# Patient Record
Sex: FEMALE | Race: WHITE | Employment: FULL TIME | URBAN - METROPOLITAN AREA
[De-identification: names, ages, dates, MRNs, and addresses within clinical notes are randomized per-mention and may not be internally consistent; named-entity substitution may affect disease eponyms.]

---

## 2021-04-02 ENCOUNTER — HOSPITAL ENCOUNTER (EMERGENCY)
Age: 46
Discharge: HOME | End: 2021-04-02
Attending: EMERGENCY MEDICINE
Payer: MEDICAID

## 2021-04-02 ENCOUNTER — APPOINTMENT (OUTPATIENT)
Dept: CT IMAGING | Age: 46
End: 2021-04-02
Attending: EMERGENCY MEDICINE
Payer: MEDICAID

## 2021-04-02 VITALS
RESPIRATION RATE: 16 BRPM | BODY MASS INDEX: 41.79 KG/M2 | DIASTOLIC BLOOD PRESSURE: 83 MMHG | SYSTOLIC BLOOD PRESSURE: 110 MMHG | WEIGHT: 221.34 LBS | HEART RATE: 77 BPM | OXYGEN SATURATION: 97 % | HEIGHT: 61 IN | TEMPERATURE: 98.2 F

## 2021-04-02 DIAGNOSIS — G51.0 BELL'S PALSY: Primary | ICD-10-CM

## 2021-04-02 PROCEDURE — 70450 CT HEAD/BRAIN W/O DYE: CPT

## 2021-04-02 PROCEDURE — 99283 EMERGENCY DEPT VISIT LOW MDM: CPT

## 2021-04-02 RX ORDER — PREDNISONE 10 MG/1
TABLET ORAL
Qty: 48 TAB | Refills: 0 | Status: SHIPPED | OUTPATIENT
Start: 2021-04-02 | End: 2021-05-11

## 2021-04-02 NOTE — ED TRIAGE NOTES
Triage Note: Patient arrives to ER complaining of eye swelling. Patient states that yesterday around 1500 that her right eye is not blinking with the left eye. Patient states that the right eye feels heavy. Patient denies any pain. Patient states that she has not done anything to relieve symptom but nothing makes it worse.

## 2021-04-02 NOTE — ED PROVIDER NOTES
HPI     Please note that this dictation was completed with [employer], the computer voice recognition software. Quite often unanticipated grammatical, syntax, homophones, and other interpretive errors are inadvertently transcribed by the computer software. Please disregard these errors. Please excuse any errors that have escaped final proofreading. 67-year-old female otherwise healthy here with right facial paralysis and eyelid paralysis noted yesterday at 3 PM.  Last known normal was yesterday morning. She was on her way to work and noticed that her right face had a droop at 3 PM.  Denies any other focal weakness of arms or legs, sensory changes, headaches, vomiting, tick bites or other complaints. No double vision. No eye pain. Social history: Non-smoker. Rare alcohol. History reviewed. No pertinent past medical history.     Past Surgical History:   Procedure Laterality Date    HX  SECTION      HX CHOLECYSTECTOMY           Family History:   Problem Relation Age of Onset    Cancer Sister         leukemia       Social History     Socioeconomic History    Marital status: SINGLE     Spouse name: Not on file    Number of children: Not on file    Years of education: Not on file    Highest education level: Not on file   Occupational History    Not on file   Social Needs    Financial resource strain: Not on file    Food insecurity     Worry: Not on file     Inability: Not on file    Transportation needs     Medical: Not on file     Non-medical: Not on file   Tobacco Use    Smoking status: Never Smoker    Smokeless tobacco: Never Used   Substance and Sexual Activity    Alcohol use: Yes     Comment: rarely    Drug use: Never    Sexual activity: Not on file   Lifestyle    Physical activity     Days per week: Not on file     Minutes per session: Not on file    Stress: Not on file   Relationships    Social connections     Talks on phone: Not on file     Gets together: Not on file Attends Sabianism service: Not on file     Active member of club or organization: Not on file     Attends meetings of clubs or organizations: Not on file     Relationship status: Not on file    Intimate partner violence     Fear of current or ex partner: Not on file     Emotionally abused: Not on file     Physically abused: Not on file     Forced sexual activity: Not on file   Other Topics Concern    Not on file   Social History Narrative    Not on file         ALLERGIES: Patient has no known allergies. Review of Systems   Constitutional: Negative for fever. Eyes: Negative for discharge and redness. Neurological: Positive for facial asymmetry. Negative for headaches. All other systems reviewed and are negative. Vitals:    04/02/21 1134   BP: (!) 153/96            Physical Exam  Vitals signs and nursing note reviewed. Constitutional:       Appearance: She is well-developed. HENT:      Head: Normocephalic and atraumatic. Mouth/Throat:      Pharynx: No oropharyngeal exudate. Eyes:      General: No scleral icterus. Right eye: No discharge. Left eye: No discharge. Conjunctiva/sclera: Conjunctivae normal.      Pupils: Pupils are equal, round, and reactive to light. Neck:      Musculoskeletal: Normal range of motion and neck supple. Cardiovascular:      Rate and Rhythm: Normal rate and regular rhythm. Heart sounds: Normal heart sounds. No murmur. No friction rub. No gallop. Pulmonary:      Effort: Pulmonary effort is normal. No respiratory distress. Breath sounds: Normal breath sounds. No wheezing or rales. Abdominal:      General: Bowel sounds are normal. There is no distension. Palpations: Abdomen is soft. Tenderness: There is no abdominal tenderness. There is no guarding. Musculoskeletal: Normal range of motion. General: No tenderness. Lymphadenopathy:      Cervical: No cervical adenopathy.    Skin:     General: Skin is warm and dry.      Coloration: Skin is not pale. Findings: No rash. Neurological:      Mental Status: She is alert and oriented to person, place, and time. Cranial Nerves: Cranial nerve deficit (cn 7 with asymetric forehead wrinking, mild right facial droop and inability to close right eyelid as forcefully but able to close it) present. Sensory: No sensory deficit. Coordination: Coordination normal.      Comments: + eom. Perrla. No arm or leg drift. mp tpmgue deviation. MDM     78-year-old female with right facial nerve palsy which involves her forehead as well. No eye symptoms of pain to suggest any cornea drying or ulcer. Will check head CT, eye care, likely steroids if head CT is fine. Exam is consistent with Bell's palsy most likely. Procedures          12:30 PM  Patient's results have been reviewed with them. Patient and/or family have verbally conveyed their understanding and agreement of the patient's signs, symptoms, diagnosis, treatment and prognosis and additionally agree to follow up as recommended or return to the Emergency Room should their condition change prior to follow-up. Discharge instructions have also been provided to the patient with some educational information regarding their diagnosis as well a list of reasons why they would want to return to the ER prior to their follow-up appointment should their condition change. No results found for this or any previous visit (from the past 24 hour(s)). Ct Head Wo Cont    Result Date: 4/2/2021  EXAM: CT HEAD WO CONT INDICATION: right facial nerve palsy COMPARISON: None. CONTRAST: None. TECHNIQUE: Unenhanced CT of the head was performed using 5 mm images. Brain and bone windows were generated. Coronal and sagittal reformats. CT dose reduction was achieved through use of a standardized protocol tailored for this examination and automatic exposure control for dose modulation.   FINDINGS: The ventricles and sulci are normal in size, shape and configuration. . There is no significant white matter disease. There is no intracranial hemorrhage, extra-axial collection, or mass effect. The basilar cisterns are open. No CT evidence of acute infarct. The bone windows demonstrate no abnormalities. The visualized portions of the paranasal sinuses and mastoid air cells are clear. No abnormalities demonstrated.

## 2021-05-11 ENCOUNTER — OFFICE VISIT (OUTPATIENT)
Dept: INTERNAL MEDICINE CLINIC | Age: 46
End: 2021-05-11
Payer: MEDICAID

## 2021-05-11 VITALS
HEIGHT: 61 IN | RESPIRATION RATE: 15 BRPM | OXYGEN SATURATION: 97 % | SYSTOLIC BLOOD PRESSURE: 129 MMHG | BODY MASS INDEX: 39.91 KG/M2 | WEIGHT: 211.38 LBS | HEART RATE: 92 BPM | TEMPERATURE: 98 F | DIASTOLIC BLOOD PRESSURE: 85 MMHG

## 2021-05-11 DIAGNOSIS — Z71.85 IMMUNIZATION COUNSELING: ICD-10-CM

## 2021-05-11 DIAGNOSIS — G51.0 RIGHT-SIDED BELL'S PALSY: Primary | ICD-10-CM

## 2021-05-11 DIAGNOSIS — Z76.89 ENCOUNTER TO ESTABLISH CARE: ICD-10-CM

## 2021-05-11 PROCEDURE — 99203 OFFICE O/P NEW LOW 30 MIN: CPT | Performed by: INTERNAL MEDICINE

## 2021-05-11 RX ORDER — LEVONORGESTREL 52 MG/1
1 INTRAUTERINE DEVICE INTRAUTERINE ONCE
COMMUNITY

## 2021-05-11 NOTE — PROGRESS NOTES
Meera Caro (: 1975) is a 41 y.o. female, new patient, here for evaluation of the following chief complaint(s):  Chief Complaint   Patient presents with    New Patient       Assessment and Plan:       ICD-10-CM ICD-9-CM    1. Right-sided Bell's palsy--resolved with prednisone management  G51.0 351.0    2. Immunization counseling  Z71.89 V65.49    3. Encounter to establish care  Z76.89 V65.8        1. Resolved without sequelae noted. 2.  Reviewed updating records here and vaccines as needed at follow-up. Follow-up and Dispositions    · Return in about 4 weeks (around 2021) for fasting labs, yearly physical.       lab results and schedule of future lab studies reviewed with patient  reviewed medications and side effects in detail    For additional documentation of information and/or recommendations discussed this visit, please see notes in instructions. Plan and evaluation (above) reviewed with pt at visit  Patient voiced understanding of plan and provided with time to ask/review questions. After Visit Summary (AVS) provided to pt after visit with additional instructions as needed/reviewed. Future Appointments   Date Time Provider Stacey Sanchez   2021  8:30 AM Hui Mallory MD [facility] BS AMB   --Updated future visits after patient check-out. On this date 2021 I have spent 42 minutes reviewing previous notes, test results and face to face with the patient discussing the diagnosis and importance of compliance with the treatment plan as well as documenting on the day of the visit. History of Present Illness:     Notes (nursing/rooming note copied below in italics):  Patient not fasting    Here to establish care. Pt has records in [address] [place] [address]. Records reviewed at visit as below:  --ED eval 21--imaging and exam c/w Bell's palsy right. Treated with prednisone 10mg dose pack  Head CT normal.  No labs done.     She notes symptoms resolved with prednisone therapy over 1.5 weeks as prescribed. She has no residual weakness, pain or sensory problems. She has no eye-related symptoms or persistent neuro symptoms currently. Was back to normal after 1.5 wks steroid--for past month. Last dose of prednisone was 21. Not using/needing eye ointment either. She has no other primary care at this time . Has gyn care through provider [facility] of [facility]. Had possible breast mass but complete imaging and normal--just yearly mammograms now. She was not fasting for labs with Gyn. She is seeing yearly. Health Maintenance Due   Topic Date Due    Hepatitis C Screening  Never done    COVID-19 Vaccine (1) Never done    PAP AKA CERVICAL CYTOLOGY  Never done       Prior eval with Dr. Susan Omer. Last labs --reviewed May-2016 labs--CMP, H8, lipids. Reviewed HM screening with pt--plan to review Gyn records and update at visit. Nursing screenings reviewed by provider at visit. Past Medical History:   Diagnosis Date    Bell's palsy      Past Surgical History:   Procedure Laterality Date    HX  SECTION      HX CHOLECYSTECTOMY          Prior to Admission medications    Medication Sig Start Date End Date Taking? Authorizing Provider   artificial tears,hypromellose, (ISOPTO TEARS) 0.3 % drop 1-2 drops right eye every 2 hours and then every 1 hour as needed 21  Yes Baldemar Shukla MD   white petrolatum (LACRILUBE) ointment 1 ribbon right eye at bedtime  Patient not taking: Reported on 2021   Baldemar Shukla MD   predniSONE [facility] DS) 10 mg dose pack Use as directed on package  Patient not taking: Reported on 2021   Baldemar Shukla MD   HYDROcodone-acetaminophen [facility]) 5-325 mg per tablet Take 1-2 Tabs by mouth every six (6) hours as needed for Pain. Max Daily Amount: 8 Tabs.   Patient not taking: Reported on 2021   Mic Tolliver MD   ondansetron (ZOFRAN ODT) 4 mg disintegrating tablet Take 1 Tab by mouth every eight (8) hours as needed for Nausea. Patient not taking: Reported on 5/11/2021 5/4/16   MD RENETTA Carney    Vitals:    05/11/21 1322   BP: 129/85   Pulse: 92   Resp: 15   Temp: 98 °F (36.7 °C)   TempSrc: Oral   SpO2: 97%   Weight: 211 lb 6 oz (95.9 kg)   Height: 5' 1\" (1.549 m)   PainSc:   0 - No pain      Body mass index is 39.94 kg/m². Physical Exam:     Physical Exam  Vitals signs and nursing note reviewed. Constitutional:       General: She is not in acute distress. Appearance: Normal appearance. She is well-developed. She is not diaphoretic. HENT:      Head: Normocephalic and atraumatic. Comments: Face symmetric. No facial droop noted. Motor 5/5 bilat. Sensory intact to light touch. No difficulty with eyelid closing or bulbar conjunctival dryness/injection/erythema noted bilat. Mouth/Throat:      Mouth: Mucous membranes are moist.   Eyes:      General: No scleral icterus. Right eye: No discharge. Left eye: No discharge. Conjunctiva/sclera: Conjunctivae normal.   Neck:      Musculoskeletal: Neck supple. No neck rigidity or muscular tenderness. Cardiovascular:      Rate and Rhythm: Normal rate and regular rhythm. Pulses: Normal pulses. Heart sounds: Normal heart sounds. No murmur. No friction rub. No gallop. Pulmonary:      Effort: Pulmonary effort is normal. No respiratory distress. Breath sounds: Normal breath sounds. No stridor. No wheezing, rhonchi or rales. Abdominal:      General: Bowel sounds are normal. There is no distension. Palpations: Abdomen is soft. Tenderness: There is no abdominal tenderness. There is no guarding. Musculoskeletal:         General: No swelling, tenderness, deformity or signs of injury. Right lower leg: No edema. Left lower leg: No edema. Lymphadenopathy:      Cervical: No cervical adenopathy. Skin:     General: Skin is warm. Coloration: Skin is not jaundiced or pale. Findings: No bruising, erythema or rash. Neurological:      General: No focal deficit present. Mental Status: She is alert. Motor: No abnormal muscle tone. Coordination: Coordination normal.      Gait: Gait normal.   Psychiatric:         Mood and Affect: Mood normal.         Behavior: Behavior normal.         Thought Content: Thought content normal.         Judgment: Judgment normal.         An electronic signature was used to authenticate this note.   -- Raymond Stapleton MD

## 2021-05-11 NOTE — PATIENT INSTRUCTIONS
1. COVID Vaccine Information as of April 2021: Locations to Receive Vaccine with [facility]: The vaccines are going to be given in locations that are separate from our clinic at [address] [facility] at this time. The sites that have opened to administer the vaccines are:  
[address] 
[address] [address], [address] [place] [address]--Saturday Only [facility], [address] [facility]--Saturday Only [address] [place], [address] [facility]) [address] [place] [address] [address] [place] [address], [address] [place] [address] Scheduling an appointment: If you would like to receive the vaccine, please call [phone] to make your appointment. There is limited availability and we will be scheduling patients in the order in which they call until our current vaccine supply is allocated. You must have an appointment in order to receive the vaccine. Clinics cannot accommodate walk-ins. Other ways to get COVID vaccines: 
[facility]:   
[email or web address] Note:  The Health Department is encouraging anyone interested in getting a COVID vaccine to register at vaccinate. [place].gov or call 724-VAX-IN-VA Thank you for allowing [facility] to be your trusted health care partner! 2. The CPT code for the HPV-9 (Human PapillomaVirus) vaccine is listed below. This is what insurance uses to identify the specific vaccine for billing: 
 
--CPT code is 81224 for the Human Papillomavirus vaccine nonavalent (9vHPV), 2 or 3 dose schedule, for intramuscular use. If covered here, you can start a 3-dose series as a nurse-only visit here, or you can do in local pharmacy if preferred.

## 2021-05-11 NOTE — PROGRESS NOTES
RM 16    Patient not fasting    Chief Complaint   Patient presents with    New Patient       1. Have you been to the ER, urgent care clinic since your last visit? Hospitalized since your last visit? Yes Reason for visit: 4/2/21, SPT, eye swelling     2. Have you seen or consulted any other health care providers outside of the [address] [place] [address] since your last visit? Include any pap smears or colon screening. No    Health Maintenance Due   Topic Date Due    Hepatitis C Screening  Never done    COVID-19 Vaccine (1) Never done    PAP AKA CERVICAL CYTOLOGY  Never done       Abuse Screening Questionnaire 5/11/2021   Do you ever feel afraid of your partner? N   Are you in a relationship with someone who physically or mentally threatens you? N   Is it safe for you to go home? Y       3 most recent PHQ Screens 5/11/2021   Little interest or pleasure in doing things Not at all   Feeling down, depressed, irritable, or hopeless Not at all   Total Score PHQ 2 0       No flowsheet data found.

## 2021-06-08 ENCOUNTER — OFFICE VISIT (OUTPATIENT)
Dept: INTERNAL MEDICINE CLINIC | Age: 46
End: 2021-06-08
Payer: MEDICAID

## 2021-06-08 VITALS
DIASTOLIC BLOOD PRESSURE: 81 MMHG | BODY MASS INDEX: 42.2 KG/M2 | WEIGHT: 223.5 LBS | HEIGHT: 61 IN | RESPIRATION RATE: 14 BRPM | OXYGEN SATURATION: 98 % | SYSTOLIC BLOOD PRESSURE: 123 MMHG | HEART RATE: 77 BPM | TEMPERATURE: 97.7 F

## 2021-06-08 DIAGNOSIS — A64 STI (SEXUALLY TRANSMITTED INFECTION): ICD-10-CM

## 2021-06-08 DIAGNOSIS — Z00.00 WELL WOMAN EXAM (NO GYNECOLOGICAL EXAM): Primary | ICD-10-CM

## 2021-06-08 LAB
ALBUMIN SERPL-MCNC: 4 G/DL (ref 3.5–5)
ALBUMIN/GLOB SERPL: 1 {RATIO} (ref 1.1–2.2)
ALP SERPL-CCNC: 71 U/L (ref 45–117)
ALT SERPL-CCNC: 21 U/L (ref 12–78)
ANION GAP SERPL CALC-SCNC: 8 MMOL/L (ref 5–15)
AST SERPL-CCNC: 14 U/L (ref 15–37)
BASOPHILS # BLD: 0.1 K/UL (ref 0–0.1)
BASOPHILS NFR BLD: 1 % (ref 0–1)
BILIRUB SERPL-MCNC: 0.5 MG/DL (ref 0.2–1)
BUN SERPL-MCNC: 9 MG/DL (ref 6–20)
BUN/CREAT SERPL: 14 (ref 12–20)
CALCIUM SERPL-MCNC: 9.1 MG/DL (ref 8.5–10.1)
CHLORIDE SERPL-SCNC: 103 MMOL/L (ref 97–108)
CHOLEST SERPL-MCNC: 198 MG/DL
CO2 SERPL-SCNC: 26 MMOL/L (ref 21–32)
CREAT SERPL-MCNC: 0.64 MG/DL (ref 0.55–1.02)
DIFFERENTIAL METHOD BLD: ABNORMAL
EOSINOPHIL # BLD: 0.1 K/UL (ref 0–0.4)
EOSINOPHIL NFR BLD: 1 % (ref 0–7)
ERYTHROCYTE [DISTWIDTH] IN BLOOD BY AUTOMATED COUNT: 14.7 % (ref 11.5–14.5)
EST. AVERAGE GLUCOSE BLD GHB EST-MCNC: 151 MG/DL
GLOBULIN SER CALC-MCNC: 4 G/DL (ref 2–4)
GLUCOSE SERPL-MCNC: 137 MG/DL (ref 65–100)
HBA1C MFR BLD: 6.9 % (ref 4–5.6)
HBV SURFACE AB SER QL: NONREACTIVE
HBV SURFACE AB SER-ACNC: <3.1 MIU/ML
HBV SURFACE AG SER QL: <0.1 INDEX
HBV SURFACE AG SER QL: NEGATIVE
HCT VFR BLD AUTO: 44.4 % (ref 35–47)
HCV AB SERPL QL IA: NONREACTIVE
HCV COMMENT,HCGAC: NORMAL
HDLC SERPL-MCNC: 43 MG/DL
HDLC SERPL: 4.6 {RATIO} (ref 0–5)
HGB BLD-MCNC: 13.5 G/DL (ref 11.5–16)
HIV 1+2 AB+HIV1 P24 AG SERPL QL IA: NONREACTIVE
HIV12 RESULT COMMENT, HHIVC: NORMAL
IMM GRANULOCYTES # BLD AUTO: 0.1 K/UL (ref 0–0.04)
IMM GRANULOCYTES NFR BLD AUTO: 1 % (ref 0–0.5)
LDLC SERPL CALC-MCNC: 117.2 MG/DL (ref 0–100)
LYMPHOCYTES # BLD: 1.9 K/UL (ref 0.8–3.5)
LYMPHOCYTES NFR BLD: 24 % (ref 12–49)
MCH RBC QN AUTO: 25.6 PG (ref 26–34)
MCHC RBC AUTO-ENTMCNC: 30.4 G/DL (ref 30–36.5)
MCV RBC AUTO: 84.3 FL (ref 80–99)
MONOCYTES # BLD: 0.4 K/UL (ref 0–1)
MONOCYTES NFR BLD: 5 % (ref 5–13)
NEUTS SEG # BLD: 5.6 K/UL (ref 1.8–8)
NEUTS SEG NFR BLD: 68 % (ref 32–75)
NRBC # BLD: 0 K/UL (ref 0–0.01)
NRBC BLD-RTO: 0 PER 100 WBC
PLATELET # BLD AUTO: 241 K/UL (ref 150–400)
PMV BLD AUTO: 11.1 FL (ref 8.9–12.9)
POTASSIUM SERPL-SCNC: 4.1 MMOL/L (ref 3.5–5.1)
PROT SERPL-MCNC: 8 G/DL (ref 6.4–8.2)
RBC # BLD AUTO: 5.27 M/UL (ref 3.8–5.2)
SODIUM SERPL-SCNC: 137 MMOL/L (ref 136–145)
TRIGL SERPL-MCNC: 189 MG/DL (ref ?–150)
VLDLC SERPL CALC-MCNC: 37.8 MG/DL
WBC # BLD AUTO: 8.1 K/UL (ref 3.6–11)

## 2021-06-08 PROCEDURE — 99396 PREV VISIT EST AGE 40-64: CPT | Performed by: INTERNAL MEDICINE

## 2021-06-08 NOTE — PATIENT INSTRUCTIONS
Hemoglobin A1c: About This Test 
What is it? An A1c test is a blood test that checks your average blood sugar level over the past 2 to 3 months. This test also is called a glycohemoglobin test or a hemoglobin A1c test. 
Why is this test done? The A1c test is one of the tests used to diagnose prediabetes and diabetes. If you have diabetes, this test is done to check how well your diabetes has been controlled over the past 2 to 3 months. Your doctor can use this information to adjust your treatment, if needed. How do you prepare for the test? 
You don't need to stop eating before you have an A1c test. This test can be done at any time during the day, even after a meal. 
How is the test done? A health professional uses a needle to take a blood sample, usually from an arm. What do the results of the test mean? The test result is usually given as a percentage. The normal A1c is less than 5.7%. You have a higher risk for diabetes if your A1c is 5.7% to 6.4%. If your level is 6.5% or higher, you have diabetes. The A1c test result also can be used to find your estimated average glucose, or eAG. Your eAG and A1c show the same thing in two different ways. They both help you learn more about your average blood sugar range over the past 2 to 3 months. A1c is shown as a percentage, while eAG uses the same units (mg/dl) as your glucose meter. Examples: · 6% A1c = 126 mg/dL · 7% A1c = 154 mg/dL · 8% A1c = 183 mg/dL · 9% A1c = 212 mg/dL · 10% A1c = 240 mg/dL · 11% A1c = 269 mg/dL · 12% A1c = 298 mg/dL Where can you learn more? Go to http://www.gray.com/ Enter U216 in the search box to learn more about \"Hemoglobin A1c: About This Test.\" Current as of: August 31, 2020               Content Version: 12.8 © 7133-0351 Healthwise, Inc[employer]. Care instructions adapted under license by "[employer]" (which disclaims liability or warranty for this information).  If you have questions about a medical condition or this instruction, always ask your healthcare professional. [address] any warranty or liability for your use of this information. Well Visit, Ages 25 to 48: Care Instructions Overview Well visits can help you stay healthy. Your doctor has checked your overall health and may have suggested ways to take good care of yourself. Your doctor also may have recommended tests. At home, you can help prevent illness with healthy eating, regular exercise, and other steps. Follow-up care is a key part of your treatment and safety. Be sure to make and go to all appointments, and call your doctor if you are having problems. It's also a good idea to know your test results and keep a list of the medicines you take. How can you care for yourself at home? · Get screening tests that you and your doctor decide on. Screening helps find diseases before any symptoms appear. · Eat healthy foods. Choose fruits, vegetables, whole grains, protein, and low-fat dairy foods. Limit fat, especially saturated fat. Reduce salt in your diet. · Limit alcohol. If you are a man, have no more than 2 drinks a day or 14 drinks a week. If you are a woman, have no more than 1 drink a day or 7 drinks a week. · Get at least 30 minutes of physical activity on most days of the week. Walking is a good choice. You also may want to do other activities, such as running, swimming, cycling, or playing tennis or team sports. Discuss any changes in your exercise program with your doctor. · Reach and stay at a healthy weight. This will lower your risk for many problems, such as obesity, diabetes, heart disease, and high blood pressure. · Do not smoke or allow others to smoke around you. If you need help quitting, talk to your doctor about stop-smoking programs and medicines. These can increase your chances of quitting for good. · Care for your mental health.  It is easy to get weighed down by worry and stress. Learn strategies to manage stress, like deep breathing and mindfulness, and stay connected with your family and community. If you find you often feel sad or hopeless, talk with your doctor. Treatment can help. · Talk to your doctor about whether you have any risk factors for sexually transmitted infections (STIs). You can help prevent STIs if you wait to have sex with a new partner (or partners) until you've each been tested for STIs. It also helps if you use condoms (male or female condoms) and if you limit your sex partners to one person who only has sex with you. Vaccines are available for some STIs, such as HPV. · Use birth control if it's important to you to prevent pregnancy. Talk with your doctor about the choices available and what might be best for you. · If you think you may have a problem with alcohol or drug use, talk to your doctor. This includes prescription medicines (such as amphetamines and opioids) and illegal drugs (such as cocaine and methamphetamine). Your doctor can help you figure out what type of treatment is best for you. · Protect your skin from too much sun. When you're outdoors from 10 a.m. to 4 p.m., stay in the shade or cover up with clothing and a hat with a wide brim. Wear sunglasses that block UV rays. Even when it's cloudy, put broad-spectrum sunscreen (SPF 30 or higher) on any exposed skin. · See a dentist one or two times a year for checkups and to have your teeth cleaned. · Wear a seat belt in the car. When should you call for help? Watch closely for changes in your health, and be sure to contact your doctor if you have any problems or symptoms that concern you. Where can you learn more? Go to http://www.[employer].com/ Enter P072 in the search box to learn more about \"Well Visit, Ages 25 to 48: Care Instructions. \" Current as of: May 27, 2020               Content Version: 12.8 © 2758-3563 Healthwise, [facility].   
Care instructions adapted under license by [employer] (which disclaims liability or warranty for this information). If you have questions about a medical condition or this instruction, always ask your healthcare professional. [address] any warranty or liability for your use of this information.

## 2021-06-08 NOTE — PROGRESS NOTES
Abhijit Maurice (: 1975) is a 41 y.o. female, established patient, here for evaluation of the following chief complaint(s):  Chief Complaint   Patient presents with    Complete Physical    Labs     Patient is fasting        Assessment and Plan:       ICD-10-CM ICD-9-CM    1. Well woman exam (no gynecological exam)  Z00.00 V70.0 CBC WITH AUTOMATED DIFF      METABOLIC PANEL, COMPREHENSIVE      HEMOGLOBIN A1C WITH EAG      LIPID PANEL   2. STI (sexually transmitted infection)  A64 099.9 HIV 1/2 AG/AB, 4TH GENERATION,W RFLX CONFIRM      HEPATITIS C AB      T PALLIDUM SCREEN W/REFLEX      HEP B SURFACE AG      HEP B SURFACE AB      CT/NG/T.VAGINALIS AMPLIFICATION      HEPATITIS B CORE AB, TOTAL       1-2:  Screenings reviewed at visit. Immunizations reviewed at visit--current/up to date. Follow-up and Dispositions    · Return in about 1 year (around 2022), or if symptoms worsen or fail to improve, for yearly physical, fasting labs. lab results and schedule of future lab studies reviewed with patient  reviewed medications and side effects in detail    Plan and evaluation (above) reviewed with pt at visit  Patient voiced understanding of plan and provided with time to ask/review questions. After Visit Summary (AVS) provided to pt after visit with additional instructions as needed/reviewed. No future appointments. --Updated future visits after patient check-out. History of Present Illness:     Notes (nursing/rooming note copied below in italics):  As above    Here for physical and fasting labs. Has gyn provider and mammogram screenings and pap current there. Release signed prior visit. Health Maintenance Due   Topic Date Due    Hepatitis C Screening  Never done    PAP AKA CERVICAL CYTOLOGY  Never done    Lipid Screen  2021     --Hep C and STI screening reviewed and updated at visit. --Pap reviewed as above.   Release prior.  --Lipid screening done today at visit. Reviewed and Tdap current. She is with same partner since  and not interested/at risk for HPV at this time. Shared decision-making for HPV prior to 46yr old reviewed with pt at visit. She has not had recommended with gyn prior either. Not recommended at this time. Updated colon cancer screening at 39yr old reviewed. Discussed since new recommendation, other professional groups have not addressed fully. She has no FH of colon cancer. Sister has leukemia and just had colonoscopy at 53yr old. Plan re-evaluate with her at next physical.      Nursing screenings reviewed by provider at visit. Past Medical History:   Diagnosis Date    Bell's palsy      Past Surgical History:   Procedure Laterality Date    HX  SECTION      HX CHOLECYSTECTOMY          Prior to Admission medications    Medication Sig Start Date End Date Taking? Authorizing Provider   levonorgestreL (Mirena) 20 mcg/24 hours (6 yrs) 52 mg IUD 1 Device by IntraUTERine route once. Yes Provider, Historical   artificial tears,hypromellose, (ISOPTO TEARS) 0.3 % drop 1-2 drops right eye every 2 hours and then every 1 hour as needed 21  Yes Jolene Salinas MD   white petrolatum (LACRILUBE) ointment 1 ribbon right eye at bedtime  Patient not taking: Reported on 2021   Jolene Salinas MD        ROS    Vitals:    21 0827   BP: 123/81   Pulse: 77   Resp: 14   Temp: 97.7 °F (36.5 °C)   TempSrc: Oral   SpO2: 98%   Weight: 223 lb 8 oz (101.4 kg)   Height: 5' 1\" (1.549 m)   PainSc:   0 - No pain      Body mass index is 42.23 kg/m². Physical Exam:     Physical Exam  Vitals and nursing note reviewed. Constitutional:       General: She is not in acute distress. Appearance: Normal appearance. She is well-developed. She is not diaphoretic. HENT:      Head: Normocephalic and atraumatic.       Right Ear: Tympanic membrane, ear canal and external ear normal.      Left Ear: Tympanic membrane, ear canal and external ear normal.      Ears:      Comments: Mild wax bilat. Mouth/Throat:      Mouth: Mucous membranes are moist.   Eyes:      General: No scleral icterus. Right eye: No discharge. Left eye: No discharge. Conjunctiva/sclera: Conjunctivae normal.   Cardiovascular:      Rate and Rhythm: Normal rate and regular rhythm. Pulses: Normal pulses. Heart sounds: Normal heart sounds. No murmur heard. No friction rub. No gallop. Pulmonary:      Effort: Pulmonary effort is normal. No respiratory distress. Breath sounds: Normal breath sounds. No stridor. No wheezing, rhonchi or rales. Abdominal:      General: Bowel sounds are normal. There is no distension. Palpations: Abdomen is soft. Tenderness: There is no abdominal tenderness. There is no guarding. Musculoskeletal:         General: No swelling, tenderness, deformity or signs of injury. Cervical back: Neck supple. No rigidity. No muscular tenderness. Right lower leg: No edema. Left lower leg: No edema. Lymphadenopathy:      Cervical: No cervical adenopathy. Skin:     General: Skin is warm. Coloration: Skin is not jaundiced or pale. Findings: No bruising, erythema or rash. Neurological:      General: No focal deficit present. Mental Status: She is alert. Motor: No abnormal muscle tone. Coordination: Coordination normal.      Gait: Gait normal.   Psychiatric:         Mood and Affect: Mood normal.         Behavior: Behavior normal.         Thought Content: Thought content normal.         Judgment: Judgment normal.         An electronic signature was used to authenticate this note.   -- Komal Denney MD

## 2021-06-08 NOTE — PROGRESS NOTES
RM 17    Chief Complaint   Patient presents with    Complete Physical    Labs     Patient is fasting        1. Have you been to the ER, urgent care clinic since your last visit? Hospitalized since your last visit? No    2. Have you seen or consulted any other health care providers outside of the [address] [place] [address] since your last visit? Include any pap smears or colon screening. No    Health Maintenance Due   Topic Date Due    Hepatitis C Screening  Never done    PAP AKA CERVICAL CYTOLOGY  Never done    Lipid Screen  06/13/2021       Abuse Screening Questionnaire 6/8/2021   Do you ever feel afraid of your partner? N   Are you in a relationship with someone who physically or mentally threatens you? N   Is it safe for you to go home? Y       3 most recent PHQ Screens 6/8/2021   Little interest or pleasure in doing things Not at all   Feeling down, depressed, irritable, or hopeless Not at all   Total Score PHQ 2 0       No flowsheet data found.

## 2021-06-09 LAB
HBV CORE AB SERPL QL IA: NEGATIVE
T PALLIDUM AB SER QL IA: NON REACTIVE

## 2021-06-10 LAB
C TRACH RRNA SPEC QL NAA+PROBE: NEGATIVE
N GONORRHOEA RRNA SPEC QL NAA+PROBE: NEGATIVE
SPECIMEN SOURCE: NORMAL
T VAGINALIS RRNA VAG QL NAA+PROBE: NEGATIVE

## 2021-09-15 ENCOUNTER — NURSE NAVIGATOR (OUTPATIENT)
Dept: SURGERY | Age: 46
End: 2021-09-15

## 2021-09-15 NOTE — PROGRESS NOTES
[address]  Breast Navigator Encounter    Name:    Jono Puente  Age:    40 y.o. Diagnosis:     RIGHT breast cancer    Interdisciplinary Team:  Med-Onc:    Surg-Onc:    Dr. Louise Elslworth:    Plastics:    :     Nurse Navigator:  Gamal San, RN, BSN, [facility]      Encounter type:  []Patient Initiated  []Navigator Follow-up []Pre-op  []Post-op  []Check-in Prior to First Treatment []Treatment Modality Change  []Survivorship Transition [x]Other:   Initial Navigator Encounter    Narrative: Attempted to reach out to patient prior to her appointment next week. She was not available, so I left a message asking her to call me back at her convenience.             Gamal San RN, BSN, [facility]  Oncology Breast Navigator     [address]  [address] [place] [address].  W: [phone]  F: [phone]  [email or web address]  Good Help to Those in [facility]

## 2021-09-16 ENCOUNTER — NURSE NAVIGATOR (OUTPATIENT)
Dept: SURGERY | Age: 46
End: 2021-09-16

## 2021-09-16 DIAGNOSIS — Z17.0 MALIGNANT NEOPLASM OF RIGHT BREAST IN FEMALE, ESTROGEN RECEPTOR POSITIVE, UNSPECIFIED SITE OF BREAST (HCC): Primary | ICD-10-CM

## 2021-09-16 DIAGNOSIS — C50.911 MALIGNANT NEOPLASM OF RIGHT BREAST IN FEMALE, ESTROGEN RECEPTOR POSITIVE, UNSPECIFIED SITE OF BREAST (HCC): Primary | ICD-10-CM

## 2021-09-16 NOTE — PROGRESS NOTES
[employer]  Breast Navigator Encounter    Name:    Hardeep Haley  Age:    40 y.o. Diagnosis:    RIGHT breast cancer    Interdisciplinary Team:  Med-Onc:    Surg-Onc:    Dr. Dorantes Loose:    Plastics:    :     Nurse Navigator:  Gaby Gaviria RN, BSN, [facility]      Encounter type:  []Patient Initiated  []Navigator Follow-up []Pre-op  []Post-op  []Check-in Prior to First Treatment []Treatment Modality Change  []Survivorship Transition [x]Other:   Initial Navigator Encounter     Narrative:    Patient returned my call for initial navigator contact. I explained what happens at the first consultation - an exam by the physician, a possible ultrasound, then complete discussion of surgical options and other treatment that may be considered. I encouraged the patient to bring  someone with her to this appointment since there is a lot of information that will be covered. She will be bringing a family member. Discussed breast MRI, and she is willing to go ahead and get this scheduled. Since we have a short window of time, I had already reached out to the imaging navigator, Renetta Holloway, and she had an appointment tomorrow at [facility] at 8:30 am for a breast MRI with arrival time of 8:00 am.  The patient has to go to work at 11:00, but she is able to make that appointment. Reminded her to take her disc with images from [facility] with her when she goes to the appointment tomorrow, and she will do this. I am going to try to meet her at the office next Monday when she is there to see Dr. Rossana Kong. Provided the patient with my contact information and discussed my role in her care. Will continue to follow patient throughout  the care continuum.               Gaby Gaviria RN, BSN, [facility]  Oncology Breast Navigator     [address]  [address] [facility] [address]  W: [phone]  F: [phone]  [email or web address]  Good Help to Those in [facility]

## 2021-09-17 ENCOUNTER — HOSPITAL ENCOUNTER (OUTPATIENT)
Dept: MRI IMAGING | Age: 46
Discharge: HOME | End: 2021-09-17
Attending: SURGERY
Payer: MEDICAID

## 2021-09-17 DIAGNOSIS — Z17.0 MALIGNANT NEOPLASM OF RIGHT BREAST IN FEMALE, ESTROGEN RECEPTOR POSITIVE, UNSPECIFIED SITE OF BREAST (HCC): ICD-10-CM

## 2021-09-17 DIAGNOSIS — C50.911 MALIGNANT NEOPLASM OF RIGHT BREAST IN FEMALE, ESTROGEN RECEPTOR POSITIVE, UNSPECIFIED SITE OF BREAST (HCC): ICD-10-CM

## 2021-09-17 PROCEDURE — 74011250636 HC RX REV CODE- 250/636

## 2021-09-17 PROCEDURE — 77049 MRI BREAST C-+ W/CAD BI: CPT

## 2021-09-17 PROCEDURE — 74011000258 HC RX REV CODE- 258: Performed by: SURGERY

## 2021-09-17 PROCEDURE — A9585 GADOBUTROL INJECTION: HCPCS

## 2021-09-17 RX ADMIN — GADOBUTROL 10 ML: 604.72 INJECTION INTRAVENOUS at 09:00

## 2021-09-17 RX ADMIN — SODIUM CHLORIDE 100 ML: 900 INJECTION, SOLUTION INTRAVENOUS at 09:00

## 2021-09-20 ENCOUNTER — OFFICE VISIT (OUTPATIENT)
Dept: SURGERY | Age: 46
End: 2021-09-20
Payer: MEDICAID

## 2021-09-20 ENCOUNTER — DOCUMENTATION ONLY (OUTPATIENT)
Dept: SURGERY | Age: 46
End: 2021-09-20

## 2021-09-20 ENCOUNTER — NURSE NAVIGATOR (OUTPATIENT)
Dept: CASE MANAGEMENT | Age: 46
End: 2021-09-20

## 2021-09-20 VITALS — WEIGHT: 220 LBS | BODY MASS INDEX: 41.54 KG/M2 | HEIGHT: 61 IN

## 2021-09-20 DIAGNOSIS — C50.411 MALIGNANT NEOPLASM OF UPPER-OUTER QUADRANT OF RIGHT FEMALE BREAST, UNSPECIFIED ESTROGEN RECEPTOR STATUS (HCC): Primary | ICD-10-CM

## 2021-09-20 PROCEDURE — 76642 ULTRASOUND BREAST LIMITED: CPT | Performed by: SURGERY

## 2021-09-20 PROCEDURE — 99205 OFFICE O/P NEW HI 60 MIN: CPT | Performed by: SURGERY

## 2021-09-20 NOTE — PATIENT INSTRUCTIONS
Breast Cancer: Care Instructions  Your Care Instructions     Breast cancer occurs when abnormal cells grow out of control in the breast. These cancer cells can spread within the breast, to nearby lymph nodes and other tissues, and to other parts of the body. Being treated for cancer can weaken your body, and you may feel very tired. Get the rest your body needs so you can feel better. Finding out that you have cancer is scary. You may feel many emotions and may need some help coping. Seek out family, friends, and counselors for support. You also can do things at home to make yourself feel better while you go through treatment. Call the [employer] (7-819.666.7259) or visit its website at [employer] for more information. Follow-up care is a key part of your treatment and safety. Be sure to make and go to all appointments, and call your doctor if you are having problems. It's also a good idea to know your test results and keep a list of the medicines you take. How can you care for yourself at home? · Take your medicines exactly as prescribed. Call your doctor if you think you are having a problem with your medicine. You may get medicine for nausea and vomiting if you have these side effects. · Follow your doctor's instructions to relieve pain. Pain from cancer and surgery can almost always be controlled. Use pain medicine when you first notice pain, before it becomes severe. · Eat healthy food. If you do not feel like eating, try to eat food that has protein and extra calories to keep up your strength and prevent weight loss. Drink liquid meal replacements for extra calories and protein. Try to eat your main meal early. · Get some physical activity every day, but do not get too tired. Keep doing the hobbies you enjoy as your energy allows. · Do not smoke. Smoking can make your cancer worse. If you need help quitting, talk to your doctor about stop-smoking programs and medicines.  These can increase your chances of quitting for good. · Take steps to control your stress and workload. Learn relaxation techniques. ? Share your feelings. Stress and tension affect our emotions. By expressing your feelings to others, you may be able to understand and cope with them. ? Consider joining a support group. Talking about a problem with your spouse, a good friend, or other people with similar problems is a good way to reduce tension and stress. ? Express yourself through art. Try writing, crafts, dance, or art to relieve stress. Some dance, writing, or art groups may be available just for people who have cancer. ? Be kind to your body and mind. Getting enough sleep, eating a healthy diet, and taking time to do things you enjoy can contribute to an overall feeling of balance in your life and can help reduce stress. ? Get help if you need it. Discuss your concerns with your doctor or counselor. · If you are vomiting or have diarrhea:  ? Drink plenty of fluids to prevent dehydration. Choose water and other clear liquids. If you have kidney, heart, or liver disease and have to limit fluids, talk with your doctor before you increase the amount of fluids you drink. ? When you are able to eat, try clear soups, mild foods, and liquids until all symptoms are gone for 12 to 48 hours. Other good choices include dry toast, crackers, cooked cereal, and gelatin dessert, such as Jell-O.  · If you have not already done so, prepare a list of advance directives. Advance directives are instructions to your doctor and family members about what kind of care you want if you become unable to speak or express yourself. When should you call for help? Call 911 anytime you think you may need emergency care. For example, call if:    · You passed out (lost consciousness).    Call your doctor now or seek immediate medical care if:    · You have a fever.     · You have abnormal bleeding.     · You think you have an infection.     · You have new or worse pain.     · You have new symptoms, such as a cough, belly pain, vomiting, diarrhea, or a rash. Watch closely for changes in your health, and be sure to contact your doctor if:    · You are much more tired than usual.     · You have swollen glands in your armpits, groin, or neck.     · You do not get better as expected. Where can you learn more? Go to http://www.smith.com/  Enter V321 in the search box to learn more about \"Breast Cancer: Care Instructions. \"  Current as of: December 17, 2020               Content Version: 13.0  © 3233-9128 [employer]. Care instructions adapted under license by [employer] (which disclaims liability or warranty for this information). If you have questions about a medical condition or this instruction, always ask your healthcare professional. [address] any warranty or liability for your use of this information.

## 2021-09-20 NOTE — PROGRESS NOTES
Completed TRF on [employer] portal for genetic testing (Breast Next Expanded). Insurance card emailed to [employer]. Patient has saliva kit.

## 2021-09-20 NOTE — PROGRESS NOTES
HISTORY OF PRESENT ILLNESS  Josefina Mcintosh is a 40 y.o. female. HPI NEW patient consult referred by  Dr. Amisha Osorio for RIGHT breast invasive ductal carcinoma. Found on imaging. She is having pain in the RIGHT breast area once she takes her bra off. Denies change. Biopsy 21 RIGHT breast IDC grade 3 suspicious LVI   ER 96, ND 98, Ki-67 32, Her2 Positive     Family History: None    Breast imaging-   Mammogram VWC 21, BI-RADS 4 linear calcs right breast   MRI 21      Past Medical History:   Diagnosis Date    Bell's palsy      Past Surgical History:   Procedure Laterality Date    HX  SECTION      HX CHOLECYSTECTOMY       Social History     Socioeconomic History    Marital status: SINGLE     Spouse name: Not on file    Number of children: Not on file    Years of education: Not on file    Highest education level: Not on file   Occupational History    Not on file   Tobacco Use    Smoking status: Never Smoker    Smokeless tobacco: Never Used   Substance and Sexual Activity    Alcohol use: Yes     Comment: rarely    Drug use: Never    Sexual activity: Yes     Partners: Male     Birth control/protection: I.U.D. Comment: Mirena IUD   Other Topics Concern    Not on file   Social History Narrative    Not on file     Social Determinants of Health     Financial Resource Strain:     Difficulty of Paying Living Expenses:    Food Insecurity:     Worried About Running Out of Food in the Last Year:     [address] in the Last Year:    Transportation Needs:     Lack of Transportation (Medical):      Lack of Transportation (Non-Medical):    Physical Activity:     Days of Exercise per Week:     Minutes of Exercise per Session:    Stress:     Feeling of Stress :    Social Connections:     Frequency of Communication with Friends and Family:     Frequency of Social Gatherings with Friends and Family:     Attends Gnosticist Services:     Active Member of Clubs or Organizations:     Attends Club or Organization Meetings:     Marital Status:    Intimate Partner Violence:     Fear of Current or Ex-Partner:     Emotionally Abused:     Physically Abused:     Sexually Abused:      OB History    No obstetric history on file. Obstetric Comments   Menarche 16, LMP 46, # of children 2, age of 1st delivery 2013, Hysterectomy/oophorectomy No/No, Breast bx Yes, history of breast feeding No, BCP No, Hormone therapy No             Current Outpatient Medications:     levonorgestreL (Mirena) 20 mcg/24 hours (6 yrs) 52 mg IUD, 1 Device by IntraUTERine route once., Disp: , Rfl:     artificial tears,hypromellose, (ISOPTO TEARS) 0.3 % drop, 1-2 drops right eye every 2 hours and then every 1 hour as needed, Disp: 30 mL, Rfl: 0    white petrolatum (LACRILUBE) ointment, 1 ribbon right eye at bedtime (Patient not taking: Reported on 5/11/2021), Disp: 3.5 g, Rfl: 0  No Known Allergies      Review of Systems   All other systems reviewed and are negative. Physical Exam  Vitals and nursing note reviewed. Chest:      Breasts: Breasts are symmetrical.         Right: No inverted nipple, mass, nipple discharge, skin change or tenderness. Left: No inverted nipple, mass, nipple discharge, skin change or tenderness. Lymphadenopathy:      Cervical: No cervical adenopathy. Upper Body:      Right upper body: No supraclavicular, axillary or pectoral adenopathy. Left upper body: No supraclavicular, axillary or pectoral adenopathy. BREAST ULTRASOUND, Preop planning  Indication:preop planning  right Breast 9:00    Technique:   The area was scanned using a high-frequency linear-array near-field transducer  Findings: clip not seen  Impression: Biopsy site not visible with ultrasound  Disposition:  Will schedule breast mri and refer to med onc    ASSESSMENT and PLAN    ICD-10-CM ICD-9-CM    1. Malignant neoplasm of upper-outer quadrant of right female breast, unspecified estrogen receptor status (HCC)  C50.411 174.4      Right breast cancer Biopsy 9-9-21 RIGHT breast IDC grade 3 suspicious LVI   ER 96, AL 98, Ki-67 32, Her2 Positive with DCIS  T1 N0  She is here with her . 90 minutes were spent face-to-face with the patient during this encounter and 90% of that time was spent on counseling and coordination of care. 1. Discussed lumpectomy and radiation vs mastectomy. Discussed reconstruction. MRI ordered to see if patient is a candidate for a lumpectomy. 2. Discussed sentinel lymph node biopsy. 3. Discussed external beam radiation. 4. Discussed hormone therapy. 5. Discussed the possibility of chemotherapy. Will need her 2 blockade and chemo. Neoadjuvant vs adjuvant dependent on mri  Refer to med onc  6. Genetic testing    I will call her after breast mri and refer to medical oncology. May need neoadjuvant.

## 2021-09-22 ENCOUNTER — NURSE NAVIGATOR (OUTPATIENT)
Dept: CASE MANAGEMENT | Age: 46
End: 2021-09-22

## 2021-09-22 NOTE — PROGRESS NOTES
[address]  Breast Navigator Encounter    Name:    Heena Baez  Age:    40 y.o. Diagnosis:   RIGHT breast cancer    Interdisciplinary Team:  Med-Onc:    Dr. Nicole Nick  Surg-Onc:    Dr. Branch Nap:    Plastics:    :     Nurse Navigator:  Negin Dewitt RN, BSN, [facility]      Encounter type:  [x]Patient Initiated  []Navigator Follow-up []Pre-op  []Post-op  []Check-in Prior to First Treatment []Treatment Modality Change  []Survivorship Transition []Other:       Narrative:    Can't go to her appt with Dr. Nicole Nick on Monday. Her fiance will be out of town. She has two small children. I provided her the number for Dr. Nicole Nick and asked her to call her office to change the appt. I encouraged her to get in next week (has HER-2+ cancer).             Negin Dewitt RN, BSN, [facility]  Oncology Breast Navigator     [facility] [address]  [address] [place], [place] [address]  [address].  W: [phone]  F: [phone]  [email or web address]  Good Help to Those in [facility]

## 2021-09-30 ENCOUNTER — DOCUMENTATION ONLY (OUTPATIENT)
Dept: ONCOLOGY | Age: 46
End: 2021-09-30

## 2021-09-30 ENCOUNTER — OFFICE VISIT (OUTPATIENT)
Dept: ONCOLOGY | Age: 46
End: 2021-09-30
Payer: MEDICAID

## 2021-09-30 VITALS
HEART RATE: 83 BPM | OXYGEN SATURATION: 96 % | BODY MASS INDEX: 41.65 KG/M2 | WEIGHT: 220.6 LBS | TEMPERATURE: 97.2 F | DIASTOLIC BLOOD PRESSURE: 79 MMHG | HEIGHT: 61 IN | SYSTOLIC BLOOD PRESSURE: 114 MMHG

## 2021-09-30 DIAGNOSIS — C50.911 MALIGNANT NEOPLASM OF RIGHT BREAST IN FEMALE, ESTROGEN RECEPTOR POSITIVE, UNSPECIFIED SITE OF BREAST (HCC): Primary | ICD-10-CM

## 2021-09-30 DIAGNOSIS — C50.411 MALIGNANT NEOPLASM OF UPPER-OUTER QUADRANT OF RIGHT FEMALE BREAST, UNSPECIFIED ESTROGEN RECEPTOR STATUS (HCC): Primary | ICD-10-CM

## 2021-09-30 DIAGNOSIS — Z17.0 MALIGNANT NEOPLASM OF RIGHT BREAST IN FEMALE, ESTROGEN RECEPTOR POSITIVE, UNSPECIFIED SITE OF BREAST (HCC): Primary | ICD-10-CM

## 2021-09-30 PROCEDURE — 99205 OFFICE O/P NEW HI 60 MIN: CPT | Performed by: INTERNAL MEDICINE

## 2021-09-30 NOTE — PROGRESS NOTES
Pharmacy Note- Chemotherapy Education    Leroy Cuenca is a  40 y. o.female  diagnosed with breast cancer here today for chemotherapy counseling and consent. Ms. Abdirahman Steele is being treated with TCHP. Provided education on DOCEtaxel, CARBOplatin, trastuzumab, pertuzumab and premedications - fosaprepitant, palonosetron and dexamethasone. Reviewed pegfilgrastim. Reviewed IV vs. SQ trastuzumab and pertuzumab - patient was unsure of which she wanted. Side effects of chemotherapy reviewed included s/s infection, anemia, appetite changes, thromboyctopenia, fatigue, hair loss/alopecia, bone pain, skin and nail changes, diarrhea/constipation, infusion reactions/fluid retention, peripheral neuropathy, kidney changes and cardiac toxicity. Patient given ways to manage these side effects and when to contact office. [address] Handout of medications provided to patient. Ms. Abdirahman Steele verbalized understanding of the information presented and all of the patient's questions were answered.     Zbigniew Feliciano, PharmD, BCPS, BCOP    For Pharmacy Admin Tracking Only     CPA in place: No   Recommendation Provided To: Patient/Caregiver: 1 via In person     Intervention Accepted By: Patient/Caregiver: 1   Time Spent (min): 30

## 2021-09-30 NOTE — PROGRESS NOTES
[facility] at [address], [address], [address] [place]: [phone]  F: [phone]    Reason for Visit:   Becca Hendricks is a 40 y.o. female who is seen in consultation at the request of Dr. Kody Castillo for evaluation of breast cancer. Treatment History:   RIGHT Breast biopsy 21    STAGE: clinical 2 ER+ HER2+    History of Present Illness:     Pt seen today for office consult for new RIGHT breast cancer ER/IN+ Her2 3+ post biopsy only 21. Initially had abnormal mammo at [address] with calcifications on RIGHT. No mass reported/ dense breast tissue. Had biopsy done and IDC grade 3 ER/IN strong + Her2 3+. Breast MRI:   IMPRESSION  RIGHT BREAST:  1. There is a biopsy clip in the lower, outer quadrant of the right breast,  junction of the middle and deep thirds. There is linear, branching enhancement  which extends anteriorly from the biopsy clip over approximately 3.6 cm. BI-RADS Category 6 - Known biopsy-proven malignancy. LEFT BREAST:  1. BI-RADS Category 2 - Benign findings. Seen today for systemic therapy options given Her2+. Here with sister. Genetic testing pending. Pt works as a . No medical problems. Feels well overall today. No fevers/ chills/ chest pain/ SOB/ nausea/ vomiting/diarrhea/ pain/fatigue    Family History: aunt cancer  Sister leukemia        Past Medical History:   Diagnosis Date    Bell's palsy       Past Surgical History:   Procedure Laterality Date    HX  SECTION      HX CHOLECYSTECTOMY        Social History     Tobacco Use    Smoking status: Never Smoker    Smokeless tobacco: Never Used   Substance Use Topics    Alcohol use: Yes     Comment: rarely      Family History   Problem Relation Age of Onset    Cancer Sister         leukemia     Current Outpatient Medications   Medication Sig    levonorgestreL (Mirena) 20 mcg/24 hours (6 yrs) 52 mg IUD 1 Device by IntraUTERine route once.     artificial tears,hypromellose, (ISOPTO TEARS) 0.3 % drop 1-2 drops right eye every 2 hours and then every 1 hour as needed    white petrolatum (LACRILUBE) ointment 1 ribbon right eye at bedtime (Patient not taking: Reported on 5/11/2021)     No current facility-administered medications for this visit. No Known Allergies     A complete review of systems was obtained, negative except as described above and as reported on ROS sheet scanned into system. Physical Exam:     Visit Vitals  /79 (BP 1 Location: Left upper arm, BP Patient Position: Sitting)   Pulse 83   Temp 97.2 °F (36.2 °C) (Temporal)   Ht 5' 1\" (1.549 m)   Wt 220 lb 9.6 oz (100.1 kg)   SpO2 96%   BMI 41.68 kg/m²     ECOG PS: 0  General: No distress  Eyes: PERRLA, anicteric sclerae  HENT: Atraumatic, wearing mask  Neck: Supple  Respiratory: CTAB, normal respiratory effort  CV: Normal rate, regular rhythm, no murmurs, no peripheral edema  GI: Soft, nontender, nondistended, no masses  MS: Normal gait and station. Digits without clubbing or cyanosis. Skin: No rashes, ecchymoses, or petechiae. Normal temperature, turgor, and texture. Psych: Alert, oriented, appropriate affect, normal judgment/insight  Neuro non focal  Breast no masses palpable b/l, specifically no mass appreciated RIGHT breast, no axillary LAD    Results:     Lab Results   Component Value Date/Time    WBC 8.1 06/08/2021 09:26 AM    HGB 13.5 06/08/2021 09:26 AM    HCT 44.4 06/08/2021 09:26 AM    PLATELET 684 18/07/2466 09:26 AM    MCV 84.3 06/08/2021 09:26 AM    ABS.  NEUTROPHILS 5.6 06/08/2021 09:26 AM     Lab Results   Component Value Date/Time    Sodium 137 06/08/2021 09:26 AM    Potassium 4.1 06/08/2021 09:26 AM    Chloride 103 06/08/2021 09:26 AM    CO2 26 06/08/2021 09:26 AM    Glucose 137 (H) 06/08/2021 09:26 AM    BUN 9 06/08/2021 09:26 AM    Creatinine 0.64 06/08/2021 09:26 AM    GFR est AA >60 06/08/2021 09:26 AM    GFR est non-AA >60 06/08/2021 09:26 AM    Calcium 9.1 06/08/2021 09:26 AM     Lab Results   Component Value Date/Time    Bilirubin, total 0.5 06/08/2021 09:26 AM    ALT (SGPT) 21 06/08/2021 09:26 AM    Alk. phosphatase 71 06/08/2021 09:26 AM    Protein, total 8.0 06/08/2021 09:26 AM    Albumin 4.0 06/08/2021 09:26 AM    Globulin 4.0 06/08/2021 09:26 AM         Records reviewed and summarized above. Pathology report(s) reviewed above. Radiology report(s) reviewed above. Assessment:/PLAN     1)  Clinical stage 2 (based on MRI) RIGHT breast cancer ER+ Her2+   post biopsy breast only at [facility] 9/9/21  Records and history reviewed with pt today. Reviewed path and data specifically with pt. Discussed dx, stage and treatment of breast cancer. Breast MRI with 3.6 cm enhancement. No mass palpable on exam.   Discussed systemic therapy today with neoadjuvant vs adjuvant chemo and hormonal therapy options. Pt prefers lumpectomy. Discussed possible neoadjuvant chemo based on size of mass on MRI. D/w breast surgery today. Will re check MRI / size of mass. Could do chemo with TCHP. Reviewed this chemo overall today. Will do prechemo ECHO. Pt would want cold cap for chemo. Pt and sister want to consider options today. Pt is healthy overall. Will fu with us in a week. 2) premenopausal.   Has two kids and does not want more kids. Has mirena. 3) Psychosocial.    Vaccinated for Ryan. Works as a . SW support today. Fu here in a week  Call if questions    I appreciate the opportunity to participate in Ms. Susan Rob's care.     Signed By: Cain Castaneda DO

## 2021-09-30 NOTE — PROGRESS NOTES
Oncology Social Work  Psychosocial Assessment    Reason for Assessment:      [] Social Work Referral [x] Initial Assessment [] New Diagnosis [] Other    Advance Care [address]   Patient's [address] is: Unable to obtain   Confirm Advance Directive None   Patient does not have an advanced medical directive, and did not express interest in completing one today. Sources of Information:    [x]Patient  [x]Family  []Staff  []Medical Record    Mental Status:    [x]Alert  []Lethargic  []Unresponsive   [] Unable to assess   Oriented to:  [x]Person  [x]Place  [x]Time  [x]Situation      Relationship Status:  []Single  []  [x]Significant Other/Life Partner  []  []  []    Living Circumstances:  []Lives Alone  [x]Family/Significant Other in Household  []Roommates  []Children in the Home  []Paid Caregivers  []Assisted Living Facility/Group Home  []Skilled [address]  []Homeless  []Incarcerated  []Environmental/Care Concerns  []Other:    Employment Status:  []Employed Full-time [x]Employed Part-time []Homemaker  [] Retired [] Short-Term Disability [] [facility]  [] Unemployed     Barriers to Learning:    []Language  []Developmental  []Cognitive  []Altered Mental Status  []Visual/Hearing Impairment  []Unable to Read/Write  []Motivational  [] Challenges Understanding Medical Jargon [x]No Barriers Identified      Financial/Legal Concerns:    []Uninsured  [x]Limited Income/Resources  []Non-Citizen  []Food Insecurity []No Concerns Identified   []Other:    Gnosticism/Spiritual/Existential:  Does patient have any spiritual or Synagogue beliefs? [] Yes [] No  Is the patient involved in a spiritual, tejal or Synagogue community? [] Yes [] No  Patient expressing spiritual/existential angst? [] Yes [x] No  Notes: Patient did not express any spiritual or Synagogue preferences today.       Support System:    Identified Support Person/Group:  [x]Strong []Fair  []Limited    Coping with Illness:   [x]  Coping Well  [] Challenges Coping with Serious Illness [] Situational Depression [] Situational Anxiety [] Anticipatory Grief  [] Recent Loss [] Caregiver [place]            Narrative:   Met with the patient during her initial office visit, along with her sister Harry Jordan). The patient is being seen for breast cancer. The patient has a PCP - Dr. Blythe Dancer. Living Situation - The patient lives with her fikannan Malave, and two daughters ages 10& 9years of age. The patient does not use any DME and is independent with all ADLs and IADLs. Employment Status - The patient has worked at [employer] as a [employer] for 26 years. She is currently part-time, and does not have FMLA or Short Term Disability available. Insurance - [address] [place] [address]    Social Support - The patient has good support from her fiance, children, and sister. She explained that their mother  6 years ago in her sleep. Their father  in January from [employer]. They do not have any other siblings. The patient has good support from her coworkers, and employer, as well. Resources provided -  The patient has not yet told her children of her diagnosis. Provided a list of websites and age appropriate books that will facilitate conversation regarding cancer. Patient is interested in the [employer]. Provided education regarding this system, along with pricing information. Explained that if she is interested, nursing would later determine her cap size, and complete paperwork at her next office visit. Explained that more information can be obtained by visiting [employer] and watching educational videos, or by calling [facility] directly at (P#[phone]). Provided the patient with a list of places to obtain wigs, head scarves, mastectomy bras, prosthesis, and other accessories.       Invited the patient to the upcoming [employer] Support Group meetings, led by this . Provided this 's contact information as well as information regarding the complementary services provided by the [facility], explaining that the center is currently closed due to COVID-19 restrictions. Explained that meditation, yoga, relationship counseling, and music therapy, are being offered virtually. Plan:   1. Introduced self and role of this  in the [address] 2.  Informed the patient of the [facility] and available resources there. 3.  Continue to meet with the patient when she returns to the clinic for ongoing assessment of the patients adjustment to her diagnosis and treatment. 4.  Ongoing psychosocial support as desired by patient.       Referral:   Complementary therapies referral  Support Groups referral  DME/WIANTONIO referral  [facility] Cold Cap referral   Daisy Titus LCSW

## 2021-09-30 NOTE — PROGRESS NOTES
Braden Valles is a 40 y.o. female  Chief Complaint   Patient presents with    New Patient    Breast Cancer     1. Have you been to the ER, urgent care clinic since your last visit? Hospitalized since your last visit? No.    2. Have you seen or consulted any other health care providers outside of the [address] [place] [address] since your last visit? Include any pap smears or colon screening.  No.

## 2021-10-04 ENCOUNTER — TELEPHONE (OUTPATIENT)
Dept: ONCOLOGY | Age: 46
End: 2021-10-04

## 2021-10-04 NOTE — TELEPHONE ENCOUNTER
Called patient to schedule a follow up this week. Left message asking for a call back     Pt called back 10/5 and scheduled for 10/8.

## 2021-10-08 ENCOUNTER — DOCUMENTATION ONLY (OUTPATIENT)
Dept: ONCOLOGY | Age: 46
End: 2021-10-08

## 2021-10-08 ENCOUNTER — OFFICE VISIT (OUTPATIENT)
Dept: ONCOLOGY | Age: 46
End: 2021-10-08
Payer: MEDICAID

## 2021-10-08 VITALS
DIASTOLIC BLOOD PRESSURE: 83 MMHG | OXYGEN SATURATION: 98 % | SYSTOLIC BLOOD PRESSURE: 120 MMHG | HEART RATE: 87 BPM | HEIGHT: 61 IN | TEMPERATURE: 96.8 F | WEIGHT: 218.8 LBS | BODY MASS INDEX: 41.31 KG/M2

## 2021-10-08 DIAGNOSIS — C50.911 MALIGNANT NEOPLASM OF RIGHT BREAST IN FEMALE, ESTROGEN RECEPTOR POSITIVE, UNSPECIFIED SITE OF BREAST (HCC): Primary | ICD-10-CM

## 2021-10-08 DIAGNOSIS — Z17.0 MALIGNANT NEOPLASM OF RIGHT BREAST IN FEMALE, ESTROGEN RECEPTOR POSITIVE, UNSPECIFIED SITE OF BREAST (HCC): Primary | ICD-10-CM

## 2021-10-08 PROCEDURE — 99214 OFFICE O/P EST MOD 30 MIN: CPT | Performed by: INTERNAL MEDICINE

## 2021-10-08 RX ORDER — DEXAMETHASONE 4 MG/1
TABLET ORAL
Qty: 48 TABLET | Refills: 0 | Status: ON HOLD | OUTPATIENT
Start: 2021-10-08 | End: 2022-03-03

## 2021-10-08 RX ORDER — ONDANSETRON 4 MG/1
4-8 TABLET, ORALLY DISINTEGRATING ORAL
Qty: 60 TABLET | Refills: 1 | Status: ON HOLD | OUTPATIENT
Start: 2021-10-08 | End: 2022-03-03

## 2021-10-08 RX ORDER — PROCHLORPERAZINE MALEATE 5 MG
TABLET ORAL
Qty: 30 TABLET | Refills: 1 | Status: SHIPPED | OUTPATIENT
Start: 2021-10-08 | End: 2022-04-07

## 2021-10-08 RX ORDER — LIDOCAINE AND PRILOCAINE 25; 25 MG/G; MG/G
CREAM TOPICAL AS NEEDED
Qty: 30 G | Refills: 0 | Status: ON HOLD | OUTPATIENT
Start: 2021-10-08 | End: 2022-03-03

## 2021-10-08 NOTE — PROGRESS NOTES
[facility] at [address], [address] [place]: [phone]  F: [phone]    Reason for Visit:   Shraddha Steele is a 40 y.o. female who is seen for fu of breast cancer. Treatment History:   RIGHT Breast biopsy 21    STAGE: clinical 2 ER+ HER2+    History of Present Illness:     Pt seen today for office fu of breast cancer ER+ Her2 + post biopsy only 21 here again for discussion of systemic therapy plan. For neoadjuvant TCHP. ECHO pending. Still needs port. Pt and family ready to set up chemo. Pt feels well today. No fevers/ chills/ chest pain/ SOB/ nausea/ vomiting/diarrhea/ pain/fatigue            Last visit:  consult for new RIGHT breast cancer ER/OR+ Her2 3+ post biopsy only 21. Initially had abnormal mammo at [facility] with calcifications on RIGHT. No mass reported/ dense breast tissue. Had biopsy done and IDC grade 3 ER/OR strong + Her2 3+. Breast MRI:   IMPRESSION  RIGHT BREAST:  1. There is a biopsy clip in the lower, outer quadrant of the right breast,  junction of the middle and deep thirds. There is linear, branching enhancement  which extends anteriorly from the biopsy clip over approximately 3.6 cm. BI-RADS Category 6 - Known biopsy-proven malignancy. LEFT BREAST:  1. BI-RADS Category 2 - Benign findings. Seen today for systemic therapy options given Her2+. Here with sister. Genetic testing pending. Pt works as a . No medical problems. Feels well overall today.    No fevers/ chills/ chest pain/ SOB/ nausea/ vomiting/diarrhea/ pain/fatigue    Family History: aunt cancer  Sister leukemia        Past Medical History:   Diagnosis Date    Bell's palsy       Past Surgical History:   Procedure Laterality Date    HX  SECTION      HX CHOLECYSTECTOMY        Social History     Tobacco Use    Smoking status: Never Smoker    Smokeless tobacco: Never Used   Substance Use Topics    Alcohol use: Yes     Comment: rarely      Family History   Problem Relation Age of Onset    Cancer Sister         leukemia     Current Outpatient Medications   Medication Sig    levonorgestreL (Mirena) 20 mcg/24 hours (6 yrs) 52 mg IUD 1 Device by IntraUTERine route once.  artificial tears,hypromellose, (ISOPTO TEARS) 0.3 % drop 1-2 drops right eye every 2 hours and then every 1 hour as needed    white petrolatum (LACRILUBE) ointment 1 ribbon right eye at bedtime (Patient not taking: Reported on 5/11/2021)     No current facility-administered medications for this visit. No Known Allergies     A complete review of systems was obtained, negative except as described above and as reported on ROS sheet scanned into system. Physical Exam:     Visit Vitals  /83 (BP 1 Location: Right arm, BP Patient Position: Sitting)   Pulse 87   Temp 96.8 °F (36 °C) (Temporal)   Ht 5' 1\" (1.549 m)   Wt 218 lb 12.8 oz (99.2 kg)   SpO2 98%   BMI 41.34 kg/m²     ECOG PS: 0  General: No distress  Eyes: PERRLA, anicteric sclerae  HENT: Atraumatic, wearing mask  Neck: Supple  Respiratory:  normal respiratory effort  MS: Normal gait and station. Digits without clubbing or cyanosis. Skin: No rashes, ecchymoses, or petechiae. Normal temperature, turgor, and texture. Psych: Alert, oriented, appropriate affect, normal judgment/insight  Neuro non focal  Last visit:    Breast no masses palpable b/l, specifically no mass appreciated RIGHT breast, no axillary LAD    Results:     Lab Results   Component Value Date/Time    WBC 8.1 06/08/2021 09:26 AM    HGB 13.5 06/08/2021 09:26 AM    HCT 44.4 06/08/2021 09:26 AM    PLATELET 266 69/96/6873 09:26 AM    MCV 84.3 06/08/2021 09:26 AM    ABS.  NEUTROPHILS 5.6 06/08/2021 09:26 AM     Lab Results   Component Value Date/Time    Sodium 137 06/08/2021 09:26 AM    Potassium 4.1 06/08/2021 09:26 AM    Chloride 103 06/08/2021 09:26 AM    CO2 26 06/08/2021 09:26 AM    Glucose 137 (H) 06/08/2021 09:26 AM BUN 9 06/08/2021 09:26 AM    Creatinine 0.64 06/08/2021 09:26 AM    GFR est AA >60 06/08/2021 09:26 AM    GFR est non-AA >60 06/08/2021 09:26 AM    Calcium 9.1 06/08/2021 09:26 AM     Lab Results   Component Value Date/Time    Bilirubin, total 0.5 06/08/2021 09:26 AM    ALT (SGPT) 21 06/08/2021 09:26 AM    Alk. phosphatase 71 06/08/2021 09:26 AM    Protein, total 8.0 06/08/2021 09:26 AM    Albumin 4.0 06/08/2021 09:26 AM    Globulin 4.0 06/08/2021 09:26 AM         Records reviewed and summarized above. Pathology report(s) reviewed above. Radiology report(s) reviewed above. Assessment:/PLAN     1)  Clinical stage 2 (based on MRI) RIGHT breast cancer ER+ Her2+ post biopsy breast only at [facility] 9/9/21   Breast MRI with 3.6 cm enhancement. No mass palpable on exam.   Pt prefers lumpectomy. Sent here for neoadjuvant chemo based on size of mass on MRI. D/w breast surgery. Here today for fu. Pt and family want to do ninoska chemo. To set up neoadjuvant chemo with TCHP. Will do prechemo ECHO. Not done yet. Does not want to do cold cap. rx for wig. Pt and family want to set up TCHP neoadjuvant chemo. We discussed the risks and benefits of TCH-P chemotherapy. Potential side effects include, but are not limited to, nausea, vomiting, constipation, diarrhea, taste changes, myelosuppression, increased risk for infection, myalgias, fatigue, alopecia, mucositis, neuropathy, fluid retention, renal failure, cardiac damage, allergic reactions, infertility, and rarely, death. The patient has consented to beginning chemotherapy. Port via surgery. Pt is healthy overall. Labs at chemo. Set up for next thurs. 2) premenopausal.   Has two kids and does not want more kids. Has mirena. 3) Psychosocial.    Vaccinated for Jacint[place]ohnnie. Works as a . SW support today. fiance here today. Fu here at chemo next thurs. Call if questions    I appreciate the opportunity to participate in Ms.  Robbie Yeni Darron's macho.     Signed By: Nahid Rowe DO

## 2021-10-08 NOTE — PROGRESS NOTES
[address]  Social Work Navigator Encounter     Patient Name:  Royce Ortiz    Medical History: Breast Cancer     Advance Directives: Patient does not have an advanced medical directive, and did not express interest in completing one today. Narrative:   Met with the patient during her office visit today, along with her sister Shawn Thapa), and heaven [place]. The patient plans to pursue chemotherapy, but has opted not to try the Cold Caps. She asked for wig resources. Provided the patient with a list of places to obtain wigs, head scarves, mastectomy bras, prosthesis, and other accessories. Also looked through options donated by the [employer]. Patient found a possible option. Also provided a voucher for a free wig from [employer]. Invited the patient to the upcoming Virtual Support Group meetings, led by this . The patient plans to take unpaid leave from work during treatment. Offered continued support to the patient as needed. Barriers to Care:   Patient is searching for wig resources. Plan:  1. Continue to meet with the patient when she returns to the clinic for ongoing assessment of the patients adjustment to her diagnosis and treatment. 2.  Ongoing psychosocial support as desired by patient.       Referral:   Support Groups referral  DME/Wig referral  Kayleigh Haas LCSW

## 2021-10-08 NOTE — PROGRESS NOTES
Fabian Dubon is a 40 y.o. female  Chief Complaint   Patient presents with    Follow-up    Breast Cancer     1. Have you been to the ER, urgent care clinic since your last visit? Hospitalized since your last visit? No.    2. Have you seen or consulted any other health care providers outside of the [address] [place] [address] since your last visit? Include any pap smears or colon screening.  No.

## 2021-10-08 NOTE — PROGRESS NOTES
Neoadjuvant TCHP orders entered into [place] to start around 10/14/21. Anti-emetics, Decadron, and EMLA sent to pharmacy on file. Patient still needs to do ECHO and port.

## 2021-10-11 ENCOUNTER — NURSE NAVIGATOR (OUTPATIENT)
Dept: CASE MANAGEMENT | Age: 46
End: 2021-10-11

## 2021-10-11 DIAGNOSIS — C50.911 MALIGNANT NEOPLASM OF RIGHT FEMALE BREAST, UNSPECIFIED ESTROGEN RECEPTOR STATUS, UNSPECIFIED SITE OF BREAST (HCC): Primary | ICD-10-CM

## 2021-10-11 RX ORDER — DIPHENHYDRAMINE HYDROCHLORIDE 50 MG/ML
50 INJECTION, SOLUTION INTRAMUSCULAR; INTRAVENOUS AS NEEDED
Status: CANCELLED
Start: 2021-10-21

## 2021-10-11 RX ORDER — DEXAMETHASONE SODIUM PHOSPHATE 100 MG/10ML
10 INJECTION INTRAMUSCULAR; INTRAVENOUS ONCE
Status: CANCELLED | OUTPATIENT
Start: 2021-10-21 | End: 2021-10-14

## 2021-10-11 RX ORDER — SODIUM CHLORIDE 9 MG/ML
10 INJECTION INTRAMUSCULAR; INTRAVENOUS; SUBCUTANEOUS AS NEEDED
Status: CANCELLED | OUTPATIENT
Start: 2021-10-21

## 2021-10-11 RX ORDER — ONDANSETRON 2 MG/ML
8 INJECTION INTRAMUSCULAR; INTRAVENOUS AS NEEDED
Status: CANCELLED | OUTPATIENT
Start: 2021-10-21

## 2021-10-11 RX ORDER — EPINEPHRINE 1 MG/ML
0.3 INJECTION, SOLUTION, CONCENTRATE INTRAVENOUS AS NEEDED
Status: CANCELLED | OUTPATIENT
Start: 2021-10-21

## 2021-10-11 RX ORDER — ACETAMINOPHEN 325 MG/1
650 TABLET ORAL AS NEEDED
Status: CANCELLED
Start: 2021-10-21

## 2021-10-11 RX ORDER — PALONOSETRON 0.05 MG/ML
0.25 INJECTION, SOLUTION INTRAVENOUS ONCE
Status: CANCELLED | OUTPATIENT
Start: 2021-10-21 | End: 2021-10-14

## 2021-10-11 RX ORDER — ALBUTEROL SULFATE 0.83 MG/ML
2.5 SOLUTION RESPIRATORY (INHALATION) AS NEEDED
Status: CANCELLED
Start: 2021-10-21

## 2021-10-11 RX ORDER — DIPHENHYDRAMINE HYDROCHLORIDE 50 MG/ML
25 INJECTION, SOLUTION INTRAMUSCULAR; INTRAVENOUS AS NEEDED
Status: CANCELLED
Start: 2021-10-21

## 2021-10-11 RX ORDER — SODIUM CHLORIDE 0.9 % (FLUSH) 0.9 %
10 SYRINGE (ML) INJECTION AS NEEDED
Status: CANCELLED | OUTPATIENT
Start: 2021-10-21

## 2021-10-11 RX ORDER — HEPARIN 100 UNIT/ML
300-500 SYRINGE INTRAVENOUS AS NEEDED
Status: CANCELLED
Start: 2021-10-21

## 2021-10-11 RX ORDER — HYDROCORTISONE SODIUM SUCCINATE 100 MG/2ML
100 INJECTION, POWDER, FOR SOLUTION INTRAMUSCULAR; INTRAVENOUS AS NEEDED
Status: CANCELLED | OUTPATIENT
Start: 2021-10-21

## 2021-10-11 RX ORDER — SODIUM CHLORIDE 9 MG/ML
25 INJECTION, SOLUTION INTRAVENOUS CONTINUOUS
Status: CANCELLED | OUTPATIENT
Start: 2021-10-21

## 2021-10-11 NOTE — PROGRESS NOTES
[facility]  Breast Navigator Encounter    Name:    Shraddha Steele  Age:    40 y.o. Diagnosis:   RIGHT breast cancer    Interdisciplinary Team:  Med-Onc:    Dr. Flavia Olson  Surg-Onc:    Dr. Morel Grief:    Plastics:    :    Mario Alberto Leon LCSW  Nurse Navigator:  Dez Saavedra RN, BSN, [facility]      Encounter type:  [x]Patient Initiated  []Navigator Follow-up []Pre-op  []Post-op  []Check-in Prior to First Treatment []Treatment Modality Change  []Survivorship Transition []Other:       Narrative:    Called this morning asking about getting a port placed for chemotherapy. Saw Dr. Flavia Olson on Friday, and she has decided to move forward with chemotherapy. Dr. Flavia Olson wants to start on 10/14. I told her I would send a message to Dr. Chinmay Bran surgery scheduler, and she would call her with a date/time. She is very happy with Dr. Flavia Olson. Reached out to Dr. Chinmay Bran surgery scheduler to see if she can find a spot on the schedule for patient's port. She will reach out to the patient as soon as she gets an order from Dr. Aaliyah Zazueta.           Dez Saavedra, RN, BSN, [facility]  Oncology Breast Navigator     [address]  W: [phone]  F: [phone]  [email or web address]  Good Help to Those in [facility]

## 2021-10-12 ENCOUNTER — ANESTHESIA EVENT (OUTPATIENT)
Dept: SURGERY | Age: 46
End: 2021-10-12
Payer: MEDICAID

## 2021-10-12 ENCOUNTER — HOSPITAL ENCOUNTER (OUTPATIENT)
Dept: PREADMISSION TESTING | Age: 46
Discharge: HOME | End: 2021-10-12
Payer: MEDICAID

## 2021-10-12 VITALS
HEIGHT: 61 IN | BODY MASS INDEX: 40.97 KG/M2 | SYSTOLIC BLOOD PRESSURE: 121 MMHG | WEIGHT: 217 LBS | TEMPERATURE: 96.8 F | DIASTOLIC BLOOD PRESSURE: 78 MMHG

## 2021-10-12 DIAGNOSIS — C50.911 MALIGNANT NEOPLASM OF RIGHT BREAST IN FEMALE, ESTROGEN RECEPTOR POSITIVE, UNSPECIFIED SITE OF BREAST (HCC): Primary | ICD-10-CM

## 2021-10-12 DIAGNOSIS — Z17.0 MALIGNANT NEOPLASM OF RIGHT BREAST IN FEMALE, ESTROGEN RECEPTOR POSITIVE, UNSPECIFIED SITE OF BREAST (HCC): Primary | ICD-10-CM

## 2021-10-12 LAB
ATRIAL RATE: 62 BPM
CALCULATED P AXIS, ECG09: 43 DEGREES
CALCULATED R AXIS, ECG10: -11 DEGREES
CALCULATED T AXIS, ECG11: 25 DEGREES
DIAGNOSIS, 93000: NORMAL
P-R INTERVAL, ECG05: 168 MS
Q-T INTERVAL, ECG07: 416 MS
QRS DURATION, ECG06: 102 MS
QTC CALCULATION (BEZET), ECG08: 422 MS
VENTRICULAR RATE, ECG03: 62 BPM

## 2021-10-12 PROCEDURE — 93005 ELECTROCARDIOGRAM TRACING: CPT

## 2021-10-12 NOTE — PERIOP NOTES
[address] [facility], [address] [facility], [place]   MAIN OR                                  [phone]   MAIN PRE OP                          [phone]                                                                                AMBULATORY PRE OP          [phone]  PRE-ADMISSION TESTING    [phone]   Surgery Date: Wednesday 10/13/21        Is surgery arrival time given by surgeon? NO  If NO, [facility] staff will call you between 3 and 7pm the day before your surgery with your arrival time. (If your surgery is on a Monday, we will call you the Friday before.)    Call [phone] after 7pm Monday-Friday if you did not receive this call. INSTRUCTIONS BEFORE YOUR SURGERY   When You  Arrive Arrive at the 2nd [facility] on the day of your surgery  Have your insurance card, photo ID, and any copayment (if needed)   Food   and   Drink NO food or drink after midnight the night before surgery    This means NO water, gum, mints, coffee, juice, etc.  No alcohol (beer, wine, liquor) 24 hours before and after surgery   Medications to   TAKE   Morning of Surgery MEDICATIONS TO TAKE THE MORNING OF SURGERY WITH A SIP OF WATER:    none   Medications  To  STOP      7 days before surgery  Non-Steroidal anti-inflammatory Drugs (NSAID's): for example, Ibuprofen (Advil, Motrin), Naproxen (Aleve)   Aspirin, if taking for pain    Herbal supplements, vitamins, and fish oil   Other:  (Pain medications not listed above, including Tylenol may be taken)   Blood  Thinners  If you take  Aspirin, Plavix, Coumadin, or any blood-thinning or anti-blood clot medicine, talk to the doctor who prescribed the medications for pre-operative instructions.    Bathing Clothing  Jewelry  Valuables      If you shower the morning of surgery, please do not apply anything to your skin (lotions, powders, deodorant, or makeup, especially mascara)   Follow Chlorhex[employer] body wash instructions provided to you during PAT appointment. Begin 3 days prior to surgery.  Do not shave or trim anywhere 24 hours before surgery   Wear your hair loose or down; no pony-tails, buns, or metal hair clips   Wear loose, comfortable, clean clothes   Wear glasses instead of contacts  [employer] money, valuables, and jewelry, including body piercings, at home   If you were given an [employer], bring it on day of surgery. Going Home - or Spending the Night  SAME-DAY SURGERY: You must have a responsible adult drive you home and stay with you 24 hours after surgery   ADMITS: If your doctor is keeping you in the hospital after surgery, leave personal belongings/luggage in your car until you have a hospital room number. Hospital discharge time is 12 noon  Drivers must be here before 12 noon unless you are told differently   Special Instructions        Follow all instructions so your surgery wont be cancelled. Please, be on time. If a situation occurs and you are delayed the day of surgery, call  [phone]. If your physical condition changes (like a fever, cold, flu, etc.) call your surgeon. Home medication(s) reviewed and verified via     LIST   VERBAL   during PAT appointment. The patient was contacted by      IN-PERSON    The patient verbalizes understanding of all instructions and      DOES NOT   need reinforcement.

## 2021-10-12 NOTE — PROGRESS NOTES
Verbally informed patient of surgery   [facility]   ( Dr Blankenship )    Date: 10/13/2021 @ 1:00 PM   Arrive:11:00 AM  report to 2nd floor outpatient registration day of surgery. If you take the elevator take a right once you are on the 2nd floor.     PAT: nurse will call   Arrive: nurse  report to 2nd floor volunteer desk, take a right off the elevator      Gave following instructions:  NPO after Midnight the night before  Shower in AM, no lotion, deodorant, powder,perfume or makeup  Will need  morning of the surgery     Post op to follow:

## 2021-10-12 NOTE — PERIOP NOTES
Pt's list of medications are specific to her chemotherapy regime and not reflective of what she is taking on a regular basis. List not modified for day of surgery so as not to impact future refills.

## 2021-10-13 ENCOUNTER — APPOINTMENT (OUTPATIENT)
Dept: GENERAL RADIOLOGY | Age: 46
End: 2021-10-13
Attending: SURGERY
Payer: MEDICAID

## 2021-10-13 ENCOUNTER — HOSPITAL ENCOUNTER (OUTPATIENT)
Dept: NON INVASIVE DIAGNOSTICS | Age: 46
Discharge: HOME | End: 2021-10-13
Attending: INTERNAL MEDICINE
Payer: MEDICAID

## 2021-10-13 ENCOUNTER — HOSPITAL ENCOUNTER (OUTPATIENT)
Age: 46
Setting detail: OUTPATIENT SURGERY
Discharge: HOME | End: 2021-10-13
Attending: SURGERY | Admitting: SURGERY
Payer: MEDICAID

## 2021-10-13 ENCOUNTER — ANESTHESIA (OUTPATIENT)
Dept: SURGERY | Age: 46
End: 2021-10-13
Payer: MEDICAID

## 2021-10-13 VITALS
HEIGHT: 61 IN | DIASTOLIC BLOOD PRESSURE: 78 MMHG | SYSTOLIC BLOOD PRESSURE: 121 MMHG | WEIGHT: 216.05 LBS | BODY MASS INDEX: 40.79 KG/M2

## 2021-10-13 VITALS
TEMPERATURE: 97.8 F | OXYGEN SATURATION: 99 % | HEIGHT: 61 IN | BODY MASS INDEX: 40.79 KG/M2 | DIASTOLIC BLOOD PRESSURE: 82 MMHG | HEART RATE: 59 BPM | WEIGHT: 216.05 LBS | SYSTOLIC BLOOD PRESSURE: 123 MMHG | RESPIRATION RATE: 19 BRPM

## 2021-10-13 DIAGNOSIS — C50.911 MALIGNANT NEOPLASM OF RIGHT FEMALE BREAST, UNSPECIFIED ESTROGEN RECEPTOR STATUS, UNSPECIFIED SITE OF BREAST (HCC): ICD-10-CM

## 2021-10-13 DIAGNOSIS — Z17.0 MALIGNANT NEOPLASM OF RIGHT BREAST IN FEMALE, ESTROGEN RECEPTOR POSITIVE, UNSPECIFIED SITE OF BREAST (HCC): ICD-10-CM

## 2021-10-13 DIAGNOSIS — C50.911 MALIGNANT NEOPLASM OF RIGHT BREAST IN FEMALE, ESTROGEN RECEPTOR POSITIVE, UNSPECIFIED SITE OF BREAST (HCC): ICD-10-CM

## 2021-10-13 LAB
COVID-19 RAPID TEST, COVR: NOT DETECTED
ECHO AO ROOT DIAM: 3.19 CM
ECHO AV PEAK GRADIENT: 10.96 MMHG
ECHO AV PEAK VELOCITY: 165.5 CM/S
ECHO LA MAJOR AXIS: 3.06 CM
ECHO LA MINOR AXIS: 1.57 CM
ECHO LV GLOBAL LONGITUDINAL STRAIN (GLS): -21.8 PERCENT
ECHO LV INTERNAL DIMENSION DIASTOLIC: 4.62 CM (ref 3.9–5.3)
ECHO LV INTERNAL DIMENSION SYSTOLIC: 2.95 CM
ECHO LV IVSD: 0.88 CM (ref 0.6–0.9)
ECHO LV MASS 2D: 133.6 G (ref 67–162)
ECHO LV MASS INDEX 2D: 68.5 G/M2 (ref 43–95)
ECHO LV POSTERIOR WALL DIASTOLIC: 0.87 CM (ref 0.6–0.9)
ECHO LVOT PEAK GRADIENT: 4.64 MMHG
ECHO LVOT PEAK VELOCITY: 107.68 CM/S
ECHO MV A VELOCITY: 67.04 CM/S
ECHO MV E DECELERATION TIME (DT): 162.44 MS
ECHO MV E VELOCITY: 98.15 CM/S
ECHO MV E/A RATIO: 1.46
ECHO PV PEAK INSTANTANEOUS GRADIENT SYSTOLIC: 2.82 MMHG
ECHO RV TAPSE: 1.96 CM (ref 1.5–2)
ECHO TV REGURGITANT MAX VELOCITY: 203.29 CM/S
ECHO TV REGURGITANT PEAK GRADIENT: 16.53 MMHG
GLOBAL LONGITUDINAL STRAIN 2 CHAMBER: -23.2 PERCENT
GLOBAL LONGITUDINAL STRAIN 4 CHAMBER: -22.1 PERCENT
GLOBAL LONGITUDINAL STRAIN LONG AXIS: -20.1 PERCENT
HCG UR QL: NEGATIVE
SOURCE, COVRS: NORMAL

## 2021-10-13 PROCEDURE — 74011000250 HC RX REV CODE- 250: Performed by: ANESTHESIOLOGY

## 2021-10-13 PROCEDURE — 74011250636 HC RX REV CODE- 250/636: Performed by: ANESTHESIOLOGY

## 2021-10-13 PROCEDURE — 2709999900 HC NON-CHARGEABLE SUPPLY: Performed by: SURGERY

## 2021-10-13 PROCEDURE — C1788 PORT, INDWELLING, IMP: HCPCS | Performed by: SURGERY

## 2021-10-13 PROCEDURE — 76030000000 HC AMB SURG OR TIME 0.5 TO 1: Performed by: SURGERY

## 2021-10-13 PROCEDURE — 77030040922 HC BLNKT HYPOTHRM STRY -A

## 2021-10-13 PROCEDURE — 76210000040 HC AMBSU PH I REC FIRST 0.5 HR: Performed by: SURGERY

## 2021-10-13 PROCEDURE — 76937 US GUIDE VASCULAR ACCESS: CPT | Performed by: SURGERY

## 2021-10-13 PROCEDURE — 71045 X-RAY EXAM CHEST 1 VIEW: CPT

## 2021-10-13 PROCEDURE — 74011000250 HC RX REV CODE- 250: Performed by: SURGERY

## 2021-10-13 PROCEDURE — 74011250636 HC RX REV CODE- 250/636: Performed by: SURGERY

## 2021-10-13 PROCEDURE — 81025 URINE PREGNANCY TEST: CPT

## 2021-10-13 PROCEDURE — 77030040361 HC SLV COMPR DVT MDII -B

## 2021-10-13 PROCEDURE — 77001 FLUOROGUIDE FOR VEIN DEVICE: CPT | Performed by: SURGERY

## 2021-10-13 PROCEDURE — 93306 TTE W/DOPPLER COMPLETE: CPT

## 2021-10-13 PROCEDURE — 76060000061 HC AMB SURG ANES 0.5 TO 1 HR: Performed by: SURGERY

## 2021-10-13 PROCEDURE — 76210000046 HC AMBSU PH II REC FIRST 0.5 HR: Performed by: SURGERY

## 2021-10-13 PROCEDURE — 87635 SARS-COV-2 COVID-19 AMP PRB: CPT

## 2021-10-13 PROCEDURE — 36561 INSERT TUNNELED CV CATH: CPT | Performed by: SURGERY

## 2021-10-13 DEVICE — KIT POWERPRT CLEARVUE ISP IMPL PRT 6FRENCH ATTCH POLYUR: Type: IMPLANTABLE DEVICE | Site: CHEST  WALL | Status: FUNCTIONAL

## 2021-10-13 RX ORDER — NALOXONE HYDROCHLORIDE 0.4 MG/ML
0.2 INJECTION, SOLUTION INTRAMUSCULAR; INTRAVENOUS; SUBCUTANEOUS
Status: DISCONTINUED | OUTPATIENT
Start: 2021-10-13 | End: 2021-10-13 | Stop reason: HOSPADM

## 2021-10-13 RX ORDER — BUPIVACAINE HYDROCHLORIDE 5 MG/ML
30 INJECTION, SOLUTION EPIDURAL; INTRACAUDAL ONCE
Status: DISCONTINUED | OUTPATIENT
Start: 2021-10-13 | End: 2021-10-13 | Stop reason: HOSPADM

## 2021-10-13 RX ORDER — FLUMAZENIL 0.1 MG/ML
0.2 INJECTION INTRAVENOUS
Status: DISCONTINUED | OUTPATIENT
Start: 2021-10-13 | End: 2021-10-13 | Stop reason: HOSPADM

## 2021-10-13 RX ORDER — SODIUM CHLORIDE 0.9 % (FLUSH) 0.9 %
5-40 SYRINGE (ML) INJECTION AS NEEDED
Status: DISCONTINUED | OUTPATIENT
Start: 2021-10-13 | End: 2021-10-13 | Stop reason: HOSPADM

## 2021-10-13 RX ORDER — FENTANYL CITRATE 50 UG/ML
INJECTION, SOLUTION INTRAMUSCULAR; INTRAVENOUS AS NEEDED
Status: DISCONTINUED | OUTPATIENT
Start: 2021-10-13 | End: 2021-10-13 | Stop reason: HOSPADM

## 2021-10-13 RX ORDER — SODIUM CHLORIDE 0.9 % (FLUSH) 0.9 %
5-40 SYRINGE (ML) INJECTION EVERY 8 HOURS
Status: DISCONTINUED | OUTPATIENT
Start: 2021-10-13 | End: 2021-10-13 | Stop reason: HOSPADM

## 2021-10-13 RX ORDER — HEPARIN 100 UNIT/ML
500 SYRINGE INTRAVENOUS AS NEEDED
Status: DISCONTINUED | OUTPATIENT
Start: 2021-10-13 | End: 2021-10-13 | Stop reason: HOSPADM

## 2021-10-13 RX ORDER — SODIUM CHLORIDE, SODIUM LACTATE, POTASSIUM CHLORIDE, CALCIUM CHLORIDE 600; 310; 30; 20 MG/100ML; MG/100ML; MG/100ML; MG/100ML
100 INJECTION, SOLUTION INTRAVENOUS CONTINUOUS
Status: DISCONTINUED | OUTPATIENT
Start: 2021-10-13 | End: 2021-10-13 | Stop reason: HOSPADM

## 2021-10-13 RX ORDER — SODIUM CHLORIDE, SODIUM LACTATE, POTASSIUM CHLORIDE, CALCIUM CHLORIDE 600; 310; 30; 20 MG/100ML; MG/100ML; MG/100ML; MG/100ML
125 INJECTION, SOLUTION INTRAVENOUS CONTINUOUS
Status: DISCONTINUED | OUTPATIENT
Start: 2021-10-13 | End: 2021-10-13 | Stop reason: HOSPADM

## 2021-10-13 RX ORDER — ONDANSETRON 2 MG/ML
4 INJECTION INTRAMUSCULAR; INTRAVENOUS AS NEEDED
Status: DISCONTINUED | OUTPATIENT
Start: 2021-10-13 | End: 2021-10-13 | Stop reason: HOSPADM

## 2021-10-13 RX ORDER — BUPIVACAINE HYDROCHLORIDE 5 MG/ML
INJECTION, SOLUTION EPIDURAL; INTRACAUDAL AS NEEDED
Status: DISCONTINUED | OUTPATIENT
Start: 2021-10-13 | End: 2021-10-13 | Stop reason: HOSPADM

## 2021-10-13 RX ORDER — PROPOFOL 10 MG/ML
INJECTION, EMULSION INTRAVENOUS
Status: DISCONTINUED | OUTPATIENT
Start: 2021-10-13 | End: 2021-10-13 | Stop reason: HOSPADM

## 2021-10-13 RX ORDER — LIDOCAINE HYDROCHLORIDE 20 MG/ML
INJECTION, SOLUTION INFILTRATION; PERINEURAL AS NEEDED
Status: DISCONTINUED | OUTPATIENT
Start: 2021-10-13 | End: 2021-10-13 | Stop reason: HOSPADM

## 2021-10-13 RX ORDER — MIDAZOLAM HYDROCHLORIDE 1 MG/ML
INJECTION, SOLUTION INTRAMUSCULAR; INTRAVENOUS AS NEEDED
Status: DISCONTINUED | OUTPATIENT
Start: 2021-10-13 | End: 2021-10-13 | Stop reason: HOSPADM

## 2021-10-13 RX ORDER — LIDOCAINE HYDROCHLORIDE 10 MG/ML
0.1 INJECTION, SOLUTION EPIDURAL; INFILTRATION; INTRACAUDAL; PERINEURAL AS NEEDED
Status: DISCONTINUED | OUTPATIENT
Start: 2021-10-13 | End: 2021-10-13 | Stop reason: HOSPADM

## 2021-10-13 RX ORDER — HYDROMORPHONE HYDROCHLORIDE 1 MG/ML
.5-1 INJECTION, SOLUTION INTRAMUSCULAR; INTRAVENOUS; SUBCUTANEOUS
Status: DISCONTINUED | OUTPATIENT
Start: 2021-10-13 | End: 2021-10-13 | Stop reason: HOSPADM

## 2021-10-13 RX ORDER — HEPARIN SODIUM (PORCINE) LOCK FLUSH IV SOLN 100 UNIT/ML 100 UNIT/ML
SOLUTION INTRAVENOUS AS NEEDED
Status: DISCONTINUED | OUTPATIENT
Start: 2021-10-13 | End: 2021-10-13 | Stop reason: HOSPADM

## 2021-10-13 RX ADMIN — MIDAZOLAM HYDROCHLORIDE 3 MG: 1 INJECTION, SOLUTION INTRAMUSCULAR; INTRAVENOUS at 12:47

## 2021-10-13 RX ADMIN — MIDAZOLAM HYDROCHLORIDE 2 MG: 1 INJECTION, SOLUTION INTRAMUSCULAR; INTRAVENOUS at 12:49

## 2021-10-13 RX ADMIN — PROPOFOL 140 MCG/KG/MIN: 10 INJECTION, EMULSION INTRAVENOUS at 12:57

## 2021-10-13 RX ADMIN — LIDOCAINE HYDROCHLORIDE 40 MG: 20 INJECTION, SOLUTION INFILTRATION; PERINEURAL at 12:56

## 2021-10-13 RX ADMIN — SODIUM CHLORIDE, POTASSIUM CHLORIDE, SODIUM LACTATE AND CALCIUM CHLORIDE 125 ML/HR: 600; 310; 30; 20 INJECTION, SOLUTION INTRAVENOUS at 11:33

## 2021-10-13 RX ADMIN — FENTANYL CITRATE 50 MCG: 50 INJECTION, SOLUTION INTRAMUSCULAR; INTRAVENOUS at 12:55

## 2021-10-13 NOTE — ANESTHESIA PREPROCEDURE EVALUATION
Relevant Problems   PERSONAL HX & FAMILY HX OF CANCER   (+) Malignant neoplasm of right breast in female, estrogen receptor positive (HCC)       Anesthetic History   No history of anesthetic complications            Review of Systems / Medical History  Patient summary reviewed and pertinent labs reviewed    Pulmonary  Within defined limits                 Neuro/Psych   Within defined limits        Pertinent negatives: No seizures, TIA and CVA   Cardiovascular                Pertinent negatives: No past MI, angina and CHF  Exercise tolerance: >4 METS     GI/Hepatic/Renal  Within defined limits           Pertinent negatives: No renal disease   Endo/Other        Morbid obesity  Pertinent negatives: No diabetes   Other Findings              Physical Exam    Airway  Mallampati: III  TM Distance: 4 - 6 cm  Neck ROM: normal range of motion   Mouth opening: Normal     Cardiovascular    Rhythm: regular  Rate: normal         Dental      Comments: Poor dentition throughout. Multiple chipped and missing teeth. Denies loose teeth.     Pulmonary  Breath sounds clear to auscultation               Abdominal         Other Findings            Anesthetic Plan    ASA: 3  Anesthesia type: MAC          Induction: Intravenous  Anesthetic plan and risks discussed with: Patient

## 2021-10-13 NOTE — OP NOTES
Eldon Estrada [facility] [address]  [address] [facility] [address]    Name: Bowen Tran  : 1975  Portacath Insertion   Operative Report   Date of Surgery: 10/13/2021  Preoperative Diagnosis: RIGHT breast cancer  Postoperative Diagnosis:   Same  Surgeon: Dr Sylvia Hansen  Anesthesia: MAC  Procedure:  Portacath insertion    Procedure Note:   Pt was sedated with intravenous sedation. The neck and chest were prepped and draped in the usual sterile fashion. The patient was placed in Trendelenburg. Lidocaine 1% plain was used for local anesthesia. The LEFT internal jugular vein was identified with ultrasound, and under ultrasound guidance an 18 gauge needle was inserted into the internal jugular vein. A guidewire was passed through the needle and under fluoroscopic guidance was advanced into the superior vena cava. A 3cm horizontal incision was then made just inferior to the clavicle and a pocket was created for the port. The catheter was tunneled from the chest to the neck incision. A dilator with pull-away sheath was inserted over the wire and the wire and dilator were removed leaving the pull-away sheath in place. Under fluoroscopic guidance the catheter was inserted and positioned at the junction of the right atrium and the superior vena cava. The pull-away sheath was removed . TThe catheter was trimmed to size and attached to the port and secured with a clamp over the port to catheter attachment. The port was tacked to the pectoralis major fascia with 3-0 Vicryl suture. The port was tacked to the pectoralis major fascia with 3-0 Vicryl suture. The Portacath was flushed with Heparin. The dermis was re-approximated with 3-0 Vicryl and the skin was closed with 4-0 Monocryl. The wound was dressed with skin glue and the patient was awakened and taken to the recovery room. Sponge needle and instrument counts were reported to be correct.    A portable chest x-ray was obtained in the PACU.  Estimated Blood Loss:  2 cc  Complications: none  Implants: Bard ClearVue 6fr  Assistant: none    Signed:  Itzel Contreras MD

## 2021-10-13 NOTE — ANESTHESIA POSTPROCEDURE EVALUATION
Procedure(s):  PORT A CATH PLACEMENT (URGENT). MAC    Anesthesia Post Evaluation        Patient location during evaluation: PACU  Patient participation: complete - patient participated  Level of consciousness: awake  Pain management: adequate  Airway patency: patent  Anesthetic complications: no  Cardiovascular status: acceptable  Respiratory status: acceptable  Hydration status: acceptable  Post anesthesia nausea and vomiting:  none  Final Post Anesthesia Temperature Assessment:  Normothermia (36.0-37.5 degrees C)      INITIAL Post-op Vital signs:   Vitals Value Taken Time   BP 99/86 10/13/21 1355   Temp 36.9 °C (98.4 °F) 10/13/21 1339   Pulse 64 10/13/21 1358   Resp 15 10/13/21 1358   SpO2 99 % 10/13/21 1358   Vitals shown include unvalidated device data.

## 2021-10-13 NOTE — H&P
History and Physical    Surgery History and Physical          Emiliano Howell is a 40 y.o. female who presents for portacath insertion. RIGHT breast cancer ER/PA+ Her2 3+    Physical Exam  Constitutional:       Appearance: She is well-developed. Cardiovascular:      Rate and Rhythm: Normal rate and regular rhythm. Heart sounds: Normal heart sounds. Pulmonary:      Effort: Pulmonary effort is normal.      Breath sounds: Normal breath sounds. Chest:      Breasts: Breasts are symmetrical.         Right: No mass, nipple discharge or skin change. Left: No mass, nipple discharge or skin change. Lymphadenopathy:      Cervical: No cervical adenopathy. Upper Body:      Right upper body: No supraclavicular adenopathy. Left upper body: No supraclavicular adenopathy. Skin:     General: Skin is warm and dry. Neurological:      Mental Status: She is alert and oriented to person, place, and time. Past Medical History:   Diagnosis Date    Bell's palsy     Breast cancer ([facility] [address] 2021    ER/PA+ Her2 +     Past Surgical History:   Procedure Laterality Date    HX  SECTION      HX  SECTION      HX CHOLECYSTECTOMY      HX CHOLECYSTECTOMY        Family History   Problem Relation Age of Onset   Floydene Ada Cancer Sister         leukemia     Social History     Tobacco Use    Smoking status: Never Smoker    Smokeless tobacco: Never Used   Substance Use Topics    Alcohol use: Yes     Comment: rarely      Prior to Admission medications    Medication Sig Start Date End Date Taking? Authorizing Provider   CRANIAL PROSTHESIS misc Alopecia/ breast cancer chemo/ wig 10/8/21   Christophe Kothari DO   ondansetron (ZOFRAN ODT) 4 mg disintegrating tablet Take 1-2 Tablets by mouth every eight (8) hours as needed for Nausea or Vomiting.  10/8/21   Jonathan Osborn NP   prochlorperazine (Compazine) 5 mg tablet Take 1 tab by mouth every 6 hours as needed for nausea or vomiting 10/8/21 Marlys Dumont NP   lidocaine-prilocaine (EMLA) topical cream Apply  to affected area as needed for Pain. Apply to port-a-cath 30-60 minutes prior to access for chemo 10/8/21   Marlys Dumont NP   dexAMETHasone (DECADRON) 4 mg tablet Take 2 tabs (8mg) by mouth twice daily the day before chemotherapy and the day after chemotherapy 10/8/21   Marlys Dumont NP   levonorgestreL (Mirena) 20 mcg/24 hours (6 yrs) 52 mg IUD 1 Device by IntraUTERine route once. Provider, Historical      No Known Allergies    Patient Vitals for the past 8 hrs:   Height Weight   10/13/21 1038 5' 1\" (1.549 m) 216 lb 0.8 oz (98 kg)       No data recorded.     IMPRESSION:RIGHT breast cancer, Her2 positive  PLAN: portacat for chemotherapy    Signed By: Mohamud Morillo MD     October 13, 2021

## 2021-10-13 NOTE — DISCHARGE INSTRUCTIONS
Discharge Instructions from Dr. Tyrus Sacks    Diet:Regular  Activity: As tolerated. Wound care: Okay to shower or take a bath. Dressing: The skin glue is waterproof. It is okay to wash normally at this site. If the glue is still present after 10 days you should peel it off. Pain:  You may use an ice pack and over the counter pain medication or hydrocodone if needed. Problems/Questions: Call Jerzy Morris MD on my cell phone. [phone]      DISCHARGE SUMMARY from your Nurse      PATIENT INSTRUCTIONS    After general anesthesia or intravenous sedation, for 24 hours or while taking prescription Narcotics:  · Limit your activities  · Do not drive and operate hazardous machinery  · Do not make important personal or business decisions  · Do  not drink alcoholic beverages  · If you have not urinated within 8 hours after discharge, please contact your surgeon on call. Report the following to your surgeon:  · Excessive pain, swelling, redness or odor of or around the surgical area  · Temperature over 100.5  · Nausea and vomiting lasting longer than 4 hours or if unable to take medications  · Any signs of decreased circulation or nerve impairment to extremity: change in color, persistent  numbness, tingling, coldness or increase pain  · Any questions      GOOD HELP TO FIGHT AN INFECTION  Here are a few tip to help reduce the chance of getting an infection after surgery:   Wash Your Hands   Good handwashing is the most important thing you and your caregiver can do.  Wash before and after caring for any wounds. Dry your hand with a clean towel.  Wash with soap and water for at least 20 seconds. A TIP: sing the \"Happy Birthday\" song through one time while washing to help with the timing.  Use a hand  in between washings.      Shower   When your surgeon says it is OK to take a shower, use a new bar of antibacterial soap (if that is what you use, and keep that bar of soap ONLY for your use), or antibacterial body wash.  Use a clean wash cloth or sponge when you bathe.  Dry off with a clean towel  after every bath - be careful around any wounds, skin staples, sutures or surgical glue over/on wounds.  Do not enter swimming pools, hot tubs, lakes, rivers and/or ocean until wounds are healed and your doctor/surgeon says it is OK.  Use Clean Sheets   Sleep on freshly laundered sheets after your surgery.  Keep the surgery site covered with a clean, dry bandage (if instructed to do so). If the bandage becomes soiled, reapply a new, dry, clean bandage.  Do not allow pets to sleep with you while your wound is healing.  Lifestyle Modification and Controlling Your Blood Sugar   Smoking slows wound healing. Stop smoking and limit exposure to second-hand smoke.  High blood sugar slows wound healing. Eat a well-balanced diet to provide proper nutrition while healing   Monitor your blood sugar (if you are a diabetic) and take your medications as you are suppose to so you can control you blood sugar after surgery. COUGH AND DEEP BREATHE    Breathing deeply and coughing are very important exercises to do after surgery. Deep breathing and coughing open the little air tubes and air sacks in your lungs. You take deep breaths every day. You may not even notice - it is just something you do when you sigh or yawn. It is a natural exercise you do to keep these air passages open. After surgery, take deep breaths and cough, on purpose. DIRECTIONS:  · Take 10 to 15 slow deep breaths every hour while awake. · Breathe in deeply, and hold it for 2 seconds. · Exhale slowly through puckered lips, like blowing up a balloon. · After every 4th or 5th deep breath, hug your pillow to your chest or belly and give a hard, deep cough. Yes, it will probably hurt. But doing this exercise is a very important part of healing after surgery.   Take your pain medicine to help you do this exercise without too much pain. Coughing and deep breathing help prevent bronchitis and pneumonia after surgery. If you had chest or belly surgery, use a pillow as a \"hug sabino\" and hold it tightly to your chest or belly when you cough. ANKLE PUMPS    Ankle pumps increase the circulation of oxygenated blood to your lower extremities and decrease your risk for circulation problems such as blood clots. They also stretch the muscles, tendons and ligaments in your foot and ankle, and prevent joint contracture in the ankle and foot, especially after surgeries on the legs. It is important to do ankle pump exercises regularly after surgery because immobility increases your risk for developing a blood clot. Your doctor may also have you take an Aspirin for the next few days as well. If your doctor did not ask you to take an Aspirin, consult with him before starting Aspirin therapy on your own. The exercise is quite simple. · Slowly point your foot forward, feeling the muscles on the top of your lower leg stretch, and hold this position for 5 seconds. · Next, pull your foot back toward you as far as possible, stretching the calf muscles, and hold that position for 5 seconds. · Repeat with the other foot. · Perform 10 repetitions every hour while awake for both ankles if possible (down and then up with the foot once is one repetition). You should feel gentle stretching of the muscles in your lower leg when doing this exercise. If you feel pain, or your range of motion is limited, don't push too hard. Only go the limit your joint and muscles will let you go. If you have increasing pain, progressively worsening leg warmth or swelling, STOP the exercise and call your doctor. MEDICATION AND   SIDE EFFECT GUIDE    The [employer] MEDICATION AND SIDE EFFECT GUIDE was provided to the PATIENT AND CARE PROVIDER.   Information provided includes instruction about drug purpose and common side effects for the following medications:   · none        These are general instructions for a healthy lifestyle:    *   Please give a list of your current medications to your Primary Care Provider. *   Please update this list whenever your medications are discontinued, doses are changed, or new medications (including over-the-counter products) are added. *   Please carry medication information at all times in case of emergency situations. About Smoking  No smoking / No tobacco products  Avoid exposure to second hand smoke     Surgeon General's Warning:  Quitting smoking now greatly reduces serious risk to your health. Obesity, smoking, and sedentary lifestyle greatly increases your risk for illness and disease. A healthy diet, regular physical exercise & weight monitoring are important for maintaining a healthy lifestyle. Congestive Heart Failure  You may be retaining fluid if you have a history of heart failure or if you experience any of the following symptoms:  Weight gain of 3 pounds or more overnight or 5 pounds in a week, increased swelling in your hands or feet or shortness of breath while lying flat in bed. Please call your doctor as soon as you notice any of these symptoms; do not wait until your next office visit. Recognize signs and symptoms of STROKE:  F -  Face looks uneven  A -  Arms unable to move or move evenly  S -  Speech slurred or non-existent  T -  Time-call 911 as soon as signs and symptoms begin-DO NOT go          back to bed or wait to see if you get better-TIME IS BRAIN. Warning Signs of HEART ATTACK   Call 911 if you have these symptoms:     Chest discomfort. Most heart attacks involve discomfort in the center of the chest that lasts more than a few minutes, or that goes away and comes back. It can feel like uncomfortable pressure, squeezing, fullness, or pain.  Discomfort in other areas of the upper body.  Symptoms can include pain or discomfort in one or both arms, the back, neck, jaw, or stomach.  Shortness of breath with or without chest discomfort.  Other signs may include breaking out in a cold sweat, nausea, or lightheadedness. Don't wait more than five minutes to call 911 - MINUTES MATTER! Fast action can save your life. Calling 911 is almost always the fastest way to get lifesaving treatment. Emergency Medical Services staff can begin treatment when they arrive -- up to an hour sooner than if someone gets to the hospital by car. Learning About Coronavirus [phone])  Coronavirus [phone]): Overview  What is coronavirus (COVID-19)? The coronavirus disease (COVID-19) is caused by a virus. It is an illness that was first found in [place], [place], in December 2019. It has since spread worldwide. The virus can cause fever, cough, and trouble breathing. In severe cases, it can cause pneumonia and make it hard to breathe without help. It can cause death. Coronaviruses are a large group of viruses. They cause the common cold. They also cause more serious illnesses like Middle East respiratory syndrome (MERS) and severe acute respiratory syndrome (SARS). COVID-19 is caused by a novel coronavirus. That means it's a new type that has not been seen in people before. This virus spreads person-to-person through droplets from coughing and sneezing. It can also spread when you are close to someone who is infected. And it can spread when you touch something that has the virus on it, such as a doorknob or a tabletop. What can you do to protect yourself from coronavirus (COVID-19)? The best way to protect yourself from getting sick is to:  · Avoid areas where there is an outbreak. · Avoid contact with people who may be infected. · Wash your hands often with soap or alcohol-based hand sanitizers. · Avoid crowds and try to stay at least 6 feet away from other people.   · Wash your hands often, especially after you cough or sneeze. Use soap and water, and scrub for at least 20 seconds. If soap and water aren't available, use an alcohol-based hand . · Avoid touching your mouth, nose, and eyes. What can you do to avoid spreading the virus to others? To help avoid spreading the virus to others:  · Cover your mouth with a tissue when you cough or sneeze. Then throw the tissue in the trash. · Use a disinfectant to clean things that you touch often. · Stay home if you are sick or have been exposed to the virus. Don't go to school, work, or public areas. And don't use public transportation. · If you are sick:  ? Leave your home only if you need to get medical care. But call the doctor's office first so they know you're coming. And wear a face mask, if you have one.  ? If you have a face mask, wear it whenever you're around other people. It can help stop the spread of the virus when you cough or sneeze. ? Clean and disinfect your home every day. Use household  and disinfectant wipes or sprays. Take special care to clean things that you grab with your hands. These include doorknobs, remote controls, phones, and handles on your refrigerator and microwave. And don't forget countertops, tabletops, bathrooms, and computer keyboards. When to call for help  Call 911 anytime you think you may need emergency care. For example, call if:  · You have severe trouble breathing. (You can't talk at all.)  · You have constant chest pain or pressure. · You are severely dizzy or lightheaded. · You are confused or can't think clearly. · Your face and lips have a blue color. · You pass out (lose consciousness) or are very hard to wake up. Call your doctor now if you develop symptoms such as:  · Shortness of breath. · Fever. · Cough. If you need to get care, call ahead to the doctor's office for instructions before you go. Make sure you wear a face mask, if you have one, to prevent exposing other people to the virus.   Where can you get the latest information? The following health organizations are tracking and studying this virus. Their websites contain the most up-to-date information. Vero Dunlap also learn what to do if you think you may have been exposed to the virus. · U.S. Centers for Disease Control and Prevention (CDC): The CDC provides updated news about the disease and travel advice. The website also tells you how to prevent the spread of infection. www.cdc.gov  · World Health Organization [facility]): WHO offers information about the virus outbreaks. WHO also has travel advice. www.who.int  Current as of: April 1, 2020               Content Version: 12.4  © 0935-2209 [employer], [employer]orporated. Care instructions adapted under license by your healthcare professional. If you have questions about a medical condition or this instruction, always ask your healthcare professional. [address] any warranty or liability for your use of this information. The discharge information has been reviewed with the spouse. Any questions and concerns from the spouse have been addressed. The spouse verbalized understanding. CONTENTS FOUND IN YOUR DISCHARGE ENVELOPE:  [x]     Surgeon and Hospital Discharge Instructions  [x]     [facility] Surgical Services Care Provider Card  []     Medication & Side Effect Guide            (your newly prescribed medications have been marked/highlighted showing the most common side effects from   the classes of drugs on your prescriptions)  []     [facility] Education Information  []     Physical Therapy Prescription  []     Follow-up Appointment Cards  []     Surgery-related Pictures/Media  []     Pain block and/or block with On-Q Catheter from Anesthesia Service (information included in your instructions above)  []     Medical device information sheets/pamphlets from their    []     School/work excuse note. []     /parent work excuse note.       The following personal items collected during your admission are returned to you:   Dental Appliance: Dental Appliances: Uppers, With patient  Vision: Visual Aid: Glasses  Hearing Aid:    Jewelry: Jewelry: None  Clothing: Clothing: Footwear, Pants, Shirt, Undergarments  Other Valuables:  Other Valuables: Eyeglasses  Valuables sent to safe:

## 2021-10-13 NOTE — PROGRESS NOTES
Follow up call to patient, ID verified X 2. Patient slightly woozy, fatigued from procedures today. Scheduled to begin [facility] on 10/14/21, but Insurance has not authorized treatment as of 1640. Patient understands that treatment, [address] will be rescheduled - patient prefers Thursdays for treatment and states cannot come before 0900, as must get her children on the school bus. Understands that scheduling department will contact her with updated times/dates. [facility] Charge RNNANA MARIE notified that patient appt will be rescheduled. Update to Provider/Scheduling/NN.

## 2021-10-14 ENCOUNTER — HOSPITAL ENCOUNTER (OUTPATIENT)
Dept: INFUSION THERAPY | Age: 46
End: 2021-10-14

## 2021-10-15 ENCOUNTER — TELEPHONE (OUTPATIENT)
Dept: SURGERY | Age: 46
End: 2021-10-15

## 2021-10-15 NOTE — TELEPHONE ENCOUNTER
Called patient. No answer. Left detailed message with genetic testing results ([facility] [address]) and stated that they should be available to the patient in [address] Asked her to call the office with questions or concerns.

## 2021-10-19 ENCOUNTER — TELEPHONE (OUTPATIENT)
Dept: ONCOLOGY | Age: 46
End: 2021-10-19

## 2021-10-19 ENCOUNTER — NURSE NAVIGATOR (OUTPATIENT)
Dept: CASE MANAGEMENT | Age: 46
End: 2021-10-19

## 2021-10-19 NOTE — PROGRESS NOTES
[address]  Breast Navigator Encounter    Patient called today to make sure that everything has been approved through her insurance company for her chemo to start on Thursday. I told her I saw the appointments in [address] [place] [address], but recommended that she reach out to Dr. Kidd Links office to confirm, number provided.             Aron Rodney, RN, BSN, [facility]  Oncology Breast Navigator     [address]  [address]  W: [phone]  F: [phone]  [email or web address]  Good Help to Those in [facility]

## 2021-10-19 NOTE — TELEPHONE ENCOUNTER
Patient called to make sure that she is okay for her treatment on Thursday. She explained that the insurance was giving her issues.      Please follow up      CB# [phone]

## 2021-10-19 NOTE — TELEPHONE ENCOUNTER
Call to patient, [phone], ID verified X 2. Scheduled for C 1, D 1 of [facility] on Updated patient that per NEY, Insurance approved her treatment. Reviewed treatment plan with patient, as well as [facility] location and appointment times. Has not received pre/post meds from Pharmacy, but plans to obtain today. Reviewed EMLA usage and when to apply, also understands to take Decadron BID day before/day after treatment. Plans eat breakfast prior to arrival, to bring items of comfort/support with her to [facility]. Understands length of treatment and will plan self care accordingly. Also reviewed post care and will plan ahead by having Claritin, Imodium, antacids, light foods, plenty of liquids on hand if needed. Reviewed how to reach after hours Provider if needed. Update to Provider.

## 2021-10-20 NOTE — PROGRESS NOTES
[facility] [place] [address] [place], [address] [address], [facility]: [phone]  F: [phone]    Reason for Visit:   Nahum Gan is a 40 y.o. female seen today in office for follow up if Right Breast Cancer    Treatment History:   · Initially had abnormal mammo at [address] with calcifications on RIGHT. No mass reported/dense breast tissue  · RIGHT Breast Biopsy 9/9/21: PATH -  IDC, grade 3 ER/NJ strong+ Her2 3+  · Genetic testing negative  · Breast MRI 9/17/21: RIGHT BREAST:Background parenchymal enhancement: Severe. There is a biopsy clip in the lower, outer quadrant of the right breast, junction of the middle and deep thirds. There is linear, branching enhancement which extends anterior to the biopsy clip over approximately 3.6 cm. There are no suspicious internal mammary or axillary chain lymph nodes. LEFT BREAST: Background parenchymal enhancement: Severe. There is no suspicious masslike or nonmasslike enhancement within the left breast to indicate breast carcinoma. There is a small mass in the lower, outer quadrant with an adjacent biopsy clip. There is an additional mass without significant enhancement in the lateral, middle 3rd, just below the nipple line. There are no suspicious internal mammary or axillary chain lymph nodes  · Neoadjuvant TCHP chemo 10/21/21 -     STAGE: Clinical 2 ER/NJ+, HER2+    History of Present Illness:   Nahum Gan is a 40 y.o. female seen today in office for follow up of right breast cancer ER+ Her2 + post biopsy only 9/9/21. She is here today for Cycle 1 of neoadjuvant TCHP chemotherapy. She had a port placed on 10/13/21 without issues. She had pre chemo ECHO on 10/13/21 that was good with EF 60-65%. She reports that she feels well overall today. Her appetite and energy levels are good. She denies fever, chills, mouth sores, cough, SOB, CP, nausea, vomiting, diarrhea, and constipation. She denies pain today.  CBC and CMP are still pending today. She is ready to start chemo today. Family History:   · Aunt cancer  · Sister leukemia    Past Medical History:   Diagnosis Date    Bell's palsy     Breast cancer ([address] 2021    ER/CT+ Her2 +      Past Surgical History:   Procedure Laterality Date    HX  SECTION      HX  SECTION      HX CHOLECYSTECTOMY      HX CHOLECYSTECTOMY        Social History     Tobacco Use    Smoking status: Never Smoker    Smokeless tobacco: Never Used   Substance Use Topics    Alcohol use: Yes     Comment: rarely      Family History   Problem Relation Age of Onset    Cancer Sister         leukemia     Current Outpatient Medications   Medication Sig    CRANIAL PROSTHESIS misc Alopecia/ breast cancer chemo/ wig    ondansetron (ZOFRAN ODT) 4 mg disintegrating tablet Take 1-2 Tablets by mouth every eight (8) hours as needed for Nausea or Vomiting.  dexAMETHasone (DECADRON) 4 mg tablet Take 2 tabs (8mg) by mouth twice daily the day before chemotherapy and the day after chemotherapy    levonorgestreL (Mirena) 20 mcg/24 hours (6 yrs) 52 mg IUD 1 Device by IntraUTERine route once.  prochlorperazine (Compazine) 5 mg tablet Take 1 tab by mouth every 6 hours as needed for nausea or vomiting    lidocaine-prilocaine (EMLA) topical cream Apply  to affected area as needed for Pain. Apply to port-a-cath 30-60 minutes prior to access for chemo     No current facility-administered medications for this visit.      Facility-Administered Medications Ordered in Other Visits   Medication Dose Route Frequency    0.9% sodium chloride infusion  25 mL/hr IntraVENous CONTINUOUS    pertuzumab 1,200 mg-trastuzumab 600 mg-hyaluronidase-zzxf 30,000 units/15 mL  15 mL SubCUTAneous ONCE    fosaprepitant (EMEND) 150 mg in 0.9% sodium chloride 150 mL IVPB  150 mg IntraVENous ONCE    dexamethasone (DECADRON) 4 mg/mL injection 10 mg  10 mg IntraVENous ONCE    palonosetron HCl (ALOXI) injection 0.25 mg  0.25 mg IntraVENous ONCE    DOCEtaxeL (TAXOTERE) 155 mg in 0.9% sodium chloride 250 mL, overfill volume 25 mL chemo infusion  75 mg/m2 (Treatment Plan Recorded) IntraVENous ONCE    CARBOplatin (PARAPLATIN) 900 mg in 0.9% sodium chloride 250 mL chemo infusion  900 mg IntraVENous ONCE    pegfilgrastim (NEULASTA) wearable SQ injector 6 mg  6 mg SubCUTAneous ONCE    sodium chloride (NS) flush 10 mL  10 mL IntraVENous PRN    0.9% sodium chloride injection 10 mL  10 mL IntraVENous PRN    heparin (porcine) pf 300-500 Units  300-500 Units InterCATHeter PRN      No Known Allergies     Review of Systems:  A complete review of systems was obtained, negative except as described above and as reported on ROS sheet scanned into system. Physical Exam:     Visit Vitals  /74 (BP 1 Location: Left upper arm, BP Patient Position: Sitting)   Pulse 87   Temp 97.3 °F (36.3 °C) (Temporal)   Ht 5' 1\" (1.549 m)   Wt 215 lb 14.4 oz (97.9 kg)   SpO2 96%   BMI 40.79 kg/m²     ECOG PS: 0  General: No distress  Eyes: Anicteric sclerae  HENT: Atraumatic, wearing a mask  Neck: Supple  CV: Regular   Respiratory: CTAB, normal respiratory effort  MS: Normal gait and station. Digits without clubbing or cyanosis. Skin: No rashes, ecchymoses, or petechiae. Normal temperature, turgor, and texture. Port site without redness/swelling  Psych: Alert, oriented, appropriate affect, normal judgment/insight  Breast: No masses palpable b/l, specifically no mass appreciated RIGHT breast, no axillary LAD  Neuro: Non focal    Results:     Lab Results   Component Value Date/Time    WBC 14.0 (H) 10/21/2021 10:12 AM    HGB 13.2 10/21/2021 10:12 AM    HCT 41.3 10/21/2021 10:12 AM    PLATELET 611 83/76/9032 10:12 AM    MCV 80.4 10/21/2021 10:12 AM    ABS.  NEUTROPHILS 12.6 (H) 10/21/2021 10:12 AM     Lab Results   Component Value Date/Time    Sodium 138 10/21/2021 10:12 AM    Potassium 3.9 10/21/2021 10:12 AM    Chloride 110 (H) 10/21/2021 10:12 AM    CO2 22 10/21/2021 10:12 AM    Glucose 263 (H) 10/21/2021 10:12 AM    BUN 14 10/21/2021 10:12 AM    Creatinine 0.86 10/21/2021 10:12 AM    GFR est AA >60 10/21/2021 10:12 AM    GFR est non-AA >60 10/21/2021 10:12 AM    Calcium 9.5 10/21/2021 10:12 AM     Lab Results   Component Value Date/Time    Bilirubin, total 0.3 10/21/2021 10:12 AM    ALT (SGPT) 20 10/21/2021 10:12 AM    Alk. phosphatase 80 10/21/2021 10:12 AM    Protein, total 8.1 10/21/2021 10:12 AM    Albumin 3.7 10/21/2021 10:12 AM    Globulin 4.4 (H) 10/21/2021 10:12 AM     Path at outside facility - scanned into chart     MRI Results (most recent):  Results from Hospital Encounter encounter on 09/17/21    MRI BREAST BI W WO CONT    Narrative  MRI BREAST BI W WO CONT, 9/17/2021 8:59 AM  INDICATION: Malignant neoplasm of unspecified site of right female breast / New  diagnosis of RIGHT breast cancer. HISTORY: Newly diagnosed invasive ductal carcinoma and ductal carcinoma in situ  in the right breast at \"11 o'clock. \" Fibroadenoma in the left breast at 3-4  o'clock. COMPARISON: Previous mammograms from 2020 and 2021  . TECHNIQUE:  Bilateral breast MRI was performed using a dedicated breast coil without  compression with the patient in the prone position. Precontrast T1-weighted  images with fat suppression were obtained followed by bolus injection of  contrast. Postcontrast dynamic and high-resolution images were acquired. T2-weighted axial imaging with fat suppression was also performed. The images  were analyzed using software for kinetic analysis, enhancement curves, digital  subtraction, and reconstructions. Alan Locke RIGHT BREAST:  Background parenchymal enhancement: Severe  There is a biopsy clip in the lower, outer quadrant of the right breast,  junction of the middle and deep thirds.  There is linear, branching enhancement  which extends anterior to the biopsy clip over approximately 3.6 cm    There are no suspicious internal mammary or axillary chain lymph nodes. Alfonzo Mackenzie LEFT BREAST:  Background parenchymal enhancement: Severe  There is no suspicious masslike or nonmasslike enhancement within the left  breast to indicate breast carcinoma. There is a small mass in the lower, outer  quadrant with an adjacent biopsy clip. There is an additional mass without  significant enhancement in the lateral, middle 3rd, just below the nipple line. There are no suspicious internal mammary or axillary chain lymph nodes. .    Impression  RIGHT BREAST:  1. There is a biopsy clip in the lower, outer quadrant of the right breast,  junction of the middle and deep thirds. There is linear, branching enhancement  which extends anteriorly from the biopsy clip over approximately 3.6 cm. BI-RADS Category 6 - Known biopsy-proven malignancy. Alfonzo Mackenzie LEFT BREAST:  1. BI-RADS Category 2 - Benign findings. 10/13/21    ECHO ADULT COMPLETE 10/13/2021 10/13/2021    Interpretation Summary  · LV: Estimated LVEF is 60 - 65%. Normal cavity size, wall thickness, systolic function (ejection fraction normal) and diastolic function. Normal left ventricular strain. Signed by: Alexander Musa MD on 10/13/2021  3:52 PM    Records reviewed and summarized above. Pathology report(s) reviewed above. Radiology report(s) reviewed above. Assessment:/PLAN     1) Clinical Stage 2 (based on MRI) RIGHT Breast Cancer ER+ Her2+ post Breast Biopsy only at [address] 9/9/21   Breast MRI 9/17/21 with 3.6 cm enhancement. No mass palpable on exam.   Patient was sent here for neoadjuvant chemo based on size of mass on MRI. Patient prefers lumpectomy, d/w breast surgery. Decided on neoadjuvant TCHP chemotherapy. She had teaching and consent with pharmacy. Pre chemo ECHO 10/13/21 good with EF 60-65%  She had port placed on 10/13/21 with no issues. Patient does not want to do cold cap, rx for wig. She is here today for Cycle 1 of neoadjuvant TCHP chemotherapy. She is clinically stable and healthy overall.   Labs (CBC and CMP) reviewed today. Patient is ready for chemo today. Follow up in 3 weeks with Cycle 2. Patient agrees with plan. We discussed the risks and benefits of TCH-P chemotherapy. Potential side effects include, but are not limited to, nausea, vomiting, constipation, diarrhea, taste changes, myelosuppression, increased risk for infection, myalgias, fatigue, alopecia, mucositis, neuropathy, fluid retention, renal failure, cardiac damage, allergic reactions, infertility, and rarely, death. The patient has consented to beginning chemotherapy. Intent is curative. 2) Premenopausal  She has two kids and does not want more kids. Reviewed impaired fertility/early menopause due to chemo with patient. She has Mirena IUD in place. Continue routine GYN follow up. 3) Management of High Risk Medications - Chemotherapy  No toxicities noted as patient is starting treatment today. Labs (CBC and CMP) reviewed today. Pre chemo ECHO from 10/13/21 reviewed. No dose adjustments needed today. Will monitor for side effects. 4) Psychosocial  She is vaccinated for COVID. She works as a , has a supportive fiance. SW/NN support as needed. Her fiance is here today. Call if questions. Follow up in 3 weeks with Cycle 2. This patient was seen in conjunction with Claire Caldera NP. I personally performed a face to face diagnostic evaluation on this patient. I personally reviewed the history and performed the key points on the exam.   I personally reviewed all points in the assessment and created treatment plan with the patient. Specifically, pt seen by me today  Doing well overall  Ready for chemo  Reviewed chemo overall today  Labs personally reviewed today  Proceed with chemo as ordered. I appreciate the opportunity to participate in Ms. Susan Rob's care.     Signed By: Syd Dunlap DO

## 2021-10-21 ENCOUNTER — DOCUMENTATION ONLY (OUTPATIENT)
Dept: ONCOLOGY | Age: 46
End: 2021-10-21

## 2021-10-21 ENCOUNTER — OFFICE VISIT (OUTPATIENT)
Dept: ONCOLOGY | Age: 46
End: 2021-10-21
Payer: MEDICAID

## 2021-10-21 ENCOUNTER — HOSPITAL ENCOUNTER (OUTPATIENT)
Dept: INFUSION THERAPY | Age: 46
Discharge: HOME | End: 2021-10-21
Payer: MEDICAID

## 2021-10-21 VITALS
DIASTOLIC BLOOD PRESSURE: 78 MMHG | TEMPERATURE: 97.3 F | SYSTOLIC BLOOD PRESSURE: 120 MMHG | HEART RATE: 69 BPM | OXYGEN SATURATION: 96 % | RESPIRATION RATE: 17 BRPM

## 2021-10-21 VITALS
OXYGEN SATURATION: 96 % | HEIGHT: 61 IN | DIASTOLIC BLOOD PRESSURE: 74 MMHG | SYSTOLIC BLOOD PRESSURE: 118 MMHG | WEIGHT: 215.9 LBS | BODY MASS INDEX: 40.76 KG/M2 | TEMPERATURE: 97.3 F | HEART RATE: 87 BPM

## 2021-10-21 DIAGNOSIS — N63.10 BREAST MASS, RIGHT: ICD-10-CM

## 2021-10-21 DIAGNOSIS — C50.911 MALIGNANT NEOPLASM OF RIGHT BREAST IN FEMALE, ESTROGEN RECEPTOR POSITIVE, UNSPECIFIED SITE OF BREAST (HCC): Primary | ICD-10-CM

## 2021-10-21 DIAGNOSIS — Z17.0 MALIGNANT NEOPLASM OF RIGHT BREAST IN FEMALE, ESTROGEN RECEPTOR POSITIVE, UNSPECIFIED SITE OF BREAST (HCC): Primary | ICD-10-CM

## 2021-10-21 DIAGNOSIS — Z51.81 ENCOUNTER FOR MONITORING CARDIOTOXIC DRUG THERAPY: ICD-10-CM

## 2021-10-21 DIAGNOSIS — Z79.899 ENCOUNTER FOR MONITORING CARDIOTOXIC DRUG THERAPY: ICD-10-CM

## 2021-10-21 DIAGNOSIS — N95.9 PREMENOPAUSAL PATIENT: ICD-10-CM

## 2021-10-21 DIAGNOSIS — Z95.828 PORT-A-CATH IN PLACE: ICD-10-CM

## 2021-10-21 DIAGNOSIS — Z51.11 ENCOUNTER FOR ANTINEOPLASTIC CHEMOTHERAPY: ICD-10-CM

## 2021-10-21 LAB
ALBUMIN SERPL-MCNC: 3.7 G/DL (ref 3.5–5)
ALBUMIN/GLOB SERPL: 0.8 {RATIO} (ref 1.1–2.2)
ALP SERPL-CCNC: 80 U/L (ref 45–117)
ALT SERPL-CCNC: 20 U/L (ref 12–78)
ANION GAP SERPL CALC-SCNC: 6 MMOL/L (ref 5–15)
AST SERPL-CCNC: 9 U/L (ref 15–37)
BASOPHILS # BLD: 0 K/UL (ref 0–0.1)
BASOPHILS NFR BLD: 0 % (ref 0–1)
BILIRUB SERPL-MCNC: 0.3 MG/DL (ref 0.2–1)
BUN SERPL-MCNC: 14 MG/DL (ref 6–20)
BUN/CREAT SERPL: 16 (ref 12–20)
CALCIUM SERPL-MCNC: 9.5 MG/DL (ref 8.5–10.1)
CHLORIDE SERPL-SCNC: 110 MMOL/L (ref 97–108)
CO2 SERPL-SCNC: 22 MMOL/L (ref 21–32)
CREAT SERPL-MCNC: 0.86 MG/DL (ref 0.55–1.02)
DIFFERENTIAL METHOD BLD: ABNORMAL
EOSINOPHIL # BLD: 0 K/UL (ref 0–0.4)
EOSINOPHIL NFR BLD: 0 % (ref 0–7)
ERYTHROCYTE [DISTWIDTH] IN BLOOD BY AUTOMATED COUNT: 14.2 % (ref 11.5–14.5)
GLOBULIN SER CALC-MCNC: 4.4 G/DL (ref 2–4)
GLUCOSE SERPL-MCNC: 263 MG/DL (ref 65–100)
HBV SURFACE AB SER QL: NONREACTIVE
HBV SURFACE AB SER-ACNC: <3.1 MIU/ML
HBV SURFACE AG SER QL: 0.22 INDEX
HBV SURFACE AG SER QL: NEGATIVE
HCT VFR BLD AUTO: 41.3 % (ref 35–47)
HGB BLD-MCNC: 13.2 G/DL (ref 11.5–16)
IMM GRANULOCYTES # BLD AUTO: 0.1 K/UL (ref 0–0.04)
IMM GRANULOCYTES NFR BLD AUTO: 1 % (ref 0–0.5)
LYMPHOCYTES # BLD: 1 K/UL (ref 0.8–3.5)
LYMPHOCYTES NFR BLD: 7 % (ref 12–49)
MCH RBC QN AUTO: 25.7 PG (ref 26–34)
MCHC RBC AUTO-ENTMCNC: 32 G/DL (ref 30–36.5)
MCV RBC AUTO: 80.4 FL (ref 80–99)
MONOCYTES # BLD: 0.2 K/UL (ref 0–1)
MONOCYTES NFR BLD: 2 % (ref 5–13)
NEUTS SEG # BLD: 12.6 K/UL (ref 1.8–8)
NEUTS SEG NFR BLD: 90 % (ref 32–75)
NRBC # BLD: 0 K/UL (ref 0–0.01)
NRBC BLD-RTO: 0 PER 100 WBC
PLATELET # BLD AUTO: 274 K/UL (ref 150–400)
PMV BLD AUTO: 11.3 FL (ref 8.9–12.9)
POTASSIUM SERPL-SCNC: 3.9 MMOL/L (ref 3.5–5.1)
PROT SERPL-MCNC: 8.1 G/DL (ref 6.4–8.2)
RBC # BLD AUTO: 5.14 M/UL (ref 3.8–5.2)
SODIUM SERPL-SCNC: 138 MMOL/L (ref 136–145)
WBC # BLD AUTO: 14 K/UL (ref 3.6–11)

## 2021-10-21 PROCEDURE — 74011250636 HC RX REV CODE- 250/636: Performed by: INTERNAL MEDICINE

## 2021-10-21 PROCEDURE — 87340 HEPATITIS B SURFACE AG IA: CPT

## 2021-10-21 PROCEDURE — 96402 CHEMO HORMON ANTINEOPL SQ/IM: CPT

## 2021-10-21 PROCEDURE — 96367 TX/PROPH/DG ADDL SEQ IV INF: CPT

## 2021-10-21 PROCEDURE — 77030012965 HC NDL HUBR BBMI -A

## 2021-10-21 PROCEDURE — 80053 COMPREHEN METABOLIC PANEL: CPT

## 2021-10-21 PROCEDURE — 96415 CHEMO IV INFUSION ADDL HR: CPT

## 2021-10-21 PROCEDURE — 86704 HEP B CORE ANTIBODY TOTAL: CPT

## 2021-10-21 PROCEDURE — 86706 HEP B SURFACE ANTIBODY: CPT

## 2021-10-21 PROCEDURE — 85025 COMPLETE CBC W/AUTO DIFF WBC: CPT

## 2021-10-21 PROCEDURE — 96413 CHEMO IV INFUSION 1 HR: CPT

## 2021-10-21 PROCEDURE — 96401 CHEMO ANTI-NEOPL SQ/IM: CPT

## 2021-10-21 PROCEDURE — 96377 APPLICATON ON-BODY INJECTOR: CPT

## 2021-10-21 PROCEDURE — 96375 TX/PRO/DX INJ NEW DRUG ADDON: CPT

## 2021-10-21 PROCEDURE — 74011000258 HC RX REV CODE- 258: Performed by: INTERNAL MEDICINE

## 2021-10-21 PROCEDURE — 99215 OFFICE O/P EST HI 40 MIN: CPT | Performed by: INTERNAL MEDICINE

## 2021-10-21 PROCEDURE — 36415 COLL VENOUS BLD VENIPUNCTURE: CPT

## 2021-10-21 RX ORDER — DEXAMETHASONE SODIUM PHOSPHATE 4 MG/ML
10 INJECTION, SOLUTION INTRA-ARTICULAR; INTRALESIONAL; INTRAMUSCULAR; INTRAVENOUS; SOFT TISSUE ONCE
Status: COMPLETED | OUTPATIENT
Start: 2021-10-21 | End: 2021-10-21

## 2021-10-21 RX ORDER — HEPARIN 100 UNIT/ML
300-500 SYRINGE INTRAVENOUS AS NEEDED
Status: ACTIVE | OUTPATIENT
Start: 2021-10-21 | End: 2021-10-21

## 2021-10-21 RX ORDER — PALONOSETRON 0.05 MG/ML
0.25 INJECTION, SOLUTION INTRAVENOUS ONCE
Status: COMPLETED | OUTPATIENT
Start: 2021-10-21 | End: 2021-10-21

## 2021-10-21 RX ORDER — SODIUM CHLORIDE 9 MG/ML
10 INJECTION INTRAMUSCULAR; INTRAVENOUS; SUBCUTANEOUS AS NEEDED
Status: ACTIVE | OUTPATIENT
Start: 2021-10-21 | End: 2021-10-21

## 2021-10-21 RX ORDER — SODIUM CHLORIDE 0.9 % (FLUSH) 0.9 %
10 SYRINGE (ML) INJECTION AS NEEDED
Status: DISPENSED | OUTPATIENT
Start: 2021-10-21 | End: 2021-10-21

## 2021-10-21 RX ORDER — SODIUM CHLORIDE 9 MG/ML
25 INJECTION, SOLUTION INTRAVENOUS CONTINUOUS
Status: DISPENSED | OUTPATIENT
Start: 2021-10-21 | End: 2021-10-21

## 2021-10-21 RX ADMIN — DEXAMETHASONE SODIUM PHOSPHATE 10 MG: 4 INJECTION, SOLUTION INTRA-ARTICULAR; INTRALESIONAL; INTRAMUSCULAR; INTRAVENOUS; SOFT TISSUE at 13:31

## 2021-10-21 RX ADMIN — FOSAPREPITANT 150 MG: 150 INJECTION, POWDER, LYOPHILIZED, FOR SOLUTION INTRAVENOUS at 13:39

## 2021-10-21 RX ADMIN — Medication 10 ML: at 16:50

## 2021-10-21 RX ADMIN — DOCETAXEL ANHYDROUS 155 MG: 10 INJECTION, SOLUTION INTRAVENOUS at 14:35

## 2021-10-21 RX ADMIN — PEGFILGRASTIM 6 MG: KIT SUBCUTANEOUS at 16:49

## 2021-10-21 RX ADMIN — CARBOPLATIN 900 MG: 10 INJECTION, SOLUTION INTRAVENOUS at 15:53

## 2021-10-21 RX ADMIN — SODIUM CHLORIDE 25 ML/HR: 900 INJECTION, SOLUTION INTRAVENOUS at 13:27

## 2021-10-21 RX ADMIN — PALONOSETRON 0.25 MG: 0.05 INJECTION, SOLUTION INTRAVENOUS at 13:27

## 2021-10-21 RX ADMIN — HEPARIN SODIUM (PORCINE) LOCK FLUSH IV SOLN 100 UNIT/ML 500 UNITS: 100 SOLUTION at 16:54

## 2021-10-21 RX ADMIN — PERTUZUMAB, TRASTUZUMAB, AND HYALURONIDASE-ZZXF 15 ML: 1200; 600; 2000 INJECTION, SOLUTION SUBCUTANEOUS at 14:20

## 2021-10-21 NOTE — PROGRESS NOTES
Isaiah Rosario is a 40 y.o. female  Chief Complaint   Patient presents with    Chemotherapy    Breast Cancer     1. Have you been to the ER, urgent care clinic since your last visit? Hospitalized since your last visit? No.    2. Have you seen or consulted any other health care providers outside of the [address] [place] [address] since your last visit? Include any pap smears or colon screening.  No.

## 2021-10-21 NOTE — PROGRESS NOTES
Pharmacy Note- Chemotherapy Education     Adele Jimenez is a  40 y. o.female  diagnosed with breast cancer here today for Cycle 1, Day 1 chemotherapy counseling. Ms. Booker Mae is being treated with TCHP. Provided education on DOCEtaxel, CARBOplatin, trastuzumab, pertuzumab and premedications - fosaprepitant, palonosetron and dexamethasone.     Provided Ms. Booker Mae with handouts and reviewed home supportive care regimen and Neulasta On Pro.      Reviewed SQ trastuzumab and pertuzumab.     Reviewed side effects of chemotherapy to include s/s infection, anemia, appetite changes, thromboyctopenia, fatigue, hair loss/alopecia, bone pain, skin and nail changes, diarrhea/constipation, infusion reactions/fluid retention, peripheral neuropathy, kidney changes and cardiac toxicity.     Patient given ways to manage these side effects and when to contact office.       Ms. Booker Mae verbalized understanding of the information presented and all of the patient's questions were answered.     Julien Menendez, MargueriteD, BCPS, [facility] [address] in [address]: No   Recommendation Provided To: Patient/Caregiver: 1 via In person     Intervention Accepted By: Patient/Caregiver: 1   Time Spent (min): 30

## 2021-10-21 NOTE — PROGRESS NOTES
COY Verified. Called pt on mobile phone number listed. Patient was made aware of her high blood sugar levels prior to start chemo. Patient verbalized understanding and was advised MA has faxed results to her PCP for an FYI. MA has faxed most recent blood work results from 10/21/21 to [facility] to fax number: [phone]! Fax confirmation was received back. This was done.   Timur Del Castillo

## 2021-10-21 NOTE — PROGRESS NOTES
[facility] Progress Note  Name:Susan Rob  : 1975  MRN: [id number]    Pt arrived to  [facility] ambulatory in no acute distress at 1000 for [facility] [place] D[facility]. Assessment unremarkable. L chest port accessed without issue and positive blood return noted. Labs obtained, as ordred. Vital Signs:  Patient Vitals for the past 12 hrs:   Temp Pulse Resp BP SpO2   10/21/21 1657  69  120/78    10/21/21 1009 97.3 °F (36.3 °C) 87 17 118/74 96 %       Labs:  Recent Results (from the past 12 hour(s))   CBC WITH AUTOMATED DIFF    Collection Time: 10/21/21 10:12 AM   Result Value Ref Range    WBC 14.0 (H) 3.6 - 11.0 K/uL    RBC 5.14 3.80 - 5.20 M/uL    HGB 13.2 11.5 - 16.0 g/dL    HCT 41.3 35.0 - 47.0 %    MCV 80.4 80.0 - 99.0 FL    MCH 25.7 (L) 26.0 - 34.0 PG    MCHC 32.0 30.0 - 36.5 g/dL    RDW 14.2 11.5 - 14.5 %    PLATELET 776 378 - 243 K/uL    MPV 11.3 8.9 - 12.9 FL    NRBC 0.0 0  WBC    ABSOLUTE NRBC 0.00 0.00 - 0.01 K/uL    NEUTROPHILS 90 (H) 32 - 75 %    LYMPHOCYTES 7 (L) 12 - 49 %    MONOCYTES 2 (L) 5 - 13 %    EOSINOPHILS 0 0 - 7 %    BASOPHILS 0 0 - 1 %    IMMATURE GRANULOCYTES 1 (H) 0.0 - 0.5 %    ABS. NEUTROPHILS 12.6 (H) 1.8 - 8.0 K/UL    ABS. LYMPHOCYTES 1.0 0.8 - 3.5 K/UL    ABS. MONOCYTES 0.2 0.0 - 1.0 K/UL    ABS. EOSINOPHILS 0.0 0.0 - 0.4 K/UL    ABS. BASOPHILS 0.0 0.0 - 0.1 K/UL    ABS. IMM.  GRANS. 0.1 (H) 0.00 - 0.04 K/UL    DF AUTOMATED     METABOLIC PANEL, COMPREHENSIVE    Collection Time: 10/21/21 10:12 AM   Result Value Ref Range    Sodium 138 136 - 145 mmol/L    Potassium 3.9 3.5 - 5.1 mmol/L    Chloride 110 (H) 97 - 108 mmol/L    CO2 22 21 - 32 mmol/L    Anion gap 6 5 - 15 mmol/L    Glucose 263 (H) 65 - 100 mg/dL    BUN 14 6 - 20 MG/DL    Creatinine 0.86 0.55 - 1.02 MG/DL    BUN/Creatinine ratio 16 12 - 20      GFR est AA >60 >60 ml/min/1.73m2    GFR est non-AA >60 >60 ml/min/1.73m2    Calcium 9.5 8.5 - 10.1 MG/DL    Bilirubin, total 0.3 0.2 - 1.0 MG/DL    ALT (SGPT) 20 12 - 78 U/L    AST (SGOT) 9 (L) 15 - 37 U/L    Alk. phosphatase 80 45 - 117 U/L    Protein, total 8.1 6.4 - 8.2 g/dL    Albumin 3.7 3.5 - 5.0 g/dL    Globulin 4.4 (H) 2.0 - 4.0 g/dL    A-G Ratio 0.8 (L) 1.1 - 2.2     HEP B SURFACE AG    Collection Time: 10/21/21 10:33 AM   Result Value Ref Range    Hepatitis B surface Ag 0.22 Index    Hep B surface Ag Interp. Negative NEG     HEP B SURFACE AB    Collection Time: 10/21/21 10:33 AM   Result Value Ref Range    Hepatitis B surface Ab <3.10 mIU/mL    Hep B surface Ab Interp. NONREACTIVE NR         Patient denied having any symptoms of COVID-19, i.e. SOB, coughing, fever, or generally not feeling well.   Also denies having been exposed to COVID-19 recently or having had any recent contact with family/friend that has a pending COVID test.     The following medications administered:  Medications Administered     0.9% sodium chloride infusion     Admin Date  10/21/2021 Action  New Bag Dose  25 mL/hr Rate  25 mL/hr Route  IntraVENous Administered By  Elisabeth Costello RN          CARBOplatin (PARAPLATIN) 900 mg in 0.9% sodium chloride 250 mL, overfill volume 25 mL chemo infusion     Admin Date  10/21/2021 Action  New Bag Dose  900 mg Rate  730 mL/hr Route  IntraVENous Administered By  Elisabeth Costello RN          dexamethasone (DECADRON) 4 mg/mL injection 10 mg     Admin Date  10/21/2021 Action  Given Dose  10 mg Route  IntraVENous Administered By  Elisabeth Costello RN          DOCEtaxeL (TAXOTERE) 155 mg in 0.9% sodium chloride 250 mL, overfill volume 25 mL chemo infusion     Admin Date  10/21/2021 Action  New Bag Dose  155 mg Rate  290.5 mL/hr Route  IntraVENous Administered By  Elisabeth Costello RN          fosaprepitant (EMEND) 150 mg in 0.9% sodium chloride 150 mL IVPB     Admin Date  10/21/2021 Action  New Bag Dose  150 mg Rate  450 mL/hr Route  IntraVENous Administered By  Elisabeth Costello RN          heparin (porcine) pf 300-500 Units     Admin Date  10/21/2021 Action  Given Dose  500 Units Route  InterCATHeter Administered By  Nate Menchaca, SUSHMA          palonosetron HCl (ALOXI) injection 0.25 mg     Admin Date  10/21/2021 Action  Given Dose  0.25 mg Route  IntraVENous Administered By  Nate Menchaca, SUSHMA          pegfilgrastim (NEULASTA) wearable SQ injector 6 mg     Admin Date  10/21/2021 Action  Given Dose  6 mg Route  SubCUTAneous Administered By  Nate Menchaca, SUSHMA          pertuzumab 1,200 mg-trastuzumab 600 mg-hyaluronidase-zzxf 30,000 units/15 mL     Admin Date  10/21/2021 Action  Given Dose  15 mL Route  SubCUTAneous Administered By  Nate Menchaca, SUSHMA          sodium chloride (NS) flush 10 mL     Admin Date  10/21/2021 Action  Given Dose  10 mL Route  IntraVENous Administered By  Nate Menchaca, SUSHMA              Neulasta placed on left arm, patient is aware that it will start to infuse after 27 hours over 45 minutes. Patient is aware to take off after 930 pm on 10/22/2021. She also received her trastuzumab/perjeta injection in her left thigh slow push over 10 minutes. Pt tolerated treatment well. IV flushed per policy and removed, 2x2 and bandaid placed. Pt discharged ambulatory in no acute distress at 1650, accompanied by significant other. Next appointment 11/11/2021@ 1000.     Future Appointments   Date Time Provider Stacey Sanchez   11/2/2021  2:00 PM Feliberto Box NP [facility] BS AMB   11/11/2021 10:00 AM H3 [facility]   11/12/2021  9:00 AM G3 [place] [place] [facility]   12/2/2021 10:00 AM H3 [facility]   12/3/2021  9:00 AM G3 [place] [facility]   12/22/2021 10:00 AM H3 [facility]   12/23/2021 10:00 AM E4 [facility] [place] [facility]   6/9/2022  8:30 AM Jaime Albetro MD [facility] BS AMB

## 2021-10-22 ENCOUNTER — TELEPHONE (OUTPATIENT)
Dept: ONCOLOGY | Age: 46
End: 2021-10-22

## 2021-10-22 LAB
HBV CORE AB SERPL QL IA: NEGATIVE
HBV CORE AB SERPL QL IA: NEGATIVE
HBV CORE IGM SERPL QL IA: NEGATIVE

## 2021-11-02 ENCOUNTER — OFFICE VISIT (OUTPATIENT)
Dept: SURGERY | Age: 46
End: 2021-11-02
Payer: MEDICAID

## 2021-11-02 VITALS — HEIGHT: 61 IN | BODY MASS INDEX: 40.59 KG/M2 | WEIGHT: 215 LBS

## 2021-11-02 DIAGNOSIS — R21 RASH: ICD-10-CM

## 2021-11-02 DIAGNOSIS — C50.411 MALIGNANT NEOPLASM OF UPPER-OUTER QUADRANT OF RIGHT FEMALE BREAST, UNSPECIFIED ESTROGEN RECEPTOR STATUS (HCC): Primary | ICD-10-CM

## 2021-11-02 PROCEDURE — 99213 OFFICE O/P EST LOW 20 MIN: CPT | Performed by: NURSE PRACTITIONER

## 2021-11-02 NOTE — PROGRESS NOTES
HISTORY OF PRESENT ILLNESS  Gab De Leon is a 40 y.o. female. HPI Established patient presents for evaluation of a rash x 4-5 days. Red, splotchy rash along arms bilaterally and small papules along chest and around back of neck. These papules also are also around her mouth. None of the areas itches and they are not painful. No drainage. Reports no changes to detergents, soap, etc.  No associated symptoms or treatments tried. Breast history -   Referring - Dr. Colleen Lofton - [address]  · Initially had abnormal mammo at [address] with calcifications on RIGHT. No mass reported/dense breast tissue  · RIGHT Breast Biopsy 9/9/21: PATH -  IDC, grade 3 ER/ND strong+ Her2 3+  · Genetic testing negative  · Breast MRI 9/17/21: RIGHT BREAST:Background parenchymal enhancement: Severe. There is a biopsy clip in the lower, outer quadrant of the right breast, junction of the middle and deep thirds. There is linear, branching enhancement which extends anterior to the biopsy clip over approximately 3.6 cm. There are no suspicious internal mammary or axillary chain lymph nodes. LEFT BREAST: Background parenchymal enhancement: Severe. There is no suspicious masslike or nonmasslike enhancement within the left breast to indicate breast carcinoma. There is a small mass in the lower, outer quadrant with an adjacent biopsy clip. There is an additional mass without significant enhancement in the lateral, middle 3rd, just below the nipple line. There are no suspicious internal mammary or axillary chain lymph nodes  · Neoadjuvant TCHP chemo 10/21/21 - started - Dr. Frederic Babinski      Family history -   No family history of breast cancer  Genetic testing negative - 2021      OB History    No obstetric history on file.       Obstetric Comments   Menarche 16, LMP 46, # of children 2, age of 5st delivery 2013, Hysterectomy/oophorectomy No/No, Breast bx Yes, history of breast feeding No, BCP No, Hormone therapy No               Past Surgical History:   Procedure Laterality Date    HX  SECTION      HX  SECTION      HX CHOLECYSTECTOMY      HX CHOLECYSTECTOMY           ROS    Physical Exam  Constitutional:       Appearance: Normal appearance. Skin:     General: Skin is warm and dry. Findings: Rash (rash along arms biaterally - red and splotchy; small papules along chest, back of neck and around back) present. Neurological:      Mental Status: She is alert. Psychiatric:         Attention and Perception: Attention normal.         Mood and Affect: Mood normal.         Speech: Speech normal.         Behavior: Behavior normal.         Visit Vitals  Ht 5' 1\" (1.549 m)   Wt 215 lb (97.5 kg)   BMI 40.62 kg/m²         ASSESSMENT and PLAN  Encounter Diagnoses   Name Primary?  Malignant neoplasm of upper-outer quadrant of right female breast, unspecified estrogen receptor status (HCC) Yes    Rash        Red, splotchy rash along arms bilaterally and small papules along chest and around back of neck. These papules also are also around her mouth. None of the areas itches and they are not painful. No drainage. Reports no changes to detergents, soap, etc.  No associated symptoms or treatments tried. Since rash is relatively widespread and is not itchy, no topical steroids will be tried at this time. Since rash is not bothersome, no treatment at this time and will observe for spread or worsening. She will follow-up here PRN changes to the rash and has follow-up with med onc on 21. Will forward note to med onc to make aware as well. Continue care with med onc and follow-up here PRN. She is comfortable with this plan. All questions answered and she stated understanding. Total time spent for this patient - 20 minutes.

## 2021-11-03 ENCOUNTER — TELEPHONE (OUTPATIENT)
Dept: ONCOLOGY | Age: 46
End: 2021-11-03

## 2021-11-03 NOTE — TELEPHONE ENCOUNTER
Follow up call to patient, ID verified X 2. Patient seen by Breast NP yesterday and noted to have raised red rash on bilateral arms, chest, back of neck, around mouth. Per patient, rash started \"a few days ago around my port. \"  Denies itching, swelling, or pain. States areas are present but not bothersome to her. States HX of allergy to [employer], but has not used. Advised to avoid prolonged sun exposure on irritated areas, plans to keep diary of what worsens/exacerbates skin, agreed to notify office if worsens, becomes bothersome. May try OTC PO Benadryl or topicals and see if this helps/hinders rash. Update to Provider.

## 2021-11-04 ENCOUNTER — APPOINTMENT (OUTPATIENT)
Dept: INFUSION THERAPY | Age: 46
End: 2021-11-04

## 2021-11-04 RX ORDER — DEXAMETHASONE SODIUM PHOSPHATE 100 MG/10ML
10 INJECTION INTRAMUSCULAR; INTRAVENOUS ONCE
Status: CANCELLED | OUTPATIENT
Start: 2021-11-12 | End: 2021-11-12

## 2021-11-04 RX ORDER — SODIUM CHLORIDE 9 MG/ML
10 INJECTION INTRAMUSCULAR; INTRAVENOUS; SUBCUTANEOUS AS NEEDED
Status: CANCELLED | OUTPATIENT
Start: 2021-11-12

## 2021-11-04 RX ORDER — EPINEPHRINE 1 MG/ML
0.3 INJECTION, SOLUTION, CONCENTRATE INTRAVENOUS AS NEEDED
Status: CANCELLED | OUTPATIENT
Start: 2021-11-12

## 2021-11-04 RX ORDER — ACETAMINOPHEN 325 MG/1
650 TABLET ORAL AS NEEDED
Status: CANCELLED
Start: 2021-11-12

## 2021-11-04 RX ORDER — PALONOSETRON 0.05 MG/ML
0.25 INJECTION, SOLUTION INTRAVENOUS ONCE
Status: CANCELLED | OUTPATIENT
Start: 2021-11-12 | End: 2021-11-12

## 2021-11-04 RX ORDER — DIPHENHYDRAMINE HYDROCHLORIDE 50 MG/ML
50 INJECTION, SOLUTION INTRAMUSCULAR; INTRAVENOUS AS NEEDED
Status: CANCELLED
Start: 2021-11-12

## 2021-11-04 RX ORDER — DIPHENHYDRAMINE HYDROCHLORIDE 50 MG/ML
25 INJECTION, SOLUTION INTRAMUSCULAR; INTRAVENOUS AS NEEDED
Status: CANCELLED
Start: 2021-11-12

## 2021-11-04 RX ORDER — HYDROCORTISONE SODIUM SUCCINATE 100 MG/2ML
100 INJECTION, POWDER, FOR SOLUTION INTRAMUSCULAR; INTRAVENOUS AS NEEDED
Status: CANCELLED | OUTPATIENT
Start: 2021-11-12

## 2021-11-04 RX ORDER — ACETAMINOPHEN 325 MG/1
650 TABLET ORAL
Status: CANCELLED | OUTPATIENT
Start: 2021-11-12

## 2021-11-04 RX ORDER — ONDANSETRON 2 MG/ML
8 INJECTION INTRAMUSCULAR; INTRAVENOUS AS NEEDED
Status: CANCELLED | OUTPATIENT
Start: 2021-11-12

## 2021-11-04 RX ORDER — DIPHENHYDRAMINE HYDROCHLORIDE 50 MG/ML
50 INJECTION, SOLUTION INTRAMUSCULAR; INTRAVENOUS
Status: CANCELLED | OUTPATIENT
Start: 2021-11-12

## 2021-11-04 RX ORDER — HEPARIN 100 UNIT/ML
300-500 SYRINGE INTRAVENOUS AS NEEDED
Status: CANCELLED
Start: 2021-11-12

## 2021-11-04 RX ORDER — SODIUM CHLORIDE 0.9 % (FLUSH) 0.9 %
10 SYRINGE (ML) INJECTION AS NEEDED
Status: CANCELLED | OUTPATIENT
Start: 2021-11-12

## 2021-11-04 RX ORDER — ALBUTEROL SULFATE 0.83 MG/ML
2.5 SOLUTION RESPIRATORY (INHALATION) AS NEEDED
Status: CANCELLED
Start: 2021-11-12

## 2021-11-04 RX ORDER — SODIUM CHLORIDE 9 MG/ML
25 INJECTION, SOLUTION INTRAVENOUS CONTINUOUS
Status: CANCELLED | OUTPATIENT
Start: 2021-11-12

## 2021-11-11 ENCOUNTER — OFFICE VISIT (OUTPATIENT)
Dept: ONCOLOGY | Age: 46
End: 2021-11-11
Payer: MEDICAID

## 2021-11-11 ENCOUNTER — HOSPITAL ENCOUNTER (OUTPATIENT)
Dept: INFUSION THERAPY | Age: 46
Discharge: HOME | End: 2021-11-11
Payer: MEDICAID

## 2021-11-11 VITALS
SYSTOLIC BLOOD PRESSURE: 104 MMHG | TEMPERATURE: 96.7 F | HEART RATE: 94 BPM | OXYGEN SATURATION: 99 % | DIASTOLIC BLOOD PRESSURE: 84 MMHG | RESPIRATION RATE: 18 BRPM

## 2021-11-11 VITALS
SYSTOLIC BLOOD PRESSURE: 114 MMHG | HEART RATE: 97 BPM | TEMPERATURE: 96.4 F | BODY MASS INDEX: 41.46 KG/M2 | WEIGHT: 211.2 LBS | OXYGEN SATURATION: 98 % | DIASTOLIC BLOOD PRESSURE: 79 MMHG | HEIGHT: 60 IN

## 2021-11-11 DIAGNOSIS — Z51.81 ENCOUNTER FOR MONITORING CARDIOTOXIC DRUG THERAPY: ICD-10-CM

## 2021-11-11 DIAGNOSIS — C50.911 MALIGNANT NEOPLASM OF RIGHT BREAST IN FEMALE, ESTROGEN RECEPTOR POSITIVE, UNSPECIFIED SITE OF BREAST (HCC): Primary | ICD-10-CM

## 2021-11-11 DIAGNOSIS — Z79.899 ENCOUNTER FOR MONITORING CARDIOTOXIC DRUG THERAPY: ICD-10-CM

## 2021-11-11 DIAGNOSIS — N95.9 PREMENOPAUSAL PATIENT: ICD-10-CM

## 2021-11-11 DIAGNOSIS — Z09 CHEMOTHERAPY FOLLOW-UP EXAMINATION: ICD-10-CM

## 2021-11-11 DIAGNOSIS — Z95.828 PORT-A-CATH IN PLACE: ICD-10-CM

## 2021-11-11 DIAGNOSIS — N63.10 BREAST MASS, RIGHT: ICD-10-CM

## 2021-11-11 DIAGNOSIS — Z17.0 MALIGNANT NEOPLASM OF RIGHT BREAST IN FEMALE, ESTROGEN RECEPTOR POSITIVE, UNSPECIFIED SITE OF BREAST (HCC): Primary | ICD-10-CM

## 2021-11-11 LAB
ALBUMIN SERPL-MCNC: 3.6 G/DL (ref 3.5–5)
ALBUMIN/GLOB SERPL: 0.9 {RATIO} (ref 1.1–2.2)
ALP SERPL-CCNC: 66 U/L (ref 45–117)
ALT SERPL-CCNC: 37 U/L (ref 12–78)
ANION GAP SERPL CALC-SCNC: 3 MMOL/L (ref 5–15)
AST SERPL-CCNC: 15 U/L (ref 15–37)
BASOPHILS # BLD: 0.1 K/UL (ref 0–0.1)
BASOPHILS NFR BLD: 1 % (ref 0–1)
BILIRUB SERPL-MCNC: 0.3 MG/DL (ref 0.2–1)
BUN SERPL-MCNC: 12 MG/DL (ref 6–20)
BUN/CREAT SERPL: 16 (ref 12–20)
CALCIUM SERPL-MCNC: 9.3 MG/DL (ref 8.5–10.1)
CHLORIDE SERPL-SCNC: 108 MMOL/L (ref 97–108)
CO2 SERPL-SCNC: 28 MMOL/L (ref 21–32)
CREAT SERPL-MCNC: 0.74 MG/DL (ref 0.55–1.02)
DIFFERENTIAL METHOD BLD: ABNORMAL
EOSINOPHIL # BLD: 0.2 K/UL (ref 0–0.4)
EOSINOPHIL NFR BLD: 2 % (ref 0–7)
ERYTHROCYTE [DISTWIDTH] IN BLOOD BY AUTOMATED COUNT: 14.6 % (ref 11.5–14.5)
GLOBULIN SER CALC-MCNC: 3.9 G/DL (ref 2–4)
GLUCOSE SERPL-MCNC: 127 MG/DL (ref 65–100)
HCT VFR BLD AUTO: 39.3 % (ref 35–47)
HGB BLD-MCNC: 12.3 G/DL (ref 11.5–16)
IMM GRANULOCYTES # BLD AUTO: 0.1 K/UL (ref 0–0.04)
IMM GRANULOCYTES NFR BLD AUTO: 2 % (ref 0–0.5)
LYMPHOCYTES # BLD: 1.9 K/UL (ref 0.8–3.5)
LYMPHOCYTES NFR BLD: 23 % (ref 12–49)
MCH RBC QN AUTO: 25.5 PG (ref 26–34)
MCHC RBC AUTO-ENTMCNC: 31.3 G/DL (ref 30–36.5)
MCV RBC AUTO: 81.4 FL (ref 80–99)
MONOCYTES # BLD: 0.5 K/UL (ref 0–1)
MONOCYTES NFR BLD: 6 % (ref 5–13)
NEUTS SEG # BLD: 5.4 K/UL (ref 1.8–8)
NEUTS SEG NFR BLD: 66 % (ref 32–75)
NRBC # BLD: 0 K/UL (ref 0–0.01)
NRBC BLD-RTO: 0 PER 100 WBC
PLATELET # BLD AUTO: 186 K/UL (ref 150–400)
PMV BLD AUTO: 11 FL (ref 8.9–12.9)
POTASSIUM SERPL-SCNC: 4.1 MMOL/L (ref 3.5–5.1)
PROT SERPL-MCNC: 7.5 G/DL (ref 6.4–8.2)
RBC # BLD AUTO: 4.83 M/UL (ref 3.8–5.2)
SODIUM SERPL-SCNC: 139 MMOL/L (ref 136–145)
WBC # BLD AUTO: 8.2 K/UL (ref 3.6–11)

## 2021-11-11 PROCEDURE — 36415 COLL VENOUS BLD VENIPUNCTURE: CPT

## 2021-11-11 PROCEDURE — 85025 COMPLETE CBC W/AUTO DIFF WBC: CPT

## 2021-11-11 PROCEDURE — 80053 COMPREHEN METABOLIC PANEL: CPT

## 2021-11-11 PROCEDURE — 99214 OFFICE O/P EST MOD 30 MIN: CPT | Performed by: INTERNAL MEDICINE

## 2021-11-11 NOTE — PROGRESS NOTES
[facility] [place] at [place] [address] [address], [place]: [phone]  F: [phone]    Reason for Visit:   Erica Reddy is a 40 y.o. female seen today in office for follow up if Right Breast Cancer    Treatment History:   · Initially had abnormal mammo at [facility] with calcifications on RIGHT. No mass reported/dense breast tissue  · RIGHT Breast Biopsy 9/9/21: PATH -  IDC, grade 3 ER/MA strong+ Her2 3+  · Genetic testing negative  · Breast MRI 9/17/21: RIGHT BREAST:Background parenchymal enhancement: Severe. There is a biopsy clip in the lower, outer quadrant of the right breast, junction of the middle and deep thirds. There is linear, branching enhancement which extends anterior to the biopsy clip over approximately 3.6 cm. There are no suspicious internal mammary or axillary chain lymph nodes. LEFT BREAST: Background parenchymal enhancement: Severe. There is no suspicious masslike or nonmasslike enhancement within the left breast to indicate breast carcinoma. There is a small mass in the lower, outer quadrant with an adjacent biopsy clip. There is an additional mass without significant enhancement in the lateral, middle 3rd, just below the nipple line. There are no suspicious internal mammary or axillary chain lymph nodes  · Neoadjuvant TCHP chemo 10/21/21 -     STAGE: Clinical 2 ER/MA+, HER2+    History of Present Illness:   Erica Reddy is a 40 y.o. female seen today in office for follow up of right breast cancer ER+ Her2 + post biopsy only 9/9/21 here today for Cycle 2 of neoadjuvant TCHP chemotherapy. Did well with cycle 1 overall. No major issues. Has some diarrhea after eating. Ready for chemo today. Labs pending. Not working now.    No fevers/ chills/ chest pain/ SOB/ nausea/ vomiting/diarrhea/ pain/            Past Medical History:   Diagnosis Date    Bell's palsy     Breast cancer ([facility] [address] 09/2021    ER/MA+ Her2 +      Past Surgical History: Procedure Laterality Date    HX  SECTION      HX  SECTION      HX CHOLECYSTECTOMY      HX CHOLECYSTECTOMY        Social History     Tobacco Use    Smoking status: Never Smoker    Smokeless tobacco: Never Used   Substance Use Topics    Alcohol use: Yes     Comment: rarely      Family History   Problem Relation Age of Onset    Cancer Sister         leukemia     Current Outpatient Medications   Medication Sig    ondansetron (ZOFRAN ODT) 4 mg disintegrating tablet Take 1-2 Tablets by mouth every eight (8) hours as needed for Nausea or Vomiting.  prochlorperazine (Compazine) 5 mg tablet Take 1 tab by mouth every 6 hours as needed for nausea or vomiting    lidocaine-prilocaine (EMLA) topical cream Apply  to affected area as needed for Pain. Apply to port-a-cath 30-60 minutes prior to access for chemo    dexAMETHasone (DECADRON) 4 mg tablet Take 2 tabs (8mg) by mouth twice daily the day before chemotherapy and the day after chemotherapy    levonorgestreL (Mirena) 20 mcg/24 hours (6 yrs) 52 mg IUD 1 Device by IntraUTERine route once.  CRANIAL PROSTHESIS misc Alopecia/ breast cancer chemo/ wig     No current facility-administered medications for this visit. No Known Allergies     Review of Systems:  A complete review of systems was obtained, negative except as described above and as reported on ROS sheet scanned into system. Physical Exam:     Visit Vitals  /79 (BP 1 Location: Left upper arm, BP Patient Position: Sitting)   Pulse 97   Temp (!) 96.4 °F (35.8 °C) (Temporal)   Ht 5' (1.524 m)   Wt 211 lb 3.2 oz (95.8 kg)   SpO2 98%   BMI 41.25 kg/m²     ECOG PS: 0  General: No distress  Eyes: Anicteric sclerae  HENT: Atraumatic, wearing a mask  Neck: Supple  CV: Regular   Respiratory: CTAB, normal respiratory effort  MS: Normal gait and station. Digits without clubbing or cyanosis. Skin: No rashes, ecchymoses, or petechiae. Normal temperature, turgor, and texture.  Port site without redness/swelling  Psych: Alert, oriented, appropriate affect, normal judgment/insight  Neuro: Non focal    Results:     Lab Results   Component Value Date/Time    WBC 14.0 (H) 10/21/2021 10:12 AM    HGB 13.2 10/21/2021 10:12 AM    HCT 41.3 10/21/2021 10:12 AM    PLATELET 394 83/23/0391 10:12 AM    MCV 80.4 10/21/2021 10:12 AM    ABS. NEUTROPHILS 12.6 (H) 10/21/2021 10:12 AM     Lab Results   Component Value Date/Time    Sodium 138 10/21/2021 10:12 AM    Potassium 3.9 10/21/2021 10:12 AM    Chloride 110 (H) 10/21/2021 10:12 AM    CO2 22 10/21/2021 10:12 AM    Glucose 263 (H) 10/21/2021 10:12 AM    BUN 14 10/21/2021 10:12 AM    Creatinine 0.86 10/21/2021 10:12 AM    GFR est AA >60 10/21/2021 10:12 AM    GFR est non-AA >60 10/21/2021 10:12 AM    Calcium 9.5 10/21/2021 10:12 AM     Lab Results   Component Value Date/Time    Bilirubin, total 0.3 10/21/2021 10:12 AM    ALT (SGPT) 20 10/21/2021 10:12 AM    Alk. phosphatase 80 10/21/2021 10:12 AM    Protein, total 8.1 10/21/2021 10:12 AM    Albumin 3.7 10/21/2021 10:12 AM    Globulin 4.4 (H) 10/21/2021 10:12 AM     Path at outside facility - scanned into chart     MRI Results (most recent):  Results from Hospital Encounter encounter on 09/17/21    MRI BREAST BI W WO CONT    Narrative  MRI BREAST BI W WO CONT, 9/17/2021 8:59 AM  INDICATION: Malignant neoplasm of unspecified site of right female breast / New  diagnosis of RIGHT breast cancer. HISTORY: Newly diagnosed invasive ductal carcinoma and ductal carcinoma in situ  in the right breast at \"11 o'clock. \" Fibroadenoma in the left breast at 3-4  o'clock. COMPARISON: Previous mammograms from 2020 and 2021  . TECHNIQUE:  Bilateral breast MRI was performed using a dedicated breast coil without  compression with the patient in the prone position.  Precontrast T1-weighted  images with fat suppression were obtained followed by bolus injection of  contrast. Postcontrast dynamic and high-resolution images were acquired. T2-weighted axial imaging with fat suppression was also performed. The images  were analyzed using software for kinetic analysis, enhancement curves, digital  subtraction, and reconstructions. Blanchie Bevels RIGHT BREAST:  Background parenchymal enhancement: Severe  There is a biopsy clip in the lower, outer quadrant of the right breast,  junction of the middle and deep thirds. There is linear, branching enhancement  which extends anterior to the biopsy clip over approximately 3.6 cm    There are no suspicious internal mammary or axillary chain lymph nodes. Blanchie Bevels LEFT BREAST:  Background parenchymal enhancement: Severe  There is no suspicious masslike or nonmasslike enhancement within the left  breast to indicate breast carcinoma. There is a small mass in the lower, outer  quadrant with an adjacent biopsy clip. There is an additional mass without  significant enhancement in the lateral, middle 3rd, just below the nipple line. There are no suspicious internal mammary or axillary chain lymph nodes. .    Impression  RIGHT BREAST:  1. There is a biopsy clip in the lower, outer quadrant of the right breast,  junction of the middle and deep thirds. There is linear, branching enhancement  which extends anteriorly from the biopsy clip over approximately 3.6 cm. BI-RADS Category 6 - Known biopsy-proven malignancy. Blanchie Bevels LEFT BREAST:  1. BI-RADS Category 2 - Benign findings. 10/13/21    ECHO ADULT COMPLETE 10/13/2021 10/13/2021    Interpretation Summary  · LV: Estimated LVEF is 60 - 65%. Normal cavity size, wall thickness, systolic function (ejection fraction normal) and diastolic function. Normal left ventricular strain. Signed by: Honey Pena MD on 10/13/2021  3:52 PM    Records reviewed and summarized above. Pathology report(s) reviewed above. Radiology report(s) reviewed above.     Assessment:/PLAN     1) Clinical Stage 2 (based on MRI) RIGHT Breast Cancer ER+ Her2+ post Breast Biopsy only at [facility] 9/9/21 Breast MRI 9/17/21 with 3.6 cm enhancement. No mass palpable on exam.   Patient was sent here for neoadjuvant chemo based on size of mass on MRI. Patient prefers lumpectomy, d/w breast surgery. Decided on neoadjuvant TCHP chemotherapy. She had teaching and consent with pharmacy. Pre chemo ECHO 10/13/21 good with EF 60-65%  She had port placed on 10/13/21 with no issues. Patient does not want to do cold cap, rx for wig. She is here today for Cycle 2 of neoadjuvant TCHP chemotherapy. Tolerated cycle 1 well. Reviewed chemo overall. She is clinically stable and healthy overall. Labs (CBC and CMP) reviewed today. Patient is ready for chemo today. Follow up in 3 weeks with Cycle 3. Patient agrees with plan. 2) Premenopausal  She has two kids and does not want more kids. Reviewed impaired fertility/early menopause due to chemo with patient. She has Mirena IUD in place. Continue routine GYN follow up. 3) Management of High Risk Medications - Chemotherapy  No toxicities noted as patient is starting treatment today. Labs (CBC and CMP) reviewed today. Pre chemo ECHO from 10/13/21 reviewed. No dose adjustments needed today. Will monitor for side effects. 4) Psychosocial  She is vaccinated for COVID. She works as a , has a supportive fiance. SW/NN support as needed. Her fiance is here today. Call if questions. Follow up in 3 weeks with Cycle 3. I appreciate the opportunity to participate in Ms. Susan Rob's care.     Signed By: Vane Rosa DO

## 2021-11-11 NOTE — PROGRESS NOTES
[facility] [place] Lab Visit:        10:00  Pt arrived ambulatory to [facility] in stable condition, for lab draw. Labs drawn peripherally from Right AC arm and sent for processing. Pt tolerated well. Visit Vitals  /84 (BP 1 Location: Left upper arm, BP Patient Position: Sitting)   Pulse 94   Temp (!) 96.7 °F (35.9 °C)   Resp 18   SpO2 99%         1015: No new concerns voiced. D/cd home ambulatory in no distress. Pt aware of next [facility] appointment scheduled. Labs available in CC once resulted.

## 2021-11-11 NOTE — PROGRESS NOTES
Emiliano Howell is a 40 y.o. female  Chief Complaint   Patient presents with    Breast Cancer     1. Have you been to the ER, urgent care clinic since your last visit? Hospitalized since your last visit? No.    2. Have you seen or consulted any other health care providers outside of the [address] [place] [address] since your last visit? Include any pap smears or colon screening.  No.

## 2021-11-11 NOTE — PROGRESS NOTES
Attempted to reach patient off mobile phone number listed, no answer. Left a voicemail to give the office a call back!   Anjelica Arango

## 2021-11-11 NOTE — PROGRESS NOTES
LAINAA Verified. Called pt on mobile phone number listed. Patient was made aware of most recent blood work results from today being good per provider. Patient was very appreciative over call!   Alvaro Bateman

## 2021-11-12 ENCOUNTER — HOSPITAL ENCOUNTER (OUTPATIENT)
Dept: INFUSION THERAPY | Age: 46
Discharge: HOME | End: 2021-11-12
Payer: MEDICAID

## 2021-11-12 VITALS
DIASTOLIC BLOOD PRESSURE: 83 MMHG | WEIGHT: 210.8 LBS | HEIGHT: 60 IN | TEMPERATURE: 97.8 F | SYSTOLIC BLOOD PRESSURE: 123 MMHG | HEART RATE: 77 BPM | RESPIRATION RATE: 18 BRPM | BODY MASS INDEX: 41.39 KG/M2

## 2021-11-12 DIAGNOSIS — Z17.0 MALIGNANT NEOPLASM OF RIGHT BREAST IN FEMALE, ESTROGEN RECEPTOR POSITIVE, UNSPECIFIED SITE OF BREAST (HCC): Primary | ICD-10-CM

## 2021-11-12 DIAGNOSIS — C50.911 MALIGNANT NEOPLASM OF RIGHT BREAST IN FEMALE, ESTROGEN RECEPTOR POSITIVE, UNSPECIFIED SITE OF BREAST (HCC): Primary | ICD-10-CM

## 2021-11-12 PROCEDURE — 96377 APPLICATON ON-BODY INJECTOR: CPT

## 2021-11-12 PROCEDURE — 96367 TX/PROPH/DG ADDL SEQ IV INF: CPT

## 2021-11-12 PROCEDURE — 74011250636 HC RX REV CODE- 250/636: Performed by: INTERNAL MEDICINE

## 2021-11-12 PROCEDURE — 96375 TX/PRO/DX INJ NEW DRUG ADDON: CPT

## 2021-11-12 PROCEDURE — 74011000258 HC RX REV CODE- 258: Performed by: INTERNAL MEDICINE

## 2021-11-12 PROCEDURE — 77030012965 HC NDL HUBR BBMI -A

## 2021-11-12 PROCEDURE — 96417 CHEMO IV INFUS EACH ADDL SEQ: CPT

## 2021-11-12 PROCEDURE — 96401 CHEMO ANTI-NEOPL SQ/IM: CPT

## 2021-11-12 PROCEDURE — 96413 CHEMO IV INFUSION 1 HR: CPT

## 2021-11-12 RX ORDER — SODIUM CHLORIDE 9 MG/ML
25 INJECTION, SOLUTION INTRAVENOUS CONTINUOUS
Status: DISPENSED | OUTPATIENT
Start: 2021-11-12 | End: 2021-11-12

## 2021-11-12 RX ORDER — PALONOSETRON 0.05 MG/ML
0.25 INJECTION, SOLUTION INTRAVENOUS ONCE
Status: COMPLETED | OUTPATIENT
Start: 2021-11-12 | End: 2021-11-12

## 2021-11-12 RX ORDER — DEXAMETHASONE SODIUM PHOSPHATE 10 MG/ML
10 INJECTION INTRAMUSCULAR; INTRAVENOUS ONCE
Status: COMPLETED | OUTPATIENT
Start: 2021-11-12 | End: 2021-11-12

## 2021-11-12 RX ORDER — SODIUM CHLORIDE 0.9 % (FLUSH) 0.9 %
10 SYRINGE (ML) INJECTION AS NEEDED
Status: DISPENSED | OUTPATIENT
Start: 2021-11-12 | End: 2021-11-12

## 2021-11-12 RX ORDER — SODIUM CHLORIDE 9 MG/ML
10 INJECTION INTRAMUSCULAR; INTRAVENOUS; SUBCUTANEOUS AS NEEDED
Status: ACTIVE | OUTPATIENT
Start: 2021-11-12 | End: 2021-11-12

## 2021-11-12 RX ORDER — HEPARIN 100 UNIT/ML
300-500 SYRINGE INTRAVENOUS AS NEEDED
Status: ACTIVE | OUTPATIENT
Start: 2021-11-12 | End: 2021-11-12

## 2021-11-12 RX ADMIN — CARBOPLATIN 900 MG: 10 INJECTION INTRAVENOUS at 11:21

## 2021-11-12 RX ADMIN — Medication 10 ML: at 09:57

## 2021-11-12 RX ADMIN — Medication 10 ML: at 12:58

## 2021-11-12 RX ADMIN — DEXAMETHASONE SODIUM PHOSPHATE 10 MG: 10 INJECTION INTRAMUSCULAR; INTRAVENOUS at 09:58

## 2021-11-12 RX ADMIN — PERTUZUMAB, TRASTUZUMAB, AND HYALURONIDASE-ZZXF 10 ML: 600; 600; 2000 INJECTION, SOLUTION SUBCUTANEOUS at 10:49

## 2021-11-12 RX ADMIN — SODIUM CHLORIDE 10 ML: 9 INJECTION INTRAMUSCULAR; INTRAVENOUS; SUBCUTANEOUS at 09:56

## 2021-11-12 RX ADMIN — FOSAPREPITANT 150 MG: 150 INJECTION, POWDER, LYOPHILIZED, FOR SOLUTION INTRAVENOUS at 10:21

## 2021-11-12 RX ADMIN — HEPARIN 500 UNITS: 100 SYRINGE at 12:58

## 2021-11-12 RX ADMIN — PALONOSETRON 0.25 MG: 0.05 INJECTION, SOLUTION INTRAVENOUS at 09:57

## 2021-11-12 RX ADMIN — DOCETAXEL 155 MG: 10 INJECTION, SOLUTION INTRAVENOUS at 11:56

## 2021-11-12 RX ADMIN — PEGFILGRASTIM 6 MG: KIT SUBCUTANEOUS at 13:00

## 2021-11-12 RX ADMIN — SODIUM CHLORIDE 25 ML/HR: 9 INJECTION, SOLUTION INTRAVENOUS at 09:56

## 2021-11-12 NOTE — PROGRESS NOTES
[facility] - Chemotherapy Progress Note    0900 Pt admit to [facility] ambulatory in stable condition accompanied by . Assessment completed. No new concerns voiced. Labs were drawn yesterday and used for today's treatment. Port accessed with positive blood return.  IV attached to NS at [address] 11/12/2021   Cycle C2   Date 11/12/2021   Drug / Regimen TCHP   Pre Meds given       Visit Vitals  /83   Pulse 77   Temp 97.8 °F (36.6 °C)   Resp 18   Ht 5' (1.524 m)   Wt 95.6 kg (210 lb 12.8 oz)   BMI 41.17 kg/m²       Medications:  Medications Administered     0.9% sodium chloride infusion     Admin Date  11/12/2021 Action  New Bag Dose  25 mL/hr Rate  25 mL/hr Route  IntraVENous Administered By  Jeannie Hopper RN          0.9% sodium chloride injection 10 mL     Admin Date  11/12/2021 Action  Given Dose  10 mL Route  IntraVENous Administered By  Jeannie Hopper RN          CARBOplatin (PARAPLATIN) 900 mg in 0.9% sodium chloride 250 mL, overfill volume 25 mL chemo infusion     Admin Date  11/12/2021 Action  New Bag Dose  900 mg Rate  730 mL/hr Route  IntraVENous Administered By  Jeannie Hopper RN          dexamethasone (PF) (DECADRON) 10 mg/mL injection 10 mg     Admin Date  11/12/2021 Action  Given Dose  10 mg Route  IntraVENous Administered By  Jeannie Hopper RN          DOCEtaxeL (TAXOTERE) 155 mg in 0.9% sodium chloride 250 mL, overfill volume 25 mL chemo infusion     Admin Date  11/12/2021 Action  New Bag Dose  155 mg Rate  290.5 mL/hr Route  IntraVENous Administered By  Jeannie Hopper RN          fosaprepitant (EMEND) 150 mg in 0.9% sodium chloride 150 mL IVPB     Admin Date  11/12/2021 Action  New Bag Dose  150 mg Rate  450 mL/hr Route  IntraVENous Administered By  Jeannie Hopper RN          heparin (porcine) pf 300-500 Units     Admin Date  11/12/2021 Action  Given Dose  500 Units Route  InterCATHeter Administered By  Jeannie Hopper RN          palonosetron HCl (ALOXI) injection 0.25 mg     Admin Date  11/12/2021 Action  Given Dose  0.25 mg Route  IntraVENous Administered By  David Carranza RN          pegfilgrastim (NEULASTA) wearable SQ injector 6 mg     Admin Date  11/12/2021 Action  Given Dose  6 mg Route  SubCUTAneous Administered By  David Carranza RN          pertuzumab 600 mg-trastuzumab 600 mg-hyaluronidase-zzxf 20,000 units/10 mL     Admin Date  11/12/2021 Action  Given Dose  10 mL Route  SubCUTAneous Administered By  David Carranza RN          sodium chloride (NS) flush 10 mL     Admin Date  11/12/2021 Action  Given Dose  10 mL Route  IntraVENous Administered By  David Carranza RN           Admin Date  11/12/2021 Action  Given Dose  10 mL Route  IntraVENous Administered By  David Carranza RN              PHESGO injected SQ in right thigh  Neulasta on body applied to left arm    1300 Pt tolerated treatment well. Port maintained positive blood return throughout treatment, flushed with positive blood return at conclusion. D/c home ambulatory in no distress.  Pt aware of next appointment scheduled for 12/2/21.  }

## 2021-11-24 ENCOUNTER — APPOINTMENT (OUTPATIENT)
Dept: INFUSION THERAPY | Age: 46
End: 2021-11-24

## 2021-11-26 RX ORDER — HEPARIN 100 UNIT/ML
300-500 SYRINGE INTRAVENOUS AS NEEDED
Status: CANCELLED
Start: 2021-12-03

## 2021-11-26 RX ORDER — HYDROCORTISONE SODIUM SUCCINATE 100 MG/2ML
100 INJECTION, POWDER, FOR SOLUTION INTRAMUSCULAR; INTRAVENOUS AS NEEDED
Status: CANCELLED | OUTPATIENT
Start: 2021-12-03

## 2021-11-26 RX ORDER — DIPHENHYDRAMINE HYDROCHLORIDE 50 MG/ML
25 INJECTION, SOLUTION INTRAMUSCULAR; INTRAVENOUS AS NEEDED
Status: CANCELLED
Start: 2021-12-03

## 2021-11-26 RX ORDER — SODIUM CHLORIDE 9 MG/ML
10 INJECTION INTRAMUSCULAR; INTRAVENOUS; SUBCUTANEOUS AS NEEDED
Status: CANCELLED | OUTPATIENT
Start: 2021-12-03

## 2021-11-26 RX ORDER — ONDANSETRON 2 MG/ML
8 INJECTION INTRAMUSCULAR; INTRAVENOUS AS NEEDED
Status: CANCELLED | OUTPATIENT
Start: 2021-12-03

## 2021-11-26 RX ORDER — DIPHENHYDRAMINE HYDROCHLORIDE 50 MG/ML
50 INJECTION, SOLUTION INTRAMUSCULAR; INTRAVENOUS
Status: CANCELLED | OUTPATIENT
Start: 2021-12-03

## 2021-11-26 RX ORDER — PALONOSETRON 0.05 MG/ML
0.25 INJECTION, SOLUTION INTRAVENOUS ONCE
Status: CANCELLED | OUTPATIENT
Start: 2021-12-03 | End: 2021-12-03

## 2021-11-26 RX ORDER — ACETAMINOPHEN 325 MG/1
650 TABLET ORAL AS NEEDED
Status: CANCELLED
Start: 2021-12-03

## 2021-11-26 RX ORDER — ALBUTEROL SULFATE 0.83 MG/ML
2.5 SOLUTION RESPIRATORY (INHALATION) AS NEEDED
Status: CANCELLED
Start: 2021-12-03

## 2021-11-26 RX ORDER — EPINEPHRINE 1 MG/ML
0.3 INJECTION, SOLUTION, CONCENTRATE INTRAVENOUS AS NEEDED
Status: CANCELLED | OUTPATIENT
Start: 2021-12-03

## 2021-11-26 RX ORDER — SODIUM CHLORIDE 0.9 % (FLUSH) 0.9 %
10 SYRINGE (ML) INJECTION AS NEEDED
Status: CANCELLED | OUTPATIENT
Start: 2021-12-03

## 2021-11-26 RX ORDER — ACETAMINOPHEN 325 MG/1
650 TABLET ORAL
Status: CANCELLED | OUTPATIENT
Start: 2021-12-03

## 2021-11-26 RX ORDER — SODIUM CHLORIDE 9 MG/ML
25 INJECTION, SOLUTION INTRAVENOUS CONTINUOUS
Status: CANCELLED | OUTPATIENT
Start: 2021-12-03

## 2021-11-26 RX ORDER — DEXAMETHASONE SODIUM PHOSPHATE 100 MG/10ML
10 INJECTION INTRAMUSCULAR; INTRAVENOUS ONCE
Status: CANCELLED | OUTPATIENT
Start: 2021-12-03 | End: 2021-12-03

## 2021-11-26 RX ORDER — DIPHENHYDRAMINE HYDROCHLORIDE 50 MG/ML
50 INJECTION, SOLUTION INTRAMUSCULAR; INTRAVENOUS AS NEEDED
Status: CANCELLED
Start: 2021-12-03

## 2021-12-01 NOTE — PROGRESS NOTES
[facility] [place] at [address] [place] [address] [address], [place]: [phone]  F: [phone]    Reason for Visit:   Nahum Gan is a 40 y.o. female seen today in office for follow up if Right Breast Cancer    Treatment History:   · Initially had abnormal mammo at [address] with calcifications on RIGHT. No mass reported/dense breast tissue  · RIGHT Breast Biopsy 9/9/21: PATH -  IDC, grade 3 ER/LA strong+ Her2 3+  · Genetic testing negative  · Breast MRI 9/17/21: RIGHT BREAST:Background parenchymal enhancement: Severe. There is a biopsy clip in the lower, outer quadrant of the right breast, junction of the middle and deep thirds. There is linear, branching enhancement which extends anterior to the biopsy clip over approximately 3.6 cm. There are no suspicious internal mammary or axillary chain lymph nodes. LEFT BREAST: Background parenchymal enhancement: Severe. There is no suspicious masslike or nonmasslike enhancement within the left breast to indicate breast carcinoma. There is a small mass in the lower, outer quadrant with an adjacent biopsy clip. There is an additional mass without significant enhancement in the lateral, middle 3rd, just below the nipple line. There are no suspicious internal mammary or axillary chain lymph nodes  · Neoadjuvant TCHP chemo 10/21/21 - Current     STAGE: Clinical 2 ER/LA+, HER2+    History of Present Illness:   Nahum Gan is a 40 y.o. female seen today in office for follow up of right breast cancer ER+ Her2 + post biopsy only 9/9/21. She started neoadjuvant TCHP on 10/21/21. She is here today for pre chemo labs and OV and will have Cycle 3 of neoadjuvant TCHP chemotherapy tomorrow. She reports that she feels well overall today. She is tolerating chemo well overall thus far. She has some diarrhea for the first week after chemo and takes Imodium PRN which helps. Her appetite and energy levels are good.   She denies fever, chills, mouth sores, cough, SOB, CP, nausea, vomiting, constipation, and neuropathy. She denies pain today. CBC and CMP are still pending today. She is ready for treatment tomorrow. She is here alone today. Past Medical History:   Diagnosis Date    Bell's palsy     Breast cancer ([facility] [address] 2021    ER/AR+ Her2 +      Past Surgical History:   Procedure Laterality Date    HX  SECTION      HX  SECTION      HX CHOLECYSTECTOMY      HX CHOLECYSTECTOMY        Social History     Tobacco Use    Smoking status: Never Smoker    Smokeless tobacco: Never Used   Substance Use Topics    Alcohol use: Yes     Comment: rarely      Family History   Problem Relation Age of Onset    Cancer Sister         leukemia     Current Outpatient Medications   Medication Sig    ondansetron (ZOFRAN ODT) 4 mg disintegrating tablet Take 1-2 Tablets by mouth every eight (8) hours as needed for Nausea or Vomiting.  prochlorperazine (Compazine) 5 mg tablet Take 1 tab by mouth every 6 hours as needed for nausea or vomiting    lidocaine-prilocaine (EMLA) topical cream Apply  to affected area as needed for Pain. Apply to port-a-cath 30-60 minutes prior to access for chemo    dexAMETHasone (DECADRON) 4 mg tablet Take 2 tabs (8mg) by mouth twice daily the day before chemotherapy and the day after chemotherapy    levonorgestreL (Mirena) 20 mcg/24 hours (6 yrs) 52 mg IUD 1 Device by IntraUTERine route once.  CRANIAL PROSTHESIS misc Alopecia/ breast cancer chemo/ wig (Patient not taking: Reported on 2021)     No current facility-administered medications for this visit. No Known Allergies     Review of Systems:  A complete review of systems was obtained, negative except as described above and as reported on ROS sheet scanned into system.      Physical Exam:     Visit Vitals  /79 (BP 1 Location: Right arm, BP Patient Position: Sitting)   Pulse 89   Temp 97.6 °F (36.4 °C) (Temporal)   Ht 5' (1.524 m)   Wt 212 lb 8 oz (96.4 kg) SpO2 95%   BMI 41.50 kg/m²     ECOG PS: 0  General: No distress  Eyes: Anicteric sclerae  HENT: Atraumatic, wearing a mask  Neck: Supple  CV: Regular   Respiratory: CTAB, normal respiratory effort  MS: Normal gait and station. Digits without clubbing or cyanosis. Skin: No rashes, ecchymoses, or petechiae. Normal temperature, turgor, and texture. Port site without redness/swelling  Psych: Alert, oriented, appropriate affect, normal judgment/insight  Neuro: Non focal    Results:     Lab Results   Component Value Date/Time    WBC 8.2 11/11/2021 09:57 AM    HGB 12.3 11/11/2021 09:57 AM    HCT 39.3 11/11/2021 09:57 AM    PLATELET 328 64/53/6197 09:57 AM    MCV 81.4 11/11/2021 09:57 AM    ABS. NEUTROPHILS 5.4 11/11/2021 09:57 AM     Lab Results   Component Value Date/Time    Sodium 139 11/11/2021 09:57 AM    Potassium 4.1 11/11/2021 09:57 AM    Chloride 108 11/11/2021 09:57 AM    CO2 28 11/11/2021 09:57 AM    Glucose 127 (H) 11/11/2021 09:57 AM    BUN 12 11/11/2021 09:57 AM    Creatinine 0.74 11/11/2021 09:57 AM    GFR est AA >60 11/11/2021 09:57 AM    GFR est non-AA >60 11/11/2021 09:57 AM    Calcium 9.3 11/11/2021 09:57 AM     Lab Results   Component Value Date/Time    Bilirubin, total 0.3 11/11/2021 09:57 AM    ALT (SGPT) 37 11/11/2021 09:57 AM    Alk. phosphatase 66 11/11/2021 09:57 AM    Protein, total 7.5 11/11/2021 09:57 AM    Albumin 3.6 11/11/2021 09:57 AM    Globulin 3.9 11/11/2021 09:57 AM     Path at outside facility - scanned into chart     MRI Results (most recent):  Results from [place] encounter on 09/17/21    MRI BREAST BI W WO CONT    Narrative  MRI BREAST BI W WO CONT, 9/17/2021 8:59 AM  INDICATION: Malignant neoplasm of unspecified site of right female breast / New  diagnosis of RIGHT breast cancer. HISTORY: Newly diagnosed invasive ductal carcinoma and ductal carcinoma in situ  in the right breast at \"11 o'clock. \" Fibroadenoma in the left breast at 3-4  o'clock.   COMPARISON: Previous mammograms from 2020 and 2021  . TECHNIQUE:  Bilateral breast MRI was performed using a dedicated breast coil without  compression with the patient in the prone position. Precontrast T1-weighted  images with fat suppression were obtained followed by bolus injection of  contrast. Postcontrast dynamic and high-resolution images were acquired. T2-weighted axial imaging with fat suppression was also performed. The images  were analyzed using software for kinetic analysis, enhancement curves, digital  subtraction, and reconstructions. Hilda Sharper RIGHT BREAST:  Background parenchymal enhancement: Severe  There is a biopsy clip in the lower, outer quadrant of the right breast,  junction of the middle and deep thirds. There is linear, branching enhancement  which extends anterior to the biopsy clip over approximately 3.6 cm    There are no suspicious internal mammary or axillary chain lymph nodes. Hilda Sharper LEFT BREAST:  Background parenchymal enhancement: Severe  There is no suspicious masslike or nonmasslike enhancement within the left  breast to indicate breast carcinoma. There is a small mass in the lower, outer  quadrant with an adjacent biopsy clip. There is an additional mass without  significant enhancement in the lateral, middle 3rd, just below the nipple line. There are no suspicious internal mammary or axillary chain lymph nodes. .    Impression  RIGHT BREAST:  1. There is a biopsy clip in the lower, outer quadrant of the right breast,  junction of the middle and deep thirds. There is linear, branching enhancement  which extends anteriorly from the biopsy clip over approximately 3.6 cm. BI-RADS Category 6 - Known biopsy-proven malignancy. Hilda Sharper LEFT BREAST:  1. BI-RADS Category 2 - Benign findings. 10/13/21    ECHO ADULT COMPLETE 10/13/2021 10/13/2021    Interpretation Summary  · LV: Estimated LVEF is 60 - 65%. Normal cavity size, wall thickness, systolic function (ejection fraction normal) and diastolic function. Normal left ventricular strain. Signed by: Babita Arias MD on 10/13/2021  3:52 PM    Records reviewed and summarized above. Pathology report(s) reviewed above. Radiology report(s) reviewed above. Assessment:/PLAN     1) Clinical Stage 2 (based on MRI) RIGHT Breast Cancer ER+ Her2+ post Breast Biopsy only at [facility] 9/9/21   Breast MRI 9/17/21 with 3.6 cm enhancement. No mass palpable on exam.   Patient was sent here for neoadjuvant chemo based on size of mass on MRI. Patient prefers lumpectomy, d/w breast surgery. Decided on neoadjuvant TCHP chemotherapy. She had teaching and consent with pharmacy. Pre chemo ECHO 10/13/21 good with EF 60-65%  She had port placed on 10/13/21 with no issues. Patient does not want to do cold cap, rx for wig. She is here today for pre chemo labs and OV and will have Cycle 3 of neoadjuvant TCHP chemotherapy tomorrow. She is tolerating chemo well overall thus far with only mild diarrhea. She is taking Imodium as needed for diarrhea and this helps. Discussed side effect management today. She is clinically stable and healthy overall. Labs (CBC and CMP) reviewed today. Patient is ready for chemo tomorrow. Follow up in 3 weeks with Cycle 4. Patient agrees with plan. 2) Premenopausal  She has two kids and does not want more kids. Reviewed impaired fertility/early menopause due to chemo with patient. She has Mirena IUD in place. Continue routine GYN follow up. 3) Management of High Risk Medications - Chemotherapy  Toxicities include Grade 1 Diarrhea. Reviewed side effect management today. Pre chemo ECHO from 10/13/21 reviewed. Labs (CBC and CMP) reviewed today. No dose adjustments needed tomorrow. Will monitor for side effects. 4) Psychosocial  She is vaccinated for COVID. She works as a , has a supportive fiance. SW/NN support as needed. Her fiance is here today. Call if questions.   Follow up in 3 weeks with Cycle 4.  Case discussed with Dr. Justen Gonzales via [employer] today. I appreciate the opportunity to participate in Ms. Susan Rob's care.     Signed By: Ashely Alexander NP

## 2021-12-02 ENCOUNTER — OFFICE VISIT (OUTPATIENT)
Dept: ONCOLOGY | Age: 46
End: 2021-12-02
Payer: MEDICAID

## 2021-12-02 ENCOUNTER — HOSPITAL ENCOUNTER (OUTPATIENT)
Dept: INFUSION THERAPY | Age: 46
Discharge: HOME | End: 2021-12-02
Payer: MEDICAID

## 2021-12-02 VITALS
TEMPERATURE: 96.8 F | RESPIRATION RATE: 18 BRPM | OXYGEN SATURATION: 97 % | WEIGHT: 210.76 LBS | SYSTOLIC BLOOD PRESSURE: 110 MMHG | BODY MASS INDEX: 41.16 KG/M2 | DIASTOLIC BLOOD PRESSURE: 76 MMHG

## 2021-12-02 VITALS
TEMPERATURE: 97.6 F | WEIGHT: 212.5 LBS | HEART RATE: 89 BPM | BODY MASS INDEX: 41.72 KG/M2 | DIASTOLIC BLOOD PRESSURE: 79 MMHG | SYSTOLIC BLOOD PRESSURE: 111 MMHG | HEIGHT: 60 IN | OXYGEN SATURATION: 95 %

## 2021-12-02 DIAGNOSIS — Z51.81 ENCOUNTER FOR MONITORING CARDIOTOXIC DRUG THERAPY: ICD-10-CM

## 2021-12-02 DIAGNOSIS — C50.911 MALIGNANT NEOPLASM OF RIGHT BREAST IN FEMALE, ESTROGEN RECEPTOR POSITIVE, UNSPECIFIED SITE OF BREAST (HCC): Primary | ICD-10-CM

## 2021-12-02 DIAGNOSIS — Z09 CHEMOTHERAPY FOLLOW-UP EXAMINATION: ICD-10-CM

## 2021-12-02 DIAGNOSIS — Z17.0 MALIGNANT NEOPLASM OF RIGHT BREAST IN FEMALE, ESTROGEN RECEPTOR POSITIVE, UNSPECIFIED SITE OF BREAST (HCC): Primary | ICD-10-CM

## 2021-12-02 DIAGNOSIS — N95.9 PREMENOPAUSAL PATIENT: ICD-10-CM

## 2021-12-02 DIAGNOSIS — Z95.828 PORT-A-CATH IN PLACE: ICD-10-CM

## 2021-12-02 DIAGNOSIS — Z79.899 ENCOUNTER FOR MONITORING CARDIOTOXIC DRUG THERAPY: ICD-10-CM

## 2021-12-02 DIAGNOSIS — N63.10 BREAST MASS, RIGHT: ICD-10-CM

## 2021-12-02 LAB
ALBUMIN SERPL-MCNC: 3.4 G/DL (ref 3.5–5)
ALBUMIN/GLOB SERPL: 0.9 {RATIO} (ref 1.1–2.2)
ALP SERPL-CCNC: 66 U/L (ref 45–117)
ALT SERPL-CCNC: 34 U/L (ref 12–78)
ANION GAP SERPL CALC-SCNC: 5 MMOL/L (ref 5–15)
AST SERPL-CCNC: 13 U/L (ref 15–37)
BASOPHILS # BLD: 0.1 K/UL (ref 0–0.1)
BASOPHILS NFR BLD: 1 % (ref 0–1)
BILIRUB SERPL-MCNC: 0.4 MG/DL (ref 0.2–1)
BUN SERPL-MCNC: 11 MG/DL (ref 6–20)
BUN/CREAT SERPL: 13 (ref 12–20)
CALCIUM SERPL-MCNC: 9.2 MG/DL (ref 8.5–10.1)
CHLORIDE SERPL-SCNC: 108 MMOL/L (ref 97–108)
CO2 SERPL-SCNC: 28 MMOL/L (ref 21–32)
CREAT SERPL-MCNC: 0.82 MG/DL (ref 0.55–1.02)
DIFFERENTIAL METHOD BLD: ABNORMAL
EOSINOPHIL # BLD: 0 K/UL (ref 0–0.4)
EOSINOPHIL NFR BLD: 0 % (ref 0–7)
ERYTHROCYTE [DISTWIDTH] IN BLOOD BY AUTOMATED COUNT: 15.9 % (ref 11.5–14.5)
GLOBULIN SER CALC-MCNC: 3.8 G/DL (ref 2–4)
GLUCOSE SERPL-MCNC: 164 MG/DL (ref 65–100)
HCT VFR BLD AUTO: 36.6 % (ref 35–47)
HGB BLD-MCNC: 11.3 G/DL (ref 11.5–16)
IMM GRANULOCYTES # BLD AUTO: 0 K/UL (ref 0–0.04)
IMM GRANULOCYTES NFR BLD AUTO: 1 % (ref 0–0.5)
LYMPHOCYTES # BLD: 1.6 K/UL (ref 0.8–3.5)
LYMPHOCYTES NFR BLD: 24 % (ref 12–49)
MCH RBC QN AUTO: 25.3 PG (ref 26–34)
MCHC RBC AUTO-ENTMCNC: 30.9 G/DL (ref 30–36.5)
MCV RBC AUTO: 82.1 FL (ref 80–99)
MONOCYTES # BLD: 0.3 K/UL (ref 0–1)
MONOCYTES NFR BLD: 5 % (ref 5–13)
NEUTS SEG # BLD: 4.5 K/UL (ref 1.8–8)
NEUTS SEG NFR BLD: 69 % (ref 32–75)
NRBC # BLD: 0 K/UL (ref 0–0.01)
NRBC BLD-RTO: 0 PER 100 WBC
PLATELET # BLD AUTO: 123 K/UL (ref 150–400)
PMV BLD AUTO: 10.1 FL (ref 8.9–12.9)
POTASSIUM SERPL-SCNC: 4 MMOL/L (ref 3.5–5.1)
PROT SERPL-MCNC: 7.2 G/DL (ref 6.4–8.2)
RBC # BLD AUTO: 4.46 M/UL (ref 3.8–5.2)
SODIUM SERPL-SCNC: 141 MMOL/L (ref 136–145)
WBC # BLD AUTO: 6.4 K/UL (ref 3.6–11)

## 2021-12-02 PROCEDURE — 85025 COMPLETE CBC W/AUTO DIFF WBC: CPT

## 2021-12-02 PROCEDURE — 80053 COMPREHEN METABOLIC PANEL: CPT

## 2021-12-02 PROCEDURE — 36415 COLL VENOUS BLD VENIPUNCTURE: CPT

## 2021-12-02 PROCEDURE — 99212 OFFICE O/P EST SF 10 MIN: CPT | Performed by: NURSE PRACTITIONER

## 2021-12-02 NOTE — PROGRESS NOTES
Bowen Tran is a 40 y.o. female  Chief Complaint   Patient presents with    Chemotherapy    Breast Cancer     1. Have you been to the ER, urgent care clinic since your last visit? Hospitalized since your last visit? No.  2. Have you seen or consulted any other health care providers outside of the [address] [place] [address] since your last visit? Include any pap smears or colon screening.  No.

## 2021-12-03 ENCOUNTER — HOSPITAL ENCOUNTER (OUTPATIENT)
Dept: INFUSION THERAPY | Age: 46
Discharge: HOME | End: 2021-12-03
Payer: MEDICAID

## 2021-12-03 VITALS
TEMPERATURE: 96.8 F | HEART RATE: 81 BPM | DIASTOLIC BLOOD PRESSURE: 78 MMHG | RESPIRATION RATE: 16 BRPM | SYSTOLIC BLOOD PRESSURE: 122 MMHG

## 2021-12-03 DIAGNOSIS — Z17.0 MALIGNANT NEOPLASM OF RIGHT BREAST IN FEMALE, ESTROGEN RECEPTOR POSITIVE, UNSPECIFIED SITE OF BREAST (HCC): Primary | ICD-10-CM

## 2021-12-03 DIAGNOSIS — C50.911 MALIGNANT NEOPLASM OF RIGHT BREAST IN FEMALE, ESTROGEN RECEPTOR POSITIVE, UNSPECIFIED SITE OF BREAST (HCC): Primary | ICD-10-CM

## 2021-12-03 PROCEDURE — 74011000250 HC RX REV CODE- 250: Performed by: INTERNAL MEDICINE

## 2021-12-03 PROCEDURE — 74011250636 HC RX REV CODE- 250/636: Performed by: INTERNAL MEDICINE

## 2021-12-03 PROCEDURE — 74011000258 HC RX REV CODE- 258: Performed by: INTERNAL MEDICINE

## 2021-12-03 PROCEDURE — 36593 DECLOT VASCULAR DEVICE: CPT

## 2021-12-03 PROCEDURE — 96377 APPLICATON ON-BODY INJECTOR: CPT

## 2021-12-03 PROCEDURE — 96366 THER/PROPH/DIAG IV INF ADDON: CPT

## 2021-12-03 PROCEDURE — 77030012965 HC NDL HUBR BBMI -A

## 2021-12-03 PROCEDURE — 96401 CHEMO ANTI-NEOPL SQ/IM: CPT

## 2021-12-03 PROCEDURE — 96413 CHEMO IV INFUSION 1 HR: CPT

## 2021-12-03 PROCEDURE — 96417 CHEMO IV INFUS EACH ADDL SEQ: CPT

## 2021-12-03 PROCEDURE — 96375 TX/PRO/DX INJ NEW DRUG ADDON: CPT

## 2021-12-03 RX ORDER — SODIUM CHLORIDE 9 MG/ML
10 INJECTION INTRAMUSCULAR; INTRAVENOUS; SUBCUTANEOUS AS NEEDED
Status: ACTIVE | OUTPATIENT
Start: 2021-12-03 | End: 2021-12-03

## 2021-12-03 RX ORDER — DEXAMETHASONE SODIUM PHOSPHATE 10 MG/ML
10 INJECTION INTRAMUSCULAR; INTRAVENOUS ONCE
Status: COMPLETED | OUTPATIENT
Start: 2021-12-03 | End: 2021-12-03

## 2021-12-03 RX ORDER — SODIUM CHLORIDE 0.9 % (FLUSH) 0.9 %
10 SYRINGE (ML) INJECTION AS NEEDED
Status: DISPENSED | OUTPATIENT
Start: 2021-12-03 | End: 2021-12-03

## 2021-12-03 RX ORDER — HEPARIN 100 UNIT/ML
300-500 SYRINGE INTRAVENOUS AS NEEDED
Status: ACTIVE | OUTPATIENT
Start: 2021-12-03 | End: 2021-12-03

## 2021-12-03 RX ORDER — SODIUM CHLORIDE 9 MG/ML
25 INJECTION, SOLUTION INTRAVENOUS CONTINUOUS
Status: DISPENSED | OUTPATIENT
Start: 2021-12-03 | End: 2021-12-03

## 2021-12-03 RX ORDER — PALONOSETRON 0.05 MG/ML
0.25 INJECTION, SOLUTION INTRAVENOUS ONCE
Status: COMPLETED | OUTPATIENT
Start: 2021-12-03 | End: 2021-12-03

## 2021-12-03 RX ADMIN — DOCETAXEL 155 MG: 10 INJECTION, SOLUTION INTRAVENOUS at 12:54

## 2021-12-03 RX ADMIN — FOSAPREPITANT 150 MG: 150 INJECTION, POWDER, LYOPHILIZED, FOR SOLUTION INTRAVENOUS at 11:01

## 2021-12-03 RX ADMIN — CARBOPLATIN 900 MG: 10 INJECTION, SOLUTION INTRAVENOUS at 14:46

## 2021-12-03 RX ADMIN — ALTEPLASE 1 MG: 2.2 INJECTION, POWDER, LYOPHILIZED, FOR SOLUTION INTRAVENOUS at 12:04

## 2021-12-03 RX ADMIN — Medication 10 ML: at 15:30

## 2021-12-03 RX ADMIN — DEXAMETHASONE SODIUM PHOSPHATE 10 MG: 10 INJECTION, SOLUTION INTRAMUSCULAR; INTRAVENOUS at 10:52

## 2021-12-03 RX ADMIN — PERTUZUMAB, TRASTUZUMAB, AND HYALURONIDASE-ZZXF 10 ML: 600; 600; 2000 INJECTION, SOLUTION SUBCUTANEOUS at 10:54

## 2021-12-03 RX ADMIN — HEPARIN 500 UNITS: 100 SYRINGE at 15:30

## 2021-12-03 RX ADMIN — SODIUM CHLORIDE 10 ML: 9 INJECTION INTRAMUSCULAR; INTRAVENOUS; SUBCUTANEOUS at 09:05

## 2021-12-03 RX ADMIN — SODIUM CHLORIDE 25 ML/HR: 900 INJECTION, SOLUTION INTRAVENOUS at 10:51

## 2021-12-03 RX ADMIN — PALONOSETRON 0.25 MG: 0.05 INJECTION, SOLUTION INTRAVENOUS at 10:50

## 2021-12-03 RX ADMIN — PEGFILGRASTIM 6 MG: KIT SUBCUTANEOUS at 15:30

## 2021-12-03 NOTE — PROGRESS NOTES
[facility] Chemo Progress Note    Date: December 3, 2021    Name: Emiliano Howell    MRN: [id number]         : 1975    0900 Ms. Kerry Madera Arrived to [facility] for C3 TCHP w/Phesgo ambulatory in stable condition. Assessment was completed, no acute issues at this time, no new complaints voiced. Port accessed but did not give initial blood return. Labs were drawn yesterday and reviewed. Chemotherapy Flowsheet 12/3/2021   Cycle C3   Date 12/3/2021   Drug / Regimen TCHP (w/Phesgo)   Pre Meds given   Notes given w/Neulasta OBI     Ms. Rob's vitals were reviewed. Patient Vitals for the past 12 hrs:   Temp Pulse Resp BP   21 1530  81 16 122/78   21 0900 96.8 °F (36 °C) 87 18 130/86     Pre-medications  were administered as ordered and chemotherapy was initiated.   Medications Administered     0.9% sodium chloride infusion     Admin Date  2021 Action  New Bag Dose  25 mL/hr Rate  25 mL/hr Route  IntraVENous Administered By  Gilma Henderson RN          0.9% sodium chloride injection 10 mL     Admin Date  2021 Action  Given Dose  10 mL Route  IntraVENous Administered By  Gilma Henderson RN          alteplase (CATHFLO) 1 mg in sterile water (preservative free) 1 mL injection     Admin Date  2021 Action  Given Dose  1 mg Route  InterCATHeter Administered By  Gilma Henderson RN          CARBOplatin (PARAPLATIN) 900 mg in 0.9% sodium chloride 250 mL, overfill volume 25 mL chemo infusion     Admin Date  2021 Action  New Bag Dose  900 mg Rate  730 mL/hr Route  IntraVENous Administered By  Gilma Henderson RN          dexamethasone (PF) (DECADRON) 10 mg/mL injection 10 mg     Admin Date  2021 Action  Given Dose  10 mg Route  IntraVENous Administered By  Gilma Henderson RN          DOCEtaxeL (TAXOTERE) 155 mg in 0.9% sodium chloride 250 mL, overfill volume 25 mL chemo infusion     Admin Date  2021 Action  New Bag Dose  155 mg Rate  290.5 mL/hr Route  IntraVENous Administered By  Demi Arciniega RN          fosaprepitant (EMEND) 150 mg in 0.9% sodium chloride 150 mL IVPB     Admin Date  12/03/2021 Action  New Bag Dose  150 mg Rate  450 mL/hr Route  IntraVENous Administered By  Demi Arciniega RN          heparin (porcine) pf 300-500 Units     Admin Date  12/03/2021 Action  Given Dose  500 Units Route  InterCATHeter Administered By  Demi Arciniega RN          palonosetron HCl (ALOXI) injection 0.25 mg     Admin Date  12/03/2021 Action  Given Dose  0.25 mg Route  IntraVENous Administered By  Demi Arciniega RN          pegfilgrastim (NEULASTA) wearable SQ injector 6 mg     Admin Date  12/03/2021 Action  Given Dose  6 mg Route  SubCUTAneous Administered By  Demi Arciniega RN          pertuzumab 600 mg-trastuzumab 600 mg-hyaluronidase-zzxf 20,000 units/10 mL     Admin Date  12/03/2021 Action  Given Dose  10 mL Route  SubCUTAneous Administered By  Demi Arciniega RN          sodium chloride (NS) flush 10 mL     Admin Date  12/03/2021 Action  Given Dose  10 mL Route  IntraVENous Administered By  Demi Arciniega RN              [id number] Patient tolerated treatment well. Port maintained positive blood return throughout treatment. Line flushed, heparinized and de accessed per protocol. Neulasta OBI placed on left arm and was flashing green upon d/c. Patient was discharged from [facility] in stable condition. Patient aware of next appointment.      Future Appointments   Date Time Provider Stacey Sanchez   12/22/2021 10:00 AM H3 [facility]   12/22/2021 10:15 AM Marlys Dumont  [address] BS AMB   12/23/2021 10:00 AM E4 [facility] [place] [facility]   1/13/2022 10:00 AM H3 [facility] LAB [facility]   1/14/2022 10:00 AM F3 [facility] LONG [place] [facility]   2/3/2022 10:00 AM H3 [facility]   2/4/2022  9:00 AM G3 [facility]O LONG [place] [facility]   2/24/2022 10:00 AM H3 [facility]   2/25/2022  9:00 AM D3 JUANITO LONG TX [address]    6/9/2022  8:30 AM Gemini Saavedra MD [facility] BS AMB     Ada Castellon RN  December 3, 2021

## 2021-12-15 RX ORDER — EPINEPHRINE 1 MG/ML
0.3 INJECTION, SOLUTION, CONCENTRATE INTRAVENOUS AS NEEDED
Status: CANCELLED | OUTPATIENT
Start: 2021-12-23

## 2021-12-15 RX ORDER — DEXAMETHASONE SODIUM PHOSPHATE 100 MG/10ML
10 INJECTION INTRAMUSCULAR; INTRAVENOUS ONCE
Status: CANCELLED | OUTPATIENT
Start: 2021-12-23 | End: 2021-12-23

## 2021-12-15 RX ORDER — HEPARIN 100 UNIT/ML
300-500 SYRINGE INTRAVENOUS AS NEEDED
Status: CANCELLED
Start: 2021-12-23

## 2021-12-15 RX ORDER — ALBUTEROL SULFATE 0.83 MG/ML
2.5 SOLUTION RESPIRATORY (INHALATION) AS NEEDED
Status: CANCELLED
Start: 2021-12-23

## 2021-12-15 RX ORDER — SODIUM CHLORIDE 9 MG/ML
10 INJECTION INTRAMUSCULAR; INTRAVENOUS; SUBCUTANEOUS AS NEEDED
Status: CANCELLED | OUTPATIENT
Start: 2021-12-23

## 2021-12-15 RX ORDER — ACETAMINOPHEN 325 MG/1
650 TABLET ORAL AS NEEDED
Status: CANCELLED
Start: 2021-12-23

## 2021-12-15 RX ORDER — SODIUM CHLORIDE 0.9 % (FLUSH) 0.9 %
10 SYRINGE (ML) INJECTION AS NEEDED
Status: CANCELLED | OUTPATIENT
Start: 2021-12-23

## 2021-12-15 RX ORDER — DIPHENHYDRAMINE HYDROCHLORIDE 50 MG/ML
25 INJECTION, SOLUTION INTRAMUSCULAR; INTRAVENOUS AS NEEDED
Status: CANCELLED
Start: 2021-12-23

## 2021-12-15 RX ORDER — ONDANSETRON 2 MG/ML
8 INJECTION INTRAMUSCULAR; INTRAVENOUS AS NEEDED
Status: CANCELLED | OUTPATIENT
Start: 2021-12-23

## 2021-12-15 RX ORDER — PALONOSETRON 0.05 MG/ML
0.25 INJECTION, SOLUTION INTRAVENOUS ONCE
Status: CANCELLED | OUTPATIENT
Start: 2021-12-23 | End: 2021-12-23

## 2021-12-15 RX ORDER — ACETAMINOPHEN 325 MG/1
650 TABLET ORAL
Status: CANCELLED | OUTPATIENT
Start: 2021-12-23

## 2021-12-15 RX ORDER — DIPHENHYDRAMINE HYDROCHLORIDE 50 MG/ML
50 INJECTION, SOLUTION INTRAMUSCULAR; INTRAVENOUS AS NEEDED
Status: CANCELLED
Start: 2021-12-23

## 2021-12-15 RX ORDER — HYDROCORTISONE SODIUM SUCCINATE 100 MG/2ML
100 INJECTION, POWDER, FOR SOLUTION INTRAMUSCULAR; INTRAVENOUS AS NEEDED
Status: CANCELLED | OUTPATIENT
Start: 2021-12-23

## 2021-12-15 RX ORDER — DIPHENHYDRAMINE HYDROCHLORIDE 50 MG/ML
50 INJECTION, SOLUTION INTRAMUSCULAR; INTRAVENOUS
Status: CANCELLED | OUTPATIENT
Start: 2021-12-23

## 2021-12-15 RX ORDER — SODIUM CHLORIDE 9 MG/ML
25 INJECTION, SOLUTION INTRAVENOUS CONTINUOUS
Status: CANCELLED | OUTPATIENT
Start: 2021-12-23

## 2021-12-16 ENCOUNTER — APPOINTMENT (OUTPATIENT)
Dept: INFUSION THERAPY | Age: 46
End: 2021-12-16

## 2021-12-21 NOTE — PROGRESS NOTES
[facility] [place] at [place] [address] [address], [address] [address], [place]: [phone]  F: [phone]    Reason for Visit:   Fuad Coles is a 40 y.o. female seen today in office for follow up if Right Breast Cancer    Treatment History:   · Initially had abnormal mammo at [facility] with calcifications on RIGHT. No mass reported/dense breast tissue  · RIGHT Breast Biopsy 9/9/21: PATH -  IDC, grade 3 ER/[place] strong+ Her2 3+  · Genetic testing negative  · Breast MRI 9/17/21: RIGHT BREAST:Background parenchymal enhancement: Severe. There is a biopsy clip in the lower, outer quadrant of the right breast, junction of the middle and deep thirds. There is linear, branching enhancement which extends anterior to the biopsy clip over approximately 3.6 cm. There are no suspicious internal mammary or axillary chain lymph nodes. LEFT BREAST: Background parenchymal enhancement: Severe. There is no suspicious masslike or nonmasslike enhancement within the left breast to indicate breast carcinoma. There is a small mass in the lower, outer quadrant with an adjacent biopsy clip. There is an additional mass without significant enhancement in the lateral, middle 3rd, just below the nipple line. There are no suspicious internal mammary or axillary chain lymph nodes  · Neoadjuvant TCHP chemo 10/21/21 - Current     STAGE: Clinical 2 ER/WV+, HER2+    History of Present Illness:   Fuad Coles is a 40 y.o. female seen today in office for follow up of right breast cancer ER+ Her2 + post biopsy only 9/9/21. She started neoadjuvant TCHP on 10/21/21. She is here today for pre chemo labs and OV and will have Cycle 4 of neoadjuvant TCHP chemotherapy tomorrow. She reports that she feels well overall today. She is tolerating chemo well overall thus far. She has some diarrhea for the first week after chemo and takes Imodium PRN which helps. Her appetite and energy levels are good.  She denies fever, chills, mouth sores, cough, SOB, CP, nausea, vomiting, constipation, and neuropathy. She denies pain today. CBC and CMP are still pending today. She is ready for treatment tomorrow. She is here alone today. Past Medical History:   Diagnosis Date    Bell's palsy     Breast cancer ([facility] [address] 2021    ER/[place]+ Her2 +      Past Surgical History:   Procedure Laterality Date    HX  SECTION      HX  SECTION      HX CHOLECYSTECTOMY      HX CHOLECYSTECTOMY        Social History     Tobacco Use    Smoking status: Never Smoker    Smokeless tobacco: Never Used   Substance Use Topics    Alcohol use: Yes     Comment: rarely      Family History   Problem Relation Age of Onset    Cancer Sister         leukemia     Current Outpatient Medications   Medication Sig    CRANIAL PROSTHESIS misc Alopecia/ breast cancer chemo/ wig    ondansetron (ZOFRAN ODT) 4 mg disintegrating tablet Take 1-2 Tablets by mouth every eight (8) hours as needed for Nausea or Vomiting.  prochlorperazine (Compazine) 5 mg tablet Take 1 tab by mouth every 6 hours as needed for nausea or vomiting    lidocaine-prilocaine (EMLA) topical cream Apply  to affected area as needed for Pain. Apply to port-a-cath 30-60 minutes prior to access for chemo    dexAMETHasone (DECADRON) 4 mg tablet Take 2 tabs (8mg) by mouth twice daily the day before chemotherapy and the day after chemotherapy    levonorgestreL (Mirena) 20 mcg/24 hours (6 yrs) 52 mg IUD 1 Device by IntraUTERine route once. No current facility-administered medications for this visit. No Known Allergies     Review of Systems:  A complete review of systems was obtained, negative except as described above and as reported on ROS sheet scanned into system.      Physical Exam:     Visit Vitals  /78 (BP 1 Location: Left upper arm, BP Patient Position: Sitting)   Pulse (!) 114   Temp 96.8 °F (36 °C) (Temporal)   Ht 5' (1.524 m)   Wt 210 lb 4.8 oz (95.4 kg)   SpO2 95%   BMI 41.07 kg/m² ECOG PS: 0  General: No distress  Eyes: Anicteric sclerae  HENT: Atraumatic, wearing a mask  Neck: Supple  CV: Regular   Respiratory: CTAB, normal respiratory effort  MS: Normal gait and station. Digits without clubbing or cyanosis. Skin: No rashes, ecchymoses, or petechiae. Normal temperature, turgor, and texture. Port site without redness/swelling  Psych: Alert, oriented, appropriate affect, normal judgment/insight  Neuro: Non focal    Results:     Lab Results   Component Value Date/Time    WBC 4.9 12/22/2021 10:00 AM    HGB 11.4 (L) 12/22/2021 10:00 AM    HCT 36.0 12/22/2021 10:00 AM    PLATELET 636 (L) 05/09/0948 10:00 AM    MCV 83.3 12/22/2021 10:00 AM    ABS. NEUTROPHILS 3.1 12/22/2021 10:00 AM     Lab Results   Component Value Date/Time    Sodium 141 12/02/2021 10:01 AM    Potassium 4.0 12/02/2021 10:01 AM    Chloride 108 12/02/2021 10:01 AM    CO2 28 12/02/2021 10:01 AM    Glucose 164 (H) 12/02/2021 10:01 AM    BUN 11 12/02/2021 10:01 AM    Creatinine 0.82 12/02/2021 10:01 AM    GFR est AA >60 12/02/2021 10:01 AM    GFR est non-AA >60 12/02/2021 10:01 AM    Calcium 9.2 12/02/2021 10:01 AM     Lab Results   Component Value Date/Time    Bilirubin, total 0.4 12/02/2021 10:01 AM    ALT (SGPT) 34 12/02/2021 10:01 AM    Alk. phosphatase 66 12/02/2021 10:01 AM    Protein, total 7.2 12/02/2021 10:01 AM    Albumin 3.4 (L) 12/02/2021 10:01 AM    Globulin 3.8 12/02/2021 10:01 AM     Path at outside facility - scanned into chart     MRI Results (most recent):  Results from [place] encounter on 09/17/21    MRI BREAST BI W WO CONT    Narrative  MRI BREAST BI W WO CONT, 9/17/2021 8:59 AM  INDICATION: Malignant neoplasm of unspecified site of right female breast / New  diagnosis of RIGHT breast cancer. HISTORY: Newly diagnosed invasive ductal carcinoma and ductal carcinoma in situ  in the right breast at \"11 o'clock. \" Fibroadenoma in the left breast at 3-4  o'clock.   COMPARISON: Previous mammograms from 2020 and 2021  . TECHNIQUE:  Bilateral breast MRI was performed using a dedicated breast coil without  compression with the patient in the prone position. Precontrast T1-weighted  images with fat suppression were obtained followed by bolus injection of  contrast. Postcontrast dynamic and high-resolution images were acquired. T2-weighted axial imaging with fat suppression was also performed. The images  were analyzed using software for kinetic analysis, enhancement curves, digital  subtraction, and reconstructions. Grace Ferguson RIGHT BREAST:  Background parenchymal enhancement: Severe  There is a biopsy clip in the lower, outer quadrant of the right breast,  junction of the middle and deep thirds. There is linear, branching enhancement  which extends anterior to the biopsy clip over approximately 3.6 cm    There are no suspicious internal mammary or axillary chain lymph nodes. Grace Ferguson LEFT BREAST:  Background parenchymal enhancement: Severe  There is no suspicious masslike or nonmasslike enhancement within the left  breast to indicate breast carcinoma. There is a small mass in the lower, outer  quadrant with an adjacent biopsy clip. There is an additional mass without  significant enhancement in the lateral, middle 3rd, just below the nipple line. There are no suspicious internal mammary or axillary chain lymph nodes. .    Impression  RIGHT BREAST:  1. There is a biopsy clip in the lower, outer quadrant of the right breast,  junction of the middle and deep thirds. There is linear, branching enhancement  which extends anteriorly from the biopsy clip over approximately 3.6 cm. BI-RADS Category 6 - Known biopsy-proven malignancy. Grace Ferguson LEFT BREAST:  1. BI-RADS Category 2 - Benign findings. 10/13/21    ECHO ADULT COMPLETE 10/13/2021 10/13/2021    Interpretation Summary  · LV: Estimated LVEF is 60 - 65%. Normal cavity size, wall thickness, systolic function (ejection fraction normal) and diastolic function.  Normal left ventricular strain. Signed by: Ahmet Steel MD on 10/13/2021  3:52 PM    Records reviewed and summarized above. Pathology report(s) reviewed above. Radiology report(s) reviewed above. Assessment:/PLAN     1) Clinical Stage 2 (based on MRI) RIGHT Breast Cancer ER+ Her2+ post Breast Biopsy only at [facility] 9/9/21   Breast MRI 9/17/21 with 3.6 cm enhancement. No mass palpable on exam.   Patient was sent here for neoadjuvant chemo based on size of mass on MRI. Patient prefers lumpectomy, d/w breast surgery. Decided on neoadjuvant TCHP chemotherapy. She had teaching and consent with pharmacy. Pre chemo ECHO 10/13/21 good with EF 60-65%  She had port placed on 10/13/21 with no issues. Patient does not want to do cold cap, rx for wig. She is here today for pre chemo labs and OV and will have Cycle 4 of neoadjuvant TCHP chemotherapy tomorrow. She is tolerating chemo well overall thus far with only mild diarrhea. She is taking Imodium as needed for diarrhea and this helps. Discussed side effect management today. She is clinically stable and healthy overall. Labs (CBC and CMP) reviewed today. Patient is ready for chemo tomorrow. Advised to make follow up with Breast Surgery. Follow up in 3 weeks with Cycle 5. Follow up ECHO ordered today. Patient agrees with plan. 2) Premenopausal  She has two kids and does not want more kids. Reviewed impaired fertility/early menopause due to chemo with patient. She has Mirena IUD in place. Continue routine GYN follow up. 3) Management of High Risk Medications - Chemotherapy  Toxicities include Grade 1 Diarrhea. Reviewed side effect management today. Pre chemo ECHO from 10/13/21 reviewed. Follow up ECHO ordered today to be done prior to next treatment. Labs (CBC and CMP) reviewed today. No dose adjustments needed tomorrow. Will monitor for side effects. 4) Psychosocial  She is vaccinated for COVID.    She works as a , has a supportive fiance. SW/NN support as needed. Her fiance is here today. Call if questions. Follow up in 3 weeks with Cycle 5. I appreciate the opportunity to participate in Ms. Susan Rob's care.     Signed By: Alesia Kim NP

## 2021-12-22 ENCOUNTER — OFFICE VISIT (OUTPATIENT)
Dept: ONCOLOGY | Age: 46
End: 2021-12-22
Payer: MEDICAID

## 2021-12-22 ENCOUNTER — HOSPITAL ENCOUNTER (OUTPATIENT)
Dept: INFUSION THERAPY | Age: 46
Discharge: HOME | End: 2021-12-22
Payer: MEDICAID

## 2021-12-22 VITALS
HEART RATE: 87 BPM | RESPIRATION RATE: 18 BRPM | SYSTOLIC BLOOD PRESSURE: 103 MMHG | DIASTOLIC BLOOD PRESSURE: 72 MMHG | BODY MASS INDEX: 41.51 KG/M2 | OXYGEN SATURATION: 98 % | WEIGHT: 212.52 LBS | TEMPERATURE: 96.6 F

## 2021-12-22 VITALS
DIASTOLIC BLOOD PRESSURE: 78 MMHG | OXYGEN SATURATION: 95 % | TEMPERATURE: 96.8 F | WEIGHT: 210.3 LBS | SYSTOLIC BLOOD PRESSURE: 122 MMHG | HEIGHT: 60 IN | HEART RATE: 114 BPM | BODY MASS INDEX: 41.29 KG/M2

## 2021-12-22 DIAGNOSIS — Z95.828 PORT-A-CATH IN PLACE: ICD-10-CM

## 2021-12-22 DIAGNOSIS — N63.10 BREAST MASS, RIGHT: ICD-10-CM

## 2021-12-22 DIAGNOSIS — C50.911 MALIGNANT NEOPLASM OF RIGHT BREAST IN FEMALE, ESTROGEN RECEPTOR POSITIVE, UNSPECIFIED SITE OF BREAST (HCC): Primary | ICD-10-CM

## 2021-12-22 DIAGNOSIS — Z51.81 ENCOUNTER FOR MONITORING CARDIOTOXIC DRUG THERAPY: ICD-10-CM

## 2021-12-22 DIAGNOSIS — Z09 CHEMOTHERAPY FOLLOW-UP EXAMINATION: ICD-10-CM

## 2021-12-22 DIAGNOSIS — N95.9 PREMENOPAUSAL PATIENT: ICD-10-CM

## 2021-12-22 DIAGNOSIS — Z17.0 MALIGNANT NEOPLASM OF RIGHT BREAST IN FEMALE, ESTROGEN RECEPTOR POSITIVE, UNSPECIFIED SITE OF BREAST (HCC): Primary | ICD-10-CM

## 2021-12-22 DIAGNOSIS — Z79.899 ENCOUNTER FOR MONITORING CARDIOTOXIC DRUG THERAPY: ICD-10-CM

## 2021-12-22 DIAGNOSIS — T45.1X5A CHEMOTHERAPY INDUCED DIARRHEA: ICD-10-CM

## 2021-12-22 DIAGNOSIS — K52.1 CHEMOTHERAPY INDUCED DIARRHEA: ICD-10-CM

## 2021-12-22 LAB
ALBUMIN SERPL-MCNC: 3.5 G/DL (ref 3.5–5)
ALBUMIN/GLOB SERPL: 0.9 {RATIO} (ref 1.1–2.2)
ALP SERPL-CCNC: 66 U/L (ref 45–117)
ALT SERPL-CCNC: 26 U/L (ref 12–78)
ANION GAP SERPL CALC-SCNC: 6 MMOL/L (ref 5–15)
AST SERPL-CCNC: 14 U/L (ref 15–37)
BASOPHILS # BLD: 0 K/UL (ref 0–0.1)
BASOPHILS NFR BLD: 1 % (ref 0–1)
BILIRUB SERPL-MCNC: 0.3 MG/DL (ref 0.2–1)
BUN SERPL-MCNC: 11 MG/DL (ref 6–20)
BUN/CREAT SERPL: 14 (ref 12–20)
CALCIUM SERPL-MCNC: 9.2 MG/DL (ref 8.5–10.1)
CHLORIDE SERPL-SCNC: 107 MMOL/L (ref 97–108)
CO2 SERPL-SCNC: 26 MMOL/L (ref 21–32)
CREAT SERPL-MCNC: 0.8 MG/DL (ref 0.55–1.02)
DIFFERENTIAL METHOD BLD: ABNORMAL
EOSINOPHIL # BLD: 0 K/UL (ref 0–0.4)
EOSINOPHIL NFR BLD: 0 % (ref 0–7)
ERYTHROCYTE [DISTWIDTH] IN BLOOD BY AUTOMATED COUNT: 18.6 % (ref 11.5–14.5)
GLOBULIN SER CALC-MCNC: 3.8 G/DL (ref 2–4)
GLUCOSE SERPL-MCNC: 179 MG/DL (ref 65–100)
HCT VFR BLD AUTO: 36 % (ref 35–47)
HGB BLD-MCNC: 11.4 G/DL (ref 11.5–16)
IMM GRANULOCYTES # BLD AUTO: 0 K/UL (ref 0–0.04)
IMM GRANULOCYTES NFR BLD AUTO: 1 % (ref 0–0.5)
LYMPHOCYTES # BLD: 1.4 K/UL (ref 0.8–3.5)
LYMPHOCYTES NFR BLD: 28 % (ref 12–49)
MCH RBC QN AUTO: 26.4 PG (ref 26–34)
MCHC RBC AUTO-ENTMCNC: 31.7 G/DL (ref 30–36.5)
MCV RBC AUTO: 83.3 FL (ref 80–99)
MONOCYTES # BLD: 0.3 K/UL (ref 0–1)
MONOCYTES NFR BLD: 7 % (ref 5–13)
NEUTS SEG # BLD: 3.1 K/UL (ref 1.8–8)
NEUTS SEG NFR BLD: 63 % (ref 32–75)
NRBC # BLD: 0 K/UL (ref 0–0.01)
NRBC BLD-RTO: 0 PER 100 WBC
PLATELET # BLD AUTO: 130 K/UL (ref 150–400)
PMV BLD AUTO: 9.8 FL (ref 8.9–12.9)
POTASSIUM SERPL-SCNC: 3.9 MMOL/L (ref 3.5–5.1)
PROT SERPL-MCNC: 7.3 G/DL (ref 6.4–8.2)
RBC # BLD AUTO: 4.32 M/UL (ref 3.8–5.2)
SODIUM SERPL-SCNC: 139 MMOL/L (ref 136–145)
WBC # BLD AUTO: 4.9 K/UL (ref 3.6–11)

## 2021-12-22 PROCEDURE — 85025 COMPLETE CBC W/AUTO DIFF WBC: CPT

## 2021-12-22 PROCEDURE — 36415 COLL VENOUS BLD VENIPUNCTURE: CPT

## 2021-12-22 PROCEDURE — 80053 COMPREHEN METABOLIC PANEL: CPT

## 2021-12-22 PROCEDURE — 99212 OFFICE O/P EST SF 10 MIN: CPT | Performed by: NURSE PRACTITIONER

## 2021-12-22 NOTE — PROGRESS NOTES
Isis Castle is a 40 y.o. female  Chief Complaint   Patient presents with    Chemotherapy    Breast Cancer     1. Have you been to the ER, urgent care clinic since your last visit? Hospitalized since your last visit? No.    2. Have you seen or consulted any other health care providers outside of the [address] [place] [address] since your last visit? Include any pap smears or colon screening.  No.

## 2021-12-23 ENCOUNTER — HOSPITAL ENCOUNTER (OUTPATIENT)
Dept: INFUSION THERAPY | Age: 46
Discharge: HOME | End: 2021-12-23
Payer: MEDICAID

## 2021-12-23 VITALS
DIASTOLIC BLOOD PRESSURE: 73 MMHG | WEIGHT: 210 LBS | HEIGHT: 60 IN | SYSTOLIC BLOOD PRESSURE: 118 MMHG | TEMPERATURE: 96.8 F | HEART RATE: 73 BPM | RESPIRATION RATE: 16 BRPM | BODY MASS INDEX: 41.23 KG/M2

## 2021-12-23 DIAGNOSIS — C50.911 MALIGNANT NEOPLASM OF RIGHT BREAST IN FEMALE, ESTROGEN RECEPTOR POSITIVE, UNSPECIFIED SITE OF BREAST (HCC): Primary | ICD-10-CM

## 2021-12-23 DIAGNOSIS — Z17.0 MALIGNANT NEOPLASM OF RIGHT BREAST IN FEMALE, ESTROGEN RECEPTOR POSITIVE, UNSPECIFIED SITE OF BREAST (HCC): Primary | ICD-10-CM

## 2021-12-23 PROCEDURE — 74011000258 HC RX REV CODE- 258: Performed by: INTERNAL MEDICINE

## 2021-12-23 PROCEDURE — 74011250636 HC RX REV CODE- 250/636: Performed by: INTERNAL MEDICINE

## 2021-12-23 PROCEDURE — 96367 TX/PROPH/DG ADDL SEQ IV INF: CPT

## 2021-12-23 PROCEDURE — 96377 APPLICATON ON-BODY INJECTOR: CPT

## 2021-12-23 PROCEDURE — 96402 CHEMO HORMON ANTINEOPL SQ/IM: CPT

## 2021-12-23 PROCEDURE — 96375 TX/PRO/DX INJ NEW DRUG ADDON: CPT

## 2021-12-23 PROCEDURE — 96417 CHEMO IV INFUS EACH ADDL SEQ: CPT

## 2021-12-23 PROCEDURE — 96413 CHEMO IV INFUSION 1 HR: CPT

## 2021-12-23 PROCEDURE — 77030012965 HC NDL HUBR BBMI -A

## 2021-12-23 RX ORDER — DEXAMETHASONE SODIUM PHOSPHATE 10 MG/ML
10 INJECTION INTRAMUSCULAR; INTRAVENOUS ONCE
Status: COMPLETED | OUTPATIENT
Start: 2021-12-23 | End: 2021-12-23

## 2021-12-23 RX ORDER — SODIUM CHLORIDE 9 MG/ML
10 INJECTION INTRAMUSCULAR; INTRAVENOUS; SUBCUTANEOUS AS NEEDED
Status: ACTIVE | OUTPATIENT
Start: 2021-12-23 | End: 2021-12-23

## 2021-12-23 RX ORDER — PALONOSETRON 0.05 MG/ML
0.25 INJECTION, SOLUTION INTRAVENOUS ONCE
Status: COMPLETED | OUTPATIENT
Start: 2021-12-23 | End: 2021-12-23

## 2021-12-23 RX ORDER — HEPARIN 100 UNIT/ML
300-500 SYRINGE INTRAVENOUS AS NEEDED
Status: ACTIVE | OUTPATIENT
Start: 2021-12-23 | End: 2021-12-23

## 2021-12-23 RX ORDER — SODIUM CHLORIDE 0.9 % (FLUSH) 0.9 %
10 SYRINGE (ML) INJECTION AS NEEDED
Status: DISPENSED | OUTPATIENT
Start: 2021-12-23 | End: 2021-12-23

## 2021-12-23 RX ORDER — SODIUM CHLORIDE 9 MG/ML
25 INJECTION, SOLUTION INTRAVENOUS CONTINUOUS
Status: DISPENSED | OUTPATIENT
Start: 2021-12-23 | End: 2021-12-23

## 2021-12-23 RX ADMIN — DEXAMETHASONE SODIUM PHOSPHATE 10 MG: 10 INJECTION, SOLUTION INTRAMUSCULAR; INTRAVENOUS at 11:37

## 2021-12-23 RX ADMIN — DOCETAXEL ANHYDROUS 155 MG: 10 INJECTION, SOLUTION INTRAVENOUS at 12:40

## 2021-12-23 RX ADMIN — PEGFILGRASTIM 6 MG: KIT SUBCUTANEOUS at 14:27

## 2021-12-23 RX ADMIN — SODIUM CHLORIDE 25 ML/HR: 9 INJECTION, SOLUTION INTRAVENOUS at 10:49

## 2021-12-23 RX ADMIN — CARBOPLATIN 900 MG: 10 INJECTION, SOLUTION INTRAVENOUS at 13:47

## 2021-12-23 RX ADMIN — FOSAPREPITANT 150 MG: 150 INJECTION, POWDER, LYOPHILIZED, FOR SOLUTION INTRAVENOUS at 10:55

## 2021-12-23 RX ADMIN — PALONOSETRON 0.25 MG: 0.25 INJECTION, SOLUTION INTRAVENOUS at 10:52

## 2021-12-23 RX ADMIN — SODIUM CHLORIDE 10 ML: 9 INJECTION INTRAMUSCULAR; INTRAVENOUS; SUBCUTANEOUS at 14:26

## 2021-12-23 RX ADMIN — Medication 500 UNITS: at 14:26

## 2021-12-23 RX ADMIN — PERTUZUMAB, TRASTUZUMAB, AND HYALURONIDASE-ZZXF 10 ML: 600; 600; 2000 INJECTION, SOLUTION SUBCUTANEOUS at 12:17

## 2021-12-23 NOTE — PROGRESS NOTES
[facility] Chemo Progress Note    Date: 2021    Name: Fuad Coles    MRN: [id number]         : 1975    0955 Ms. Lakisha Nash Arrived to [facility] for C4 D1 TCHP [facility] ambulatory in stable condition. Assessment was completed, no acute issues at this time, no new complaints voiced. port accessed with positive blood return. Labs drawn and sent for processing. Went to provider appointment with Medical Oncology    Chemotherapy Flowsheet 2021   Cycle C4   Date 2021   Drug / Regimen TCHP(Phesgo)   Pre Meds given   Notes OBI,given,phesgo in right leg         Patient denies SOB, fever, cough, general not feeling well. Patient denies recent exposure to someone who has tested positive for COVID-19. Patient denies having contact with anyone who has a pending COVID test.      Ms. Rob's vitals were reviewed. Patient Vitals for the past 12 hrs:   Temp Pulse Resp BP   21 1424  73  118/73   21 0958 96.8 °F (36 °C) 85 16 133/82         Lab results were obtained and reviewed. No results found for this or any previous visit (from the past 12 hour(s)). Pre-medications  were administered as ordered and chemotherapy was initiated.   Medications Administered     0.9% sodium chloride infusion     Admin Date  2021 Action  New Bag Dose  25 mL/hr Rate  25 mL/hr Route  IntraVENous Administered By  Mando Leon RN          0.9% sodium chloride injection 10 mL     Admin Date  2021 Action  Given Dose  10 mL Route  IntraVENous Administered By  Mando Leon RN          CARBOplatin (PARAPLATIN) 900 mg in 0.9% sodium chloride 250 mL, overfill volume 25 mL chemo infusion     Admin Date  2021 Action  New Bag Dose  900 mg Rate  730 mL/hr Route  IntraVENous Administered By  Mando Leon RN          dexamethasone (PF) (DECADRON) 10 mg/mL injection 10 mg     Admin Date  2021 Action  Given Dose  10 mg Route  IntraVENous Administered By  Mando Leon RN DOCEtaxeL (TAXOTERE) 155 mg in 0.9% sodium chloride 250 mL, overfill volume 25 mL chemo infusion     Admin Date  12/23/2021 Action  New Bag Dose  155 mg Rate  290.5 mL/hr Route  IntraVENous Administered By  Merly Chang RN          fosaprepitant (EMEND) 150 mg in 0.9% sodium chloride 150 mL IVPB     Admin Date  12/23/2021 Action  New Bag Dose  150 mg Rate  450 mL/hr Route  IntraVENous Administered By  Merly Chang RN          heparin (porcine) pf 300-500 Units     Admin Date  12/23/2021 Action  Given Dose  500 Units Route  InterCATHeter Administered By  Merly Chang RN          palonosetron HCl (ALOXI) injection 0.25 mg     Admin Date  12/23/2021 Action  Given Dose  0.25 mg Route  IntraVENous Administered By  Merly Chang RN          pegfilgrastim (NEULASTA) wearable SQ injector 6 mg     Admin Date  12/23/2021 Action  Given Dose  6 mg Route  SubCUTAneous Administered By  Merly Chang RN          pertuzumab 600 mg-trastuzumab 600 mg-hyaluronidase-zzxf 20,000 units/10 mL     Admin Date  12/23/2021 Action  Given Dose  10 mL Route  SubCUTAneous Administered By  Merly Chang RN              PHESGO injection given SC in right thigh    1435 Patient tolerated treatment well. Port maintained positive blood return throughout treatment. Port flushed, heparinized and de accessed per protocol. Patient was discharged from [facility] in stable condition. Patient aware of next appointment.      Future Appointments   Date Time Provider Stacey Sanchez   1/3/2022  9:45 AM Héctor Allred MD [facility] BS AMB   1/5/2022  8:30 AM ECHO LAB 1 [facility] [address] [facility]   1/13/2022 10:00 AM H3 [facility]   1/13/2022 10:15 AM Salma Parekh  [address] BS AMB   1/14/2022 10:00 AM F3 [facility]ITO LONG [place] [facility]   2/3/2022 10:00 AM H3 [facility]   2/4/2022  9:00 AM G3 [facility]ITO LONG [place] [facility]   2/24/2022 10:00 [facility]    2/25/2022  9:00 AM D3 JU[facility]O [facility] [address] [facility]   6/9/2022  8:30 AM Allison Almonte MD [facility] BS AMB Claudeen Fuse, RN  December 23, 2021

## 2022-01-05 ENCOUNTER — HOSPITAL ENCOUNTER (OUTPATIENT)
Dept: NON INVASIVE DIAGNOSTICS | Age: 47
Discharge: HOME | End: 2022-01-05
Attending: NURSE PRACTITIONER
Payer: MEDICAID

## 2022-01-05 VITALS
SYSTOLIC BLOOD PRESSURE: 118 MMHG | HEIGHT: 60 IN | DIASTOLIC BLOOD PRESSURE: 73 MMHG | WEIGHT: 209.44 LBS | BODY MASS INDEX: 41.12 KG/M2

## 2022-01-05 DIAGNOSIS — Z17.0 MALIGNANT NEOPLASM OF RIGHT BREAST IN FEMALE, ESTROGEN RECEPTOR POSITIVE, UNSPECIFIED SITE OF BREAST (HCC): ICD-10-CM

## 2022-01-05 DIAGNOSIS — C50.911 MALIGNANT NEOPLASM OF RIGHT BREAST IN FEMALE, ESTROGEN RECEPTOR POSITIVE, UNSPECIFIED SITE OF BREAST (HCC): ICD-10-CM

## 2022-01-05 DIAGNOSIS — Z79.899 ENCOUNTER FOR MONITORING CARDIOTOXIC DRUG THERAPY: ICD-10-CM

## 2022-01-05 DIAGNOSIS — Z51.81 ENCOUNTER FOR MONITORING CARDIOTOXIC DRUG THERAPY: ICD-10-CM

## 2022-01-05 LAB
ECHO AV PEAK GRADIENT: 7 MMHG
ECHO AV PEAK VELOCITY: 1.4 M/S
ECHO AV VELOCITY RATIO: 1
ECHO EST RA PRESSURE: 3 MMHG
ECHO LA DIAMETER INDEX: 1.68 CM/M2
ECHO LA DIAMETER: 3.2 CM
ECHO LV E' LATERAL VELOCITY: 15 CM/S
ECHO LV E' SEPTAL VELOCITY: 7 CM/S
ECHO LV EDV A2C: 54 ML
ECHO LV EDV A4C: 57 ML
ECHO LV EDV BP: 58 ML (ref 56–104)
ECHO LV EDV BP: 58 ML (ref 56–104)
ECHO LV EDV INDEX A4C: 30 ML/M2
ECHO LV EDV NDEX A2C: 28 ML/M2
ECHO LV EJECTION FRACTION A4C: 66 %
ECHO LV EJECTION FRACTION BIPLANE: 61 % (ref 55–100)
ECHO LV EJECTION FRACTION BIPLANE: 61 % (ref 55–100)
ECHO LV ESV A2C: 26 ML
ECHO LV ESV A4C: 19 ML
ECHO LV ESV BP: 22 ML (ref 19–49)
ECHO LV ESV INDEX A2C: 14 ML/M2
ECHO LV ESV INDEX A4C: 10 ML/M2
ECHO LV ESV INDEX BP: 12 ML/M2
ECHO LV FRACTIONAL SHORTENING: 29 % (ref 28–44)
ECHO LV GLOBAL LONGITUDINAL STRAIN (GLS): -18 %
ECHO LV INTERNAL DIMENSION DIASTOLE INDEX: 2.21 CM/M2
ECHO LV INTERNAL DIMENSION DIASTOLIC: 4.2 CM (ref 3.9–5.3)
ECHO LV INTERNAL DIMENSION SYSTOLIC INDEX: 1.58 CM/M2
ECHO LV INTERNAL DIMENSION SYSTOLIC: 3 CM
ECHO LV IVSD: 0.7 CM (ref 0.6–0.9)
ECHO LV MASS 2D: 85.1 G (ref 67–162)
ECHO LV MASS INDEX 2D: 44.8 G/M2 (ref 43–95)
ECHO LV POSTERIOR WALL DIASTOLIC: 0.7 CM (ref 0.6–0.9)
ECHO LV RELATIVE WALL THICKNESS RATIO: 0.33
ECHO LVOT PEAK GRADIENT: 7 MMHG
ECHO LVOT PEAK VELOCITY: 1.4 M/S
ECHO MV A VELOCITY: 0.71 M/S
ECHO MV E DECELERATION TIME (DT): 146.9 MS
ECHO MV E VELOCITY: 0.86 M/S
ECHO MV E/A RATIO: 1.21
ECHO MV E/E' LATERAL: 5.73
ECHO MV E/E' RATIO (AVERAGED): 9.01
ECHO MV E/E' SEPTAL: 12.29
ECHO PV MAX VELOCITY: 1.2 M/S
ECHO PV PEAK GRADIENT: 6 MMHG
ECHO RIGHT VENTRICULAR SYSTOLIC PRESSURE (RVSP): 20 MMHG
ECHO RV TAPSE: 1.7 CM (ref 1.5–2)
ECHO TV REGURGITANT MAX VELOCITY: 2.07 M/S
ECHO TV REGURGITANT PEAK GRADIENT: 17 MMHG

## 2022-01-05 PROCEDURE — 93356 MYOCRD STRAIN IMG SPCKL TRCK: CPT | Performed by: SPECIALIST

## 2022-01-05 PROCEDURE — 93306 TTE W/DOPPLER COMPLETE: CPT | Performed by: SPECIALIST

## 2022-01-05 PROCEDURE — 93306 TTE W/DOPPLER COMPLETE: CPT

## 2022-01-05 NOTE — PROGRESS NOTES
Attempted to reach patient off mobile phone number listed, no answer. Left a voicemail to give the office a call back!   Ana Tolentino

## 2022-01-05 NOTE — PROGRESS NOTES
HIPPA Verified. Patient gave the office a call back off mobile phone number listed. Patient was made aware of normal/good ECHO report per Provider. Patient verbalized understanding and expressed no question!   Ya Henson

## 2022-01-06 RX ORDER — DIPHENHYDRAMINE HYDROCHLORIDE 50 MG/ML
25 INJECTION, SOLUTION INTRAMUSCULAR; INTRAVENOUS AS NEEDED
Status: CANCELLED
Start: 2022-02-25

## 2022-01-06 RX ORDER — ACETAMINOPHEN 325 MG/1
650 TABLET ORAL AS NEEDED
Status: CANCELLED
Start: 2022-02-25

## 2022-01-06 RX ORDER — ACETAMINOPHEN 325 MG/1
650 TABLET ORAL AS NEEDED
Status: CANCELLED
Start: 2022-02-04

## 2022-01-06 RX ORDER — EPINEPHRINE 1 MG/ML
0.3 INJECTION, SOLUTION, CONCENTRATE INTRAVENOUS AS NEEDED
Status: CANCELLED | OUTPATIENT
Start: 2022-01-14

## 2022-01-06 RX ORDER — SODIUM CHLORIDE 9 MG/ML
25 INJECTION, SOLUTION INTRAVENOUS CONTINUOUS
Status: CANCELLED | OUTPATIENT
Start: 2022-01-14

## 2022-01-06 RX ORDER — DIPHENHYDRAMINE HYDROCHLORIDE 50 MG/ML
25 INJECTION, SOLUTION INTRAMUSCULAR; INTRAVENOUS AS NEEDED
Status: CANCELLED
Start: 2022-01-14

## 2022-01-06 RX ORDER — ALBUTEROL SULFATE 0.83 MG/ML
2.5 SOLUTION RESPIRATORY (INHALATION) AS NEEDED
Status: CANCELLED
Start: 2022-01-14

## 2022-01-06 RX ORDER — DIPHENHYDRAMINE HYDROCHLORIDE 50 MG/ML
50 INJECTION, SOLUTION INTRAMUSCULAR; INTRAVENOUS
Status: CANCELLED | OUTPATIENT
Start: 2022-01-14

## 2022-01-06 RX ORDER — DIPHENHYDRAMINE HYDROCHLORIDE 50 MG/ML
50 INJECTION, SOLUTION INTRAMUSCULAR; INTRAVENOUS AS NEEDED
Status: CANCELLED
Start: 2022-02-25

## 2022-01-06 RX ORDER — HYDROCORTISONE SODIUM SUCCINATE 100 MG/2ML
100 INJECTION, POWDER, FOR SOLUTION INTRAMUSCULAR; INTRAVENOUS AS NEEDED
Status: CANCELLED | OUTPATIENT
Start: 2022-02-04

## 2022-01-06 RX ORDER — DIPHENHYDRAMINE HYDROCHLORIDE 50 MG/ML
50 INJECTION, SOLUTION INTRAMUSCULAR; INTRAVENOUS
Status: CANCELLED | OUTPATIENT
Start: 2022-02-04

## 2022-01-06 RX ORDER — HEPARIN 100 UNIT/ML
300-500 SYRINGE INTRAVENOUS AS NEEDED
Status: CANCELLED
Start: 2022-02-04

## 2022-01-06 RX ORDER — PALONOSETRON 0.05 MG/ML
0.25 INJECTION, SOLUTION INTRAVENOUS ONCE
Status: CANCELLED | OUTPATIENT
Start: 2022-02-04 | End: 2022-02-03

## 2022-01-06 RX ORDER — EPINEPHRINE 1 MG/ML
0.3 INJECTION, SOLUTION, CONCENTRATE INTRAVENOUS AS NEEDED
Status: CANCELLED | OUTPATIENT
Start: 2022-02-25

## 2022-01-06 RX ORDER — DIPHENHYDRAMINE HYDROCHLORIDE 50 MG/ML
50 INJECTION, SOLUTION INTRAMUSCULAR; INTRAVENOUS
Status: CANCELLED | OUTPATIENT
Start: 2022-02-25

## 2022-01-06 RX ORDER — DEXAMETHASONE SODIUM PHOSPHATE 100 MG/10ML
10 INJECTION INTRAMUSCULAR; INTRAVENOUS ONCE
Status: CANCELLED | OUTPATIENT
Start: 2022-01-14 | End: 2022-01-13

## 2022-01-06 RX ORDER — SODIUM CHLORIDE 0.9 % (FLUSH) 0.9 %
10 SYRINGE (ML) INJECTION AS NEEDED
Status: CANCELLED | OUTPATIENT
Start: 2022-01-14

## 2022-01-06 RX ORDER — ONDANSETRON 2 MG/ML
8 INJECTION INTRAMUSCULAR; INTRAVENOUS AS NEEDED
Status: CANCELLED | OUTPATIENT
Start: 2022-01-14

## 2022-01-06 RX ORDER — SODIUM CHLORIDE 9 MG/ML
10 INJECTION INTRAMUSCULAR; INTRAVENOUS; SUBCUTANEOUS AS NEEDED
Status: CANCELLED | OUTPATIENT
Start: 2022-01-14

## 2022-01-06 RX ORDER — ACETAMINOPHEN 325 MG/1
650 TABLET ORAL
Status: CANCELLED | OUTPATIENT
Start: 2022-01-14

## 2022-01-06 RX ORDER — SODIUM CHLORIDE 9 MG/ML
25 INJECTION, SOLUTION INTRAVENOUS CONTINUOUS
Status: CANCELLED | OUTPATIENT
Start: 2022-02-04

## 2022-01-06 RX ORDER — EPINEPHRINE 1 MG/ML
0.3 INJECTION, SOLUTION, CONCENTRATE INTRAVENOUS AS NEEDED
Status: CANCELLED | OUTPATIENT
Start: 2022-02-04

## 2022-01-06 RX ORDER — PALONOSETRON 0.05 MG/ML
0.25 INJECTION, SOLUTION INTRAVENOUS ONCE
Status: CANCELLED | OUTPATIENT
Start: 2022-01-14 | End: 2022-01-13

## 2022-01-06 RX ORDER — ALBUTEROL SULFATE 0.83 MG/ML
2.5 SOLUTION RESPIRATORY (INHALATION) AS NEEDED
Status: CANCELLED
Start: 2022-02-04

## 2022-01-06 RX ORDER — HEPARIN 100 UNIT/ML
300-500 SYRINGE INTRAVENOUS AS NEEDED
Status: CANCELLED
Start: 2022-01-14

## 2022-01-06 RX ORDER — ACETAMINOPHEN 325 MG/1
650 TABLET ORAL AS NEEDED
Status: CANCELLED
Start: 2022-01-14

## 2022-01-06 RX ORDER — ALBUTEROL SULFATE 0.83 MG/ML
2.5 SOLUTION RESPIRATORY (INHALATION) AS NEEDED
Status: CANCELLED
Start: 2022-02-25

## 2022-01-06 RX ORDER — ONDANSETRON 2 MG/ML
8 INJECTION INTRAMUSCULAR; INTRAVENOUS AS NEEDED
Status: CANCELLED | OUTPATIENT
Start: 2022-02-25

## 2022-01-06 RX ORDER — SODIUM CHLORIDE 0.9 % (FLUSH) 0.9 %
10 SYRINGE (ML) INJECTION AS NEEDED
Status: CANCELLED | OUTPATIENT
Start: 2022-02-04

## 2022-01-06 RX ORDER — DIPHENHYDRAMINE HYDROCHLORIDE 50 MG/ML
25 INJECTION, SOLUTION INTRAMUSCULAR; INTRAVENOUS AS NEEDED
Status: CANCELLED
Start: 2022-02-04

## 2022-01-06 RX ORDER — HYDROCORTISONE SODIUM SUCCINATE 100 MG/2ML
100 INJECTION, POWDER, FOR SOLUTION INTRAMUSCULAR; INTRAVENOUS AS NEEDED
Status: CANCELLED | OUTPATIENT
Start: 2022-02-25

## 2022-01-06 RX ORDER — ACETAMINOPHEN 325 MG/1
650 TABLET ORAL
Status: CANCELLED | OUTPATIENT
Start: 2022-02-25

## 2022-01-06 RX ORDER — SODIUM CHLORIDE 9 MG/ML
10 INJECTION INTRAMUSCULAR; INTRAVENOUS; SUBCUTANEOUS AS NEEDED
Status: CANCELLED | OUTPATIENT
Start: 2022-02-04

## 2022-01-06 RX ORDER — ONDANSETRON 2 MG/ML
8 INJECTION INTRAMUSCULAR; INTRAVENOUS AS NEEDED
Status: CANCELLED | OUTPATIENT
Start: 2022-02-04

## 2022-01-06 RX ORDER — DIPHENHYDRAMINE HYDROCHLORIDE 50 MG/ML
50 INJECTION, SOLUTION INTRAMUSCULAR; INTRAVENOUS AS NEEDED
Status: CANCELLED
Start: 2022-02-04

## 2022-01-06 RX ORDER — ACETAMINOPHEN 325 MG/1
650 TABLET ORAL
Status: CANCELLED | OUTPATIENT
Start: 2022-02-04

## 2022-01-06 RX ORDER — DIPHENHYDRAMINE HYDROCHLORIDE 50 MG/ML
50 INJECTION, SOLUTION INTRAMUSCULAR; INTRAVENOUS AS NEEDED
Status: CANCELLED
Start: 2022-01-14

## 2022-01-06 RX ORDER — SODIUM CHLORIDE 9 MG/ML
10 INJECTION INTRAMUSCULAR; INTRAVENOUS; SUBCUTANEOUS AS NEEDED
Status: CANCELLED | OUTPATIENT
Start: 2022-02-25

## 2022-01-06 RX ORDER — HEPARIN 100 UNIT/ML
300-500 SYRINGE INTRAVENOUS AS NEEDED
Status: CANCELLED
Start: 2022-02-25

## 2022-01-06 RX ORDER — DEXAMETHASONE SODIUM PHOSPHATE 100 MG/10ML
10 INJECTION INTRAMUSCULAR; INTRAVENOUS ONCE
Status: CANCELLED | OUTPATIENT
Start: 2022-02-04 | End: 2022-02-03

## 2022-01-06 RX ORDER — SODIUM CHLORIDE 0.9 % (FLUSH) 0.9 %
10 SYRINGE (ML) INJECTION AS NEEDED
Status: CANCELLED | OUTPATIENT
Start: 2022-02-25

## 2022-01-06 RX ORDER — SODIUM CHLORIDE 9 MG/ML
25 INJECTION, SOLUTION INTRAVENOUS CONTINUOUS
Status: CANCELLED | OUTPATIENT
Start: 2022-02-25

## 2022-01-06 RX ORDER — HYDROCORTISONE SODIUM SUCCINATE 100 MG/2ML
100 INJECTION, POWDER, FOR SOLUTION INTRAMUSCULAR; INTRAVENOUS AS NEEDED
Status: CANCELLED | OUTPATIENT
Start: 2022-01-14

## 2022-01-10 ENCOUNTER — OFFICE VISIT (OUTPATIENT)
Dept: SURGERY | Age: 47
End: 2022-01-10
Payer: MEDICAID

## 2022-01-10 VITALS
BODY MASS INDEX: 41.62 KG/M2 | WEIGHT: 212 LBS | HEART RATE: 87 BPM | DIASTOLIC BLOOD PRESSURE: 58 MMHG | SYSTOLIC BLOOD PRESSURE: 130 MMHG | HEIGHT: 60 IN | TEMPERATURE: 97.3 F

## 2022-01-10 DIAGNOSIS — C50.411 MALIGNANT NEOPLASM OF UPPER-OUTER QUADRANT OF RIGHT FEMALE BREAST, UNSPECIFIED ESTROGEN RECEPTOR STATUS (HCC): Primary | ICD-10-CM

## 2022-01-10 PROCEDURE — 99214 OFFICE O/P EST MOD 30 MIN: CPT | Performed by: SURGERY

## 2022-01-10 NOTE — Clinical Note
No mass on ultrasound initially only calcs and enhancement   On mri  Will go ahead and order mri now.

## 2022-01-10 NOTE — PROGRESS NOTES
HISTORY OF PRESENT ILLNESS  Marques Craig is a 40 y.o. female. HPI ESTABLISHED patient here for follow up RIGHT breast cancer. Half way through Neoadjuvant TCHP chemo. Patient reports doing well  Breast history -   Referring - Dr. Cathy Galicia - [address]  · Initially had abnormal mammo at [address] with calcifications on RIGHT. No mass reported/dense breast tissue  · RIGHT Breast Biopsy 9/9/21: PATH -  IDC, grade 3 ER/[place] strong+ Her2 3+  · Genetic testing negative  · Breast MRI 9/17/21: RIGHT BREAST:Background parenchymal enhancement: Severe. There is a biopsy clip in the lower, outer quadrant of the right breast, junction of the middle and deep thirds. There is linear, branching enhancement which extends anterior to the biopsy clip over approximately 3.6 cm. There are no suspicious internal mammary or axillary chain lymph nodes. LEFT BREAST: Background parenchymal enhancement: Severe. There is no suspicious masslike or nonmasslike enhancement within the left breast to indicate breast carcinoma. There is a small mass in the lower, outer quadrant with an adjacent biopsy clip. There is an additional mass without significant enhancement in the lateral, middle 3rd, just below the nipple line. There are no suspicious internal mammary or axillary chain lymph nodes  · Neoadjuvant TCHP chemo 10/21/21 - started - Dr. Kathe Curtis    Family history -   No family history of breast cancer  Genetic testing negative - 2021     Breast imaging-   Mammogram [address] 8-19-21, BI-RADS 4 linear calcs right breast   MRI 9-17-21    Review of Systems   All other systems reviewed and are negative. Physical Exam  Vitals and nursing note reviewed. Chest:   Breasts: Breasts are symmetrical.      Right: No inverted nipple, mass, nipple discharge, skin change or tenderness. Left: No inverted nipple, mass, nipple discharge, skin change or tenderness. Lymphadenopathy:      Cervical: No cervical adenopathy.          ASSESSMENT and PLAN ICD-10-CM ICD-9-CM    1. Malignant neoplasm of upper-outer quadrant of right female breast, unspecified estrogen receptor [facility]  C50.411 174.4 MRI BREAST BI W WO CONT   Breast history -   Referring - Dr. Denny Folds - [address]  · Initially had abnormal mammo at [address] with calcifications on RIGHT. No mass reported/dense breast tissue  · RIGHT Breast Biopsy 9/9/21: PATH -  IDC, grade 3 ER/[place] strong+ Her2 3+  · Genetic testing negative  · Breast MRI 9/17/21: RIGHT BREAST:Background parenchymal enhancement: Severe. There is a biopsy clip in the lower, outer quadrant of the right breast, junction of the middle and deep thirds. There is linear, branching enhancement which extends anterior to the biopsy clip over approximately 3.6 cm. There are no suspicious internal mammary or axillary chain lymph nodes. LEFT BREAST: Background parenchymal enhancement: Severe. There is no suspicious masslike or nonmasslike enhancement within the left breast to indicate breast carcinoma. There is a small mass in the lower, outer quadrant with an adjacent biopsy clip. There is an additional mass without significant enhancement in the lateral, middle 3rd, just below the nipple line. There are no suspicious internal mammary or axillary chain lymph nodes  Neoadjuvant TCHP chemo 10/21/21 - started - Dr. Vicki Lamas    No tumor initially seen on us. All calcs on mammo and breast mri. Will go ahead and order breast mri to assess response. Plan will be right breast magseed guided lumpectomy, sln biopsy   Will schedule for mid march  30 minutes was spent with patient on counseling and coordination of care.

## 2022-01-10 NOTE — PATIENT INSTRUCTIONS

## 2022-01-12 ENCOUNTER — APPOINTMENT (OUTPATIENT)
Dept: INFUSION THERAPY | Age: 47
End: 2022-01-12

## 2022-01-13 ENCOUNTER — OFFICE VISIT (OUTPATIENT)
Dept: ONCOLOGY | Age: 47
End: 2022-01-13
Payer: MEDICAID

## 2022-01-13 ENCOUNTER — APPOINTMENT (OUTPATIENT)
Dept: INFUSION THERAPY | Age: 47
End: 2022-01-13

## 2022-01-13 ENCOUNTER — HOSPITAL ENCOUNTER (OUTPATIENT)
Dept: INFUSION THERAPY | Age: 47
Discharge: HOME | End: 2022-01-13
Payer: MEDICAID

## 2022-01-13 VITALS
OXYGEN SATURATION: 100 % | RESPIRATION RATE: 18 BRPM | HEART RATE: 93 BPM | DIASTOLIC BLOOD PRESSURE: 78 MMHG | TEMPERATURE: 97 F | SYSTOLIC BLOOD PRESSURE: 103 MMHG

## 2022-01-13 VITALS
OXYGEN SATURATION: 96 % | HEART RATE: 110 BPM | SYSTOLIC BLOOD PRESSURE: 114 MMHG | BODY MASS INDEX: 42.35 KG/M2 | TEMPERATURE: 97.6 F | DIASTOLIC BLOOD PRESSURE: 81 MMHG | WEIGHT: 215.7 LBS | HEIGHT: 60 IN

## 2022-01-13 DIAGNOSIS — Z95.828 PORT-A-CATH IN PLACE: ICD-10-CM

## 2022-01-13 DIAGNOSIS — Z09 CHEMOTHERAPY FOLLOW-UP EXAMINATION: ICD-10-CM

## 2022-01-13 DIAGNOSIS — N95.9 PREMENOPAUSAL PATIENT: ICD-10-CM

## 2022-01-13 DIAGNOSIS — C50.911 MALIGNANT NEOPLASM OF RIGHT BREAST IN FEMALE, ESTROGEN RECEPTOR POSITIVE, UNSPECIFIED SITE OF BREAST (HCC): Primary | ICD-10-CM

## 2022-01-13 DIAGNOSIS — N63.10 BREAST MASS, RIGHT: ICD-10-CM

## 2022-01-13 DIAGNOSIS — Z17.0 MALIGNANT NEOPLASM OF RIGHT BREAST IN FEMALE, ESTROGEN RECEPTOR POSITIVE, UNSPECIFIED SITE OF BREAST (HCC): Primary | ICD-10-CM

## 2022-01-13 DIAGNOSIS — Z51.81 ENCOUNTER FOR MONITORING CARDIOTOXIC DRUG THERAPY: ICD-10-CM

## 2022-01-13 DIAGNOSIS — Z79.899 ENCOUNTER FOR MONITORING CARDIOTOXIC DRUG THERAPY: ICD-10-CM

## 2022-01-13 LAB
ALBUMIN SERPL-MCNC: 3.4 G/DL (ref 3.5–5)
ALBUMIN/GLOB SERPL: 0.9 {RATIO} (ref 1.1–2.2)
ALP SERPL-CCNC: 62 U/L (ref 45–117)
ALT SERPL-CCNC: 29 U/L (ref 12–78)
ANION GAP SERPL CALC-SCNC: 4 MMOL/L (ref 5–15)
AST SERPL-CCNC: 18 U/L (ref 15–37)
BASOPHILS # BLD: 0 K/UL (ref 0–0.1)
BASOPHILS NFR BLD: 0 % (ref 0–1)
BILIRUB SERPL-MCNC: 0.2 MG/DL (ref 0.2–1)
BUN SERPL-MCNC: 12 MG/DL (ref 6–20)
BUN/CREAT SERPL: 17 (ref 12–20)
CALCIUM SERPL-MCNC: 9.2 MG/DL (ref 8.5–10.1)
CHLORIDE SERPL-SCNC: 107 MMOL/L (ref 97–108)
CO2 SERPL-SCNC: 27 MMOL/L (ref 21–32)
CREAT SERPL-MCNC: 0.71 MG/DL (ref 0.55–1.02)
DIFFERENTIAL METHOD BLD: ABNORMAL
EOSINOPHIL # BLD: 0 K/UL (ref 0–0.4)
EOSINOPHIL NFR BLD: 0 % (ref 0–7)
ERYTHROCYTE [DISTWIDTH] IN BLOOD BY AUTOMATED COUNT: 20.7 % (ref 11.5–14.5)
GLOBULIN SER CALC-MCNC: 3.6 G/DL (ref 2–4)
GLUCOSE SERPL-MCNC: 152 MG/DL (ref 65–100)
HCT VFR BLD AUTO: 33.4 % (ref 35–47)
HGB BLD-MCNC: 10.4 G/DL (ref 11.5–16)
IMM GRANULOCYTES # BLD AUTO: 0.1 K/UL (ref 0–0.04)
IMM GRANULOCYTES NFR BLD AUTO: 1 % (ref 0–0.5)
LYMPHOCYTES # BLD: 1.4 K/UL (ref 0.8–3.5)
LYMPHOCYTES NFR BLD: 20 % (ref 12–49)
MCH RBC QN AUTO: 27.2 PG (ref 26–34)
MCHC RBC AUTO-ENTMCNC: 31.1 G/DL (ref 30–36.5)
MCV RBC AUTO: 87.2 FL (ref 80–99)
MONOCYTES # BLD: 0.4 K/UL (ref 0–1)
MONOCYTES NFR BLD: 6 % (ref 5–13)
NEUTS SEG # BLD: 5 K/UL (ref 1.8–8)
NEUTS SEG NFR BLD: 73 % (ref 32–75)
NRBC # BLD: 0 K/UL (ref 0–0.01)
NRBC BLD-RTO: 0 PER 100 WBC
PLATELET # BLD AUTO: 172 K/UL (ref 150–400)
PMV BLD AUTO: 9.9 FL (ref 8.9–12.9)
POTASSIUM SERPL-SCNC: 4 MMOL/L (ref 3.5–5.1)
PROT SERPL-MCNC: 7 G/DL (ref 6.4–8.2)
RBC # BLD AUTO: 3.83 M/UL (ref 3.8–5.2)
RBC MORPH BLD: ABNORMAL
SODIUM SERPL-SCNC: 138 MMOL/L (ref 136–145)
WBC # BLD AUTO: 6.9 K/UL (ref 3.6–11)

## 2022-01-13 PROCEDURE — 99214 OFFICE O/P EST MOD 30 MIN: CPT | Performed by: NURSE PRACTITIONER

## 2022-01-13 PROCEDURE — 80053 COMPREHEN METABOLIC PANEL: CPT

## 2022-01-13 PROCEDURE — 85025 COMPLETE CBC W/AUTO DIFF WBC: CPT

## 2022-01-13 PROCEDURE — 36415 COLL VENOUS BLD VENIPUNCTURE: CPT

## 2022-01-13 NOTE — PROGRESS NOTES
Cancer [place] [facility] [place] [address] [address], [address] [address], [facility]: [phone]  F: [phone]    Reason for Visit:   Gera Harman is a 40 y.o. female seen today in office for follow up if Right Breast Cancer    Treatment History:   · Initially had abnormal mammo at [facility] with calcifications on RIGHT. No mass reported/dense breast tissue  · RIGHT Breast Biopsy 9/9/21: PATH -  IDC, grade 3 ER/ID strong+ Her2 3+  · Genetic testing negative  · Breast MRI 9/17/21: RIGHT BREAST:Background parenchymal enhancement: Severe. There is a biopsy clip in the lower, outer quadrant of the right breast, junction of the middle and deep thirds. There is linear, branching enhancement which extends anterior to the biopsy clip over approximately 3.6 cm. There are no suspicious internal mammary or axillary chain lymph nodes. LEFT BREAST: Background parenchymal enhancement: Severe. There is no suspicious masslike or nonmasslike enhancement within the left breast to indicate breast carcinoma. There is a small mass in the lower, outer quadrant with an adjacent biopsy clip. There is an additional mass without significant enhancement in the lateral, middle 3rd, just below the nipple line. There are no suspicious internal mammary or axillary chain lymph nodes  · Neoadjuvant TCHP chemo 10/21/21 - Current     STAGE: Clinical 2 ER/ID+, HER2+    History of Present Illness:   Gera Harman is a 40 y.o. female seen today in office for follow up of right breast cancer ER+ Her2 + post biopsy only 9/9/21. She started neoadjuvant TCHP on 10/21/21. She is here today for pre chemo labs and OV and will have Cycle 5 of neoadjuvant TCHP chemotherapy tomorrow. She reports that she feels well overall today. She is tolerating chemo well overall thus far. Her appetite and energy levels are good. She denies fever, chills, mouth sores, cough, SOB, CP, nausea, vomiting, diarrhea, constipation, and neuropathy.  She denies pain today. CBC and CMP are still pending today. She is ready for treatment tomorrow. She is here alone today. Past Medical History:   Diagnosis Date    Bell's palsy     Breast cancer ([address] 2021    ER/ME+ Her2 +      Past Surgical History:   Procedure Laterality Date    HX  SECTION      HX  SECTION      HX CHOLECYSTECTOMY      HX CHOLECYSTECTOMY        Social History     Tobacco Use    Smoking status: Never Smoker    Smokeless tobacco: Never Used   Substance Use Topics    Alcohol use: Yes     Comment: rarely      Family History   Problem Relation Age of Onset    Cancer Sister         leukemia     Current Outpatient Medications   Medication Sig    CRANIAL PROSTHESIS misc Alopecia/ breast cancer chemo/ wig    ondansetron (ZOFRAN ODT) 4 mg disintegrating tablet Take 1-2 Tablets by mouth every eight (8) hours as needed for Nausea or Vomiting.  prochlorperazine (Compazine) 5 mg tablet Take 1 tab by mouth every 6 hours as needed for nausea or vomiting    lidocaine-prilocaine (EMLA) topical cream Apply  to affected area as needed for Pain. Apply to port-a-cath 30-60 minutes prior to access for chemo    dexAMETHasone (DECADRON) 4 mg tablet Take 2 tabs (8mg) by mouth twice daily the day before chemotherapy and the day after chemotherapy    levonorgestreL (Mirena) 20 mcg/24 hours (6 yrs) 52 mg IUD 1 Device by IntraUTERine route once. No current facility-administered medications for this visit. No Known Allergies     Review of Systems:  A complete review of systems was obtained, negative except as described above and as reported on ROS sheet scanned into system.      Physical Exam:     Visit Vitals  /81 (BP 1 Location: Left upper arm, BP Patient Position: Sitting)   Pulse (!) 110   Temp 97.6 °F (36.4 °C) (Temporal)   Ht 5' (1.524 m)   Wt 215 lb 11.2 oz (97.8 kg)   SpO2 96%   BMI 42.13 kg/m²     ECOG PS: 0  General: No distress  Eyes: Anicteric sclerae  HENT: Atraumatic, wearing a mask  Neck: Supple  CV: Regular   Respiratory: CTAB, normal respiratory effort  MS: Normal gait and station. Digits without clubbing or cyanosis. Skin: No rashes, ecchymoses, or petechiae. Normal temperature, turgor, and texture. Port site without redness/swelling  Psych: Alert, oriented, appropriate affect, normal judgment/insight  Neuro: Non focal    Results:     Lab Results   Component Value Date/Time    WBC 6.9 01/13/2022 10:03 AM    HGB 10.4 (L) 01/13/2022 10:03 AM    HCT 33.4 (L) 01/13/2022 10:03 AM    PLATELET 040 73/93/6089 10:03 AM    MCV 87.2 01/13/2022 10:03 AM    ABS. NEUTROPHILS 5.0 01/13/2022 10:03 AM     Lab Results   Component Value Date/Time    Sodium 138 01/13/2022 10:03 AM    Potassium 4.0 01/13/2022 10:03 AM    Chloride 107 01/13/2022 10:03 AM    CO2 27 01/13/2022 10:03 AM    Glucose 152 (H) 01/13/2022 10:03 AM    BUN 12 01/13/2022 10:03 AM    Creatinine 0.71 01/13/2022 10:03 AM    GFR est AA >60 01/13/2022 10:03 AM    GFR est non-AA >60 01/13/2022 10:03 AM    Calcium 9.2 01/13/2022 10:03 AM     Lab Results   Component Value Date/Time    Bilirubin, total 0.2 01/13/2022 10:03 AM    ALT (SGPT) 29 01/13/2022 10:03 AM    Alk. phosphatase 62 01/13/2022 10:03 AM    Protein, total 7.0 01/13/2022 10:03 AM    Albumin 3.4 (L) 01/13/2022 10:03 AM    Globulin 3.6 01/13/2022 10:03 AM     Path at outside facility - scanned into chart     MRI Results (most recent):  Results from [place] encounter on 09/17/21    MRI BREAST BI W WO CONT    Narrative  MRI BREAST BI W WO CONT, 9/17/2021 8:59 AM  INDICATION: Malignant neoplasm of unspecified site of right female breast / New  diagnosis of RIGHT breast cancer. HISTORY: Newly diagnosed invasive ductal carcinoma and ductal carcinoma in situ  in the right breast at \"11 o'clock. \" Fibroadenoma in the left breast at 3-4  o'clock. COMPARISON: Previous mammograms from 2020 and 2021  .   TECHNIQUE:  Bilateral breast MRI was performed using a dedicated breast coil without  compression with the patient in the prone position. Precontrast T1-weighted  images with fat suppression were obtained followed by bolus injection of  contrast. Postcontrast dynamic and high-resolution images were acquired. T2-weighted axial imaging with fat suppression was also performed. The images  were analyzed using software for kinetic analysis, enhancement curves, digital  subtraction, and reconstructions. Vearl Pippins RIGHT BREAST:  Background parenchymal enhancement: Severe  There is a biopsy clip in the lower, outer quadrant of the right breast,  junction of the middle and deep thirds. There is linear, branching enhancement  which extends anterior to the biopsy clip over approximately 3.6 cm    There are no suspicious internal mammary or axillary chain lymph nodes. Vearl Pippins LEFT BREAST:  Background parenchymal enhancement: Severe  There is no suspicious masslike or nonmasslike enhancement within the left  breast to indicate breast carcinoma. There is a small mass in the lower, outer  quadrant with an adjacent biopsy clip. There is an additional mass without  significant enhancement in the lateral, middle 3rd, just below the nipple line. There are no suspicious internal mammary or axillary chain lymph nodes. .    Impression  RIGHT BREAST:  1. There is a biopsy clip in the lower, outer quadrant of the right breast,  junction of the middle and deep thirds. There is linear, branching enhancement  which extends anteriorly from the biopsy clip over approximately 3.6 cm. BI-RADS Category 6 - Known biopsy-proven malignancy. Vearl Pippins LEFT BREAST:  1. BI-RADS Category 2 - Benign findings. 10/13/21    ECHO ADULT COMPLETE 10/13/2021 10/13/2021    Interpretation Summary  · LV: Estimated LVEF is 60 - 65%. Normal cavity size, wall thickness, systolic function (ejection fraction normal) and diastolic function. Normal left ventricular strain.     Signed by: Cheryl Garza MD on 10/13/2021  3:52 PM    01/05/22    ECHO ADULT COMPLETE 01/05/2022 1/5/2022    Interpretation Summary    Left Ventricle: Left ventricle size is normal. Normal wall thickness. Normal wall motion. Normal left ventricular systolic function with a visually estimated EF of 55 - 60%. Global longitudinal strain is -18.0%. Normal diastolic function. Signed by: Tyler Villagran MD on 1/5/2022 12:52 PM    Records reviewed and summarized above. Pathology report(s) reviewed above. Radiology report(s) reviewed above. Assessment:/PLAN     1) Clinical Stage 2 (based on MRI) RIGHT Breast Cancer ER+ Her2+ post Breast Biopsy only at [address] 9/9/21   Breast MRI 9/17/21 with 3.6 cm enhancement. No mass palpable on exam.   Patient was sent here for neoadjuvant chemo based on size of mass on MRI. Patient prefers lumpectomy, d/w breast surgery. She is here today for pre chemo labs and OV and will have Cycle 5 of neoadjuvant TCHP chemotherapy tomorrow. She had a follow up ECHO on 1/5/22 and EF 55-60%. She is tolerating chemo well overall thus far with only mild diarrhea. She is taking Imodium as needed for diarrhea and this helps. Discussed side effect management today. She is clinically stable and healthy overall. Labs (CBC and CMP) reviewed today. Patient is ready for chemo tomorrow. She saw Breast Surgery on 1/10/22. Breast MRI is scheduled for 1/27/22. Follow up in 3 weeks with Cycle 6. Patient agrees with plan. 2) Premenopausal  She has two kids and does not want more kids. Reviewed impaired fertility/early menopause due to chemo with patient. She has Mirena IUD in place. Continue routine GYN follow up. 3) Management of High Risk Medications - Chemotherapy  No toxicities notable noted. Pre chemo ECHO from 10/13/21 reviewed. Follow up ECHO from 1/5/22 reviewed. Labs (CBC and CMP) reviewed today. No dose adjustments needed tomorrow. Will monitor for side effects. 4) Psychosocial  She is vaccinated for COVID. She works as a , has a supportive fiance. SW/NN support as needed. Her fiance is here today. Call if questions. Follow up in 3 weeks with Cycle 6. I personally saw and evaluated the patient and performed the key components of medical decision making. The history, physical exam, and documentation were performed by Socrates Ryan NP. I reviewed and verified the above documentation and modified it as needed. Specifically pt doing well overall  Tolerating chemo with manageable side effects  Continue supportive care and proceed with chemo as ordered. Continue lab/ ECHO monitoring. Seeing surgery and for breast MRI    I appreciate the opportunity to participate in Ms. Susan Rob's care.     Signed By: Temitope Lam,

## 2022-01-13 NOTE — PROGRESS NOTES
Laureano Schmidt is a 40 y.o. female  Chief Complaint   Patient presents with    Chemotherapy    Breast Cancer     1. Have you been to the ER, urgent care clinic since your last visit? Hospitalized since your last visit? No.    2. Have you seen or consulted any other health care providers outside of the [address] [place] [address] since your last visit? Include any pap smears or colon screening.  No.

## 2022-01-14 ENCOUNTER — HOSPITAL ENCOUNTER (OUTPATIENT)
Dept: INFUSION THERAPY | Age: 47
Discharge: HOME | End: 2022-01-14
Payer: MEDICAID

## 2022-01-14 VITALS
HEART RATE: 106 BPM | WEIGHT: 213 LBS | HEIGHT: 60 IN | DIASTOLIC BLOOD PRESSURE: 85 MMHG | TEMPERATURE: 97 F | BODY MASS INDEX: 41.82 KG/M2 | RESPIRATION RATE: 16 BRPM | SYSTOLIC BLOOD PRESSURE: 117 MMHG

## 2022-01-14 DIAGNOSIS — C50.911 MALIGNANT NEOPLASM OF RIGHT BREAST IN FEMALE, ESTROGEN RECEPTOR POSITIVE, UNSPECIFIED SITE OF BREAST (HCC): Primary | ICD-10-CM

## 2022-01-14 DIAGNOSIS — Z17.0 MALIGNANT NEOPLASM OF RIGHT BREAST IN FEMALE, ESTROGEN RECEPTOR POSITIVE, UNSPECIFIED SITE OF BREAST (HCC): Primary | ICD-10-CM

## 2022-01-14 PROCEDURE — 74011000250 HC RX REV CODE- 250: Performed by: NURSE PRACTITIONER

## 2022-01-14 PROCEDURE — 74011250636 HC RX REV CODE- 250/636: Performed by: NURSE PRACTITIONER

## 2022-01-14 PROCEDURE — 96413 CHEMO IV INFUSION 1 HR: CPT

## 2022-01-14 PROCEDURE — 96377 APPLICATON ON-BODY INJECTOR: CPT

## 2022-01-14 PROCEDURE — 77030012965 HC NDL HUBR BBMI -A

## 2022-01-14 PROCEDURE — 96375 TX/PRO/DX INJ NEW DRUG ADDON: CPT

## 2022-01-14 PROCEDURE — 96417 CHEMO IV INFUS EACH ADDL SEQ: CPT

## 2022-01-14 PROCEDURE — 96367 TX/PROPH/DG ADDL SEQ IV INF: CPT

## 2022-01-14 PROCEDURE — 96402 CHEMO HORMON ANTINEOPL SQ/IM: CPT

## 2022-01-14 PROCEDURE — 74011000258 HC RX REV CODE- 258: Performed by: NURSE PRACTITIONER

## 2022-01-14 RX ORDER — DIPHENHYDRAMINE HYDROCHLORIDE 50 MG/ML
50 INJECTION, SOLUTION INTRAMUSCULAR; INTRAVENOUS AS NEEDED
Status: ACTIVE | OUTPATIENT
Start: 2022-01-14 | End: 2022-01-14

## 2022-01-14 RX ORDER — SODIUM CHLORIDE 9 MG/ML
25 INJECTION, SOLUTION INTRAVENOUS CONTINUOUS
Status: DISPENSED | OUTPATIENT
Start: 2022-01-14 | End: 2022-01-14

## 2022-01-14 RX ORDER — SODIUM CHLORIDE 9 MG/ML
10 INJECTION INTRAMUSCULAR; INTRAVENOUS; SUBCUTANEOUS AS NEEDED
Status: ACTIVE | OUTPATIENT
Start: 2022-01-14 | End: 2022-01-14

## 2022-01-14 RX ORDER — HYDROCORTISONE SODIUM SUCCINATE 100 MG/2ML
100 INJECTION, POWDER, FOR SOLUTION INTRAMUSCULAR; INTRAVENOUS AS NEEDED
Status: ACTIVE | OUTPATIENT
Start: 2022-01-14 | End: 2022-01-14

## 2022-01-14 RX ORDER — DEXAMETHASONE SODIUM PHOSPHATE 10 MG/ML
10 INJECTION INTRAMUSCULAR; INTRAVENOUS ONCE
Status: COMPLETED | OUTPATIENT
Start: 2022-01-14 | End: 2022-01-14

## 2022-01-14 RX ORDER — ONDANSETRON 2 MG/ML
8 INJECTION INTRAMUSCULAR; INTRAVENOUS AS NEEDED
Status: ACTIVE | OUTPATIENT
Start: 2022-01-14 | End: 2022-01-14

## 2022-01-14 RX ORDER — HEPARIN 100 UNIT/ML
300-500 SYRINGE INTRAVENOUS AS NEEDED
Status: ACTIVE | OUTPATIENT
Start: 2022-01-14 | End: 2022-01-14

## 2022-01-14 RX ORDER — SODIUM CHLORIDE 0.9 % (FLUSH) 0.9 %
10 SYRINGE (ML) INJECTION AS NEEDED
Status: DISPENSED | OUTPATIENT
Start: 2022-01-14 | End: 2022-01-14

## 2022-01-14 RX ORDER — DIPHENHYDRAMINE HYDROCHLORIDE 50 MG/ML
25 INJECTION, SOLUTION INTRAMUSCULAR; INTRAVENOUS AS NEEDED
Status: ACTIVE | OUTPATIENT
Start: 2022-01-14 | End: 2022-01-14

## 2022-01-14 RX ORDER — PALONOSETRON 0.05 MG/ML
0.25 INJECTION, SOLUTION INTRAVENOUS ONCE
Status: COMPLETED | OUTPATIENT
Start: 2022-01-14 | End: 2022-01-14

## 2022-01-14 RX ORDER — ACETAMINOPHEN 325 MG/1
650 TABLET ORAL AS NEEDED
Status: ACTIVE | OUTPATIENT
Start: 2022-01-14 | End: 2022-01-14

## 2022-01-14 RX ORDER — ALBUTEROL SULFATE 0.83 MG/ML
2.5 SOLUTION RESPIRATORY (INHALATION) AS NEEDED
Status: ACTIVE | OUTPATIENT
Start: 2022-01-14 | End: 2022-01-14

## 2022-01-14 RX ORDER — EPINEPHRINE 1 MG/ML
0.3 INJECTION, SOLUTION, CONCENTRATE INTRAVENOUS AS NEEDED
Status: ACTIVE | OUTPATIENT
Start: 2022-01-14 | End: 2022-01-14

## 2022-01-14 RX ADMIN — DOCETAXEL ANHYDROUS 155 MG: 10 INJECTION, SOLUTION INTRAVENOUS at 13:00

## 2022-01-14 RX ADMIN — PEGFILGRASTIM 6 MG: KIT SUBCUTANEOUS at 14:50

## 2022-01-14 RX ADMIN — SODIUM CHLORIDE 150 MG: 900 INJECTION, SOLUTION INTRAVENOUS at 12:30

## 2022-01-14 RX ADMIN — PALONOSETRON 0.25 MG: 0.05 INJECTION, SOLUTION INTRAVENOUS at 12:25

## 2022-01-14 RX ADMIN — CARBOPLATIN 900 MG: 450 INJECTION, SOLUTION INTRAVENOUS at 14:12

## 2022-01-14 RX ADMIN — PERTUZUMAB, TRASTUZUMAB, AND HYALURONIDASE-ZZXF 10 ML: 600; 600; 2000 INJECTION, SOLUTION SUBCUTANEOUS at 12:10

## 2022-01-14 RX ADMIN — SODIUM CHLORIDE, PRESERVATIVE FREE 10 ML: 5 INJECTION INTRAVENOUS at 10:05

## 2022-01-14 RX ADMIN — HEPARIN 500 UNITS: 100 SYRINGE at 14:51

## 2022-01-14 RX ADMIN — DEXAMETHASONE SODIUM PHOSPHATE 10 MG: 10 INJECTION, SOLUTION INTRAMUSCULAR; INTRAVENOUS at 12:28

## 2022-01-14 RX ADMIN — SODIUM CHLORIDE 25 ML/HR: 9 INJECTION, SOLUTION INTRAVENOUS at 12:20

## 2022-01-14 NOTE — PROGRESS NOTES
[facility] - Chemotherapy Progress Note    [id number] Pt admit to [facility] for TCHP/C5 ambulatory in stable condition accompanied by family. Assessment completed. No new concerns voiced. Port accessed with positive blood return. Labs drawn prior to today's visit (1/13/22). Line flushed, clamped, Curos Cap applied to end clave. Awaiting meds to arrive. Patient denies SOB, fever, cough, general not feeling well. Patient denies recent exposure to someone who has tested positive for COVID-19.  Patient  denies having contact with anyone who has a pending COVID test.     Visit Vitals  /85   Pulse (!) 106   Temp 97 °F (36.1 °C)   Resp 16   Ht 5' (1.524 m)   Wt 96.6 kg (213 lb)   Breastfeeding No   BMI 41.60 kg/m²     Medications Administered     0.9% sodium chloride infusion     Admin Date  01/14/2022 Action  New Bag Dose  25 mL/hr Rate  25 mL/hr Route  IntraVENous Administered By  Kimani Duran RN          0.9% sodium chloride injection 10 mL     Admin Date  01/14/2022 Action  Given Dose  10 mL Route  IntraVENous Administered By  Kimani Duran RN          CARBOplatin (PARAPLATIN) 900 mg in 0.9% sodium chloride 250 mL, overfill volume 25 mL chemo infusion     Admin Date  01/14/2022 Action  New Bag Dose  900 mg Rate  730 mL/hr Route  IntraVENous Administered By  Kimani Duran RN          dexamethasone (PF) (DECADRON) 10 mg/mL injection 10 mg     Admin Date  01/14/2022 Action  Given Dose  10 mg Route  IntraVENous Administered By  Kimani Duran RN          DOCEtaxeL (TAXOTERE) 155 mg in 0.9% sodium chloride 250 mL, overfill volume 25 mL chemo infusion     Admin Date  01/14/2022 Action  New Bag Dose  155 mg Route  IntraVENous Administered By  Kimani Duran RN          fosaprepitant (EMEND) 150 mg in 0.9% sodium chloride 150 mL IVPB     Admin Date  01/14/2022 Action  New Bag Dose  150 mg Rate  450 mL/hr Route  IntraVENous Administered By  Kimani Duran RN          heparin (porcine) pf 300-500 Units     Admin Date  01/14/2022 Action  Given Dose  500 Units Route  InterCATHeter Administered By  Mickey Olmstead RN          palonosetron HCl (ALOXI) injection 0.25 mg     Admin Date  01/14/2022 Action  Given Dose  0.25 mg Route  IntraVENous Administered By  Mickey Olmstead RN          pegfilgrastim (NEULASTA) wearable SQ injector 6 mg     Admin Date  01/14/2022 Action  Given Dose  6 mg Route  SubCUTAneous Administered By  Mickey Olmstead RN          pertuzumab 600 mg-trastuzumab 600 mg-hyaluronidase-zzxf 20,000 units/10 mL     Admin Date  01/14/2022 Action  Given Dose  10 mL Route  SubCUTAneous Administered By  Mickey Olmstead RN          sodium chloride (NS) flush 10 mL     Admin Date  01/14/2022 Action  Given Dose  10 mL Route  IntraVENous Administered By  Mickey Olmstead RN              [place], SC L thigh; OBI, SC L arm      1500 Pt tolerated treatment well. Port maintained positive blood return throughout treatment, flushed with positive blood return at conclusion, heparinized and de-accessed. Discussed when to remove OBI device; verbalized understanding. D/c home ambulatory in no distress. Pt aware of next [facility] appointment scheduled for 02/03/2022.     Please see labs in CC from 01/13/22

## 2022-01-27 ENCOUNTER — HOSPITAL ENCOUNTER (OUTPATIENT)
Dept: MRI IMAGING | Age: 47
Discharge: HOME | End: 2022-01-27
Attending: SURGERY
Payer: MEDICAID

## 2022-01-27 DIAGNOSIS — C50.411 MALIGNANT NEOPLASM OF UPPER-OUTER QUADRANT OF RIGHT FEMALE BREAST, UNSPECIFIED ESTROGEN RECEPTOR STATUS (HCC): ICD-10-CM

## 2022-01-27 PROCEDURE — 74011250636 HC RX REV CODE- 250/636

## 2022-01-27 PROCEDURE — 74011000258 HC RX REV CODE- 258: Performed by: SURGERY

## 2022-01-27 PROCEDURE — 77049 MRI BREAST C-+ W/CAD BI: CPT

## 2022-01-27 PROCEDURE — 74011000250 HC RX REV CODE- 250: Performed by: SURGERY

## 2022-01-27 PROCEDURE — A9576 INJ PROHANCE MULTIPACK: HCPCS

## 2022-01-27 RX ORDER — SODIUM CHLORIDE 0.9 % (FLUSH) 0.9 %
10 SYRINGE (ML) INJECTION ONCE
Status: COMPLETED | OUTPATIENT
Start: 2022-01-27 | End: 2022-01-27

## 2022-01-27 RX ADMIN — GADOTERIDOL 20 ML: 279.3 INJECTION, SOLUTION INTRAVENOUS at 09:30

## 2022-01-27 RX ADMIN — SODIUM CHLORIDE, PRESERVATIVE FREE 10 ML: 5 INJECTION INTRAVENOUS at 09:32

## 2022-01-27 RX ADMIN — SODIUM CHLORIDE 100 ML: 900 INJECTION, SOLUTION INTRAVENOUS at 09:32

## 2022-02-02 ENCOUNTER — APPOINTMENT (OUTPATIENT)
Dept: INFUSION THERAPY | Age: 47
End: 2022-02-02

## 2022-02-03 ENCOUNTER — APPOINTMENT (OUTPATIENT)
Dept: INFUSION THERAPY | Age: 47
End: 2022-02-03

## 2022-02-03 ENCOUNTER — OFFICE VISIT (OUTPATIENT)
Dept: ONCOLOGY | Age: 47
End: 2022-02-03
Payer: MEDICAID

## 2022-02-03 ENCOUNTER — HOSPITAL ENCOUNTER (OUTPATIENT)
Dept: INFUSION THERAPY | Age: 47
Discharge: HOME | End: 2022-02-03
Payer: MEDICAID

## 2022-02-03 VITALS
DIASTOLIC BLOOD PRESSURE: 76 MMHG | TEMPERATURE: 96.4 F | SYSTOLIC BLOOD PRESSURE: 116 MMHG | HEIGHT: 60 IN | HEART RATE: 84 BPM | OXYGEN SATURATION: 97 % | BODY MASS INDEX: 41.78 KG/M2 | WEIGHT: 212.8 LBS

## 2022-02-03 VITALS
HEART RATE: 77 BPM | TEMPERATURE: 97.3 F | SYSTOLIC BLOOD PRESSURE: 111 MMHG | DIASTOLIC BLOOD PRESSURE: 66 MMHG | OXYGEN SATURATION: 99 %

## 2022-02-03 DIAGNOSIS — C50.911 MALIGNANT NEOPLASM OF RIGHT BREAST IN FEMALE, ESTROGEN RECEPTOR POSITIVE, UNSPECIFIED SITE OF BREAST (HCC): Primary | ICD-10-CM

## 2022-02-03 DIAGNOSIS — N95.9 PREMENOPAUSAL PATIENT: ICD-10-CM

## 2022-02-03 DIAGNOSIS — Z09 CHEMOTHERAPY FOLLOW-UP EXAMINATION: ICD-10-CM

## 2022-02-03 DIAGNOSIS — Z51.81 ENCOUNTER FOR MONITORING CARDIOTOXIC DRUG THERAPY: ICD-10-CM

## 2022-02-03 DIAGNOSIS — Z17.0 MALIGNANT NEOPLASM OF RIGHT BREAST IN FEMALE, ESTROGEN RECEPTOR POSITIVE, UNSPECIFIED SITE OF BREAST (HCC): Primary | ICD-10-CM

## 2022-02-03 DIAGNOSIS — Z79.899 ENCOUNTER FOR MONITORING CARDIOTOXIC DRUG THERAPY: ICD-10-CM

## 2022-02-03 DIAGNOSIS — N63.10 BREAST MASS, RIGHT: ICD-10-CM

## 2022-02-03 DIAGNOSIS — Z95.828 PORT-A-CATH IN PLACE: ICD-10-CM

## 2022-02-03 LAB
ALBUMIN SERPL-MCNC: 3.1 G/DL (ref 3.5–5)
ALBUMIN/GLOB SERPL: 0.8 {RATIO} (ref 1.1–2.2)
ALP SERPL-CCNC: 66 U/L (ref 45–117)
ALT SERPL-CCNC: 24 U/L (ref 12–78)
ANION GAP SERPL CALC-SCNC: 4 MMOL/L (ref 5–15)
AST SERPL-CCNC: 11 U/L (ref 15–37)
BASOPHILS # BLD: 0 K/UL (ref 0–0.1)
BASOPHILS NFR BLD: 1 % (ref 0–1)
BILIRUB SERPL-MCNC: 0.2 MG/DL (ref 0.2–1)
BUN SERPL-MCNC: 12 MG/DL (ref 6–20)
BUN/CREAT SERPL: 17 (ref 12–20)
CALCIUM SERPL-MCNC: 9.1 MG/DL (ref 8.5–10.1)
CHLORIDE SERPL-SCNC: 108 MMOL/L (ref 97–108)
CO2 SERPL-SCNC: 26 MMOL/L (ref 21–32)
CREAT SERPL-MCNC: 0.69 MG/DL (ref 0.55–1.02)
DIFFERENTIAL METHOD BLD: ABNORMAL
EOSINOPHIL # BLD: 0 K/UL (ref 0–0.4)
EOSINOPHIL NFR BLD: 0 % (ref 0–7)
ERYTHROCYTE [DISTWIDTH] IN BLOOD BY AUTOMATED COUNT: 18.6 % (ref 11.5–14.5)
GLOBULIN SER CALC-MCNC: 3.7 G/DL (ref 2–4)
GLUCOSE SERPL-MCNC: 173 MG/DL (ref 65–100)
HCT VFR BLD AUTO: 32.3 % (ref 35–47)
HGB BLD-MCNC: 9.9 G/DL (ref 11.5–16)
IMM GRANULOCYTES # BLD AUTO: 0 K/UL (ref 0–0.04)
IMM GRANULOCYTES NFR BLD AUTO: 0 % (ref 0–0.5)
LYMPHOCYTES # BLD: 1.3 K/UL (ref 0.8–3.5)
LYMPHOCYTES NFR BLD: 26 % (ref 12–49)
MCH RBC QN AUTO: 27.8 PG (ref 26–34)
MCHC RBC AUTO-ENTMCNC: 30.7 G/DL (ref 30–36.5)
MCV RBC AUTO: 90.7 FL (ref 80–99)
MONOCYTES # BLD: 0.4 K/UL (ref 0–1)
MONOCYTES NFR BLD: 8 % (ref 5–13)
NEUTS SEG # BLD: 3.3 K/UL (ref 1.8–8)
NEUTS SEG NFR BLD: 65 % (ref 32–75)
NRBC # BLD: 0 K/UL (ref 0–0.01)
NRBC BLD-RTO: 0 PER 100 WBC
PLATELET # BLD AUTO: 152 K/UL (ref 150–400)
PMV BLD AUTO: 10.1 FL (ref 8.9–12.9)
POTASSIUM SERPL-SCNC: 3.8 MMOL/L (ref 3.5–5.1)
PROT SERPL-MCNC: 6.8 G/DL (ref 6.4–8.2)
RBC # BLD AUTO: 3.56 M/UL (ref 3.8–5.2)
SODIUM SERPL-SCNC: 138 MMOL/L (ref 136–145)
WBC # BLD AUTO: 5.1 K/UL (ref 3.6–11)

## 2022-02-03 PROCEDURE — 80053 COMPREHEN METABOLIC PANEL: CPT

## 2022-02-03 PROCEDURE — 85025 COMPLETE CBC W/AUTO DIFF WBC: CPT

## 2022-02-03 PROCEDURE — 99215 OFFICE O/P EST HI 40 MIN: CPT | Performed by: INTERNAL MEDICINE

## 2022-02-03 PROCEDURE — 36415 COLL VENOUS BLD VENIPUNCTURE: CPT

## 2022-02-03 NOTE — PROGRESS NOTES
[facility] [place] [facility] [place] [address] [address], [address], [place]: [phone]  F: [phone]    Reason for Visit:   Evan Sands is a 40 y.o. female seen today in office for follow up if Right Breast Cancer    Treatment History:   · Initially had abnormal mammo at [facility] with calcifications on RIGHT. No mass reported/dense breast tissue  · RIGHT Breast Biopsy 9/9/21: PATH -  IDC, grade 3 ER/SD strong+ Her2 3+  · Genetic testing negative  · Breast MRI 9/17/21: RIGHT BREAST:Background parenchymal enhancement: Severe. There is a biopsy clip in the lower, outer quadrant of the right breast, junction of the middle and deep thirds. There is linear, branching enhancement which extends anterior to the biopsy clip over approximately 3.6 cm. There are no suspicious internal mammary or axillary chain lymph nodes. LEFT BREAST: Background parenchymal enhancement: Severe. There is no suspicious masslike or nonmasslike enhancement within the left breast to indicate breast carcinoma. There is a small mass in the lower, outer quadrant with an adjacent biopsy clip. There is an additional mass without significant enhancement in the lateral, middle 3rd, just below the nipple line. There are no suspicious internal mammary or axillary chain lymph nodes  · Neoadjuvant TCHP chemo 10/21/21 - Current   · Breast MRI 1/27/22: Right Breast: In the right lateral breast at 8-9 o'clock posterior depth 10 cm from the nipple there is susceptibility artifact from the biopsy clip but no residual enhancement. There is no suspicious axillary or internal mammary chain lymphadenopathy. Left Breast: No suspicious enhancement. There is no suspicious axillary or internal mammary chain lymphadenopathy.     STAGE: Clinical 2 ER/SD+, HER2+    History of Present Illness:   Evan Sands is a 40 y.o. female seen today in office for follow up of right breast cancer ER+ Her2 + post biopsy only 9/9/21.  She started neoadjuvant TCHP on 10/21/21. She had a Breast MRI on 22 that showed no residual mass. She is here today for pre chemo labs and OV and will have Cycle 6 of neoadjuvant TCHP chemotherapy tomorrow. She reports that she feels well overall today. She is tolerating chemo well overall thus far. After last chemo, she had one episode of vomiting. She took anti-emetics which helped. Her appetite and energy levels are good. She denies fever, chills, mouth sores, cough, SOB, CP, nausea, diarrhea, constipation, and neuropathy. She denies pain today. CBC and CMP are still pending today. She is tentatively scheduled for surgery with Dr. Seema Bates on 3/15/22. She is ready for treatment tomorrow. She is here alone today. Past Medical History:   Diagnosis Date    Bell's palsy     Breast cancer ([facility] [address] 2021    ER/[place]+ Her2 +      Past Surgical History:   Procedure Laterality Date    HX  SECTION      HX  SECTION      HX CHOLECYSTECTOMY      HX CHOLECYSTECTOMY        Social History     Tobacco Use    Smoking status: Never Smoker    Smokeless tobacco: Never Used   Substance Use Topics    Alcohol use: Yes     Comment: rarely      Family History   Problem Relation Age of Onset    Cancer Sister         leukemia     Current Outpatient Medications   Medication Sig    CRANIAL PROSTHESIS misc Alopecia/ breast cancer chemo/ wig    ondansetron (ZOFRAN ODT) 4 mg disintegrating tablet Take 1-2 Tablets by mouth every eight (8) hours as needed for Nausea or Vomiting.  prochlorperazine (Compazine) 5 mg tablet Take 1 tab by mouth every 6 hours as needed for nausea or vomiting    lidocaine-prilocaine (EMLA) topical cream Apply  to affected area as needed for Pain.  Apply to port-a-cath 30-60 minutes prior to access for chemo    dexAMETHasone (DECADRON) 4 mg tablet Take 2 tabs (8mg) by mouth twice daily the day before chemotherapy and the day after chemotherapy    levonorgestreL (Mirena) 20 mcg/24 hours (6 yrs) 52 mg IUD 1 Device by IntraUTERine route once. No current facility-administered medications for this visit. No Known Allergies     Review of Systems:  A complete review of systems was obtained, negative except as described above and as reported on ROS sheet scanned into system. Physical Exam:     Visit Vitals  /76 (BP 1 Location: Left upper arm, BP Patient Position: Sitting)   Pulse 84   Temp (!) 96.4 °F (35.8 °C) (Temporal)   Ht 5' (1.524 m)   Wt 212 lb 12.8 oz (96.5 kg)   SpO2 97%   BMI 41.56 kg/m²     ECOG PS: 0  General: No distress  Eyes: Anicteric sclerae  HENT: Atraumatic, wearing a mask  Neck: Supple  CV: Regular   Respiratory: CTAB, normal respiratory effort  MS: Normal gait and station. Digits without clubbing or cyanosis. Skin: No rashes, ecchymoses, or petechiae. Normal temperature, turgor, and texture. Port site without redness/swelling  Psych: Alert, oriented, appropriate affect, normal judgment/insight  Neuro: Non focal    Results:     Lab Results   Component Value Date/Time    WBC 5.1 02/03/2022 10:09 AM    HGB 9.9 (L) 02/03/2022 10:09 AM    HCT 32.3 (L) 02/03/2022 10:09 AM    PLATELET 736 85/83/7991 10:09 AM    MCV 90.7 02/03/2022 10:09 AM    ABS. NEUTROPHILS 3.3 02/03/2022 10:09 AM     Lab Results   Component Value Date/Time    Sodium 138 02/03/2022 10:09 AM    Potassium 3.8 02/03/2022 10:09 AM    Chloride 108 02/03/2022 10:09 AM    CO2 26 02/03/2022 10:09 AM    Glucose 173 (H) 02/03/2022 10:09 AM    BUN 12 02/03/2022 10:09 AM    Creatinine 0.69 02/03/2022 10:09 AM    GFR est AA >60 02/03/2022 10:09 AM    GFR est non-AA >60 02/03/2022 10:09 AM    Calcium 9.1 02/03/2022 10:09 AM     Lab Results   Component Value Date/Time    Bilirubin, total 0.2 02/03/2022 10:09 AM    ALT (SGPT) 24 02/03/2022 10:09 AM    Alk.  phosphatase 66 02/03/2022 10:09 AM    Protein, total 6.8 02/03/2022 10:09 AM    Albumin 3.1 (L) 02/03/2022 10:09 AM    Globulin 3.7 02/03/2022 10:09 AM     Path at outside facility - scanned into chart     Breast MRI 9/17/21  RIGHT BREAST:  Background parenchymal enhancement: Severe   There is a biopsy clip in the lower, outer quadrant of the right breast,  junction of the middle and deep thirds. There is linear, branching enhancement  which extends anterior to the biopsy clip over approximately 3.6 cm     There are no suspicious internal mammary or axillary chain lymph nodes. Babita Marinelli LEFT BREAST:  Background parenchymal enhancement: Severe  There is no suspicious masslike or nonmasslike enhancement within the left  breast to indicate breast carcinoma. There is a small mass in the lower, outer  quadrant with an adjacent biopsy clip. There is an additional mass without  significant enhancement in the lateral, middle 3rd, just below the nipple line.     There are no suspicious internal mammary or axillary chain lymph nodes. .  IMPRESSION  RIGHT BREAST:  1. There is a biopsy clip in the lower, outer quadrant of the right breast,  junction of the middle and deep thirds. There is linear, branching enhancement  which extends anteriorly from the biopsy clip over approximately 3.6 cm. BI-RADS Category 6 - Known biopsy-proven malignancy. Breast MRI 1/27/22  FINDINGS:  There is marked background parenchymal enhancement. The breast tissue is heterogeneously dense, which could obscure detection of  small masses (approximately 51-75% glandular).       Right breast:  In the right lateral breast at 8-9 o'clock posterior depth 10 cm from the nipple  there is susceptibility artifact from the biopsy clip but no residual  enhancement.     There is no suspicious axillary or internal mammary chain lymphadenopathy.     Left breast:  No suspicious enhancement.     There is no suspicious axillary or internal mammary chain lymphadenopathy.     IMPRESSION     Right Breast:  1. BI-RADS Assessment Category 6: Known biopsy proven malignancy- Appropriate  action should be taken.  Right breast biopsy clip with no residual suspicious  enhancement.     Left Breast:  1. BI-RADS Assessment     10/13/21    ECHO ADULT COMPLETE 10/13/2021 10/13/2021    Interpretation Summary  · LV: Estimated LVEF is 60 - 65%. Normal cavity size, wall thickness, systolic function (ejection fraction normal) and diastolic function. Normal left ventricular strain. Signed by: Ann-Marie Dangelo MD on 10/13/2021  3:52 PM    01/05/22    ECHO ADULT COMPLETE 01/05/2022 1/5/2022    Interpretation Summary    Left Ventricle: Left ventricle size is normal. Normal wall thickness. Normal wall motion. Normal left ventricular systolic function with a visually estimated EF of 55 - 60%. Global longitudinal strain is -18.0%. Normal diastolic function. Signed by: Ana Rosa Salinas MD on 1/5/2022 12:52 PM    Records reviewed and summarized above. Pathology report(s) reviewed above. Radiology report(s) reviewed above. Assessment:/PLAN     1) Clinical Stage 2 (based on MRI) RIGHT Breast Cancer ER+ Her2+ post Breast Biopsy only at [facility] 9/9/21   Breast MRI 9/17/21 with 3.6 cm enhancement. No mass palpable on exam.   Patient was sent here for neoadjuvant chemo based on size of mass on MRI. Patient prefers lumpectomy, d/w breast surgery. She had a follow up ECHO on 1/5/22 and EF 55-60%. She is here today for pre chemo labs and OV and will have Cycle 6 of neoadjuvant TCHP chemotherapy tomorrow. She saw Breast Surgery on 1/10/22. She had a Breast MRI on 1/27/22 that showed no residual disease. She is tolerating chemo well overall thus - no significant toxicities. She is clinically stable and healthy overall. Labs (CBC and CMP) reviewed today. Patient is ready for chemo tomorrow. Follow up in 3 weeks with Cycle 7 (HP). Surgery is scheduled for 3/15/22. Patient agrees with plan. 2) Premenopausal  She has two kids and does not want more kids. Reviewed impaired fertility/early menopause due to chemo with patient.   She has Mirena IUD in place. Continue routine GYN follow up. 3) Management of High Risk Medications - Chemotherapy  No toxicities noted. Pre chemo ECHO from 10/13/21 reviewed. Follow up ECHO from 1/5/22 reviewed. Labs (CBC and CMP) reviewed today. No dose adjustments needed tomorrow. Will monitor for side effects. 4) Psychosocial  She is vaccinated for COVID. She works as a , has a supportive fiance. SW/NN support as needed. Her fiance is here today. Call if questions. Follow up in 3 weeks with Cycle 7 (HP). I personally saw and evaluated the patient and performed the key components of medical decision making. The history, physical exam, and documentation were performed by Aldair Quispe NP. I reviewed and verified the above documentation and modified it as needed. Specifically pt doing well overall  Tolerating chemo with manageable side effects  Good response on MRI  Labs personally reviewed today  proceed with chemo as ordered. Continue lab/ ECHO monitoring. Seeing surgery and set up for 3/22    I appreciate the opportunity to participate in Ms. Susan Rob's care.     Signed By: Gary Ayala,

## 2022-02-03 NOTE — PROGRESS NOTES
James Hammer is a 40 y.o. female  Chief Complaint   Patient presents with    Chemotherapy    Breast Cancer     1. Have you been to the ER, urgent care clinic since your last visit? Hospitalized since your last visit? No.  2. Have you seen or consulted any other health care providers outside of the [address] [place] [address] since your last visit? Include any pap smears or colon screening.  No.

## 2022-02-04 ENCOUNTER — HOSPITAL ENCOUNTER (OUTPATIENT)
Dept: INFUSION THERAPY | Age: 47
Discharge: HOME | End: 2022-02-04
Payer: MEDICAID

## 2022-02-04 VITALS
TEMPERATURE: 97 F | SYSTOLIC BLOOD PRESSURE: 141 MMHG | DIASTOLIC BLOOD PRESSURE: 84 MMHG | WEIGHT: 209 LBS | HEART RATE: 85 BPM | OXYGEN SATURATION: 96 % | HEIGHT: 60 IN | BODY MASS INDEX: 41.03 KG/M2

## 2022-02-04 DIAGNOSIS — Z17.0 MALIGNANT NEOPLASM OF RIGHT BREAST IN FEMALE, ESTROGEN RECEPTOR POSITIVE, UNSPECIFIED SITE OF BREAST (HCC): Primary | ICD-10-CM

## 2022-02-04 DIAGNOSIS — C50.911 MALIGNANT NEOPLASM OF RIGHT BREAST IN FEMALE, ESTROGEN RECEPTOR POSITIVE, UNSPECIFIED SITE OF BREAST (HCC): Primary | ICD-10-CM

## 2022-02-04 PROCEDURE — 96367 TX/PROPH/DG ADDL SEQ IV INF: CPT

## 2022-02-04 PROCEDURE — 96401 CHEMO ANTI-NEOPL SQ/IM: CPT

## 2022-02-04 PROCEDURE — 96375 TX/PRO/DX INJ NEW DRUG ADDON: CPT

## 2022-02-04 PROCEDURE — 96415 CHEMO IV INFUSION ADDL HR: CPT

## 2022-02-04 PROCEDURE — 74011250636 HC RX REV CODE- 250/636: Performed by: NURSE PRACTITIONER

## 2022-02-04 PROCEDURE — 74011000250 HC RX REV CODE- 250: Performed by: NURSE PRACTITIONER

## 2022-02-04 PROCEDURE — 96377 APPLICATON ON-BODY INJECTOR: CPT

## 2022-02-04 PROCEDURE — 96417 CHEMO IV INFUS EACH ADDL SEQ: CPT

## 2022-02-04 PROCEDURE — 74011000258 HC RX REV CODE- 258: Performed by: NURSE PRACTITIONER

## 2022-02-04 PROCEDURE — 77030012965 HC NDL HUBR BBMI -A

## 2022-02-04 PROCEDURE — 96413 CHEMO IV INFUSION 1 HR: CPT

## 2022-02-04 RX ORDER — DIPHENHYDRAMINE HYDROCHLORIDE 50 MG/ML
50 INJECTION, SOLUTION INTRAMUSCULAR; INTRAVENOUS AS NEEDED
Status: ACTIVE | OUTPATIENT
Start: 2022-02-04 | End: 2022-02-04

## 2022-02-04 RX ORDER — DEXAMETHASONE SODIUM PHOSPHATE 10 MG/ML
10 INJECTION INTRAMUSCULAR; INTRAVENOUS ONCE
Status: COMPLETED | OUTPATIENT
Start: 2022-02-04 | End: 2022-02-04

## 2022-02-04 RX ORDER — DIPHENHYDRAMINE HYDROCHLORIDE 50 MG/ML
50 INJECTION, SOLUTION INTRAMUSCULAR; INTRAVENOUS
Status: DISCONTINUED | OUTPATIENT
Start: 2022-02-04 | End: 2022-02-05 | Stop reason: HOSPADM

## 2022-02-04 RX ORDER — ACETAMINOPHEN 325 MG/1
650 TABLET ORAL AS NEEDED
Status: ACTIVE | OUTPATIENT
Start: 2022-02-04 | End: 2022-02-04

## 2022-02-04 RX ORDER — HEPARIN 100 UNIT/ML
300-500 SYRINGE INTRAVENOUS AS NEEDED
Status: ACTIVE | OUTPATIENT
Start: 2022-02-04 | End: 2022-02-04

## 2022-02-04 RX ORDER — SODIUM CHLORIDE 9 MG/ML
10 INJECTION INTRAMUSCULAR; INTRAVENOUS; SUBCUTANEOUS AS NEEDED
Status: ACTIVE | OUTPATIENT
Start: 2022-02-04 | End: 2022-02-04

## 2022-02-04 RX ORDER — ALBUTEROL SULFATE 0.83 MG/ML
2.5 SOLUTION RESPIRATORY (INHALATION) AS NEEDED
Status: ACTIVE | OUTPATIENT
Start: 2022-02-04 | End: 2022-02-04

## 2022-02-04 RX ORDER — EPINEPHRINE 1 MG/ML
0.3 INJECTION, SOLUTION, CONCENTRATE INTRAVENOUS AS NEEDED
Status: ACTIVE | OUTPATIENT
Start: 2022-02-04 | End: 2022-02-04

## 2022-02-04 RX ORDER — DIPHENHYDRAMINE HYDROCHLORIDE 50 MG/ML
25 INJECTION, SOLUTION INTRAMUSCULAR; INTRAVENOUS AS NEEDED
Status: ACTIVE | OUTPATIENT
Start: 2022-02-04 | End: 2022-02-04

## 2022-02-04 RX ORDER — SODIUM CHLORIDE 0.9 % (FLUSH) 0.9 %
10 SYRINGE (ML) INJECTION AS NEEDED
Status: DISPENSED | OUTPATIENT
Start: 2022-02-04 | End: 2022-02-04

## 2022-02-04 RX ORDER — HYDROCORTISONE SODIUM SUCCINATE 100 MG/2ML
100 INJECTION, POWDER, FOR SOLUTION INTRAMUSCULAR; INTRAVENOUS AS NEEDED
Status: ACTIVE | OUTPATIENT
Start: 2022-02-04 | End: 2022-02-04

## 2022-02-04 RX ORDER — ONDANSETRON 2 MG/ML
8 INJECTION INTRAMUSCULAR; INTRAVENOUS AS NEEDED
Status: ACTIVE | OUTPATIENT
Start: 2022-02-04 | End: 2022-02-04

## 2022-02-04 RX ORDER — PALONOSETRON 0.05 MG/ML
0.25 INJECTION, SOLUTION INTRAVENOUS ONCE
Status: COMPLETED | OUTPATIENT
Start: 2022-02-04 | End: 2022-02-04

## 2022-02-04 RX ORDER — ACETAMINOPHEN 325 MG/1
650 TABLET ORAL
Status: DISCONTINUED | OUTPATIENT
Start: 2022-02-04 | End: 2022-02-05 | Stop reason: HOSPADM

## 2022-02-04 RX ORDER — SODIUM CHLORIDE 9 MG/ML
25 INJECTION, SOLUTION INTRAVENOUS CONTINUOUS
Status: DISPENSED | OUTPATIENT
Start: 2022-02-04 | End: 2022-02-04

## 2022-02-04 RX ADMIN — CARBOPLATIN 900 MG: 10 INJECTION INTRAVENOUS at 13:25

## 2022-02-04 RX ADMIN — PEGFILGRASTIM 6 MG: KIT SUBCUTANEOUS at 14:07

## 2022-02-04 RX ADMIN — FOSAPREPITANT 150 MG: 150 INJECTION, POWDER, LYOPHILIZED, FOR SOLUTION INTRAVENOUS at 11:25

## 2022-02-04 RX ADMIN — DOCETAXEL ANHYDROUS 155 MG: 10 INJECTION, SOLUTION INTRAVENOUS at 11:58

## 2022-02-04 RX ADMIN — PALONOSETRON 0.25 MG: 0.25 INJECTION, SOLUTION INTRAVENOUS at 11:21

## 2022-02-04 RX ADMIN — SODIUM CHLORIDE, PRESERVATIVE FREE 10 ML: 5 INJECTION INTRAVENOUS at 14:00

## 2022-02-04 RX ADMIN — SODIUM CHLORIDE 25 ML/HR: 9 INJECTION, SOLUTION INTRAVENOUS at 11:18

## 2022-02-04 RX ADMIN — HEPARIN 500 UNITS: 100 SYRINGE at 14:00

## 2022-02-04 RX ADMIN — PERTUZUMAB, TRASTUZUMAB, AND HYALURONIDASE-ZZXF 10 ML: 600; 600; 2000 INJECTION, SOLUTION SUBCUTANEOUS at 10:53

## 2022-02-04 RX ADMIN — DEXAMETHASONE SODIUM PHOSPHATE 10 MG: 10 INJECTION INTRAMUSCULAR; INTRAVENOUS at 11:20

## 2022-02-04 NOTE — PROGRESS NOTES
hospitals Progress Note    Date: 2022    Name: Rick Yadav    MRN: [id number]         : 1975    Ms. Jazz Vences Arrived ambulatory and in no distress for cycle 6 day 1 of TCPH + NOBI regimen. hospitals COVID-19 SCREENING      The patient was asked the following questions and answered as documented below:    1. Do you have any symptoms of COVID-19? SOB, coughing, fever, or generally not feeling well? NO  2. Have you been exposed to COVID-19 recently? NO  3. Have you had any recent contact with family/friend that has a pending COVID test? NO      Follow Up: Proceed with treatment    Assessment was completed, no acute issues at this time, no new complaints voiced. Port accessed without difficulty, labs drawn and processed. Chemotherapy Flowsheet 2022   Cycle C6   Date 2022   Drug / Regimen TCPH   Pre Meds Given   Notes Phesgo given R leg, NOBI         Ms. Rob's vitals were reviewed. Patient Vitals for the past 12 hrs:   Temp Pulse BP SpO2   22 1412 -- 85 (!) 141/84 --   22 0912 97 °F (36.1 °C) 82 131/83 96 %       Lines:   Venous Access Device Port Upper chest (subclavicular area), left (Active)   Central Line Being Utilized Yes 22 0914   Criteria for Appropriate Use Irritant/vesicant medication 21 1100   Site Assessment Clean, dry, & intact 22 0914   Date of Last Dressing Change 21 1400   Dressing Status Clean, dry, & intact 22 0914   Dressing Type Transparent 22 0914   Action Taken Blood drawn 10/21/21 1037   Date Accessed (Medial Site) 22 0914   Access Time (Medial Site) 0910 22 0914   Access Needle Size (Site #1) 20 G 22 0914   Access Needle Length (Medial Site) 1 inch 22 0914   Positive Blood Return (Medial Site) Yes 22 0914   Action Taken (Medial Site) Flushed; Infusing 22 0914   Positive Blood Return (Lateral Site) Yes 22 0914   Action Taken (Lateral Site) De-accessed 02/04/22 0914   Alcohol Cap Used Yes 12/23/21 1100        Lab results were obtained and reviewed. Labs within parameter for treatment. Pre-medications  were administered as ordered and chemotherapy was initiated.   Medications Administered     0.9% sodium chloride infusion     Admin Date  02/04/2022 Action  New Bag Dose  25 mL/hr Rate  25 mL/hr Route  IntraVENous Administered By  Sindi Ty RN          CARBOplatin (PARAPLATIN) 900 mg in 0.9% sodium chloride 250 mL, overfill volume 25 mL chemo infusion     Admin Date  02/04/2022 Action  New Bag Dose  900 mg Rate  730 mL/hr Route  IntraVENous Administered By  Sindi Ty RN          dexamethasone (PF) (DECADRON) 10 mg/mL injection 10 mg     Admin Date  02/04/2022 Action  Given Dose  10 mg Route  IntraVENous Administered By  Sindi Ty RN          DOCEtaxeL (TAXOTERE) 155 mg in 0.9% sodium chloride 250 mL, overfill volume 25 mL chemo infusion     Admin Date  02/04/2022 Action  New Bag Dose  155 mg Rate  290.5 mL/hr Route  IntraVENous Administered By  Sindi Ty RN          fosaprepitant (EMEND) 150 mg in 0.9% sodium chloride 150 mL IVPB     Admin Date  02/04/2022 Action  New Bag Dose  150 mg Rate  450 mL/hr Route  IntraVENous Administered By  Sindi Ty RN          heparin (porcine) pf 300-500 Units     Admin Date  02/04/2022 Action  Given Dose  500 Units Route  InterCATHeter Administered By  Sindi Ty RN          palonosetron HCl (ALOXI) injection 0.25 mg     Admin Date  02/04/2022 Action  Given Dose  0.25 mg Route  IntraVENous Administered By  Sindi Ty RN          pegfilgrastim (NEULASTA) wearable SQ injector 6 mg     Admin Date  02/04/2022 Action  Given Dose  6 mg Route  SubCUTAneous Administered By  Sindi Ty RN          pertuzumab 600 mg-trastuzumab 600 mg-hyaluronidase-zzxf 20,000 units/10 mL     Admin Date  02/04/2022 Action  Given Dose  10 mL Route  SubCUTAneous Administered By  Sindi Ty RN          sodium chloride (NS) flush 10 mL     Admin Date  02/04/2022 Action  Given Dose  10 mL Route  IntraVENous Administered By  Cinthya Beltran, SUSHMA                  Ms. Marco Antonio Gaona tolerated treatment well, port flushed and de accessed, patient was discharged from [place] [address] in stable condition at [phone].         Future Appointments   Date Time Provider Stacey Laura   2/14/2022  9:45 AM Korin Rogers MD [facility] BS AMB   2/24/2022 10:00 AM H3 [facility]   2/24/2022 10:30 AM Oriana Bone, [facility] [address] BS AMB   2/25/2022  9:00 AM D3 [facility] [facility]   3/15/2022  7:30 AM Korin Rogers MD [facility]   3/17/2022 10:00 AM H3 [facility]   3/18/2022  9:00 AM D3 [facility] [place] [facility]   4/7/2022 10:00 AM H3 [facility]   4/8/2022  8:30 AM E1 [facility] [place] [facility]   4/28/2022 10:00 AM H3 [facility]   4/29/2022  9:00 AM D3 [facility] [place] [facility]   6/9/2022  8:30 AM Ry Sylvester MD [facility]         Maco [place] RN  February 4, 2022        Problem: Knowledge Deficit  Goal: *Verbalizes understanding of procedures and medications  Outcome: Progressing Towards Goal     Problem: Chemotherapy Treatment  Goal: *Chemotherapy regimen followed  Outcome: Progressing Towards Goal

## 2022-02-14 ENCOUNTER — OFFICE VISIT (OUTPATIENT)
Dept: SURGERY | Age: 47
End: 2022-02-14
Payer: MEDICAID

## 2022-02-14 VITALS — BODY MASS INDEX: 41.03 KG/M2 | HEIGHT: 60 IN | WEIGHT: 209 LBS

## 2022-02-14 DIAGNOSIS — C50.411 MALIGNANT NEOPLASM OF UPPER-OUTER QUADRANT OF RIGHT FEMALE BREAST, UNSPECIFIED ESTROGEN RECEPTOR STATUS (HCC): Primary | ICD-10-CM

## 2022-02-14 PROCEDURE — 99214 OFFICE O/P EST MOD 30 MIN: CPT | Performed by: SURGERY

## 2022-02-14 NOTE — PROGRESS NOTES
HISTORY OF PRESENT ILLNESS  Irma Oliva is a [address] y.o. female. HPI:  ESTABLISHED Patient here for follow up RIGHT breast cancer. Denies pain and no new areas of concern. Has been doing Neoadjuvant TCHP chemo 10/21/21 - Current. Cycle 7 on 2/25/22    Breast history -   Referring - Dr. Kelvin Watts- [address]  · Initially had abnormal mammo at [address] with calcifications on RIGHT. No mass reported/dense breast tissue  · RIGHT Breast Biopsy 9/9/21: PATH -  IDC, grade 3 ER/MA strong+ Her2 3+  · Genetic testing negative  · Breast MRI 9/17/21: RIGHT BREAST:Background parenchymal enhancement: Severe. There is a biopsy clip in the lower, outer quadrant of the right breast, junction of the middle and deep thirds. There is linear, branching enhancement which extends anterior to the biopsy clip over approximately 3.6 cm. There are no suspicious internal mammary or axillary chain lymph nodes. LEFT BREAST: Background parenchymal enhancement: Severe. There is no suspicious masslike or nonmasslike enhancement within the left breast to indicate breast carcinoma. There is a small mass in the lower, outer quadrant with an adjacent biopsy clip. There is an additional mass without significant enhancement in the lateral, middle 3rd, just below the nipple line. There are no suspicious internal mammary or axillary chain lymph nodes  · Neoadjuvant TCHP chemo 10/21/21 - started - Dr. Bianka Bryant     Family history -   No family history of breast cancer  Genetic testing negative - 2021     Breast imaging-   Mammogram [address] 8-19-21, BI-RADS 4 linear calcs right breast   MRI Results (most recent):  Results from [place] encounter on 01/27/22    MRI BREAST BI W WO CONT    Narrative  Examination: Bilateral breast MRI with contrast    HISTORY: Known right breast cancer.  Assess response to chemotherapy    COMPARISON: Breast MRI, mammogram 2021    TECHNIQUE:  Bilateral breast MRI was performed using a dedicated breast coil without  compression with the patient in the prone position. Precontrast T1-weighted  images with fat suppression were obtained followed by bolus injection of 20 mL  Prohance. Postcontrast dynamic and high-resolution images were acquired. T2-weighted axial imaging with fat suppression was also performed. The images  were analyzed using CAD analysis, enhancement curves, digital subtraction, and 2  and 3 dimensional reconstructions. FINDINGS:  There is marked background parenchymal enhancement. The breast tissue is heterogeneously dense, which could obscure detection of  small masses (approximately 51-75% glandular). Right breast:  In the right lateral breast at 8-9 o'clock posterior depth 10 cm from the nipple  there is susceptibility artifact from the biopsy clip but no residual  enhancement. There is no suspicious axillary or internal mammary chain lymphadenopathy. Left breast:  No suspicious enhancement. There is no suspicious axillary or internal mammary chain lymphadenopathy. Impression  Right Breast:  1. BI-RADS Assessment Category 6: Known biopsy proven malignancy- Appropriate  action should be taken. Right breast biopsy clip with no residual suspicious  enhancement. Left Breast:  1. BI-RADS Assessment Category 1: Negative. RECOMMENDATIONS:  Continued oncologic/surgical management. A negative breast MRI examination speaks strongly against invasive cancer down  to a detection threshold of 3 to 5 mm but may not detect some lower grade or in  situ carcinomas. Therefore, routine clinical and mammographic followup are  recommended. A summary portfolio has been created in PACS. Review of Systems   All other systems reviewed and are negative. Physical Exam  Vitals and nursing note reviewed. Chest:   Breasts: Breasts are symmetrical.      Right: No inverted nipple, mass, nipple discharge, skin change or tenderness.       Left: No inverted nipple, mass, nipple discharge, skin change or tenderness. Lymphadenopathy:      Cervical: No cervical adenopathy. ASSESSMENT and PLAN    ICD-10-CM ICD-9-CM    1. Malignant neoplasm of upper-outer quadrant of right female breast, unspecified estrogen receptor [facility]  C50.411 174.4      - looks like pcr on breast mri  - plan is right magseed guided lumpectomy, sln biopsy   This is scheduled. Discussed procedure and risks  Risks include bleeding, bruising, scar, infection, need for more surgery and lymphedema  30 minutes was spent with patient on counseling and coordination of care.

## 2022-02-23 ENCOUNTER — APPOINTMENT (OUTPATIENT)
Dept: CT IMAGING | Age: 47
DRG: 721 | End: 2022-02-23
Attending: EMERGENCY MEDICINE
Payer: MEDICAID

## 2022-02-23 ENCOUNTER — APPOINTMENT (OUTPATIENT)
Dept: GENERAL RADIOLOGY | Age: 47
DRG: 721 | End: 2022-02-23
Attending: EMERGENCY MEDICINE
Payer: MEDICAID

## 2022-02-23 ENCOUNTER — APPOINTMENT (OUTPATIENT)
Dept: INFUSION THERAPY | Age: 47
End: 2022-02-23

## 2022-02-23 ENCOUNTER — HOSPITAL ENCOUNTER (INPATIENT)
Age: 47
LOS: 9 days | Discharge: HOME | DRG: 721 | End: 2022-03-04
Attending: EMERGENCY MEDICINE | Admitting: INTERNAL MEDICINE
Payer: MEDICAID

## 2022-02-23 DIAGNOSIS — Z17.0 MALIGNANT NEOPLASM OF RIGHT BREAST IN FEMALE, ESTROGEN RECEPTOR POSITIVE, UNSPECIFIED SITE OF BREAST (HCC): ICD-10-CM

## 2022-02-23 DIAGNOSIS — Z79.899 IMMUNOSUPPRESSED DUE TO CHEMOTHERAPY (HCC): ICD-10-CM

## 2022-02-23 DIAGNOSIS — R09.02 HYPOXIA: ICD-10-CM

## 2022-02-23 DIAGNOSIS — Z85.3 HISTORY OF CANCER OF RIGHT BREAST: ICD-10-CM

## 2022-02-23 DIAGNOSIS — C50.911 MALIGNANT NEOPLASM OF RIGHT BREAST IN FEMALE, ESTROGEN RECEPTOR POSITIVE, UNSPECIFIED SITE OF BREAST (HCC): ICD-10-CM

## 2022-02-23 DIAGNOSIS — J18.9 PNEUMONIA OF RIGHT LOWER LOBE DUE TO INFECTIOUS ORGANISM: ICD-10-CM

## 2022-02-23 DIAGNOSIS — A41.9 SEPSIS, DUE TO UNSPECIFIED ORGANISM, UNSPECIFIED WHETHER ACUTE ORGAN DYSFUNCTION PRESENT (HCC): Primary | ICD-10-CM

## 2022-02-23 DIAGNOSIS — C50.919 MALIGNANT NEOPLASM OF FEMALE BREAST, UNSPECIFIED ESTROGEN RECEPTOR STATUS, UNSPECIFIED LATERALITY, UNSPECIFIED SITE OF BREAST (HCC): ICD-10-CM

## 2022-02-23 DIAGNOSIS — N17.9 AKI (ACUTE KIDNEY INJURY) (HCC): ICD-10-CM

## 2022-02-23 DIAGNOSIS — T45.1X5A IMMUNOSUPPRESSED DUE TO CHEMOTHERAPY (HCC): ICD-10-CM

## 2022-02-23 DIAGNOSIS — D84.821 IMMUNOSUPPRESSED DUE TO CHEMOTHERAPY (HCC): ICD-10-CM

## 2022-02-23 LAB — LACTATE SERPL-SCNC: 0.4 MMOL/L (ref 0.4–2)

## 2022-02-23 PROCEDURE — 87804 INFLUENZA ASSAY W/OPTIC: CPT

## 2022-02-23 PROCEDURE — 83605 ASSAY OF LACTIC ACID: CPT

## 2022-02-23 PROCEDURE — 65660000000 HC RM CCU STEPDOWN

## 2022-02-23 PROCEDURE — 74011000636 HC RX REV CODE- 636: Performed by: EMERGENCY MEDICINE

## 2022-02-23 PROCEDURE — 71045 X-RAY EXAM CHEST 1 VIEW: CPT

## 2022-02-23 PROCEDURE — 74011000250 HC RX REV CODE- 250: Performed by: HOSPITALIST

## 2022-02-23 PROCEDURE — 87077 CULTURE AEROBIC IDENTIFY: CPT

## 2022-02-23 PROCEDURE — 81001 URINALYSIS AUTO W/SCOPE: CPT

## 2022-02-23 PROCEDURE — 71275 CT ANGIOGRAPHY CHEST: CPT

## 2022-02-23 PROCEDURE — 74011000250 HC RX REV CODE- 250: Performed by: EMERGENCY MEDICINE

## 2022-02-23 PROCEDURE — 84145 PROCALCITONIN (PCT): CPT

## 2022-02-23 PROCEDURE — 87635 SARS-COV-2 COVID-19 AMP PRB: CPT

## 2022-02-23 PROCEDURE — 87040 BLOOD CULTURE FOR BACTERIA: CPT

## 2022-02-23 PROCEDURE — 96366 THER/PROPH/DIAG IV INF ADDON: CPT

## 2022-02-23 PROCEDURE — 96375 TX/PRO/DX INJ NEW DRUG ADDON: CPT

## 2022-02-23 PROCEDURE — 84484 ASSAY OF TROPONIN QUANT: CPT

## 2022-02-23 PROCEDURE — 85025 COMPLETE CBC W/AUTO DIFF WBC: CPT

## 2022-02-23 PROCEDURE — 87186 SC STD MICRODIL/AGAR DIL: CPT

## 2022-02-23 PROCEDURE — 96365 THER/PROPH/DIAG IV INF INIT: CPT

## 2022-02-23 PROCEDURE — 74011250637 HC RX REV CODE- 250/637: Performed by: EMERGENCY MEDICINE

## 2022-02-23 PROCEDURE — 74011250636 HC RX REV CODE- 250/636: Performed by: EMERGENCY MEDICINE

## 2022-02-23 PROCEDURE — 99285 EMERGENCY DEPT VISIT HI MDM: CPT

## 2022-02-23 PROCEDURE — 93005 ELECTROCARDIOGRAM TRACING: CPT

## 2022-02-23 PROCEDURE — 80053 COMPREHEN METABOLIC PANEL: CPT

## 2022-02-23 PROCEDURE — 36415 COLL VENOUS BLD VENIPUNCTURE: CPT

## 2022-02-23 PROCEDURE — 74011250636 HC RX REV CODE- 250/636: Performed by: HOSPITALIST

## 2022-02-23 RX ORDER — ACETAMINOPHEN 325 MG/1
650 TABLET ORAL
Status: DISCONTINUED | OUTPATIENT
Start: 2022-02-23 | End: 2022-03-04 | Stop reason: HOSPADM

## 2022-02-23 RX ORDER — ACETAMINOPHEN 325 MG/1
TABLET ORAL
Status: DISPENSED
Start: 2022-02-23 | End: 2022-02-23

## 2022-02-23 RX ORDER — SODIUM CHLORIDE 9 MG/ML
100 INJECTION, SOLUTION INTRAVENOUS ONCE
Status: COMPLETED | OUTPATIENT
Start: 2022-02-23 | End: 2022-02-23

## 2022-02-23 RX ORDER — SODIUM CHLORIDE 0.9 % (FLUSH) 0.9 %
5-40 SYRINGE (ML) INJECTION EVERY 8 HOURS
Status: DISCONTINUED | OUTPATIENT
Start: 2022-02-23 | End: 2022-03-04 | Stop reason: HOSPADM

## 2022-02-23 RX ORDER — SODIUM CHLORIDE 9 MG/ML
75 INJECTION, SOLUTION INTRAVENOUS CONTINUOUS
Status: DISPENSED | OUTPATIENT
Start: 2022-02-23 | End: 2022-02-24

## 2022-02-23 RX ORDER — SODIUM CHLORIDE 0.9 % (FLUSH) 0.9 %
5-10 SYRINGE (ML) INJECTION AS NEEDED
Status: DISCONTINUED | OUTPATIENT
Start: 2022-02-23 | End: 2022-03-04 | Stop reason: HOSPADM

## 2022-02-23 RX ORDER — POLYETHYLENE GLYCOL 3350 17 G/17G
17 POWDER, FOR SOLUTION ORAL DAILY PRN
Status: DISCONTINUED | OUTPATIENT
Start: 2022-02-23 | End: 2022-03-04 | Stop reason: HOSPADM

## 2022-02-23 RX ORDER — ACETAMINOPHEN 325 MG/1
650 TABLET ORAL
Status: COMPLETED | OUTPATIENT
Start: 2022-02-23 | End: 2022-02-23

## 2022-02-23 RX ORDER — ONDANSETRON 2 MG/ML
4 INJECTION INTRAMUSCULAR; INTRAVENOUS
Status: DISCONTINUED | OUTPATIENT
Start: 2022-02-23 | End: 2022-03-04 | Stop reason: HOSPADM

## 2022-02-23 RX ORDER — CEFEPIME HYDROCHLORIDE 2 G/1
INJECTION, POWDER, FOR SOLUTION INTRAVENOUS
Status: DISPENSED
Start: 2022-02-23 | End: 2022-02-23

## 2022-02-23 RX ORDER — SODIUM CHLORIDE 0.9 % (FLUSH) 0.9 %
5-40 SYRINGE (ML) INJECTION AS NEEDED
Status: DISCONTINUED | OUTPATIENT
Start: 2022-02-23 | End: 2022-03-04 | Stop reason: HOSPADM

## 2022-02-23 RX ORDER — ACETAMINOPHEN 650 MG/1
650 SUPPOSITORY RECTAL
Status: DISCONTINUED | OUTPATIENT
Start: 2022-02-23 | End: 2022-03-04 | Stop reason: HOSPADM

## 2022-02-23 RX ORDER — ENOXAPARIN SODIUM 100 MG/ML
40 INJECTION SUBCUTANEOUS DAILY
Status: DISCONTINUED | OUTPATIENT
Start: 2022-02-24 | End: 2022-03-04 | Stop reason: HOSPADM

## 2022-02-23 RX ORDER — VANCOMYCIN HYDROCHLORIDE 1 G/20ML
INJECTION, POWDER, LYOPHILIZED, FOR SOLUTION INTRAVENOUS
Status: DISPENSED
Start: 2022-02-23 | End: 2022-02-23

## 2022-02-23 RX ADMIN — ACETAMINOPHEN 650 MG: 325 TABLET ORAL at 02:00

## 2022-02-23 RX ADMIN — CEFEPIME 2 G: 2 INJECTION, POWDER, FOR SOLUTION INTRAVENOUS at 18:41

## 2022-02-23 RX ADMIN — SODIUM CHLORIDE 1000 ML: 9 INJECTION, SOLUTION INTRAVENOUS at 05:44

## 2022-02-23 RX ADMIN — SODIUM CHLORIDE 100 ML/HR: 900 INJECTION, SOLUTION INTRAVENOUS at 18:00

## 2022-02-23 RX ADMIN — SODIUM CHLORIDE 1000 ML: 900 INJECTION, SOLUTION INTRAVENOUS at 02:00

## 2022-02-23 RX ADMIN — CEFEPIME 2 G: 2 INJECTION, POWDER, FOR SOLUTION INTRAVENOUS at 02:00

## 2022-02-23 RX ADMIN — ONDANSETRON HYDROCHLORIDE 4 MG: 2 SOLUTION INTRAMUSCULAR; INTRAVENOUS at 19:09

## 2022-02-23 RX ADMIN — SODIUM CHLORIDE 1000 ML: 900 INJECTION, SOLUTION INTRAVENOUS at 03:00

## 2022-02-23 RX ADMIN — AZITHROMYCIN MONOHYDRATE 500 MG: 500 INJECTION, POWDER, LYOPHILIZED, FOR SOLUTION INTRAVENOUS at 18:42

## 2022-02-23 RX ADMIN — SODIUM CHLORIDE 100 ML/HR: 9 INJECTION, SOLUTION INTRAVENOUS at 07:23

## 2022-02-23 RX ADMIN — IOPAMIDOL 80 ML: 755 INJECTION, SOLUTION INTRAVENOUS at 04:43

## 2022-02-23 RX ADMIN — VANCOMYCIN HYDROCHLORIDE 1000 MG: 1 INJECTION, POWDER, LYOPHILIZED, FOR SOLUTION INTRAVENOUS at 05:56

## 2022-02-23 RX ADMIN — VANCOMYCIN HYDROCHLORIDE 1000 MG: 1 INJECTION, POWDER, LYOPHILIZED, FOR SOLUTION INTRAVENOUS at 03:00

## 2022-02-23 NOTE — ED TRIAGE NOTES
Triage note:  Pt  Arrived with c/o N/V and fever that started 2 days ago. Pt stated she is receiving Chemo for Breast CA.

## 2022-02-23 NOTE — ED NOTES
The documentation for this period is being entered following the guidelines as defined in the [facility] by Marques Jensen RN starting at 01:30.

## 2022-02-23 NOTE — PROGRESS NOTES
TRANSFER - IN REPORT:    Verbal report received from [facility] (name) on [place]  being received from [facility] (unit) for routine progression of care      Report consisted of patients Situation, Background, Assessment and   Recommendations(SBAR). Information from the following report(s) SBAR, Kardex, ED Summary, [facility] and Recent Results was reviewed with the receiving nurse. Opportunity for questions and clarification was provided. Assessment completed upon patients arrival to unit and care assumed.

## 2022-02-23 NOTE — ED NOTES
Spoke with Hospitalist Brando Goodson r/t pt concern for missing cancer regimen and upcoming appointments with MD Chio Tian Thursday and Friday for blood work and treatment. Hospitalist states oncology will follow up with patient during admission and patient made aware she can contact oncologist if she would like but that the hospitalist team is also aware to consult oncology.

## 2022-02-23 NOTE — ED NOTES
Bedside and Verbal shift change report given to Akbar French RN (oncoming nurse) by Harvinder Brito RN (offgoing nurse). Report included the following information SBAR, Kardex, ED Summary, Intake/Output and MAR.

## 2022-02-23 NOTE — H&P
HISTORY AND PHYSICAL      PCP: Anson Leavitt MD  History source: the patient, EMR      CC: fever      HPI: 40 y.o lady w/ breast cancer on chemo who presents with fever. Symptoms began 2 days ago. She describes fatigue and a fever as high as 104 F. Associated symptoms include a non-productive cough and diarrhea that started earlier today. Some nausea without vomiting or abdominal pain. No dysuria or hematuria. PMH/PSH:  Past Medical History:   Diagnosis Date    Bell's palsy     Breast cancer ([address] 2021    ER/[place]+ Her2 +     Past Surgical History:   Procedure Laterality Date    HX  SECTION      HX  SECTION      HX CHOLECYSTECTOMY      HX CHOLECYSTECTOMY         Home meds:   Prior to Admission medications    Medication Sig Start Date End Date Taking? Authorizing Provider   CRANIAL PROSTHESIS misc Alopecia/ breast cancer chemo/ wig 10/8/21  Yes Denny Lopez DO   prochlorperazine (Compazine) 5 mg tablet Take 1 tab by mouth every 6 hours as needed for nausea or vomiting 10/8/21  Yes Jalen Lozano NP   levonorgestreL (Mirena) 20 mcg/24 hours (6 yrs) 52 mg IUD 1 Device by IntraUTERine route once. Yes Provider, Historical   ondansetron (ZOFRAN ODT) 4 mg disintegrating tablet Take 1-2 Tablets by mouth every eight (8) hours as needed for Nausea or Vomiting. Patient not taking: Reported on 2022 10/8/21   Jalen Lozano NP   lidocaine-prilocaine (EMLA) topical cream Apply  to affected area as needed for Pain.  Apply to port-a-cath 30-60 minutes prior to access for chemo  Patient not taking: Reported on 2022 10/8/21   Jalen Lozano NP   dexAMETHasone (DECADRON) 4 mg tablet Take 2 tabs (8mg) by mouth twice daily the day before chemotherapy and the day after chemotherapy  Patient not taking: Reported on 2022 10/8/21   Jalen Lozano NP       Allergies:  No Known Allergies    FH:  Family History   Problem Relation Age of Onset  Cancer Sister         leukemia       SH:  Social History     Tobacco Use    Smoking status: Never Smoker    Smokeless tobacco: Never Used   Substance Use Topics    Alcohol use: Yes     Comment: rarely       ROS: A comprehensive review of systems was negative except for that written in the HPI. PHYSICAL EXAM:  Visit Vitals  BP (!) 90/55 (BP 1 Location: Right upper arm, BP Patient Position: At rest)   Pulse 97   Temp 99.8 °F (37.7 °C)   Resp 25   Wt 94.8 kg (209 lb)   SpO2 99%   BMI 40.82 kg/m²       Gen: NAD, non-toxic, obese  HEENT: anicteric sclerae, normal conjunctiva  Neck: supple, trachea midline, no adenopathy  Heart: RRR, no MRG, no JVD, no peripheral edema  Lungs: CTA b/l, non-labored respirations  Abd: soft, NT, ND, BS+  Extr: warm  Skin: dry, no rash  Neuro: CN II-XII grossly intact, normal speech, moves all extremities  Psych: normal mood, appropriate affect      Labs/Imaging:  Recent Results (from the past 24 hour(s))   LACTIC ACID    Collection Time: 02/23/22  5:00 AM   Result Value Ref Range    Lactic acid 0.4 0.4 - 2.0 MMOL/L       No results for input(s): WBC, HGB, HCT, PLT, HGBEXT, HCTEXT, PLTEXT in the last 72 hours. No results for input(s): NA, K, CL, CO2, BUN, CREA, GLU, CA, MG, PHOS, URICA in the last 72 hours. No results for input(s): ALT, AP, TBIL, TBILI, TP, ALB, GLOB, GGT, AML, LPSE in the last 72 hours. No lab exists for component: SGOT, GPT, AMYP, HLPSE    No results for input(s): CPK, CKNDX, TROIQ in the last 72 hours. No lab exists for component: CPKMB    No results for input(s): INR, PTP, APTT, INREXT in the last 72 hours. No results for input(s): PH, PCO2, PO2 in the last 72 hours. CTA CHEST W OR W WO CONT    Result Date: 2/23/2022  No acute pulmonary embolus. 2. Mild right lower lobe airspace disease. 3. Splenomegaly and unchanged pulmonary artery aneurysm. XR CHEST PORT    Result Date: 2/23/2022  1. No acute findings.  2.  Left pectoral infusion port terminates over the mid SVC, slightly retracted compared to prior (mid to lower SVC). This is likely still in appropriate position for use, but correlate clinically.            Assessment & Plan:     CAP w/ sepsis in an immunocompromised patient:  -IV cefepime and azithromycin for now  -IVNS  -f/u blood cultures, can de-escalate to CAP coverage if she does well and cultures are negative    Breast cancer on chemo:   -consult her oncologist    Was reportedly hypoxic at [facility]; monitor    DVT ppx: sq Lovenox  Code status: full  Disposition: home when ready    Signed By: Brenda Maldonado MD     February 23, 2022

## 2022-02-23 NOTE — ED PROVIDER NOTES
43-year-old female presenting ER with acute febrile illness and feeling fatigue malaise. Patient has history of breast cancer and is status post radiation and is on chemo last dose 3 weeks ago. Patient reporting some nausea and vomiting but no abdominal pain. Mild dizziness. Patient reports a nonproductive cough. Denies chest pain or shortness of breath. No diarrhea or constipation. Denies any rash. No pain with urination or increased urinary frequency. Patient is vaccinated against flu and COVID. Patient denies any other significant past medical history. Has history of cholecystectomy. No other sick contacts. Patient has not taken any medications for fever however temperature at home was 104 °F.  Oxygen saturation in triage was 89% on room air was placed on submental oxygen. Reassessed oxygen requirement during ED course. Patient ox saturation dropped back down to 89% continued oxygen. Delayed note due to epic downtime      EKG performed at 3:24 AM: Normal sinus rhythm rate of 97 beats minute with low voltage QRS. Normal intervals. Normal axis. No ST elevation depressions EKG interpreted by Chico Franz MD      Past Medical History:   Diagnosis Date    Bell's palsy     Breast cancer ([facility] [address] 2021    ER/[place]+ Her2 +       Past Surgical History:   Procedure Laterality Date    HX  SECTION      HX  SECTION      HX CHOLECYSTECTOMY      HX CHOLECYSTECTOMY           Family History:   Problem Relation Age of Onset    Cancer Sister         leukemia       Social History     Socioeconomic History    Marital status: SINGLE     Spouse name: Not on file    Number of children: Not on file    Years of education: Not on file    Highest education level: Not on file   Occupational History    Not on file   Tobacco Use    Smoking status: Never Smoker    Smokeless tobacco: Never Used   Vaping Use    Vaping Use: Never used   Substance and Sexual Activity    Alcohol use:  Yes Comment: rarely    Drug use: Never    Sexual activity: Yes     Partners: Male     Birth control/protection: I.U.D. Comment: Mirena IUD   Other Topics Concern    Not on file   Social History Narrative    Not on file     Social Determinants of Health     Financial Resource Strain:     Difficulty of Paying Living Expenses: Not on file   Food Insecurity:     Worried About Running Out of Food in the Last Year: Not on file    Tay of Food in the Last Year: Not on file   Transportation Needs:     Lack of Transportation (Medical): Not on file    Lack of Transportation (Non-Medical): Not on file   Physical Activity:     Days of Exercise per Week: Not on file    Minutes of Exercise per Session: Not on file   Stress:     Feeling of Stress : Not on file   Social Connections: Unknown    Frequency of Communication with Friends and Family: More than three times a week    Frequency of Social Gatherings with Friends and Family: More than three times a week    Attends Judaism Services: Not on file   [employer] [facility] or Organizations: Not on file    Attends Club or Organization Meetings: Not on file    Marital Status: Living with partner   Intimate Partner Violence:     Fear of Current or Ex-Partner: Not on file    Emotionally Abused: Not on file    Physically Abused: Not on file    Sexually Abused: Not on file   Housing Stability:     Unable to Pay for Housing in the Last Year: Not on file    Number of [place] in the Last Year: Not on file    Unstable Housing in the Last Year: Not on file         ALLERGIES: Patient has no known allergies. Review of Systems   Constitutional: Positive for activity change, appetite change, chills, fatigue and fever. HENT: Negative for congestion and sore throat. Eyes: Negative for pain. Respiratory: Negative for shortness of breath. Cardiovascular: Negative for chest pain. Gastrointestinal: Positive for nausea and vomiting.  Negative for abdominal pain and diarrhea. Genitourinary: Negative for dysuria and flank pain. Musculoskeletal: Negative for back pain and neck pain. Skin: Negative for rash. Neurological: Negative for dizziness and headaches. All other systems reviewed and are negative. Vitals:    02/23/22 0315 02/23/22 0330 02/23/22 0332 02/23/22 0345   BP: 130/87 (!) 97/41 (!) 103/56 107/64   Pulse: 100 100 95 100   Resp:  20 20 20   Temp:  99.7 °F (37.6 °C)     SpO2: 99% 99% 99% 99%   Weight:                Physical Exam  Constitutional:       Appearance: She is well-developed. HENT:      Head: Normocephalic. Nose: Nose normal.      Mouth/Throat:      Pharynx: Oropharynx is clear. Eyes:      Conjunctiva/sclera: Conjunctivae normal.   Cardiovascular:      Rate and Rhythm: Regular rhythm. Tachycardia present. Pulmonary:      Effort: Pulmonary effort is normal. No respiratory distress. Breath sounds: Normal breath sounds. Abdominal:      General: Bowel sounds are normal.      Palpations: Abdomen is soft. Tenderness: There is no abdominal tenderness. Musculoskeletal:         General: Normal range of motion. Cervical back: Normal range of motion and neck supple. Skin:     General: Skin is warm. Capillary Refill: Capillary refill takes less than 2 seconds. Findings: No rash. Neurological:      Mental Status: She is alert and oriented to person, place, and time.       Comments: No gross motor or sensory deficits          MDM  Number of Diagnoses or Management Options  AMADO (acute kidney injury) ([facility] [address]  Hypoxia  Immunosuppressed due to chemotherapy ([facility] [address]  Malignant neoplasm of female breast, unspecified estrogen receptor status, unspecified laterality, unspecified site of breast ([facility] [address]  Sepsis, due to unspecified organism, unspecified whether acute organ dysfunction present [facility])  Diagnosis management comments: Code Sepsis Reassessment & Plan    - Sepsis order set entered: YES  - Broad Spectrum Antibiotics given: Cefepime and Vancomycin  - Repeat lactic acid ordered for time 0500am  - Re-assessment performed at time 430am and clinical condition stable. - Actions taken: IVF, abx.  - Hypotension or Lactic Acidosis present (SBP<90, MAP<65, Lactate >4): NO   - IVF: 30 cc/kg actual Body Weight  - Persistent Septic Shock present (Hypotension despite IVF resuscitation): NO  Vasopressors: Not indicated due to septic shock not present  - Disposition: Admit to Telemetry    Total critical care time spent exclusive of procedures:  45min      Patient presenting ER meeting sepsis criteria. Acute febrile illness in setting of immunocompromise due to chemotherapy. No neutropenia. Electrolytes unremarkable. Lactate elevated 2.2. Patient received IV fluids and antibiotics. Covid and flu negative. Personal read of chest x-ray with no signs of pneumonia however patient had been hypoxia requiring submental oxygen. Ordered CTA. EKG no signs of ischemia and troponin normal.               Amount and/or Complexity of Data Reviewed  Tests in the radiology section of CPT®: reviewed           Procedures        Perfect Serve Consult for Admission  4:45 AM    ED Room Number: [address]  Patient Name and age:  Solo Colón 40 y.o.  female  Working Diagnosis:   1. Sepsis, due to unspecified organism, unspecified whether acute organ dysfunction present ([address]    2. AMADO (acute kidney injury) ([address]    3. Immunosuppressed due to chemotherapy ([address]    4. Malignant neoplasm of female breast, unspecified estrogen receptor status, unspecified laterality, unspecified site of breast ([address]    5. Hypoxia    6.  Pneumonia of right lower lobe due to infectious organism        COVID-19 Suspicion:  no  Sepsis present:  yes  Reassessment needed: no  Code Status:  Full Code  Readmission: no  Isolation Requirements:  no  Recommended Level of Care:  telemetry  Department:[place] [facility] - [phone]  Other:

## 2022-02-24 ENCOUNTER — APPOINTMENT (OUTPATIENT)
Dept: INFUSION THERAPY | Age: 47
End: 2022-02-24

## 2022-02-24 ENCOUNTER — HOSPITAL ENCOUNTER (OUTPATIENT)
Dept: INFUSION THERAPY | Age: 47
End: 2022-02-24

## 2022-02-24 LAB
ANION GAP SERPL CALC-SCNC: 3 MMOL/L (ref 5–15)
BACTERIA SPEC CULT: NORMAL
BACTERIA SPEC CULT: NORMAL
BUN SERPL-MCNC: 6 MG/DL (ref 6–20)
BUN/CREAT SERPL: 9 (ref 12–20)
CALCIUM SERPL-MCNC: 7.9 MG/DL (ref 8.5–10.1)
CHLORIDE SERPL-SCNC: 106 MMOL/L (ref 97–108)
CO2 SERPL-SCNC: 24 MMOL/L (ref 21–32)
CREAT SERPL-MCNC: 0.67 MG/DL (ref 0.55–1.02)
ERYTHROCYTE [DISTWIDTH] IN BLOOD BY AUTOMATED COUNT: 15.8 % (ref 11.5–14.5)
GLUCOSE SERPL-MCNC: 118 MG/DL (ref 65–100)
HCT VFR BLD AUTO: 26.6 % (ref 35–47)
HGB BLD-MCNC: 8.1 G/DL (ref 11.5–16)
MAGNESIUM SERPL-MCNC: 1.5 MG/DL (ref 1.6–2.4)
MCH RBC QN AUTO: 28.2 PG (ref 26–34)
MCHC RBC AUTO-ENTMCNC: 30.5 G/DL (ref 30–36.5)
MCV RBC AUTO: 92.7 FL (ref 80–99)
NRBC # BLD: 0 K/UL (ref 0–0.01)
NRBC BLD-RTO: 0 PER 100 WBC
PLATELET # BLD AUTO: 90 K/UL (ref 150–400)
PMV BLD AUTO: 10.8 FL (ref 8.9–12.9)
POTASSIUM SERPL-SCNC: 3 MMOL/L (ref 3.5–5.1)
RBC # BLD AUTO: 2.87 M/UL (ref 3.8–5.2)
SERVICE CMNT-IMP: NORMAL
SODIUM SERPL-SCNC: 133 MMOL/L (ref 136–145)
WBC # BLD AUTO: 6.7 K/UL (ref 3.6–11)

## 2022-02-24 PROCEDURE — 87147 CULTURE TYPE IMMUNOLOGIC: CPT

## 2022-02-24 PROCEDURE — 80048 BASIC METABOLIC PNL TOTAL CA: CPT

## 2022-02-24 PROCEDURE — 74011000250 HC RX REV CODE- 250: Performed by: HOSPITALIST

## 2022-02-24 PROCEDURE — 74011000250 HC RX REV CODE- 250: Performed by: STUDENT IN AN ORGANIZED HEALTH CARE EDUCATION/TRAINING PROGRAM

## 2022-02-24 PROCEDURE — 74011250636 HC RX REV CODE- 250/636: Performed by: HOSPITALIST

## 2022-02-24 PROCEDURE — 74011250637 HC RX REV CODE- 250/637: Performed by: STUDENT IN AN ORGANIZED HEALTH CARE EDUCATION/TRAINING PROGRAM

## 2022-02-24 PROCEDURE — 74011250637 HC RX REV CODE- 250/637: Performed by: HOSPITALIST

## 2022-02-24 PROCEDURE — 94640 AIRWAY INHALATION TREATMENT: CPT

## 2022-02-24 PROCEDURE — 85027 COMPLETE CBC AUTOMATED: CPT

## 2022-02-24 PROCEDURE — 87040 BLOOD CULTURE FOR BACTERIA: CPT

## 2022-02-24 PROCEDURE — 99223 1ST HOSP IP/OBS HIGH 75: CPT | Performed by: INTERNAL MEDICINE

## 2022-02-24 PROCEDURE — 65660000000 HC RM CCU STEPDOWN

## 2022-02-24 PROCEDURE — 36415 COLL VENOUS BLD VENIPUNCTURE: CPT

## 2022-02-24 PROCEDURE — 74011250636 HC RX REV CODE- 250/636: Performed by: STUDENT IN AN ORGANIZED HEALTH CARE EDUCATION/TRAINING PROGRAM

## 2022-02-24 PROCEDURE — 94664 DEMO&/EVAL PT USE INHALER: CPT

## 2022-02-24 PROCEDURE — 83735 ASSAY OF MAGNESIUM: CPT

## 2022-02-24 RX ORDER — POTASSIUM CHLORIDE 750 MG/1
40 TABLET, FILM COATED, EXTENDED RELEASE ORAL
Status: COMPLETED | OUTPATIENT
Start: 2022-02-24 | End: 2022-02-24

## 2022-02-24 RX ORDER — IPRATROPIUM BROMIDE AND ALBUTEROL SULFATE 2.5; .5 MG/3ML; MG/3ML
3 SOLUTION RESPIRATORY (INHALATION)
Status: DISCONTINUED | OUTPATIENT
Start: 2022-02-24 | End: 2022-02-25

## 2022-02-24 RX ORDER — MAGNESIUM SULFATE HEPTAHYDRATE 40 MG/ML
2 INJECTION, SOLUTION INTRAVENOUS ONCE
Status: COMPLETED | OUTPATIENT
Start: 2022-02-24 | End: 2022-02-24

## 2022-02-24 RX ADMIN — CEFEPIME 2 G: 2 INJECTION, POWDER, FOR SOLUTION INTRAVENOUS at 01:52

## 2022-02-24 RX ADMIN — ENOXAPARIN SODIUM 40 MG: 100 INJECTION SUBCUTANEOUS at 09:36

## 2022-02-24 RX ADMIN — IPRATROPIUM BROMIDE AND ALBUTEROL SULFATE 3 ML: .5; 3 SOLUTION RESPIRATORY (INHALATION) at 14:43

## 2022-02-24 RX ADMIN — CEFEPIME 2 G: 2 INJECTION, POWDER, FOR SOLUTION INTRAVENOUS at 17:17

## 2022-02-24 RX ADMIN — AZITHROMYCIN MONOHYDRATE 500 MG: 500 INJECTION, POWDER, LYOPHILIZED, FOR SOLUTION INTRAVENOUS at 17:17

## 2022-02-24 RX ADMIN — POTASSIUM CHLORIDE 40 MEQ: 750 TABLET, EXTENDED RELEASE ORAL at 12:28

## 2022-02-24 RX ADMIN — MAGNESIUM SULFATE IN WATER 2 G: 40 INJECTION, SOLUTION INTRAVENOUS at 12:28

## 2022-02-24 RX ADMIN — IPRATROPIUM BROMIDE AND ALBUTEROL SULFATE 3 ML: .5; 3 SOLUTION RESPIRATORY (INHALATION) at 19:41

## 2022-02-24 RX ADMIN — ACETAMINOPHEN 650 MG: 325 TABLET ORAL at 02:08

## 2022-02-24 RX ADMIN — CEFEPIME 2 G: 2 INJECTION, POWDER, FOR SOLUTION INTRAVENOUS at 09:35

## 2022-02-24 RX ADMIN — SODIUM CHLORIDE, PRESERVATIVE FREE 10 ML: 5 INJECTION INTRAVENOUS at 15:52

## 2022-02-24 RX ADMIN — SODIUM CHLORIDE 100 ML/HR: 900 INJECTION, SOLUTION INTRAVENOUS at 06:01

## 2022-02-24 NOTE — PROGRESS NOTES
Pulse oximetry assessment   90% at rest on room air (if 88% or less, skip next steps)  81% while ambulating on room air  92% at rest on 2LPM  90% while ambulating on 2LPM

## 2022-02-24 NOTE — PROGRESS NOTES
Transition of Care Plan   RUR- 11% Moderate Risk   DISPOSITION: The disposition plan is home with family assistance.  DME - Home O2 (likely needed)   F/U with PCP/Specialist     Transport: Family    At 11:48am - CM provided update regarding completion of walk test by assigned nurse to attending. Attending reports that he will keep patient one more day.     CY Sainz, CRM, LMHP-e

## 2022-02-24 NOTE — PROGRESS NOTES
Reason for Admission:                       RUR Score:     11%             PCP: First and Last name:   Sebas Frank MD     Name of Practice: [facility]   Are you a current patient: Yes/No: YES   Approximate date of last visit: [facility]   Can you participate in a virtual visit if needed: YES    Do you (patient/family) have any concerns for transition/discharge? Patient has been under the care of oncologist and has been receiving chemotherapy every three weeks. She is not receiving her chemo this week and was admitted to hospital with fever and pneumonia. Patient states she may undergo surgery in the later part of March 2022. Patient was brought to the hospital with high fever and pneumonia by fikannan. She wishes to be able to be discharged as early as Late                                                                                                                            Friday pm.  Patient has been undergoing treatment per our conversation for a moderate period of time. She states that both of her daughters are aware of the diagnosis of cancer, but not of the pneumonia. Patient's                                                                                                                      Oldest daughter is 8yrs old and has Down's syndrome. Her second daughter is 6 and her name is Leticia. Patient lives with her fiance and does not use any adaptive equiptment. She lives in a townhouse and does                                                                                                                       Not have many stairs to enter her residence. She uses the [facility] pharmacy 3 blocks from her home.   Patient is very hopeful that she will be able to attend a family cruise scheduled for the beginning of May with her [place] and daughters. Patient has a left portacatheter in place. She is currently on 2liters of nasal oxygen and doing jet neb rx q4hrs. Patient is aware that she may need nasal oxygen at home depending on                                                                                                                       Her oxygen sats when she is ambulating. MD will write orders as needed for the oxygen on 2/25/2022. I sent the initial referral to [facility] and will follow up in am with DME. Plan for utilizing home health:   Not at present time. Current Advanced Directive/Advance Care Plan:  Full Code      Healthcare Decision Maker:   Click here to complete [address] including selection of the Healthcare Decision Maker Relationship (ie \"Primary\")              Transition of Care Plan:  Patient was admitted from the ED with elevated temperature and cough. She has had her covid series, but still needs a booster shot. She did ambulate earlier today and de-sated. MD has been made aware of this and will write oxygen orders in am after 6                                               Minute walk. Patient will return home when she is medically stable and continue with her outpatient chemotherapy. She has had blood cultures and is currently on IVAB THERAPY. The plan is for her to be discharged when medically stable by early part of next                                            Week.   Her fiance will provide transport home when discharged and she will re-schedule her chemotherapy with oncologist.

## 2022-02-24 NOTE — PROGRESS NOTES
Reason for Admission:   Fever of 104 and has breast cancer and is on chemotherapy rx. RUR Score:    11%              PCP: First and Last name:   Sebas Frank MD     Name of Practice:    Are you a current patient: Yes/No:    Approximate date of last visit:    Can you participate in a virtual visit if needed: yes    Do you (patient/family) have any concerns for transition/discharge? Patient is anxious to return home to her fiance and 2 children. She comes to her oncologist's office [id number] for her chemotherapy at [facility] for utilizing home health:   Not at present time. Current Advanced Directive/Advance Care Plan:  Full Code      Healthcare Decision Maker: none on file at this time. Click here to complete [address] including selection of the Healthcare Decision Maker Relationship (ie \"Primary\")              Transition of Care Plan:      Patient has been treated for breast cancer and has been undergoing chemotherapy every three weeks. She sees Dr Dang Murguia here at [facility]. Her fiancee brought her to the ED on 2/22/2022 and she was                                                   Diagnosed with pneumonia. She has been admitted and is on 2 IVAB via her portacatheter. Patient has two young children at home and her boyfriend Kari Park is providing care for them a this time. Patient has                                                   Had  Her 2 covid shots and will check with MD regarding when she can have her booster shot. Patient is presently not working and denies using any adaptive equiptment at home. She declined any information                                                  On ACP at this time. Patient uses the [facility] pharmacy which is 3 blocks from her home.   Presently patient is on 2 liters of nasal oxygeng and during rounds this was discussed with Dr Raúl Moser hospitalist.  Nursing                                                   Did a 6 minute walk test and documented it in chart. Patient remains on oxygen and referral initiated regarding the possibility of home oxygen. Care management will follow for transition of care needs.

## 2022-02-24 NOTE — PROGRESS NOTES
Bedside and Verbal shift change report given to Renetta Fierro (oncoming nurse) by Johny Mccarty (offgoing nurse). Report included the following information SBAR, Kardex, MAR and Recent Results.

## 2022-02-24 NOTE — PROGRESS NOTES
02/23/22 2013   Vitals   Temp (!) 100.7 °F (38.2 °C)   Temp Source Oral   Pulse (Heart Rate) 94   Heart Rate Source Monitor   Resp Rate 22   O2 Sat (%) 96 %   Level of Consciousness Alert (0)   BP (!) 92/53   MAP (Calculated) 66   BP 1 Location Left upper arm   BP 1 Method Automatic   BP Patient Position At rest   MEWS Score 3     Patient refused tylenol. Notified MD. Will continue to monitor.

## 2022-02-24 NOTE — PROGRESS NOTES
[facility]  Hospitalist Group                                                                                          Hospitalist Progress Note  Jeff Healy MD  Answering service: [phone] from in house phone        Date of Service:  2022  NAME:  Emmy Smiley  :  1975  MRN:  [id number]      Admission Summary:      40 y.o lady w/ breast cancer on chemo who presents with fever. Symptoms began 2 days ago. She describes fatigue and a fever as high as 104 F. Associated symptoms include a non-productive cough and diarrhea that started earlier today. Some nausea without vomiting or abdominal pain. No dysuria or hematuria. Interval history / Subjective:   Patient seen and examined today, doing fine, not complaining of any shortness of breath still on 2 L nasal cannula. Denies any chest pain. Assessment & Plan:        CAP w/ sepsis in an immunocompromised patient:  Acute hypoxic respiratory failure  -IV cefepime and azithromycin for now  -IVNS  CTA chestno acute PE, mild right lower lobe airspace disease, splenomegaly and unchanged pulmonary artery aneurysm. -Patient still on 2 L nasal cannula, did do walk test to requiring 2 L nasal cannula. DuoNebs  Blood culture pending.     Breast cancer on chemo:   -consult her oncologist       Code status:  Full code  Prophylaxis: Lovenox  Care Plan discussed with: Patient, RN, case management  Anticipated Disposition: 24 to 48 hours     Hospital Problems  Date Reviewed: 2022          Codes Class Noted POA    Sepsis ([facility] ICD-10-CM: A41.9  ICD-9-CM: 038.9, 995.91  2022 Unknown                Review of Systems:   A comprehensive review of systems was negative except for that written in the HPI. Vital Signs:    Last 24hrs VS reviewed since prior progress note.  Most recent are:  Visit Vitals  /70   Pulse (!) 103   Temp 99.3 °F (37.4 °C)   Resp 19   Wt 94 kg (207 lb 4.8 oz)   SpO2 94%   BMI 40.49 kg/m²         Intake/Output Summary (Last 24 hours) at 2/24/2022 1330  Last data filed at 2/23/2022 1914  Gross per 24 hour   Intake 240 ml   Output    Net 240 ml        Physical Examination:     I had a face to face encounter with this patient and independently examined them on 2/24/2022 as outlined below:          Constitutional:  No acute distress, cooperative, pleasant    ENT:  Oral mucosa moist, oropharynx benign. Resp:  CTA bilaterally. No wheezing/rhonchi/rales. No accessory muscle use. CV:  Regular rhythm, normal rate, no murmurs, gallops, rubs    GI:  Soft, non distended, non tender. normoactive bowel sounds, no hepatosplenomegaly     Musculoskeletal:  No edema, warm, 2+ pulses throughout    Neurologic:  Moves all extremities. AAOx3, CN II-XII reviewed            Data Review:    Review and/or order of clinical lab test  Review and/or order of tests in the radiology section of [facility]      Labs:     Recent Labs     02/24/22  0201   WBC 6.7   HGB 8.1*   HCT 26.6*   PLT 90*     Recent Labs     02/24/22  0201   *   K 3.0*      CO2 24   BUN 6   CREA 0.67   *   CA 7.9*   MG 1.5*     No results for input(s): ALT, AP, TBIL, TBILI, TP, ALB, GLOB, GGT, AML, LPSE in the last 72 hours. No lab exists for component: SGOT, GPT, AMYP, HLPSE  No results for input(s): INR, PTP, APTT, INREXT in the last 72 hours. No results for input(s): FE, TIBC, PSAT, FERR in the last 72 hours. No results found for: FOL, RBCF   No results for input(s): PH, PCO2, PO2 in the last 72 hours. No results for input(s): CPK, CKNDX, TROIQ in the last 72 hours.     No lab exists for component: CPKMB  Lab Results   Component Value Date/Time    Cholesterol, total 198 06/08/2021 09:26 AM    HDL Cholesterol 43 06/08/2021 09:26 AM    LDL, calculated 117.2 (H) 06/08/2021 09:26 AM    Triglyceride 189 (H) 06/08/2021 09:26 AM    CHOL/HDL Ratio 4.6 06/08/2021 09:26 AM     No results found for: [facility]  Lab Results   Component Value Date/Time    Color VALARIE 05/02/2016 01:35 PM    Appearance TURBID (A) 05/02/2016 01:35 PM    Specific gravity 1.026 05/02/2016 01:35 PM    pH (UA) 5.5 05/02/2016 01:35 PM    Protein TRACE (A) 05/02/2016 01:35 PM    Glucose NEGATIVE  05/02/2016 01:35 PM    Ketone 15 (A) 05/02/2016 01:35 PM    Bilirubin NEGATIVE  04/28/2016 07:36 PM    Urobilinogen 1.0 05/02/2016 01:35 PM    Nitrites POSITIVE (A) 05/02/2016 01:35 PM    Leukocyte Esterase LARGE (A) 05/02/2016 01:35 PM    Epithelial cells MANY (A) 05/02/2016 01:35 PM    Bacteria 2+ (A) 05/02/2016 01:35 PM    WBC 10-20 05/02/2016 01:35 PM    RBC 0-5 05/02/2016 01:35 PM         Medications Reviewed:     Current Facility-Administered Medications   Medication Dose Route Frequency    magnesium sulfate 2 g/50 ml IVPB (premix or compounded)  2 g IntraVENous ONCE    sodium chloride (NS) flush 5-10 mL  5-10 mL IntraVENous PRN    ondansetron (ZOFRAN) injection 4 mg  4 mg IntraVENous Q6H PRN    sodium chloride (NS) flush 5-40 mL  5-40 mL IntraVENous Q8H    sodium chloride (NS) flush 5-40 mL  5-40 mL IntraVENous PRN    acetaminophen (TYLENOL) tablet 650 mg  650 mg Oral Q6H PRN    Or    acetaminophen (TYLENOL) suppository 650 mg  650 mg Rectal Q6H PRN    polyethylene glycol (MIRALAX) packet 17 g  17 g Oral DAILY PRN    enoxaparin (LOVENOX) injection 40 mg  40 mg SubCUTAneous DAILY    cefepime (MAXIPIME) 2 g in 0.9% sodium chloride 10 mL IV syringe  2 g IntraVENous Q8H    0.9% sodium chloride infusion  100 mL/hr IntraVENous CONTINUOUS    azithromycin (ZITHROMAX) 500 mg in 0.9% sodium chloride 250 mL (VIAL-MATE)  500 mg IntraVENous Q24H     ______________________________________________________________________  EXPECTED LENGTH OF STAY: - - -  ACTUAL LENGTH OF STAY:          1                 Jose Collins MD

## 2022-02-24 NOTE — PROGRESS NOTES
[facility] [place] [facility] [address], [address], [place]: [phone]  F: [phone]    Reason for Visit:   Gerald Lin is a 40 y.o. female seen today in hospital for Right Breast Cancer    Treatment History:   · Initially had abnormal mammo at [facility] with calcifications on RIGHT. No mass reported/dense breast tissue  · RIGHT Breast Biopsy 9/9/21: PATH -  IDC, grade 3 ER/AL strong+ Her2 3+  · Genetic testing negative  · Breast MRI 9/17/21: RIGHT BREAST:Background parenchymal enhancement: Severe. There is a biopsy clip in the lower, outer quadrant of the right breast, junction of the middle and deep thirds. There is linear, branching enhancement which extends anterior to the biopsy clip over approximately 3.6 cm. There are no suspicious internal mammary or axillary chain lymph nodes. LEFT BREAST: Background parenchymal enhancement: Severe. There is no suspicious masslike or nonmasslike enhancement within the left breast to indicate breast carcinoma. There is a small mass in the lower, outer quadrant with an adjacent biopsy clip. There is an additional mass without significant enhancement in the lateral, middle 3rd, just below the nipple line. There are no suspicious internal mammary or axillary chain lymph nodes  · Neoadjuvant TCHP chemo 10/21/21 - Current   · Breast MRI 1/27/22: Right Breast: In the right lateral breast at 8-9 o'clock posterior depth 10 cm from the nipple there is susceptibility artifact from the biopsy clip but no residual enhancement. There is no suspicious axillary or internal mammary chain lymphadenopathy. Left Breast: No suspicious enhancement. There is no suspicious axillary or internal mammary chain lymphadenopathy.     STAGE: Clinical 2 ER/AL+, HER2+    History of Present Illness:   Gerald Lin is a 40 y.o. female seen in hospital during admit for PNA for breast cancer. Pt is done neoadjuvant chemo and for breast surgery 3/22.  Due for HP today but will hold. Pt is in bed on . States feels ok. CBC stable with anemia. Platelets also a little low at 90k  On IV abx. No fevers/ chills/ chest pain/ SOB/ nausea/ vomiting/diarrhea/ pain/            Last office visit:   seen today in office for follow up of right breast cancer ER+ Her2 + post biopsy only 21. She started neoadjuvant TCHP on 10/21/21. She had a Breast MRI on 22 that showed no residual mass. She is here today for pre chemo labs and OV and will have Cycle 6 of neoadjuvant TCHP chemotherapy tomorrow. She reports that she feels well overall today. She is tolerating chemo well overall thus far. After last chemo, she had one episode of vomiting. She took anti-emetics which helped. Her appetite and energy levels are good. She denies fever, chills, mouth sores, cough, SOB, CP, nausea, diarrhea, constipation, and neuropathy. She denies pain today. CBC and CMP are still pending today. She is tentatively scheduled for surgery with Dr. cAe Holland on 3/15/22. She is ready for treatment tomorrow. She is here alone today.     Past Medical History:   Diagnosis Date    Bell's palsy     Breast cancer ([facility] [address] 2021    ER/[place]+ Her2 +      Past Surgical History:   Procedure Laterality Date    HX  SECTION      HX  SECTION      HX CHOLECYSTECTOMY      HX CHOLECYSTECTOMY        Social History     Tobacco Use    Smoking status: Never Smoker    Smokeless tobacco: Never Used   Substance Use Topics    Alcohol use: Yes     Comment: rarely      Family History   Problem Relation Age of Onset    Cancer Sister         leukemia     Current Facility-Administered Medications   Medication Dose Route Frequency    potassium chloride SR (KLOR-CON 10) tablet 40 mEq  40 mEq Oral NOW    magnesium sulfate 2 g/50 ml IVPB (premix or compounded)  2 g IntraVENous ONCE    sodium chloride (NS) flush 5-10 mL  5-10 mL IntraVENous PRN    ondansetron (ZOFRAN) injection 4 mg  4 mg IntraVENous Q6H PRN    sodium chloride (NS) flush 5-40 mL  5-40 mL IntraVENous Q8H    sodium chloride (NS) flush 5-40 mL  5-40 mL IntraVENous PRN    acetaminophen (TYLENOL) tablet 650 mg  650 mg Oral Q6H PRN    Or    acetaminophen (TYLENOL) suppository 650 mg  650 mg Rectal Q6H PRN    polyethylene glycol (MIRALAX) packet 17 g  17 g Oral DAILY PRN    enoxaparin (LOVENOX) injection 40 mg  40 mg SubCUTAneous DAILY    cefepime (MAXIPIME) 2 g in 0.9% sodium chloride 10 mL IV syringe  2 g IntraVENous Q8H    0.9% sodium chloride infusion  100 mL/hr IntraVENous CONTINUOUS    azithromycin (ZITHROMAX) 500 mg in 0.9% sodium chloride 250 mL (VIAL-MATE)  500 mg IntraVENous Q24H      No Known Allergies     Review of Systems:  A complete review of systems was obtained, negative except as described above     Physical Exam:     Visit Vitals  BP 95/63 (BP 1 Location: Right upper arm, BP Patient Position: At rest)   Pulse 80   Temp 98.9 °F (37.2 °C)   Resp 19   Wt 207 lb 4.8 oz (94 kg)   SpO2 98%   BMI 40.49 kg/m²     ECOG PS: 0  General: No distress  Eyes: Anicteric sclerae  HENT: Atraumatic  Neck: Supple  CV: Regular   Respiratory: CTAB, normal respiratory effort  MS: in bed moving usually ambulatory. Skin: No rashes, ecchymoses, or petechiae. Normal temperature, turgor, and texture. Psych: Alert, oriented, appropriate affect, normal judgment/insight  Neuro: Non focal    Results:     Lab Results   Component Value Date/Time    WBC 6.7 02/24/2022 02:01 AM    HGB 8.1 (L) 02/24/2022 02:01 AM    HCT 26.6 (L) 02/24/2022 02:01 AM    PLATELET 90 (L) 87/37/1731 02:01 AM    MCV 92.7 02/24/2022 02:01 AM    ABS.  NEUTROPHILS 3.3 02/03/2022 10:09 AM     Lab Results   Component Value Date/Time    Sodium 133 (L) 02/24/2022 02:01 AM    Potassium 3.0 (L) 02/24/2022 02:01 AM    Chloride 106 02/24/2022 02:01 AM    CO2 24 02/24/2022 02:01 AM    Glucose 118 (H) 02/24/2022 02:01 AM    BUN 6 02/24/2022 02:01 AM    Creatinine 0.67 02/24/2022 02:01 AM    GFR est AA >60 02/24/2022 02:01 AM    GFR est non-AA >60 02/24/2022 02:01 AM    Calcium 7.9 (L) 02/24/2022 02:01 AM     Lab Results   Component Value Date/Time    Bilirubin, total 0.2 02/03/2022 10:09 AM    ALT (SGPT) 24 02/03/2022 10:09 AM    Alk. phosphatase 66 02/03/2022 10:09 AM    Protein, total 6.8 02/03/2022 10:09 AM    Albumin 3.1 (L) 02/03/2022 10:09 AM    Globulin 3.7 02/03/2022 10:09 AM     Path at outside facility - scanned into chart     Breast MRI 9/17/21  RIGHT BREAST:  Background parenchymal enhancement: Severe   There is a biopsy clip in the lower, outer quadrant of the right breast,  junction of the middle and deep thirds. There is linear, branching enhancement  which extends anterior to the biopsy clip over approximately 3.6 cm     There are no suspicious internal mammary or axillary chain lymph nodes. Mayela Red LEFT BREAST:  Background parenchymal enhancement: Severe  There is no suspicious masslike or nonmasslike enhancement within the left  breast to indicate breast carcinoma. There is a small mass in the lower, outer  quadrant with an adjacent biopsy clip. There is an additional mass without  significant enhancement in the lateral, middle 3rd, just below the nipple line.     There are no suspicious internal mammary or axillary chain lymph nodes. .  IMPRESSION  RIGHT BREAST:  1. There is a biopsy clip in the lower, outer quadrant of the right breast,  junction of the middle and deep thirds. There is linear, branching enhancement  which extends anteriorly from the biopsy clip over approximately 3.6 cm. BI-RADS Category 6 - Known biopsy-proven malignancy. Breast MRI 1/27/22  FINDINGS:  There is marked background parenchymal enhancement. The breast tissue is heterogeneously dense, which could obscure detection of  small masses (approximately 51-75% glandular).          Right breast:  In the right lateral breast at 8-9 o'clock posterior depth 10 cm from the nipple  there is susceptibility artifact from the biopsy clip but no residual  enhancement.     There is no suspicious axillary or internal mammary chain lymphadenopathy.     Left breast:  No suspicious enhancement.     There is no suspicious axillary or internal mammary chain lymphadenopathy.     IMPRESSION     Right Breast:  1. BI-RADS Assessment Category 6: Known biopsy proven malignancy- Appropriate  action should be taken. Right breast biopsy clip with no residual suspicious  enhancement.     Left Breast:  1. BI-RADS Assessment     10/13/21    ECHO ADULT COMPLETE 10/13/2021 10/13/2021    Interpretation Summary  · LV: Estimated LVEF is 60 - 65%. Normal cavity size, wall thickness, systolic function (ejection fraction normal) and diastolic function. Normal left ventricular strain. Signed by: Jos Weiner MD on 10/13/2021  3:52 PM    01/05/22    ECHO ADULT COMPLETE 01/05/2022 1/5/2022    Interpretation Summary    Left Ventricle: Left ventricle size is normal. Normal wall thickness. Normal wall motion. Normal left ventricular systolic function with a visually estimated EF of 55 - 60%. Global longitudinal strain is -18.0%. Normal diastolic function. Signed by: Kari Sandoval MD on 1/5/2022 12:52 PM    Records reviewed and summarized above. Pathology report(s) reviewed above. Radiology report(s) reviewed above. Assessment:/PLAN     1) Clinical Stage 2 (based on MRI) RIGHT Breast Cancer ER+ Her2+ post Breast Biopsy only at [address] 9/9/21   Breast MRI 9/17/21 with 3.6 cm enhancement. No mass palpable on exam.   Patient was sent here for neoadjuvant chemo based on size of mass on MRI. Patient prefers lumpectomy, d/w breast surgery. She had a follow up ECHO on 1/5/22 and EF 55-60%. Seen today in hospital during admit for PNA. did neoadjuvant TCHP chemotherapy and finished. On HP and was due today. Hold HP today and reschedule to next week. She saw Breast Surgery on 1/10/22.  She had a Breast MRI on 1/27/22 that showed no residual disease. Surgery is scheduled for 3/15/22. PNA treatment per IM. No plans for any inpt treatment from us  Pt is in bed and comfortable. 2) PNA per IM. 3) anemia chronic worsened by PNA. Monitor. 4) Psychosocial  She is vaccinated for COVID. She works as a , has a supportive fiance. SW/NN support as needed. Call if questions. Follow up as outpt for HP next week    I appreciate the opportunity to participate in Ms. Susan Rob's care.     Signed By: Torri Dwyer DO

## 2022-02-25 ENCOUNTER — HOSPITAL ENCOUNTER (OUTPATIENT)
Dept: INFUSION THERAPY | Age: 47
End: 2022-02-25

## 2022-02-25 LAB
ANION GAP SERPL CALC-SCNC: 5 MMOL/L (ref 5–15)
ATRIAL RATE: 97 BPM
BUN SERPL-MCNC: 4 MG/DL (ref 6–20)
BUN/CREAT SERPL: 6 (ref 12–20)
CALCIUM SERPL-MCNC: 8.4 MG/DL (ref 8.5–10.1)
CALCULATED P AXIS, ECG09: 37 DEGREES
CALCULATED R AXIS, ECG10: -12 DEGREES
CALCULATED T AXIS, ECG11: 11 DEGREES
CHLORIDE SERPL-SCNC: 104 MMOL/L (ref 97–108)
CO2 SERPL-SCNC: 28 MMOL/L (ref 21–32)
CREAT SERPL-MCNC: 0.65 MG/DL (ref 0.55–1.02)
DIAGNOSIS, 93000: NORMAL
ERYTHROCYTE [DISTWIDTH] IN BLOOD BY AUTOMATED COUNT: 15.8 % (ref 11.5–14.5)
GLUCOSE SERPL-MCNC: 193 MG/DL (ref 65–100)
HCT VFR BLD AUTO: 24.9 % (ref 35–47)
HGB BLD-MCNC: 7.5 G/DL (ref 11.5–16)
MCH RBC QN AUTO: 28 PG (ref 26–34)
MCHC RBC AUTO-ENTMCNC: 30.1 G/DL (ref 30–36.5)
MCV RBC AUTO: 92.9 FL (ref 80–99)
NRBC # BLD: 0 K/UL (ref 0–0.01)
NRBC BLD-RTO: 0 PER 100 WBC
P-R INTERVAL, ECG05: 170 MS
PLATELET # BLD AUTO: 105 K/UL (ref 150–400)
PMV BLD AUTO: 10.8 FL (ref 8.9–12.9)
POTASSIUM SERPL-SCNC: 2.8 MMOL/L (ref 3.5–5.1)
Q-T INTERVAL, ECG07: 340 MS
QRS DURATION, ECG06: 100 MS
QTC CALCULATION (BEZET), ECG08: 431 MS
RBC # BLD AUTO: 2.68 M/UL (ref 3.8–5.2)
SODIUM SERPL-SCNC: 137 MMOL/L (ref 136–145)
VENTRICULAR RATE, ECG03: 97 BPM
WBC # BLD AUTO: 4.4 K/UL (ref 3.6–11)

## 2022-02-25 PROCEDURE — 85027 COMPLETE CBC AUTOMATED: CPT

## 2022-02-25 PROCEDURE — 36415 COLL VENOUS BLD VENIPUNCTURE: CPT

## 2022-02-25 PROCEDURE — 87040 BLOOD CULTURE FOR BACTERIA: CPT

## 2022-02-25 PROCEDURE — 74011250636 HC RX REV CODE- 250/636: Performed by: HOSPITALIST

## 2022-02-25 PROCEDURE — 74011250636 HC RX REV CODE- 250/636: Performed by: STUDENT IN AN ORGANIZED HEALTH CARE EDUCATION/TRAINING PROGRAM

## 2022-02-25 PROCEDURE — 74011000250 HC RX REV CODE- 250: Performed by: STUDENT IN AN ORGANIZED HEALTH CARE EDUCATION/TRAINING PROGRAM

## 2022-02-25 PROCEDURE — 94640 AIRWAY INHALATION TREATMENT: CPT

## 2022-02-25 PROCEDURE — 74011000250 HC RX REV CODE- 250: Performed by: HOSPITALIST

## 2022-02-25 PROCEDURE — 80048 BASIC METABOLIC PNL TOTAL CA: CPT

## 2022-02-25 PROCEDURE — 99232 SBSQ HOSP IP/OBS MODERATE 35: CPT | Performed by: INTERNAL MEDICINE

## 2022-02-25 PROCEDURE — 65660000000 HC RM CCU STEPDOWN

## 2022-02-25 RX ORDER — MAGNESIUM SULFATE 1 G/100ML
1 INJECTION INTRAVENOUS ONCE
Status: COMPLETED | OUTPATIENT
Start: 2022-02-25 | End: 2022-02-25

## 2022-02-25 RX ORDER — IPRATROPIUM BROMIDE AND ALBUTEROL SULFATE 2.5; .5 MG/3ML; MG/3ML
3 SOLUTION RESPIRATORY (INHALATION)
Status: DISCONTINUED | OUTPATIENT
Start: 2022-02-25 | End: 2022-02-28

## 2022-02-25 RX ORDER — POTASSIUM CHLORIDE 750 MG/1
40 TABLET, FILM COATED, EXTENDED RELEASE ORAL EVERY 6 HOURS
Status: DISCONTINUED | OUTPATIENT
Start: 2022-02-25 | End: 2022-02-25

## 2022-02-25 RX ORDER — POTASSIUM CHLORIDE 29.8 MG/ML
20 INJECTION INTRAVENOUS
Status: DISPENSED | OUTPATIENT
Start: 2022-02-25 | End: 2022-02-26

## 2022-02-25 RX ORDER — SODIUM CHLORIDE 9 MG/ML
75 INJECTION, SOLUTION INTRAVENOUS CONTINUOUS
Status: DISPENSED | OUTPATIENT
Start: 2022-02-25 | End: 2022-02-26

## 2022-02-25 RX ADMIN — POTASSIUM CHLORIDE 20 MEQ: 400 INJECTION, SOLUTION INTRAVENOUS at 16:51

## 2022-02-25 RX ADMIN — CEFEPIME 2 G: 2 INJECTION, POWDER, FOR SOLUTION INTRAVENOUS at 01:32

## 2022-02-25 RX ADMIN — CEFEPIME 2 G: 2 INJECTION, POWDER, FOR SOLUTION INTRAVENOUS at 19:33

## 2022-02-25 RX ADMIN — AZITHROMYCIN MONOHYDRATE 500 MG: 500 INJECTION, POWDER, LYOPHILIZED, FOR SOLUTION INTRAVENOUS at 19:33

## 2022-02-25 RX ADMIN — POTASSIUM CHLORIDE 20 MEQ: 400 INJECTION, SOLUTION INTRAVENOUS at 22:22

## 2022-02-25 RX ADMIN — MAGNESIUM SULFATE 1 G: 1 INJECTION INTRAVENOUS at 15:10

## 2022-02-25 RX ADMIN — CEFEPIME 2 G: 2 INJECTION, POWDER, FOR SOLUTION INTRAVENOUS at 11:43

## 2022-02-25 RX ADMIN — SODIUM CHLORIDE 75 ML/HR: 9 INJECTION, SOLUTION INTRAVENOUS at 11:33

## 2022-02-25 RX ADMIN — SODIUM CHLORIDE, PRESERVATIVE FREE 10 ML: 5 INJECTION INTRAVENOUS at 22:25

## 2022-02-25 RX ADMIN — IPRATROPIUM BROMIDE AND ALBUTEROL SULFATE 3 ML: .5; 3 SOLUTION RESPIRATORY (INHALATION) at 20:21

## 2022-02-25 RX ADMIN — IPRATROPIUM BROMIDE AND ALBUTEROL SULFATE 3 ML: .5; 3 SOLUTION RESPIRATORY (INHALATION) at 07:32

## 2022-02-25 NOTE — PROGRESS NOTES
[facility] Adult  Hospitalist Group                                                                                          Hospitalist Progress Note  Jose Collins MD  Answering service: [phone] OR [phone] from in house phone        Date of Service:  2022  NAME:  Mary Ellen Pruitt  :  1975  MRN:  [id number]      Admission Summary:      40 y.o lady w/ breast cancer on chemo who presents with fever. Symptoms began 2 days ago. She describes fatigue and a fever as high as 104 F. Associated symptoms include a non-productive cough and diarrhea that started earlier today. Some nausea without vomiting or abdominal pain. No dysuria or hematuria. Interval history / Subjective:   Patient seen and examined today, doing fine, not complaining of any shortness of breath still on 2 L nasal cannula. Denies any chest pain. Assessment & Plan:        CAP w/ sepsis in an immunocompromised patient:  Acute hypoxic respiratory failure  Gram-positive bacteremia  -IV cefepime and azithromycin  Initial blood culture positive for gram-positive cocci in clusters, final reports are pending. Repeat blood culture today. -IVNS  CTA chestno acute PE, mild right lower lobe airspace disease, splenomegaly and unchanged pulmonary artery aneurysm. -Patient still on 2 L nasal cannula, did do walk test to requiring 2 L nasal cannula. Case management notified about the home oxygen needs. DuoNebs    Hypokalemia  Hypomagnesemia  Replaced today, BMP magnesium in a.m.     Breast cancer on chemo:   -Oncology on boardappreciate input.        Code status:  Full code  Prophylaxis: Lovenox  Care Plan discussed with: Patient, RN, case management  Anticipated Disposition: 24 to 48 hours     Hospital Problems  Date Reviewed: 2022          Codes Class Noted POA    Sepsis ([facility] [address] ICD-10-CM: A41.9  ICD-9-CM: 038.9, 995.91  2022 Unknown                Review of Systems:   A comprehensive review of systems was negative except for that written in the HPI. Vital Signs:    Last 24hrs VS reviewed since prior progress note. Most recent are:  Visit Vitals  BP 91/60 (BP 1 Location: Left upper arm, BP Patient Position: At rest)   Pulse 90   Temp 98.3 °F (36.8 °C)   Resp 18   Wt 94 kg (207 lb 4.8 oz)   SpO2 94%   BMI 40.49 kg/m²       No intake or output data in the 24 hours ending 02/25/22 1259     Physical Examination:     I had a face to face encounter with this patient and independently examined them on 2/25/2022 as outlined below:          Constitutional:  No acute distress, cooperative, pleasant    ENT:  Oral mucosa moist, oropharynx benign. Resp:  CTA bilaterally. No wheezing/rhonchi/rales. No accessory muscle use. CV:  Regular rhythm, normal rate, no murmurs, gallops, rubs    GI:  Soft, non distended, non tender. normoactive bowel sounds, no hepatosplenomegaly     Musculoskeletal:  No edema, warm, 2+ pulses throughout    Neurologic:  Moves all extremities. AAOx3, CN II-XII reviewed            Data Review:    Review and/or order of clinical lab test  Review and/or order of tests in the radiology section of CPT      Labs:     Recent Labs     02/25/22  1135 02/24/22  0201   WBC 4.4 6.7   HGB 7.5* 8.1*   HCT 24.9* 26.6*   * 90*     Recent Labs     02/25/22  1135 02/24/22  0201    133*   K 2.8* 3.0*    106   CO2 28 24   BUN 4* 6   CREA 0.65 0.67   * 118*   CA 8.4* 7.9*   MG  --  1.5*     No results for input(s): ALT, AP, TBIL, TBILI, TP, ALB, GLOB, GGT, AML, LPSE in the last 72 hours. No lab exists for component: SGOT, GPT, AMYP, HLPSE  No results for input(s): INR, PTP, APTT, INREXT, INREXT in the last 72 hours. No results for input(s): FE, TIBC, PSAT, FERR in the last 72 hours. No results found for: FOL, RBCF   No results for input(s): PH, PCO2, PO2 in the last 72 hours. No results for input(s): CPK, CKNDX, TROIQ in the last 72 hours.     No lab exists for component: CPKMB  Lab Results   Component Value Date/Time    Cholesterol, total 198 06/08/2021 09:26 AM    HDL Cholesterol 43 06/08/2021 09:26 AM    LDL, calculated 117.2 (H) 06/08/2021 09:26 AM    Triglyceride 189 (H) 06/08/2021 09:26 AM    CHOL/HDL Ratio 4.6 06/08/2021 09:26 AM     No results found for: [facility]  Lab Results   Component Value Date/Time    Color VALARIE 05/02/2016 01:35 PM    Appearance TURBID (A) 05/02/2016 01:35 PM    Specific gravity 1.026 05/02/2016 01:35 PM    pH (UA) 5.5 05/02/2016 01:35 PM    Protein TRACE (A) 05/02/2016 01:35 PM    Glucose NEGATIVE  05/02/2016 01:35 PM    Ketone 15 (A) 05/02/2016 01:35 PM    Bilirubin NEGATIVE  04/28/2016 07:36 PM    Urobilinogen 1.0 05/02/2016 01:35 PM    Nitrites POSITIVE (A) 05/02/2016 01:35 PM    Leukocyte Esterase LARGE (A) 05/02/2016 01:35 PM    Epithelial cells MANY (A) 05/02/2016 01:35 PM    Bacteria 2+ (A) 05/02/2016 01:35 PM    WBC 10-20 05/02/2016 01:35 PM    RBC 0-5 05/02/2016 01:35 PM         Medications Reviewed:     Current Facility-Administered Medications   Medication Dose Route Frequency    0.9% sodium chloride infusion  75 mL/hr IntraVENous CONTINUOUS    albuterol-ipratropium (DUO-NEB) 2.5 MG-0.5 MG/3 ML  3 mL Nebulization BID RT    sodium chloride (NS) flush 5-10 mL  5-10 mL IntraVENous PRN    ondansetron (ZOFRAN) injection 4 mg  4 mg IntraVENous Q6H PRN    sodium chloride (NS) flush 5-40 mL  5-40 mL IntraVENous Q8H    sodium chloride (NS) flush 5-40 mL  5-40 mL IntraVENous PRN    acetaminophen (TYLENOL) tablet 650 mg  650 mg Oral Q6H PRN    Or    acetaminophen (TYLENOL) suppository 650 mg  650 mg Rectal Q6H PRN    polyethylene glycol (MIRALAX) packet 17 g  17 g Oral DAILY PRN    enoxaparin (LOVENOX) injection 40 mg  40 mg SubCUTAneous DAILY    cefepime (MAXIPIME) 2 g in 0.9% sodium chloride 10 mL IV syringe  2 g IntraVENous Q8H    azithromycin (ZITHROMAX) 500 mg in 0.9% sodium chloride 250 mL (VIAL-MATE)  500 mg IntraVENous Q24H ______________________________________________________________________  EXPECTED LENGTH OF STAY: - - -  ACTUAL LENGTH OF STAY:          2                 Maryann Talavera MD

## 2022-02-25 NOTE — PROGRESS NOTES
[facility] at [address], [id number] [place] [address] Road, [facility] [phone]  F: [phone]    Reason for Visit:   Leonardo Soriano is a 40 y.o. female seen today in hospital for Right Breast Cancer    Treatment History:   · Initially had abnormal mammo at [facility] with calcifications on RIGHT. No mass reported/dense breast tissue  · RIGHT Breast Biopsy 9/9/21: PATH -  IDC, grade 3 ER/DC strong+ Her2 3+  · Genetic testing negative  · Breast MRI 9/17/21: RIGHT BREAST:Background parenchymal enhancement: Severe. There is a biopsy clip in the lower, outer quadrant of the right breast, junction of the middle and deep thirds. There is linear, branching enhancement which extends anterior to the biopsy clip over approximately 3.6 cm. There are no suspicious internal mammary or axillary chain lymph nodes. LEFT BREAST: Background parenchymal enhancement: Severe. There is no suspicious masslike or nonmasslike enhancement within the left breast to indicate breast carcinoma. There is a small mass in the lower, outer quadrant with an adjacent biopsy clip. There is an additional mass without significant enhancement in the lateral, middle 3rd, just below the nipple line. There are no suspicious internal mammary or axillary chain lymph nodes  · Neoadjuvant TCHP chemo 10/21/21 - Current   · Breast MRI 1/27/22: Right Breast: In the right lateral breast at 8-9 o'clock posterior depth 10 cm from the nipple there is susceptibility artifact from the biopsy clip but no residual enhancement. There is no suspicious axillary or internal mammary chain lymphadenopathy. Left Breast: No suspicious enhancement. There is no suspicious axillary or internal mammary chain lymphadenopathy.     STAGE: Clinical 2 ER/DC+, HER2+    History of Present Illness:   Leonardo Soriano is a 40 y.o. female seen in hospital fu during admit for PNA for breast cancer. Pt is done neoadjuvant chemo and for breast surgery 3/22. Pt is in bed on 02. States feels ok. CBC stable with anemia. Platelets also a little low at 90k. Labs pending today. On IV abx.    No new issues this am.   No fevers/ chills/ chest pain/ SOB/ nausea/ vomiting/diarrhea/ pain/          Past Medical History:   Diagnosis Date    Bell's palsy     Breast cancer ([facility] [address] 2021    ER/[place]+ Her2 +      Past Surgical History:   Procedure Laterality Date    HX  SECTION      HX  SECTION      HX CHOLECYSTECTOMY      HX CHOLECYSTECTOMY        Social History     Tobacco Use    Smoking status: Never Smoker    Smokeless tobacco: Never Used   Substance Use Topics    Alcohol use: Yes     Comment: rarely      Family History   Problem Relation Age of Onset    Cancer Sister         leukemia     Current Facility-Administered Medications   Medication Dose Route Frequency    0.9% sodium chloride infusion  75 mL/hr IntraVENous CONTINUOUS    albuterol-ipratropium (DUO-NEB) 2.5 MG-0.5 MG/3 ML  3 mL Nebulization QID RT    sodium chloride (NS) flush 5-10 mL  5-10 mL IntraVENous PRN    ondansetron (ZOFRAN) injection 4 mg  4 mg IntraVENous Q6H PRN    sodium chloride (NS) flush 5-40 mL  5-40 mL IntraVENous Q8H    sodium chloride (NS) flush 5-40 mL  5-40 mL IntraVENous PRN    acetaminophen (TYLENOL) tablet 650 mg  650 mg Oral Q6H PRN    Or    acetaminophen (TYLENOL) suppository 650 mg  650 mg Rectal Q6H PRN    polyethylene glycol (MIRALAX) packet 17 g  17 g Oral DAILY PRN    enoxaparin (LOVENOX) injection 40 mg  40 mg SubCUTAneous DAILY    cefepime (MAXIPIME) 2 g in 0.9% sodium chloride 10 mL IV syringe  2 g IntraVENous Q8H    azithromycin (ZITHROMAX) 500 mg in 0.9% sodium chloride 250 mL (VIAL-MATE)  500 mg IntraVENous Q24H      No Known Allergies     Review of Systems:  A complete review of systems was obtained, negative except as described above     Physical Exam:     Visit Vitals  BP 91/60 (BP 1 Location: Left upper arm, BP Patient Position: At rest) Pulse 93   Temp 98.3 °F (36.8 °C)   Resp 18   Wt 207 lb 4.8 oz (94 kg)   SpO2 94%   BMI 40.49 kg/m²     ECOG PS: 0  General: No distress  Eyes: Anicteric sclerae  HENT: Atraumatic  Neck: Supple  Respiratory:  normal respiratory effort  MS: in bed moving usually ambulatory. Skin: No rashes, ecchymoses, or petechiae. Normal temperature, turgor, and texture. Psych: Alert, oriented, appropriate affect, normal judgment/insight  Neuro: Non focal    Results:     Lab Results   Component Value Date/Time    WBC 6.7 02/24/2022 02:01 AM    HGB 8.1 (L) 02/24/2022 02:01 AM    HCT 26.6 (L) 02/24/2022 02:01 AM    PLATELET 90 (L) 35/23/9200 02:01 AM    MCV 92.7 02/24/2022 02:01 AM    ABS. NEUTROPHILS 3.3 02/03/2022 10:09 AM     Lab Results   Component Value Date/Time    Sodium 133 (L) 02/24/2022 02:01 AM    Potassium 3.0 (L) 02/24/2022 02:01 AM    Chloride 106 02/24/2022 02:01 AM    CO2 24 02/24/2022 02:01 AM    Glucose 118 (H) 02/24/2022 02:01 AM    BUN 6 02/24/2022 02:01 AM    Creatinine 0.67 02/24/2022 02:01 AM    GFR est AA >60 02/24/2022 02:01 AM    GFR est non-AA >60 02/24/2022 02:01 AM    Calcium 7.9 (L) 02/24/2022 02:01 AM     Lab Results   Component Value Date/Time    Bilirubin, total 0.2 02/03/2022 10:09 AM    ALT (SGPT) 24 02/03/2022 10:09 AM    Alk. phosphatase 66 02/03/2022 10:09 AM    Protein, total 6.8 02/03/2022 10:09 AM    Albumin 3.1 (L) 02/03/2022 10:09 AM    Globulin 3.7 02/03/2022 10:09 AM     Path at outside facility - scanned into chart     Breast MRI 9/17/21  RIGHT BREAST:  Background parenchymal enhancement: Severe   There is a biopsy clip in the lower, outer quadrant of the right breast,  junction of the middle and deep thirds. There is linear, branching enhancement  which extends anterior to the biopsy clip over approximately 3.6 cm     There are no suspicious internal mammary or axillary chain lymph nodes. Miri Mukherjee   LEFT BREAST:  Background parenchymal enhancement: Severe  There is no suspicious masslike or nonmasslike enhancement within the left  breast to indicate breast carcinoma. There is a small mass in the lower, outer  quadrant with an adjacent biopsy clip. There is an additional mass without  significant enhancement in the lateral, middle 3rd, just below the nipple line.     There are no suspicious internal mammary or axillary chain lymph nodes. .  IMPRESSION  RIGHT BREAST:  1. There is a biopsy clip in the lower, outer quadrant of the right breast,  junction of the middle and deep thirds. There is linear, branching enhancement  which extends anteriorly from the biopsy clip over approximately 3.6 cm. BI-RADS Category 6 - Known biopsy-proven malignancy. Breast MRI 1/27/22  FINDINGS:  There is marked background parenchymal enhancement. The breast tissue is heterogeneously dense, which could obscure detection of  small masses (approximately 51-75% glandular).       Right breast:  In the right lateral breast at 8-9 o'clock posterior depth 10 cm from the nipple  there is susceptibility artifact from the biopsy clip but no residual  enhancement.     There is no suspicious axillary or internal mammary chain lymphadenopathy.     Left breast:  No suspicious enhancement.     There is no suspicious axillary or internal mammary chain lymphadenopathy.     IMPRESSION     Right Breast:  1. BI-RADS Assessment Category 6: Known biopsy proven malignancy- Appropriate  action should be taken. Right breast biopsy clip with no residual suspicious  enhancement.     Left Breast:  1. BI-RADS Assessment     10/13/21    ECHO ADULT COMPLETE 10/13/2021 10/13/2021    Interpretation Summary  · LV: Estimated LVEF is 60 - 65%. Normal cavity size, wall thickness, systolic function (ejection fraction normal) and diastolic function. Normal left ventricular strain.     Signed by: Vannie Dubin, MD on 10/13/2021  3:52 PM    01/05/22    ECHO ADULT COMPLETE 01/05/2022 1/5/2022    Interpretation Summary    Left Ventricle: Left ventricle size is normal. Normal wall thickness. Normal wall motion. Normal left ventricular systolic function with a visually estimated EF of 55 - 60%. Global longitudinal strain is -18.0%. Normal diastolic function. Signed by: Jose Macdonald MD on 1/5/2022 12:52 PM    Records reviewed and summarized above. Pathology report(s) reviewed above. Radiology report(s) reviewed above. Assessment:/PLAN     1) Clinical Stage 2 (based on MRI) RIGHT Breast Cancer ER+ Her2+ post Breast Biopsy only at [facility] 9/9/21   Breast MRI 9/17/21 with 3.6 cm enhancement. No mass palpable on exam.   Patient was sent here for neoadjuvant chemo based on size of mass on MRI. Patient prefers lumpectomy, d/w breast surgery. She had a follow up ECHO on 1/5/22 and EF 55-60%. Seen today in hospital during admit for PNA. did neoadjuvant TCHP chemotherapy and finished. On HP and was due yest. Hold HP and reschedule to next week. She saw Breast Surgery on 1/10/22. She had a Breast MRI on 1/27/22 that showed no residual disease. Surgery is scheduled for 3/15/22. PNA treatment per IM. No plans for any inpt treatment from us  Pt is in bed and comfortable. 2) PNA per IM. 3) anemia chronic worsened by PNA. Monitor. 4) Psychosocial  She is vaccinated for COVID. She works as a , has a supportive fiance. SW/NN support as needed. Call if questions. Follow up as outpt for HP next week    I appreciate the opportunity to participate in Ms. Susan Rob's care.     Signed By: Xi Paulson DO

## 2022-02-25 NOTE — PROGRESS NOTES
Transitions of care:  RUR;11%  CRM followed up with [facility] and they will be glad to supply patient with oxygen and concentrator upon discharge. They have order from MD from today. The DME will need another six minute walk if patient is discharged after 2/26/2022.

## 2022-02-26 LAB
ANION GAP SERPL CALC-SCNC: 2 MMOL/L (ref 5–15)
BASOPHILS # BLD: 0 K/UL (ref 0–0.1)
BASOPHILS # BLD: NORMAL K/UL (ref 0–0.1)
BASOPHILS NFR BLD: 0 % (ref 0–1)
BASOPHILS NFR BLD: NORMAL % (ref 0–1)
BUN SERPL-MCNC: 4 MG/DL (ref 6–20)
BUN/CREAT SERPL: 9 (ref 12–20)
CALCIUM SERPL-MCNC: 8.7 MG/DL (ref 8.5–10.1)
CHLORIDE SERPL-SCNC: 111 MMOL/L (ref 97–108)
CO2 SERPL-SCNC: 29 MMOL/L (ref 21–32)
CREAT SERPL-MCNC: 0.47 MG/DL (ref 0.55–1.02)
DIFFERENTIAL METHOD BLD: ABNORMAL
DIFFERENTIAL METHOD BLD: NORMAL
EOSINOPHIL # BLD: 0 K/UL (ref 0–0.4)
EOSINOPHIL # BLD: NORMAL K/UL (ref 0–0.4)
EOSINOPHIL NFR BLD: 1 % (ref 0–7)
EOSINOPHIL NFR BLD: NORMAL % (ref 0–7)
ERYTHROCYTE [DISTWIDTH] IN BLOOD BY AUTOMATED COUNT: 15.6 % (ref 11.5–14.5)
ERYTHROCYTE [DISTWIDTH] IN BLOOD BY AUTOMATED COUNT: NORMAL % (ref 11.5–14.5)
GLUCOSE SERPL-MCNC: 118 MG/DL (ref 65–100)
HCT VFR BLD AUTO: 24.7 % (ref 35–47)
HCT VFR BLD AUTO: NORMAL % (ref 35–47)
HGB BLD-MCNC: 7.6 G/DL (ref 11.5–16)
HGB BLD-MCNC: NORMAL G/DL (ref 11.5–16)
IMM GRANULOCYTES # BLD AUTO: 0 K/UL
IMM GRANULOCYTES # BLD AUTO: NORMAL K/UL (ref 0–0.04)
IMM GRANULOCYTES NFR BLD AUTO: 0 %
IMM GRANULOCYTES NFR BLD AUTO: NORMAL % (ref 0–0.5)
LYMPHOCYTES # BLD: 0.9 K/UL (ref 0.8–3.5)
LYMPHOCYTES # BLD: NORMAL K/UL (ref 0.8–3.5)
LYMPHOCYTES NFR BLD: 25 % (ref 12–49)
LYMPHOCYTES NFR BLD: NORMAL % (ref 12–49)
MAGNESIUM SERPL-MCNC: 1.8 MG/DL (ref 1.6–2.4)
MCH RBC QN AUTO: 28.8 PG (ref 26–34)
MCH RBC QN AUTO: NORMAL PG (ref 26–34)
MCHC RBC AUTO-ENTMCNC: 30.8 G/DL (ref 30–36.5)
MCHC RBC AUTO-ENTMCNC: NORMAL G/DL (ref 30–36.5)
MCV RBC AUTO: 93.6 FL (ref 80–99)
MCV RBC AUTO: NORMAL FL (ref 80–99)
MONOCYTES # BLD: 0.2 K/UL (ref 0–1)
MONOCYTES # BLD: NORMAL K/UL (ref 0–1)
MONOCYTES NFR BLD: 5 % (ref 5–13)
MONOCYTES NFR BLD: NORMAL % (ref 5–13)
MYELOCYTES NFR BLD MANUAL: 1 %
NEUTS SEG # BLD: 2.4 K/UL (ref 1.8–8)
NEUTS SEG # BLD: NORMAL K/UL (ref 1.8–8)
NEUTS SEG NFR BLD: 68 % (ref 32–75)
NEUTS SEG NFR BLD: NORMAL % (ref 32–75)
NRBC # BLD: 0 K/UL (ref 0–0.01)
NRBC # BLD: NORMAL K/UL (ref 0–0.01)
NRBC BLD-RTO: 0 PER 100 WBC
NRBC BLD-RTO: NORMAL PER 100 WBC
PLATELET # BLD AUTO: 131 K/UL (ref 150–400)
PLATELET # BLD AUTO: NORMAL K/UL (ref 150–400)
PMV BLD AUTO: 10.7 FL (ref 8.9–12.9)
PMV BLD AUTO: NORMAL FL (ref 8.9–12.9)
POTASSIUM SERPL-SCNC: 3.6 MMOL/L (ref 3.5–5.1)
RBC # BLD AUTO: 2.64 M/UL (ref 3.8–5.2)
RBC # BLD AUTO: NORMAL M/UL (ref 3.8–5.2)
RBC MORPH BLD: ABNORMAL
SODIUM SERPL-SCNC: 142 MMOL/L (ref 136–145)
WBC # BLD AUTO: 3.6 K/UL (ref 3.6–11)
WBC # BLD AUTO: NORMAL K/UL (ref 3.6–11)

## 2022-02-26 PROCEDURE — 65660000000 HC RM CCU STEPDOWN

## 2022-02-26 PROCEDURE — 36415 COLL VENOUS BLD VENIPUNCTURE: CPT

## 2022-02-26 PROCEDURE — 80048 BASIC METABOLIC PNL TOTAL CA: CPT

## 2022-02-26 PROCEDURE — 74011000250 HC RX REV CODE- 250: Performed by: STUDENT IN AN ORGANIZED HEALTH CARE EDUCATION/TRAINING PROGRAM

## 2022-02-26 PROCEDURE — 94640 AIRWAY INHALATION TREATMENT: CPT

## 2022-02-26 PROCEDURE — 74011250636 HC RX REV CODE- 250/636: Performed by: HOSPITALIST

## 2022-02-26 PROCEDURE — 94760 N-INVAS EAR/PLS OXIMETRY 1: CPT

## 2022-02-26 PROCEDURE — 74011250636 HC RX REV CODE- 250/636: Performed by: STUDENT IN AN ORGANIZED HEALTH CARE EDUCATION/TRAINING PROGRAM

## 2022-02-26 PROCEDURE — 74011000250 HC RX REV CODE- 250: Performed by: HOSPITALIST

## 2022-02-26 PROCEDURE — 83735 ASSAY OF MAGNESIUM: CPT

## 2022-02-26 PROCEDURE — 85025 COMPLETE CBC W/AUTO DIFF WBC: CPT

## 2022-02-26 PROCEDURE — 77010033678 HC OXYGEN DAILY

## 2022-02-26 RX ORDER — POTASSIUM CHLORIDE 29.8 MG/ML
20 INJECTION INTRAVENOUS ONCE
Status: COMPLETED | OUTPATIENT
Start: 2022-02-26 | End: 2022-02-26

## 2022-02-26 RX ADMIN — CEFEPIME 2 G: 2 INJECTION, POWDER, FOR SOLUTION INTRAVENOUS at 11:08

## 2022-02-26 RX ADMIN — POTASSIUM CHLORIDE 20 MEQ: 400 INJECTION, SOLUTION INTRAVENOUS at 03:41

## 2022-02-26 RX ADMIN — ENOXAPARIN SODIUM 40 MG: 100 INJECTION SUBCUTANEOUS at 08:48

## 2022-02-26 RX ADMIN — IPRATROPIUM BROMIDE AND ALBUTEROL SULFATE 3 ML: .5; 3 SOLUTION RESPIRATORY (INHALATION) at 09:00

## 2022-02-26 RX ADMIN — CEFEPIME 2 G: 2 INJECTION, POWDER, FOR SOLUTION INTRAVENOUS at 02:31

## 2022-02-26 RX ADMIN — SODIUM CHLORIDE, PRESERVATIVE FREE 10 ML: 5 INJECTION INTRAVENOUS at 18:24

## 2022-02-26 RX ADMIN — CEFEPIME 2 G: 2 INJECTION, POWDER, FOR SOLUTION INTRAVENOUS at 18:22

## 2022-02-26 RX ADMIN — POTASSIUM CHLORIDE 20 MEQ: 400 INJECTION, SOLUTION INTRAVENOUS at 00:42

## 2022-02-26 RX ADMIN — AZITHROMYCIN MONOHYDRATE 500 MG: 500 INJECTION, POWDER, LYOPHILIZED, FOR SOLUTION INTRAVENOUS at 18:20

## 2022-02-26 RX ADMIN — SODIUM CHLORIDE, PRESERVATIVE FREE 10 ML: 5 INJECTION INTRAVENOUS at 06:00

## 2022-02-26 RX ADMIN — IPRATROPIUM BROMIDE AND ALBUTEROL SULFATE 3 ML: .5; 3 SOLUTION RESPIRATORY (INHALATION) at 20:49

## 2022-02-26 NOTE — CONSULTS
Infectious Disease Consult    Today's Date: 2022   Admit Date: 2022    Impression:   · Breast cancer  · Staph bacteremia  · Port a cath  · RLL pneumonia    Plan:   · Cultures until negative  · Probably needs port out  · Monitor for s/s of metastatic infection  · Adjust antibiotic therapy when cultures final    Anti-infectives:   · Cefepime   · Azithromycin   · Vancomycin      Subjective:   Date of Consultation:  2022  Referring Physician: Dr Edgardo Barney    Patient is a 40 y.o. female with recently diagnosed breast cancer. She had a port placed in the left chest in October. She is admitted with fevers and chills since the middle of the week. She has staph in blood cultures but no complaints consistent with metastatic infection. 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 She has no specific pulmonary complaints.      Patient Active Problem List   Diagnosis Code    Cholelithiasis K80.20    Upper abdominal pain R10.10    Malignant neoplasm of right breast in female, estrogen receptor positive ([facility] [address] C50.911, Z17.0    Breast mass, right N63.10    Port-A-Cath in place Z95.828    Encounter for antineoplastic chemotherapy Z51.11    Encounter for monitoring cardiotoxic drug therapy Z51.81, Z79.899    Premenopausal patient N95.9    Chemotherapy follow-up examination Z09    Chemotherapy induced diarrhea K52.1, T45.1X5A    Sepsis ([address] A41.9     Past Medical History:   Diagnosis Date    Bell's palsy     Breast cancer ([facility] [address] 2021    ER/[place]+ Her2 +      Family History   Problem Relation Age of Onset    Cancer Sister         leukemia      Social History     Tobacco Use    Smoking status: Never Smoker    Smokeless tobacco: Never Used   Substance Use Topics    Alcohol use: Yes     Comment: rarely     Past Surgical History:   Procedure Laterality Date    HX  SECTION      HX  SECTION      HX CHOLECYSTECTOMY      HX CHOLECYSTECTOMY        Prior to Admission medications    Medication Sig Start Date End Date Taking? Authorizing Provider   CRANIAL PROSTHESIS misc Alopecia/ breast cancer chemo/ wig 10/8/21  Yes Denny Holt DO   prochlorperazine (Compazine) 5 mg tablet Take 1 tab by mouth every 6 hours as needed for nausea or vomiting 10/8/21  Yes Jordy Gaming NP   levonorgestreL (Mirena) 20 mcg/24 hours (6 yrs) 52 mg IUD 1 Device by IntraUTERine route once. Yes Provider, Historical   ondansetron (ZOFRAN ODT) 4 mg disintegrating tablet Take 1-2 Tablets by mouth every eight (8) hours as needed for Nausea or Vomiting. Patient not taking: Reported on 2022 10/8/21   Jordy Gaming NP   lidocaine-prilocaine (EMLA) topical cream Apply  to affected area as needed for Pain. Apply to port-a-cath 30-60 minutes prior to access for chemo  Patient not taking: Reported on 2022 10/8/21   Jordy Gaming NP   dexAMETHasone (DECADRON) 4 mg tablet Take 2 tabs (8mg) by mouth twice daily the day before chemotherapy and the day after chemotherapy  Patient not taking: Reported on 2022 10/8/21   Jordy Gaming NP       No Known Allergies     Review of Systems:  Pertinent items are noted in the History of Present Illness. Objective:     Visit Vitals  BP (!) 98/57 (BP 1 Location: Right upper arm, BP Patient Position: At rest)   Pulse 86   Temp 98.5 °F (36.9 °C)   Resp 18   Wt 92.4 kg (203 lb 12.8 oz)   SpO2 96%   BMI 39.80 kg/m²     Temp (24hrs), Av.5 °F (36.9 °C), Min:98.1 °F (36.7 °C), Max:98.7 °F (37.1 °C)       Lines:  Central Venous Catheter:       Physical Exam:  Lungs:  clear to auscultation bilaterally  Heart:  regular rate and rhythm  Abdomen:  soft, non-tender.  Bowel sounds normal. No masses,  no organomegaly  Skin:  no rash or abnormalities  Port site not tender    Data Review:     CBC:  Recent Labs     22  0705 22  0544 22  1135   WBC 3.6 PLEASE DISREGARD RESULTS 4.4   GRANS 68 PLEASE DISREGARD RESULTS  --    MONOS 5 PLEASE DISREGARD RESULTS  --    EOS 1 PLEASE DISREGARD RESULTS  --    ANEU 2.4 PLEASE DISREGARD RESULTS  --    ABL 0.9 PLEASE DISREGARD RESULTS  --    HGB 7.6* PLEASE DISREGARD RESULTS 7.5*   HCT 24.7* PLEASE DISREGARD RESULTS 24.9*   * PLEASE DISREGARD RESULTS 105*       BMP:  Recent Labs     02/26/22  0705 02/25/22  1135 02/24/22  0201   CREA 0.47* 0.65 0.67   BUN 4* 4* 6    137 133*   K 3.6 2.8* 3.0*   * 104 106   CO2 29 28 24   AGAP 2* 5 3*   * 193* 118*       LFTS:  No results for input(s): TBILI, ALT, AP, TP, ALB in the last 72 hours. No lab exists for component: SGOT    Microbiology:     All Micro Results     Procedure Component Value Units Date/Time    CULTURE, BLOOD, PAIRED [[id number]]  (Abnormal) Collected: 02/24/22 1424    Order Status: Completed Specimen: Blood Updated: 02/26/22 1542     Special Requests: NO SPECIAL REQUESTS        Culture result:       STAPHYLOCOCCUS AUREUS GROWING IN 2 OF 4 BOTTLES DRAWN SITE=LA REFER TO [id number] FOR SENSITIVITIES                  REMAINING BOTTLE(S) HAS/HAVE NO GROWTH SO FAR            (NOTE) CALLED POSITIVE BLOOD CULTURE OF GPC IN CLUSTERS GROWING IN 1 OUT OF 4 BOTTLES TO BLAZE ORDAZ AT 0014 ON 2/25/22. AT    CULTURE, BLOOD [[id number]]     Order Status: Canceled Specimen: Blood from [facility], BLOOD, PAIRED [[id number]] Collected: 02/25/22 1135    Order Status: Completed Specimen: Blood Updated: 02/25/22 1319    CULTURE, MRSA [[id number]] Collected: 02/23/22 0921    Order Status: Completed Specimen: Nasal from Nares Updated: 02/24/22 1650     Special Requests: NO SPECIAL REQUESTS        Culture result:       MRSA NOT PRESENT. Apparent Staphylococus aureus (not MRSA noted). Screening of patient nares for MRSA is for surveillance purposes and, if positive, to facilitate isolation considerations in high risk settings.  It is not intended for automatic decolonization interventions per se as regimens are not sufficiently effective to warrant routine use.                 Imaging:   Reviewed     Signed By: Gai Tucker MD     February 26, 2022

## 2022-02-26 NOTE — PROGRESS NOTES
Transition of Care Plan   RUR- 7% Low Risk   DISPOSITION: The disposition plan is home with family assistance.  [facility]  [phone].  F/U with PCP/Specialist     Transport: Family    Patient to discharge home with home 02. [facility] [address]  [phone] accepted patient for home 02. Per CM note from yesterday, patient will need 6 min walk test if stable to discharge today.     Milena Reyes, MSW, CRM, LMHP-e

## 2022-02-26 NOTE — PROGRESS NOTES
Bedside and Verbal shift change report given to Jill (oncoming nurse) by Waynette Kawasaki (offgoing nurse). Report included the following information SBAR, Kardex, MAR and Recent Results.

## 2022-02-26 NOTE — PROGRESS NOTES
[facility]  Hospitalist Group                                                                                          Hospitalist Progress Note  Milan Champagne MD  Answering service: [phone] from in house phone        Date of Service:  2022  NAME:  Gerald Lin  :  1975  MRN:  [id number]      Admission Summary:      40 y.o lady w/ breast cancer on chemo who presents with fever. Symptoms began 2 days ago. She describes fatigue and a fever as high as 104 F. Associated symptoms include a non-productive cough and diarrhea that started earlier today. Some nausea without vomiting or abdominal pain. No dysuria or hematuria. Interval history / Subjective:   Patient seen and examined today, doing fine, not complaining of any shortness of breath still on 2 L nasal cannula. Denies any chest pain. Assessment & Plan:        CAP w/ sepsis in an immunocompromised patient:  Acute hypoxic respiratory failure  Staph aureus bacteremia  S/p chemoport   ? CAP in RLL, pt has on/off cough for almost a month PTA  --CXR reported Left pectoral infusion port terminates over the mid SVC, slightly retracted  compared to prior (mid to lower SVC). This is likely still in appropriate position for use, but correlate clinically. CTA chestno acute PE, mild right lower lobe airspace disease, splenomegaly and unchanged pulmonary artery aneurysm. -Patient still on 2 L nasal cannula, did do walk test to requiring 2 L nasal cannula. Case management notified about the home oxygen needs. DuoNebs  -IV cefepime and azithromycin  Initial blood culture positive for gram-positive cocci in clusters,1/4 bottles, could be contaminant, final reports are pending. Based on the above will consult ID   --MRSA screen neg   Repeat blood culture in process.   --ECHO  --obtain B Cx from the port     Hypokalemia  Hypomagnesemia  Replace as needed.     Breast cancer on chemo:   -Oncology on boardappreciate input. --completed Chemo and scheduled for Sx on march 15th    Thrombocytopenia   Anemia  Splenomegaly   D/t infection and Breast Ca/Rx  Keep Hb >7   Plts improving        Code status:  Full code  Prophylaxis: Lovenox  Care Plan discussed with: Patient, RN, case management  Anticipated Disposition: 24 to 48 hours     Hospital Problems  Date Reviewed: 2/18/2022          Codes Class Noted POA    Sepsis ([facility] [address] ICD-10-CM: A41.9  ICD-9-CM: 038.9, 995.91  2/23/2022 Unknown                Review of Systems:   A comprehensive review of systems was negative except for that written in the HPI. Vital Signs:    Last 24hrs VS reviewed since prior progress note. Most recent are:  Visit Vitals  BP (!) 98/57 (BP 1 Location: Right upper arm, BP Patient Position: At rest)   Pulse 89   Temp 98.5 °F (36.9 °C)   Resp 18   Wt 92.4 kg (203 lb 12.8 oz)   SpO2 96%   BMI 39.80 kg/m²         Intake/Output Summary (Last 24 hours) at 2/26/2022 1319  Last data filed at 2/26/2022 1010  Gross per 24 hour   Intake 240 ml   Output    Net 240 ml        Physical Examination:     I had a face to face encounter with this patient and independently examined them on 2/26/2022 as outlined below:          Constitutional:  No acute distress, cooperative, pleasant    ENT:  Oral mucosa moist, oropharynx benign. Resp:  CTA bilaterally. No wheezing/rhonchi/rales. Chemo port site intact. No accessory muscle use. CV:  Regular rhythm, normal rate, no murmurs, gallops, rubs    GI:  Soft, non distended, non tender. normoactive bowel sounds, no hepatosplenomegaly     Musculoskeletal:  No edema, warm, 2+ pulses throughout    Neurologic:  Moves all extremities.   AAOx3, CN II-XII reviewed              Data Review:    Review and/or order of clinical lab test  Review and/or order of tests in the radiology section of CPT      Labs:     Recent Labs     02/26/22  0705 02/26/22  0544   WBC 3.6 PLEASE DISREGARD RESULTS   HGB 7.6* PLEASE DISREGARD RESULTS   HCT 24.7* PLEASE DISREGARD RESULTS   * PLEASE DISREGARD RESULTS     Recent Labs     02/26/22  0705 02/25/22  1135 02/24/22  0201    137 133*   K 3.6 2.8* 3.0*   * 104 106   CO2 29 28 24   BUN 4* 4* 6   CREA 0.47* 0.65 0.67   * 193* 118*   CA 8.7 8.4* 7.9*   MG  --   --  1.5*     No results for input(s): ALT, AP, TBIL, TBILI, TP, ALB, GLOB, GGT, AML, LPSE in the last 72 hours. No lab exists for component: SGOT, GPT, AMYP, HLPSE  No results for input(s): INR, PTP, APTT, INREXT, INREXT in the last 72 hours. No results for input(s): FE, TIBC, PSAT, FERR in the last 72 hours. No results found for: FOL, RBCF   No results for input(s): PH, PCO2, PO2 in the last 72 hours. No results for input(s): CPK, CKNDX, TROIQ in the last 72 hours.     No lab exists for component: CPKMB  Lab Results   Component Value Date/Time    Cholesterol, total 198 06/08/2021 09:26 AM    HDL Cholesterol 43 06/08/2021 09:26 AM    LDL, calculated 117.2 (H) 06/08/2021 09:26 AM    Triglyceride 189 (H) 06/08/2021 09:26 AM    CHOL/HDL Ratio 4.6 06/08/2021 09:26 AM     No results found for: GLUCPOC  Lab Results   Component Value Date/Time    Color VALARIE 05/02/2016 01:35 PM    Appearance TURBID (A) 05/02/2016 01:35 PM    Specific gravity 1.026 05/02/2016 01:35 PM    pH (UA) 5.5 05/02/2016 01:35 PM    Protein TRACE (A) 05/02/2016 01:35 PM    Glucose NEGATIVE  05/02/2016 01:35 PM    Ketone 15 (A) 05/02/2016 01:35 PM    Bilirubin NEGATIVE  04/28/2016 07:36 PM    Urobilinogen 1.0 05/02/2016 01:35 PM    Nitrites POSITIVE (A) 05/02/2016 01:35 PM    Leukocyte Esterase LARGE (A) 05/02/2016 01:35 PM    Epithelial cells MANY (A) 05/02/2016 01:35 PM    Bacteria 2+ (A) 05/02/2016 01:35 PM    WBC 10-20 05/02/2016 01:35 PM    RBC 0-5 05/02/2016 01:35 PM         Medications Reviewed:     Current Facility-Administered Medications   Medication Dose Route Frequency    albuterol-ipratropium (DUO-NEB) 2.5 MG-0.5 MG/3 ML  3 mL Nebulization BID RT    sodium chloride (NS) flush 5-10 mL  5-10 mL IntraVENous PRN    ondansetron (ZOFRAN) injection 4 mg  4 mg IntraVENous Q6H PRN    sodium chloride (NS) flush 5-40 mL  5-40 mL IntraVENous Q8H    sodium chloride (NS) flush 5-40 mL  5-40 mL IntraVENous PRN    acetaminophen (TYLENOL) tablet 650 mg  650 mg Oral Q6H PRN    Or    acetaminophen (TYLENOL) suppository 650 mg  650 mg Rectal Q6H PRN    polyethylene glycol (MIRALAX) packet 17 g  17 g Oral DAILY PRN    enoxaparin (LOVENOX) injection 40 mg  40 mg SubCUTAneous DAILY    cefepime (MAXIPIME) 2 g in 0.9% sodium chloride 10 mL IV syringe  2 g IntraVENous Q8H    azithromycin (ZITHROMAX) 500 mg in 0.9% sodium chloride 250 mL (VIAL-MATE)  500 mg IntraVENous Q24H     ______________________________________________________________________  EXPECTED LENGTH OF STAY: - - -  ACTUAL LENGTH OF STAY:          3                 Melody Lynn MD

## 2022-02-27 ENCOUNTER — APPOINTMENT (OUTPATIENT)
Dept: NON INVASIVE DIAGNOSTICS | Age: 47
DRG: 721 | End: 2022-02-27
Attending: GENERAL ACUTE CARE HOSPITAL
Payer: MEDICAID

## 2022-02-27 LAB
ANION GAP SERPL CALC-SCNC: 1 MMOL/L (ref 5–15)
BASOPHILS # BLD: 0 K/UL (ref 0–0.1)
BASOPHILS NFR BLD: 1 % (ref 0–1)
BUN SERPL-MCNC: 3 MG/DL (ref 6–20)
BUN/CREAT SERPL: 7 (ref 12–20)
CALCIUM SERPL-MCNC: 8.7 MG/DL (ref 8.5–10.1)
CHLORIDE SERPL-SCNC: 109 MMOL/L (ref 97–108)
CO2 SERPL-SCNC: 31 MMOL/L (ref 21–32)
CREAT SERPL-MCNC: 0.42 MG/DL (ref 0.55–1.02)
DIFFERENTIAL METHOD BLD: ABNORMAL
ECHO AO ROOT DIAM: 3 CM
ECHO AO ROOT INDEX: 1.61 CM/M2
ECHO AV PEAK GRADIENT: 9 MMHG
ECHO AV PEAK VELOCITY: 1.5 M/S
ECHO AV VELOCITY RATIO: 0.67
ECHO LA DIAMETER INDEX: 1.83 CM/M2
ECHO LA DIAMETER: 3.4 CM
ECHO LA TO AORTIC ROOT RATIO: 1.13
ECHO LV E' LATERAL VELOCITY: 14 CM/S
ECHO LV E' SEPTAL VELOCITY: 9 CM/S
ECHO LV FRACTIONAL SHORTENING: 39 % (ref 28–44)
ECHO LV INTERNAL DIMENSION DIASTOLE INDEX: 2.04 CM/M2
ECHO LV INTERNAL DIMENSION DIASTOLIC: 3.8 CM (ref 3.9–5.3)
ECHO LV INTERNAL DIMENSION SYSTOLIC INDEX: 1.24 CM/M2
ECHO LV INTERNAL DIMENSION SYSTOLIC: 2.3 CM
ECHO LV IVSD: 0.7 CM (ref 0.6–0.9)
ECHO LV MASS 2D: 86 G (ref 67–162)
ECHO LV MASS INDEX 2D: 46.2 G/M2 (ref 43–95)
ECHO LV POSTERIOR WALL DIASTOLIC: 0.9 CM (ref 0.6–0.9)
ECHO LV RELATIVE WALL THICKNESS RATIO: 0.47
ECHO LVOT PEAK GRADIENT: 4 MMHG
ECHO LVOT PEAK VELOCITY: 1 M/S
ECHO MV A VELOCITY: 0.67 M/S
ECHO MV AREA PHT: 4.7 CM2
ECHO MV E DECELERATION TIME (DT): 159.8 MS
ECHO MV E VELOCITY: 0.93 M/S
ECHO MV E/A RATIO: 1.39
ECHO MV E/E' LATERAL: 6.64
ECHO MV E/E' RATIO (AVERAGED): 8.49
ECHO MV E/E' SEPTAL: 10.33
ECHO MV PRESSURE HALF TIME (PHT): 46.4 MS
ECHO PV MAX VELOCITY: 1.1 M/S
ECHO PV PEAK GRADIENT: 5 MMHG
ECHO RV TAPSE: 2.1 CM (ref 1.5–2)
EOSINOPHIL # BLD: 0.1 K/UL (ref 0–0.4)
EOSINOPHIL NFR BLD: 2 % (ref 0–7)
ERYTHROCYTE [DISTWIDTH] IN BLOOD BY AUTOMATED COUNT: 15.7 % (ref 11.5–14.5)
GLUCOSE SERPL-MCNC: 116 MG/DL (ref 65–100)
HCT VFR BLD AUTO: 25.5 % (ref 35–47)
HGB BLD-MCNC: 7.6 G/DL (ref 11.5–16)
IMM GRANULOCYTES # BLD AUTO: 0 K/UL (ref 0–0.04)
IMM GRANULOCYTES NFR BLD AUTO: 1 % (ref 0–0.5)
LYMPHOCYTES # BLD: 1.3 K/UL (ref 0.8–3.5)
LYMPHOCYTES NFR BLD: 32 % (ref 12–49)
MAGNESIUM SERPL-MCNC: 1.6 MG/DL (ref 1.6–2.4)
MCH RBC QN AUTO: 27.8 PG (ref 26–34)
MCHC RBC AUTO-ENTMCNC: 29.8 G/DL (ref 30–36.5)
MCV RBC AUTO: 93.4 FL (ref 80–99)
MONOCYTES # BLD: 0.3 K/UL (ref 0–1)
MONOCYTES NFR BLD: 8 % (ref 5–13)
NEUTS SEG # BLD: 2.3 K/UL (ref 1.8–8)
NEUTS SEG NFR BLD: 56 % (ref 32–75)
NRBC # BLD: 0 K/UL (ref 0–0.01)
NRBC BLD-RTO: 0 PER 100 WBC
PLATELET # BLD AUTO: 162 K/UL (ref 150–400)
PMV BLD AUTO: 10.2 FL (ref 8.9–12.9)
POTASSIUM SERPL-SCNC: 3.4 MMOL/L (ref 3.5–5.1)
PROCALCITONIN SERPL-MCNC: 0.13 NG/ML
RBC # BLD AUTO: 2.73 M/UL (ref 3.8–5.2)
SODIUM SERPL-SCNC: 141 MMOL/L (ref 136–145)
WBC # BLD AUTO: 4 K/UL (ref 3.6–11)

## 2022-02-27 PROCEDURE — 65660000000 HC RM CCU STEPDOWN

## 2022-02-27 PROCEDURE — 85025 COMPLETE CBC W/AUTO DIFF WBC: CPT

## 2022-02-27 PROCEDURE — 94760 N-INVAS EAR/PLS OXIMETRY 1: CPT

## 2022-02-27 PROCEDURE — 77010033678 HC OXYGEN DAILY

## 2022-02-27 PROCEDURE — 84145 PROCALCITONIN (PCT): CPT

## 2022-02-27 PROCEDURE — 80048 BASIC METABOLIC PNL TOTAL CA: CPT

## 2022-02-27 PROCEDURE — 93306 TTE W/DOPPLER COMPLETE: CPT | Performed by: INTERNAL MEDICINE

## 2022-02-27 PROCEDURE — 74011250637 HC RX REV CODE- 250/637: Performed by: GENERAL ACUTE CARE HOSPITAL

## 2022-02-27 PROCEDURE — 36415 COLL VENOUS BLD VENIPUNCTURE: CPT

## 2022-02-27 PROCEDURE — 74011000250 HC RX REV CODE- 250: Performed by: HOSPITALIST

## 2022-02-27 PROCEDURE — 94640 AIRWAY INHALATION TREATMENT: CPT

## 2022-02-27 PROCEDURE — 83735 ASSAY OF MAGNESIUM: CPT

## 2022-02-27 PROCEDURE — 74011250636 HC RX REV CODE- 250/636: Performed by: HOSPITALIST

## 2022-02-27 PROCEDURE — 93306 TTE W/DOPPLER COMPLETE: CPT

## 2022-02-27 PROCEDURE — 74011000250 HC RX REV CODE- 250: Performed by: STUDENT IN AN ORGANIZED HEALTH CARE EDUCATION/TRAINING PROGRAM

## 2022-02-27 RX ORDER — POTASSIUM CHLORIDE 750 MG/1
40 TABLET, FILM COATED, EXTENDED RELEASE ORAL
Status: DISCONTINUED | OUTPATIENT
Start: 2022-02-27 | End: 2022-02-27

## 2022-02-27 RX ORDER — VANCOMYCIN/0.9 % SOD CHLORIDE 1.5G/250ML
1500 PLASTIC BAG, INJECTION (ML) INTRAVENOUS EVERY 12 HOURS
Status: DISCONTINUED | OUTPATIENT
Start: 2022-02-27 | End: 2022-02-27

## 2022-02-27 RX ADMIN — IPRATROPIUM BROMIDE AND ALBUTEROL SULFATE 3 ML: .5; 3 SOLUTION RESPIRATORY (INHALATION) at 09:19

## 2022-02-27 RX ADMIN — CEFEPIME 2 G: 2 INJECTION, POWDER, FOR SOLUTION INTRAVENOUS at 02:04

## 2022-02-27 RX ADMIN — AZITHROMYCIN MONOHYDRATE 500 MG: 500 INJECTION, POWDER, LYOPHILIZED, FOR SOLUTION INTRAVENOUS at 18:02

## 2022-02-27 RX ADMIN — CEFEPIME 2 G: 2 INJECTION, POWDER, FOR SOLUTION INTRAVENOUS at 10:09

## 2022-02-27 RX ADMIN — SODIUM CHLORIDE, PRESERVATIVE FREE 10 ML: 5 INJECTION INTRAVENOUS at 18:03

## 2022-02-27 RX ADMIN — SODIUM CHLORIDE, PRESERVATIVE FREE 10 ML: 5 INJECTION INTRAVENOUS at 22:59

## 2022-02-27 RX ADMIN — ENOXAPARIN SODIUM 40 MG: 100 INJECTION SUBCUTANEOUS at 08:22

## 2022-02-27 RX ADMIN — CEFEPIME 2 G: 2 INJECTION, POWDER, FOR SOLUTION INTRAVENOUS at 18:05

## 2022-02-27 RX ADMIN — POTASSIUM BICARBONATE 40 MEQ: 782 TABLET, EFFERVESCENT ORAL at 11:14

## 2022-02-27 NOTE — PROGRESS NOTES
Bedside shift change report given to Shasha (oncoming nurse) by George Peters (offgoing nurse). Report included the following information SBAR, Kardex, Intake/Output, MAR and Recent Results.

## 2022-02-27 NOTE — PROGRESS NOTES
[address] [facility] Adult  Hospitalist Group                                                                                          Hospitalist Progress Note  Roman Hurtado MD  Answering service: [phone] from in house phone        Date of Service:  2022  NAME:  Minal Workman  :  1975  MRN:  [id number]      Admission Summary:      40 y.o lady w/ breast cancer on chemo who presents with fever. Symptoms began 2 days ago. She describes fatigue and a fever as high as 104 F. Associated symptoms include a non-productive cough and diarrhea that started earlier today. Some nausea without vomiting or abdominal pain. No dysuria or hematuria. Interval history / Subjective:   Patient seen and examined today, doing fine, not complaining of any shortness of breath still on 2 L nasal cannula. Denies any chest pain. Afebrile. No skin rash or joint pain. Assessment & Plan:        CAP w/ sepsis in an immunocompromised patient:  Acute hypoxic respiratory failure  Staph aureus bacteremia  S/p chemoport   ? CAP in RLL, pt has on/off cough for almost a month PTA  --CXR reported Left pectoral infusion port terminates over the mid SVC, slightly retracted  compared to prior (mid to lower SVC). This is likely still in appropriate position for use, but correlate clinically. CTA chestno acute PE, mild right lower lobe airspace disease, splenomegaly and unchanged pulmonary artery aneurysm. -Patient still on 2 L nasal cannula, did do walk test to requiring 2 L nasal cannula. Case management notified about the home oxygen needs. DuoNebs  --MRSA screen neg   --IV cefepime and azithromycin  Initial blood culture positive for staph aureus,1/4 bottles, could be contaminant, final reports are pending. Repeat blood culture in process.   --ECHO did not report vegetations   --Repeat Paired B Cx so far neg   --May need port removal, pt completed her chemotherapy and waiting for breast surgery Hypokalemia  Hypomagnesemia  Replace as needed.     Breast cancer on chemo:   -Oncology on boardappreciate input. --completed Chemo and scheduled for Sx on march 15th    Thrombocytopenia   Anemia  Splenomegaly   D/t infection and Breast Ca/Rx  Keep Hb >7   Plts improving        Code status:  Full code  Prophylaxis: Lovenox  Care Plan discussed with: Patient, RN, case management  Anticipated Disposition: 24 to 48 hours     Hospital Problems  Date Reviewed: 2/18/2022          Codes Class Noted POA    Sepsis ([facility] [address] ICD-10-CM: A41.9  ICD-9-CM: 038.9, 995.91  2/23/2022 Unknown                Review of Systems:   A comprehensive review of systems was negative except for that written in the HPI. Vital Signs:    Last 24hrs VS reviewed since prior progress note. Most recent are:  Visit Vitals  BP (!) 154/90 (BP 1 Location: Right upper arm, BP Patient Position: Lying)   Pulse 80   Temp 98.4 °F (36.9 °C)   Resp 20   Wt 90.9 kg (200 lb 4.8 oz)   SpO2 95%   BMI 39.12 kg/m²         Intake/Output Summary (Last 24 hours) at 2/27/2022 0831  Last data filed at 2/27/2022 0600  Gross per 24 hour   Intake 240 ml   Output 1550 ml   Net -1310 ml        Physical Examination:     I had a face to face encounter with this patient and independently examined them on 2/27/2022 as outlined below:          Constitutional:  No acute distress, cooperative, pleasant    ENT:  Oral mucosa moist, oropharynx benign. Resp:  CTA bilaterally. No wheezing/rhonchi/rales. Chemo port site intact. No accessory muscle use. CV:  Regular rhythm, normal rate, no murmurs, gallops, rubs    GI:  Soft, non distended, non tender. normoactive bowel sounds, no hepatosplenomegaly     Musculoskeletal:  No edema, warm, 2+ pulses throughout    Neurologic:  Moves all extremities.   AAOx3, CN II-XII reviewed              Data Review:    Review and/or order of clinical lab test  Review and/or order of tests in the radiology section of CPT      Labs:     Recent Labs     02/27/22  0212 02/26/22  0705   WBC 4.0 3.6   HGB 7.6* 7.6*   HCT 25.5* 24.7*    131*     Recent Labs     02/27/22  0212 02/26/22  0705 02/25/22  1135    142 137   K 3.4* 3.6 2.8*   * 111* 104   CO2 31 29 28   BUN 3* 4* 4*   CREA 0.42* 0.47* 0.65   * 118* 193*   CA 8.7 8.7 8.4*   MG 1.6 1.8  --      No results for input(s): ALT, AP, TBIL, TBILI, TP, ALB, GLOB, GGT, AML, LPSE in the last 72 hours. No lab exists for component: SGOT, GPT, AMYP, HLPSE  No results for input(s): INR, PTP, APTT, INREXT, INREXT in the last 72 hours. No results for input(s): FE, TIBC, PSAT, FERR in the last 72 hours. No results found for: FOL, RBCF   No results for input(s): PH, PCO2, PO2 in the last 72 hours. No results for input(s): CPK, CKNDX, TROIQ in the last 72 hours.     No lab exists for component: CPKMB  Lab Results   Component Value Date/Time    Cholesterol, total 198 06/08/2021 09:26 AM    HDL Cholesterol 43 06/08/2021 09:26 AM    LDL, calculated 117.2 (H) 06/08/2021 09:26 AM    Triglyceride 189 (H) 06/08/2021 09:26 AM    CHOL/HDL Ratio 4.6 06/08/2021 09:26 AM     No results found for: GLUCPOC  Lab Results   Component Value Date/Time    Color VALARIE 05/02/2016 01:35 PM    Appearance TURBID (A) 05/02/2016 01:35 PM    Specific gravity 1.026 05/02/2016 01:35 PM    pH (UA) 5.5 05/02/2016 01:35 PM    Protein TRACE (A) 05/02/2016 01:35 PM    Glucose NEGATIVE  05/02/2016 01:35 PM    Ketone 15 (A) 05/02/2016 01:35 PM    Bilirubin NEGATIVE  04/28/2016 07:36 PM    Urobilinogen 1.0 05/02/2016 01:35 PM    Nitrites POSITIVE (A) 05/02/2016 01:35 PM    Leukocyte Esterase LARGE (A) 05/02/2016 01:35 PM    Epithelial cells MANY (A) 05/02/2016 01:35 PM    Bacteria 2+ (A) 05/02/2016 01:35 PM    WBC 10-20 05/02/2016 01:35 PM    RBC 0-5 05/02/2016 01:35 PM         Medications Reviewed:     Current Facility-Administered Medications   Medication Dose Route Frequency    albuterol-ipratropium (DUO-NEB) 2.5 MG-0.5 MG/3 ML  3 mL Nebulization BID RT    sodium chloride (NS) flush 5-10 mL  5-10 mL IntraVENous PRN    ondansetron (ZOFRAN) injection 4 mg  4 mg IntraVENous Q6H PRN    sodium chloride (NS) flush 5-40 mL  5-40 mL IntraVENous Q8H    sodium chloride (NS) flush 5-40 mL  5-40 mL IntraVENous PRN    acetaminophen (TYLENOL) tablet 650 mg  650 mg Oral Q6H PRN    Or    acetaminophen (TYLENOL) suppository 650 mg  650 mg Rectal Q6H PRN    polyethylene glycol (MIRALAX) packet 17 g  17 g Oral DAILY PRN    enoxaparin (LOVENOX) injection 40 mg  40 mg SubCUTAneous DAILY    cefepime (MAXIPIME) 2 g in 0.9% sodium chloride 10 mL IV syringe  2 g IntraVENous Q8H    azithromycin (ZITHROMAX) 500 mg in 0.9% sodium chloride 250 mL (VIAL-MATE)  500 mg IntraVENous Q24H     ______________________________________________________________________  EXPECTED LENGTH OF STAY: - - -  ACTUAL LENGTH OF STAY:          4                 Melody Zavala MD

## 2022-02-28 LAB
BACTERIA SPEC CULT: ABNORMAL
BACTERIA SPEC CULT: ABNORMAL
COMMENT, HOLDF: NORMAL
SAMPLES BEING HELD,HOLD: NORMAL
SERVICE CMNT-IMP: ABNORMAL

## 2022-02-28 PROCEDURE — 74011250636 HC RX REV CODE- 250/636: Performed by: HOSPITALIST

## 2022-02-28 PROCEDURE — 65660000000 HC RM CCU STEPDOWN

## 2022-02-28 PROCEDURE — 99232 SBSQ HOSP IP/OBS MODERATE 35: CPT | Performed by: INTERNAL MEDICINE

## 2022-02-28 PROCEDURE — 74011000250 HC RX REV CODE- 250: Performed by: HOSPITALIST

## 2022-02-28 RX ORDER — IPRATROPIUM BROMIDE AND ALBUTEROL SULFATE 2.5; .5 MG/3ML; MG/3ML
3 SOLUTION RESPIRATORY (INHALATION)
Status: DISCONTINUED | OUTPATIENT
Start: 2022-02-28 | End: 2022-03-04 | Stop reason: HOSPADM

## 2022-02-28 RX ADMIN — AZITHROMYCIN MONOHYDRATE 500 MG: 500 INJECTION, POWDER, LYOPHILIZED, FOR SOLUTION INTRAVENOUS at 20:21

## 2022-02-28 RX ADMIN — ENOXAPARIN SODIUM 40 MG: 100 INJECTION SUBCUTANEOUS at 09:06

## 2022-02-28 RX ADMIN — SODIUM CHLORIDE, PRESERVATIVE FREE 10 ML: 5 INJECTION INTRAVENOUS at 14:00

## 2022-02-28 RX ADMIN — CEFEPIME 2 G: 2 INJECTION, POWDER, FOR SOLUTION INTRAVENOUS at 18:21

## 2022-02-28 RX ADMIN — SODIUM CHLORIDE, PRESERVATIVE FREE 10 ML: 5 INJECTION INTRAVENOUS at 07:06

## 2022-02-28 RX ADMIN — CEFEPIME 2 G: 2 INJECTION, POWDER, FOR SOLUTION INTRAVENOUS at 02:00

## 2022-02-28 RX ADMIN — CEFEPIME 2 G: 2 INJECTION, POWDER, FOR SOLUTION INTRAVENOUS at 09:06

## 2022-02-28 NOTE — PROGRESS NOTES
ID Progress Note  2022    Subjective:     She is feeling some better, no s/s concerning for metastatic infection    Objective:     Antibiotics:  1. Cefepime  2. Vancomycin   3. Azithromycin       Vitals:   Visit Vitals  /69 (BP 1 Location: Left upper arm, BP Patient Position: At rest)   Pulse 91   Temp 98.1 °F (36.7 °C)   Resp 18   Ht 5' (1.524 m)   Wt 89.4 kg (197 lb 3.2 oz)   SpO2 96%   BMI 38.51 kg/m²        Tmax:  Temp (24hrs), Av.2 °F (36.8 °C), Min:98 °F (36.7 °C), Max:98.6 °F (37 °C)      Exam:  Lungs:  clear to auscultation bilaterally  Heart:  regular rate and rhythm  Abdomen:  soft, non-tender. Bowel sounds normal. No masses,  no organomegaly  Skin:  no rash or abnormalities    Labs:      Recent Labs     22  0212 22  0705 22  0544   WBC 4.0 3.6 PLEASE DISREGARD RESULTS   HGB 7.6* 7.6* PLEASE DISREGARD RESULTS    131* PLEASE DISREGARD RESULTS   BUN 3* 4*  --    CREA 0.42* 0.47*  --        Cultures:     No results found for: SDES  Lab Results   Component Value Date/Time    Culture result: NO GROWTH 3 DAYS 2022 11:35 AM    Culture result: (A) 2022 02:24 PM     STAPHYLOCOCCUS AUREUS GROWING IN 2 OF 4 BOTTLES DRAWN SITE=LA REFER TO [id number] FOR SENSITIVITIES    Culture result: REMAINING BOTTLE(S) HAS/HAVE NO GROWTH SO FAR 2022 02:24 PM    Culture result:  2022 02:24 PM     (NOTE) CALLED POSITIVE BLOOD CULTURE OF GPC IN CLUSTERS GROWING IN 1 OUT OF 4 BOTTLES TO BLAZE ORDAZ AT 0014 ON 22. AT       Radiology:     Line/Insert Date:           Assessment:     1. MSSA bacteremia--likely line infection  2. Breast cancer--chemo complete? 3. No current s/s of metastatic infection, but she does need cardiac workup    Objective:     1. If cardiac workup negative, then will need 2 weeks IV therapy from the time of negative blood cultures  2. Blood cultures until negative  3. Will need PICC, etc at some point  4.  D/W attending and patient    Ranell Goldberg, MD

## 2022-02-28 NOTE — ROUTINE PROCESS
Transition of Care Plan  · RUR- 7% Low Risk  · DISPOSITION: The disposition plan is home with family assistance. · DME - [facility] [address]  [phone]. · F/U with PCP/Specialist    · Transport: Family    Patient to discharge home with home 02. [facility] [phone] accepted patient for home 02.     CY Moreau, CRM, LMHP-e  Available in [employer]

## 2022-02-28 NOTE — PROGRESS NOTES
[facility] at [address], [id number] [place] [address] Road, [facility] [phone]  F: [phone]    Reason for Visit:   Lianet Scherer is a 40 y.o. female seen today in hospital for Right Breast Cancer    Treatment History:   · Initially had abnormal mammo at [facility] with calcifications on RIGHT. No mass reported/dense breast tissue  · RIGHT Breast Biopsy 9/9/21: PATH -  IDC, grade 3 ER/CA strong+ Her2 3+  · Genetic testing negative  · Breast MRI 9/17/21: RIGHT BREAST:Background parenchymal enhancement: Severe. There is a biopsy clip in the lower, outer quadrant of the right breast, junction of the middle and deep thirds. There is linear, branching enhancement which extends anterior to the biopsy clip over approximately 3.6 cm. There are no suspicious internal mammary or axillary chain lymph nodes. LEFT BREAST: Background parenchymal enhancement: Severe. There is no suspicious masslike or nonmasslike enhancement within the left breast to indicate breast carcinoma. There is a small mass in the lower, outer quadrant with an adjacent biopsy clip. There is an additional mass without significant enhancement in the lateral, middle 3rd, just below the nipple line. There are no suspicious internal mammary or axillary chain lymph nodes  · Neoadjuvant TCHP chemo 10/21/21 - Current   · Breast MRI 1/27/22: Right Breast: In the right lateral breast at 8-9 o'clock posterior depth 10 cm from the nipple there is susceptibility artifact from the biopsy clip but no residual enhancement. There is no suspicious axillary or internal mammary chain lymphadenopathy. Left Breast: No suspicious enhancement. There is no suspicious axillary or internal mammary chain lymphadenopathy.     STAGE: Clinical 2 ER/CA+, HER2+    History of Present Illness:   Lianet Scherer is a 40 y.o. female seen in hospital fu during admit for PNA for breast cancer. Pt is done neoadjuvant chemo and for breast surgery 3/22. Pt is in bed on . States feels ok. CBC stable with anemia. Platelets also a little low at 90k. Labs pending today. On IV abx. No new issues this am.   No fevers/ chills/ chest pain/ SOB/ nausea/ vomiting/diarrhea/ pain/    Interval History: Karlee Pugh is off of oxygen. Infectious Disease following; blood cultures 22 with NGTD. Continues on cefepime and zithromax.     Past Medical History:   Diagnosis Date    Bell's palsy     Breast cancer ([facility] [address] 2021    ER/ME+ Her2 +      Past Surgical History:   Procedure Laterality Date    HX  SECTION      HX  SECTION      HX CHOLECYSTECTOMY      HX CHOLECYSTECTOMY        Social History     Tobacco Use    Smoking status: Never Smoker    Smokeless tobacco: Never Used   Substance Use Topics    Alcohol use: Yes     Comment: rarely      Family History   Problem Relation Age of Onset    Cancer Sister         leukemia     Current Facility-Administered Medications   Medication Dose Route Frequency    albuterol-ipratropium (DUO-NEB) 2.5 MG-0.5 MG/3 ML  3 mL Nebulization Q6H PRN    sodium chloride (NS) flush 5-10 mL  5-10 mL IntraVENous PRN    ondansetron (ZOFRAN) injection 4 mg  4 mg IntraVENous Q6H PRN    sodium chloride (NS) flush 5-40 mL  5-40 mL IntraVENous Q8H    sodium chloride (NS) flush 5-40 mL  5-40 mL IntraVENous PRN    acetaminophen (TYLENOL) tablet 650 mg  650 mg Oral Q6H PRN    Or    acetaminophen (TYLENOL) suppository 650 mg  650 mg Rectal Q6H PRN    polyethylene glycol (MIRALAX) packet 17 g  17 g Oral DAILY PRN    enoxaparin (LOVENOX) injection 40 mg  40 mg SubCUTAneous DAILY    cefepime (MAXIPIME) 2 g in 0.9% sodium chloride 10 mL IV syringe  2 g IntraVENous Q8H    azithromycin (ZITHROMAX) 500 mg in 0.9% sodium chloride 250 mL (VIAL-MATE)  500 mg IntraVENous Q24H      No Known Allergies     Review of Systems:  A complete review of systems was obtained, negative except as described above     Physical Exam:     Visit Vitals  /69 (BP 1 Location: Left upper arm, BP Patient Position: At rest)   Pulse 91   Temp 98.1 °F (36.7 °C)   Resp 18   Ht 5' (1.524 m)   Wt 197 lb 3.2 oz (89.4 kg)   SpO2 96%   BMI 38.51 kg/m²     ECOG PS: 0  General: No distress  Eyes: Anicteric sclerae  HENT: Atraumatic  Neck: Supple  Respiratory:  normal respiratory effort  MS: in bed moving usually ambulatory. Skin: No rashes, ecchymoses, or petechiae. Normal temperature, turgor, and texture. Psych: Alert, oriented, appropriate affect, normal judgment/insight  Neuro: Non focal    Results:     Lab Results   Component Value Date/Time    WBC 4.0 02/27/2022 02:12 AM    HGB 7.6 (L) 02/27/2022 02:12 AM    HCT 25.5 (L) 02/27/2022 02:12 AM    PLATELET 820 59/97/6986 02:12 AM    MCV 93.4 02/27/2022 02:12 AM    ABS. NEUTROPHILS 2.3 02/27/2022 02:12 AM     Lab Results   Component Value Date/Time    Sodium 141 02/27/2022 02:12 AM    Potassium 3.4 (L) 02/27/2022 02:12 AM    Chloride 109 (H) 02/27/2022 02:12 AM    CO2 31 02/27/2022 02:12 AM    Glucose 116 (H) 02/27/2022 02:12 AM    BUN 3 (L) 02/27/2022 02:12 AM    Creatinine 0.42 (L) 02/27/2022 02:12 AM    GFR est AA >60 02/27/2022 02:12 AM    GFR est non-AA >60 02/27/2022 02:12 AM    Calcium 8.7 02/27/2022 02:12 AM     Lab Results   Component Value Date/Time    Bilirubin, total 0.2 02/03/2022 10:09 AM    ALT (SGPT) 24 02/03/2022 10:09 AM    Alk. phosphatase 66 02/03/2022 10:09 AM    Protein, total 6.8 02/03/2022 10:09 AM    Albumin 3.1 (L) 02/03/2022 10:09 AM    Globulin 3.7 02/03/2022 10:09 AM     Path at outside facility - scanned into chart     Breast MRI 9/17/21  RIGHT BREAST:  Background parenchymal enhancement: Severe   There is a biopsy clip in the lower, outer quadrant of the right breast,  junction of the middle and deep thirds.  There is linear, branching enhancement  which extends anterior to the biopsy clip over approximately 3.6 cm     There are no suspicious internal mammary or axillary chain lymph nodes. [place] LEFT BREAST:  Background parenchymal enhancement: Severe  There is no suspicious masslike or nonmasslike enhancement within the left  breast to indicate breast carcinoma. There is a small mass in the lower, outer  quadrant with an adjacent biopsy clip. There is an additional mass without  significant enhancement in the lateral, middle 3rd, just below the nipple line.     There are no suspicious internal mammary or axillary chain lymph nodes. .  IMPRESSION  RIGHT BREAST:  1. There is a biopsy clip in the lower, outer quadrant of the right breast,  junction of the middle and deep thirds. There is linear, branching enhancement  which extends anteriorly from the biopsy clip over approximately 3.6 cm. BI-RADS Category 6 - Known biopsy-proven malignancy. Breast MRI 1/27/22  FINDINGS:  There is marked background parenchymal enhancement. The breast tissue is heterogeneously dense, which could obscure detection of  small masses (approximately 51-75% glandular).       Right breast:  In the right lateral breast at 8-9 o'clock posterior depth 10 cm from the nipple  there is susceptibility artifact from the biopsy clip but no residual  enhancement.     There is no suspicious axillary or internal mammary chain lymphadenopathy.     Left breast:  No suspicious enhancement.     There is no suspicious axillary or internal mammary chain lymphadenopathy.     IMPRESSION     Right Breast:  1. BI-RADS Assessment Category 6: Known biopsy proven malignancy- Appropriate  action should be taken. Right breast biopsy clip with no residual suspicious  enhancement.     Left Breast:  1. BI-RADS Assessment     10/13/21    ECHO ADULT COMPLETE 10/13/2021 10/13/2021    Interpretation Summary  · LV: Estimated LVEF is 60 - 65%. Normal cavity size, wall thickness, systolic function (ejection fraction normal) and diastolic function. Normal left ventricular strain.     Signed by: Alfonso Glass MD on 10/13/2021  3:52 PM    01/05/22    ECHO ADULT COMPLETE 01/05/2022 1/5/2022    Interpretation Summary    Left Ventricle: Left ventricle size is normal. Normal wall thickness. Normal wall motion. Normal left ventricular systolic function with a visually estimated EF of 55 - 60%. Global longitudinal strain is -18.0%. Normal diastolic function. Signed by: Arabella Lawson MD on 1/5/2022 12:52 PM    Records reviewed and summarized above. Pathology report(s) reviewed above. Radiology report(s) reviewed above. Assessment:/PLAN     1) Clinical Stage 2 (based on MRI) RIGHT Breast Cancer ER+ Her2+ post Breast Biopsy only [facility] 9/9/21   Breast MRI 9/17/21 with 3.6 cm enhancement. No mass palpable on exam.   Patient was sent here for neoadjuvant chemo based on size of mass on MRI. Patient prefers lumpectomy, d/w breast surgery. She had a follow up ECHO on 1/5/22 and EF 55-60%. Completed neoadjuvant TCHP chemotherapy and currently on maintenance herceptin/perjeta   She saw Breast Surgery on 1/10/22. She had a Breast MRI on 1/27/22 that showed no residual disease. Surgery is scheduled for 3/15/22. She will most likely not need port moving forward; if ID determines this needs to be removed, we are in agreement    2) Pneumonia  Management per Hospitalist, Infectious Disease  No longer requiring supplemental oxygen    3) anemia, chronic worsened by PNA   Monitor. 4) Psychosocial  She is vaccinated for COVID. She works as a , has a supportive fiance. SW/NN support as needed. Okay to remove port if indicated per ID. I personally saw and evaluated the patient and performed the key components of medical decision making. The history, physical exam, and documentation were performed by Asa Parikh NP. I reviewed and verified the above documentation and modified it as needed. Pt doing better overall.  Off 02  Question of infection from port and fine if it needs to be removed  Pt will fu with us as outpt to continue cancer care. I appreciate the opportunity to participate in Ms. Susan Rob's care.     Signed By: Radha Myers, DO

## 2022-02-28 NOTE — PROGRESS NOTES
[address] [facility] Adult  Hospitalist Group                                                                                          Hospitalist Progress Note  Mariah Fang MD  Answering service: [phone] from in house phone        Date of Service:  2022  NAME:  Jovita Kapoor  :  1975  MRN:  [id number]      Admission Summary:      40 y.o lady w/ breast cancer on chemo who presents with fever. Symptoms began 2 days ago. She describes fatigue and a fever as high as 104 F. Associated symptoms include a non-productive cough and diarrhea that started earlier today. Some nausea without vomiting or abdominal pain. No dysuria or hematuria. Interval history / Subjective:   Patient seen and examined today  Patient was seen by oncology this morning patient said that she has no further plan for cancer chemotherapy  Patient has bacteremia 2 / 4 staph aureus will remove port requested IR     Assessment & Plan:        CAP w/ sepsis in an immunocompromised patient:  Acute hypoxic respiratory failure--solved patient is on room air  Staph aureus bacteremia  S/p chemoport   ? CAP in RLL, pt has on/off cough for almost a month PTA  --CXR reported Left pectoral infusion port terminates over the mid SVC, slightly retracted  compared to prior (mid to lower SVC). This is likely still in appropriate position for use, but correlate clinically. CTA chestno acute PE, mild right lower lobe airspace disease, splenomegaly and unchanged pulmonary artery aneurysm. -Patient still on 2 L nasal cannula, --patient seen walking around without oxygen today  DuoNebs  --MRSA screen neg   --IV cefepime and azithromycin  Initial blood culture positive for staph aureus,2/4 bottles, could be contaminant, final reports are pending. Will remove port as patient will not require any more chemo through the port  Repeat blood culture in process.   --ECHO did not report vegetations   --Repeat Paired B Cx so far neg Hypokalemia  Hypomagnesemia  Replace as needed.     Breast cancer on chemo:   -Oncology on boardappreciate input. --completed Chemo and scheduled for Sx on march 15th    Thrombocytopenia   Anemia  Splenomegaly   D/t infection and Breast Ca/Rx  Keep Hb >7   Plts improving        Code status:  Full code  Prophylaxis: Lovenox  Care Plan discussed with: Patient, RN, case management  Anticipated Disposition: 24 to 48 hours     Hospital Problems  Date Reviewed: 2/18/2022          Codes Class Noted POA    Sepsis ([facility] [address] ICD-10-CM: A41.9  ICD-9-CM: 038.9, 995.91  2/23/2022 Unknown                Review of Systems:   A comprehensive review of systems was negative except for that written in the HPI. Vital Signs:    Last 24hrs VS reviewed since prior progress note. Most recent are:  Visit Vitals  /66 (BP 1 Location: Left upper arm, BP Patient Position: At rest)   Pulse 90   Temp 98.4 °F (36.9 °C)   Resp 18   Ht 5' (1.524 m)   Wt 89.4 kg (197 lb 3.2 oz)   SpO2 95%   BMI 38.51 kg/m²         Intake/Output Summary (Last 24 hours) at 2/28/2022 1339  Last data filed at 2/28/2022 0304  Gross per 24 hour   Intake 0 ml   Output 0 ml   Net 0 ml        Physical Examination:     I had a face to face encounter with this patient and independently examined them on 2/28/2022 as outlined below:          Constitutional:  No acute distress, cooperative, pleasant    ENT:  Oral mucosa moist, oropharynx benign. Resp:  CTA bilaterally. No wheezing/rhonchi/rales. Chemo port site intact. No accessory muscle use. CV:  Regular rhythm, normal rate, no murmurs, gallops, rubs    GI:  Soft, non distended, non tender. normoactive bowel sounds, no hepatosplenomegaly     Musculoskeletal:  No edema, warm, 2+ pulses throughout    Neurologic:  Moves all extremities.   AAOx3, CN II-XII reviewed              Data Review:    Review and/or order of clinical lab test  Review and/or order of tests in the radiology section of CPT      Labs: Recent Labs     02/27/22 0212 02/26/22  0705   WBC 4.0 3.6   HGB 7.6* 7.6*   HCT 25.5* 24.7*    131*     Recent Labs     02/27/22 0212 02/26/22  0705    142   K 3.4* 3.6   * 111*   CO2 31 29   BUN 3* 4*   CREA 0.42* 0.47*   * 118*   CA 8.7 8.7   MG 1.6 1.8     No results for input(s): ALT, AP, TBIL, TBILI, TP, ALB, GLOB, GGT, AML, LPSE in the last 72 hours. No lab exists for component: SGOT, GPT, AMYP, HLPSE  No results for input(s): INR, PTP, APTT, INREXT, INREXT in the last 72 hours. No results for input(s): FE, TIBC, PSAT, FERR in the last 72 hours. No results found for: FOL, RBCF   No results for input(s): PH, PCO2, PO2 in the last 72 hours. No results for input(s): CPK, CKNDX, TROIQ in the last 72 hours.     No lab exists for component: CPKMB  Lab Results   Component Value Date/Time    Cholesterol, total 198 06/08/2021 09:26 AM    HDL Cholesterol 43 06/08/2021 09:26 AM    LDL, calculated 117.2 (H) 06/08/2021 09:26 AM    Triglyceride 189 (H) 06/08/2021 09:26 AM    CHOL/HDL Ratio 4.6 06/08/2021 09:26 AM     No results found for: GLUCPOC  Lab Results   Component Value Date/Time    Color VALARIE 05/02/2016 01:35 PM    Appearance TURBID (A) 05/02/2016 01:35 PM    Specific gravity 1.026 05/02/2016 01:35 PM    pH (UA) 5.5 05/02/2016 01:35 PM    Protein TRACE (A) 05/02/2016 01:35 PM    Glucose NEGATIVE  05/02/2016 01:35 PM    Ketone 15 (A) 05/02/2016 01:35 PM    Bilirubin NEGATIVE  04/28/2016 07:36 PM    Urobilinogen 1.0 05/02/2016 01:35 PM    Nitrites POSITIVE (A) 05/02/2016 01:35 PM    Leukocyte Esterase LARGE (A) 05/02/2016 01:35 PM    Epithelial cells MANY (A) 05/02/2016 01:35 PM    Bacteria 2+ (A) 05/02/2016 01:35 PM    WBC 10-20 05/02/2016 01:35 PM    RBC 0-5 05/02/2016 01:35 PM         Medications Reviewed:     Current Facility-Administered Medications   Medication Dose Route Frequency    albuterol-ipratropium (DUO-NEB) 2.5 MG-0.5 MG/3 ML  3 mL Nebulization Q6H PRN    sodium chloride (NS) flush 5-10 mL  5-10 mL IntraVENous PRN    ondansetron (ZOFRAN) injection 4 mg  4 mg IntraVENous Q6H PRN    sodium chloride (NS) flush 5-40 mL  5-40 mL IntraVENous Q8H    sodium chloride (NS) flush 5-40 mL  5-40 mL IntraVENous PRN    acetaminophen (TYLENOL) tablet 650 mg  650 mg Oral Q6H PRN    Or    acetaminophen (TYLENOL) suppository 650 mg  650 mg Rectal Q6H PRN    polyethylene glycol (MIRALAX) packet 17 g  17 g Oral DAILY PRN    enoxaparin (LOVENOX) injection 40 mg  40 mg SubCUTAneous DAILY    cefepime (MAXIPIME) 2 g in 0.9% sodium chloride 10 mL IV syringe  2 g IntraVENous Q8H    azithromycin (ZITHROMAX) 500 mg in 0.9% sodium chloride 250 mL (VIAL-MATE)  500 mg IntraVENous Q24H     ______________________________________________________________________  EXPECTED LENGTH OF STAY: 4d 19h  ACTUAL LENGTH OF STAY:          5                 Rogelio Quinones MD

## 2022-03-01 ENCOUNTER — HOSPITAL ENCOUNTER (INPATIENT)
Dept: INTERVENTIONAL RADIOLOGY/VASCULAR | Age: 47
Discharge: HOME | DRG: 721 | End: 2022-03-01
Attending: HOSPITALIST
Payer: MEDICAID

## 2022-03-01 LAB
ALBUMIN SERPL-MCNC: 3.3 G/DL (ref 3.5–5)
ALBUMIN/GLOB SERPL: 0.8 {RATIO} (ref 1.1–2.2)
ALP SERPL-CCNC: 64 U/L (ref 45–117)
ALT SERPL-CCNC: 31 U/L (ref 12–78)
ANION GAP SERPL CALC-SCNC: 11 MMOL/L (ref 5–15)
APPEARANCE UR: CLEAR
AST SERPL-CCNC: 26 U/L (ref 15–37)
BACTERIA SPEC CULT: ABNORMAL
BACTERIA URNS QL MICRO: ABNORMAL /HPF
BASOPHILS # BLD: 0 K/UL (ref 0–0.1)
BASOPHILS NFR BLD: 0 % (ref 0–1)
BILIRUB SERPL-MCNC: 0.5 MG/DL (ref 0.2–1)
BILIRUB UR QL: NEGATIVE
BUN SERPL-MCNC: 15 MG/DL (ref 6–20)
BUN/CREAT SERPL: 14 (ref 12–20)
CALCIUM SERPL-MCNC: 8.7 MG/DL (ref 8.5–10.1)
CHLORIDE SERPL-SCNC: 96 MMOL/L (ref 97–108)
CO2 SERPL-SCNC: 26 MMOL/L (ref 21–32)
COLOR UR: ABNORMAL
COVID-19 RAPID TEST, COVR: NOT DETECTED
CREAT SERPL-MCNC: 1.06 MG/DL (ref 0.55–1.02)
DIFFERENTIAL METHOD BLD: ABNORMAL
EOSINOPHIL # BLD: 0 K/UL (ref 0–0.4)
EOSINOPHIL NFR BLD: 0 % (ref 0–7)
EPITH CASTS URNS QL MICRO: ABNORMAL /LPF
ERYTHROCYTE [DISTWIDTH] IN BLOOD BY AUTOMATED COUNT: 16 % (ref 11.5–14.5)
FLUAV AG NPH QL IA: NEGATIVE
FLUBV AG NOSE QL IA: NEGATIVE
GLOBULIN SER CALC-MCNC: 4.4 G/DL (ref 2–4)
GLUCOSE SERPL-MCNC: 192 MG/DL (ref 65–100)
GLUCOSE UR STRIP.AUTO-MCNC: NEGATIVE MG/DL
HCT VFR BLD AUTO: 33.1 % (ref 35–47)
HGB BLD-MCNC: 10.3 G/DL (ref 11.5–16)
HGB UR QL STRIP: NEGATIVE
IMM GRANULOCYTES # BLD AUTO: 0.1 K/UL (ref 0–0.04)
IMM GRANULOCYTES NFR BLD AUTO: 1 % (ref 0–0.5)
KETONES UR QL STRIP.AUTO: 15 MG/DL
LACTATE SERPL-SCNC: 2.2 MMOL/L (ref 0.4–2)
LEUKOCYTE ESTERASE UR QL STRIP.AUTO: ABNORMAL
LYMPHOCYTES # BLD: 0.7 K/UL (ref 0.8–3.5)
LYMPHOCYTES NFR BLD: 8 % (ref 12–49)
MCH RBC QN AUTO: 28.1 PG (ref 26–34)
MCHC RBC AUTO-ENTMCNC: 31.1 G/DL (ref 30–36.5)
MCV RBC AUTO: 90.2 FL (ref 80–99)
MONOCYTES # BLD: 0.6 K/UL (ref 0–1)
MONOCYTES NFR BLD: 7 % (ref 5–13)
NEUTS SEG # BLD: 7.6 K/UL (ref 1.8–8)
NEUTS SEG NFR BLD: 84 % (ref 32–75)
NITRITE UR QL STRIP.AUTO: NEGATIVE
NRBC # BLD: 0 K/UL (ref 0–0.01)
NRBC BLD-RTO: 0 PER 100 WBC
PH UR STRIP: 6.5 [PH] (ref 5–8)
PLATELET # BLD AUTO: 109 K/UL (ref 150–400)
PMV BLD AUTO: 10.1 FL (ref 8.9–12.9)
POTASSIUM SERPL-SCNC: 3.6 MMOL/L (ref 3.5–5.1)
PROCALCITONIN SERPL-MCNC: 0.19 NG/ML
PROT SERPL-MCNC: 7.7 G/DL (ref 6.4–8.2)
PROT UR STRIP-MCNC: 30 MG/DL
RBC # BLD AUTO: 3.67 M/UL (ref 3.8–5.2)
RBC #/AREA URNS HPF: ABNORMAL /HPF (ref 0–5)
RBC MORPH BLD: ABNORMAL
SERVICE CMNT-IMP: ABNORMAL
SODIUM SERPL-SCNC: 133 MMOL/L (ref 136–145)
SOURCE, COVRS: NORMAL
SP GR UR REFRACTOMETRY: 1.01 (ref 1–1.03)
TROPONIN-HIGH SENSITIVITY: 5 NG/L (ref 0–51)
UA: UC IF INDICATED,UAUC: ABNORMAL
UROBILINOGEN UR QL STRIP.AUTO: 0.2 EU/DL (ref 0.2–1)
WBC # BLD AUTO: 9 K/UL (ref 3.6–11)
WBC URNS QL MICRO: ABNORMAL /HPF (ref 0–4)

## 2022-03-01 PROCEDURE — 74011250636 HC RX REV CODE- 250/636: Performed by: RADIOLOGY

## 2022-03-01 PROCEDURE — 65660000000 HC RM CCU STEPDOWN

## 2022-03-01 PROCEDURE — 74011250636 HC RX REV CODE- 250/636: Performed by: HOSPITALIST

## 2022-03-01 PROCEDURE — 77030040922 HC BLNKT HYPOTHRM STRY -A

## 2022-03-01 PROCEDURE — 36590 REMOVAL TUNNELED CV CATH: CPT

## 2022-03-01 PROCEDURE — 2709999900 HC NON-CHARGEABLE SUPPLY

## 2022-03-01 PROCEDURE — 77030031139 HC SUT VCRL2 J&J -A

## 2022-03-01 PROCEDURE — 05HN33Z INSERTION OF INFUSION DEVICE INTO LEFT INTERNAL JUGULAR VEIN, PERCUTANEOUS APPROACH: ICD-10-PCS | Performed by: RADIOLOGY

## 2022-03-01 PROCEDURE — 94760 N-INVAS EAR/PLS OXIMETRY 1: CPT

## 2022-03-01 PROCEDURE — 74011000250 HC RX REV CODE- 250: Performed by: RADIOLOGY

## 2022-03-01 PROCEDURE — 99231 SBSQ HOSP IP/OBS SF/LOW 25: CPT | Performed by: SPECIALIST

## 2022-03-01 PROCEDURE — 74011000250 HC RX REV CODE- 250: Performed by: HOSPITALIST

## 2022-03-01 PROCEDURE — 77030010507 HC ADH SKN DERMBND J&J -B

## 2022-03-01 RX ORDER — LIDOCAINE HYDROCHLORIDE 10 MG/ML
20 INJECTION INFILTRATION; PERINEURAL
Status: COMPLETED | OUTPATIENT
Start: 2022-03-01 | End: 2022-03-01

## 2022-03-01 RX ORDER — FENTANYL CITRATE 50 UG/ML
200 INJECTION, SOLUTION INTRAMUSCULAR; INTRAVENOUS
Status: DISCONTINUED | OUTPATIENT
Start: 2022-03-01 | End: 2022-03-01

## 2022-03-01 RX ORDER — LIDOCAINE HYDROCHLORIDE AND EPINEPHRINE 20; 10 MG/ML; UG/ML
20 INJECTION, SOLUTION INFILTRATION; PERINEURAL
Status: DISCONTINUED | OUTPATIENT
Start: 2022-03-01 | End: 2022-03-02 | Stop reason: HOSPADM

## 2022-03-01 RX ADMIN — ENOXAPARIN SODIUM 40 MG: 100 INJECTION SUBCUTANEOUS at 09:50

## 2022-03-01 RX ADMIN — AZITHROMYCIN MONOHYDRATE 500 MG: 500 INJECTION, POWDER, LYOPHILIZED, FOR SOLUTION INTRAVENOUS at 19:21

## 2022-03-01 RX ADMIN — FENTANYL CITRATE 50 MCG: 50 INJECTION INTRAMUSCULAR; INTRAVENOUS at 15:40

## 2022-03-01 RX ADMIN — CEFEPIME 2 G: 2 INJECTION, POWDER, FOR SOLUTION INTRAVENOUS at 18:26

## 2022-03-01 RX ADMIN — LIDOCAINE HYDROCHLORIDE 10 ML: 10 INJECTION, SOLUTION INFILTRATION; PERINEURAL at 15:40

## 2022-03-01 RX ADMIN — FENTANYL CITRATE 50 MCG: 50 INJECTION INTRAMUSCULAR; INTRAVENOUS at 15:45

## 2022-03-01 RX ADMIN — CEFEPIME 2 G: 2 INJECTION, POWDER, FOR SOLUTION INTRAVENOUS at 09:51

## 2022-03-01 RX ADMIN — CEFEPIME 2 G: 2 INJECTION, POWDER, FOR SOLUTION INTRAVENOUS at 01:46

## 2022-03-01 NOTE — PROGRESS NOTES
Name of procedure: Port Removal    Medications given: 100 mcg Fenatanyl    Vital Signs: stable    Any complications related to procedure: none    Post Procedure Care Needed/order sets placed in connect care: See orders    Pt tolerated procedure well. VSS. No C/O pain. Dressing to site D&I. No bleeding or hematoma noted to site. HOB elevated. Pt taken to room by transport. NAD noted at time of transfer. Report called to floor RN who will assume care of pt.

## 2022-03-01 NOTE — PROGRESS NOTES
1821: attempted to pull azithromycin, but is not listed under pt on [employer] machine. Messaged pharmacy. 1850: pharmacy messaged back explaining they reloaded in [employer]. Will attempt to pull. 1913: still unable to pull medication. Alerted pharmacy.

## 2022-03-01 NOTE — PROGRESS NOTES
Verbal shift change report given to Candie Nash (oncoming nurse) by Mandy Sanchez RN (offgoing nurse). Report included the following information SBAR.     1215-Called IR who reports they will try and get pt down between [id number]    1610- pt arrived from IR after port removal.     1615- Following up cards consult for LUIS. Will keep pt NPO for now. LUIS scheduled for tomorrow pt will be NPO @ midnight. Verbal shift change report given to Carmen Navarro RN (oncoming nurse) by Ramses Jo RN (offgoing nurse). Report included the following information SBAR and Procedure Summary.

## 2022-03-01 NOTE — PROGRESS NOTES
Bedside shift change report given to Robyn Rosa (oncoming nurse) by Margarito Oliveros RN (offgoing nurse). Report included the following information SBAR, Intake/Output, MAR, Med Rec Status and Cardiac Rhythm nsr.

## 2022-03-01 NOTE — PROGRESS NOTES
800:    Bedside shift change report given to [facility] (oncoming nurse) by Mario Vinson (offgoing nurse). Report included the following information SBAR, Kardex, Intake/Output, MAR and Recent Results.

## 2022-03-01 NOTE — CONSULTS
Mel Ruano M.D., .A.C.C.  [phone]        NAME: Wayne Cotter   :  1975   MRN:  [id number]  Date/Time:  3/1/10263:49 PM      ________________________________________________________________________  HPI    Very pleasant 40years old female with past medical history of right breast cancer on neoadjuvant adjuvant chemotherapy. She was admitted on account of fatigue and fever. Eventually she had a 2 out of 4 bottles positive for staph for his bacteremia. She does have a Chemo-Port. She was diagnosed with possible community-acquired pneumonia in the right lower lobe. She has had off-and-on cough. She had a chest CT with no acute PE. Echocardiogram no vegetations. Was called for LUIS. Assessment/Plan  1. Staph aureus bacteremia bacteremia: There is a question about vegetation on his medication Port-A-Cath. LUIS was requested. Have discussed with her risks and benefits of LUIS risks include but not limited to esophageal perforation bleeding sore throat respiratory depression require intubation. The seems reasonable and the patient is agreeable to proceed. She has had no previous history of cardiac issues and no previous GI surgery with exception of cholecystectomy. She denies any problem with swallowing solids of fluids. Keep her n.p.o. overnight         ICD-10-CM ICD-9-CM    1. Sepsis, due to unspecified organism, unspecified whether acute organ dysfunction present ([facility]  A41.9 038.9      995.91    2. AMADO (acute kidney injury) ([facility]  N17.9 584.9    3. Immunosuppressed due to chemotherapy ([facility]  D84.821 V58.69     T45. 1X5A      Z79.899     4. Malignant neoplasm of female breast, unspecified estrogen receptor status, unspecified laterality, unspecified site of breast ([facility]  C50.919 174.9    5. Hypoxia  R09.02 799.02    6.  Pneumonia of right lower lobe due to infectious organism  J18.9 486    7. Malignant neoplasm of right breast in female, estrogen receptor positive, unspecified site of breast ([facility] [address]  C50.911 174.9     Z17.0 V86.0    8. History of cancer of right breast  Z85.3 V10.3 [facility] NDL/WIRE/CLIP/SEED BREAST RT      [facility] NDL/WIRE/CLIP/SEED BREAST RT          CARDIAC STUDIES      02/23/22    ECHO ADULT COMPLETE 02/27/2022 2/27/2022    Interpretation Summary    Left Ventricle: Left ventricle size is normal. Normal wall thickness. Normal wall motion. Normal left ventricular systolic function with a visually estimated EF of 60 - 65%. Signed by: Katy Vasquez MD on 2/27/2022 12:31 PM                     EKG: normal sinus rhythm. IMAGING      CT Results (most recent):  Results from Hospital Encounter encounter on 02/23/22    CTA CHEST W OR W WO CONT    Narrative  INDICATION:  SOB, fever    EXAM:  CTA Chest with contrast for Pulmonary Embolus    COMPARISON:  May 2, 2016    TECHNIQUE:  Following the uneventful intravenous administration of IV contrast  thin helical axial images were obtained through the chest. 3D image  postprocessing was performed. CT dose reduction was achieved through use of a  standardized protocol tailored for this examination and automatic exposure  control for dose modulation. FINDINGS:    THYROID: Unremarkable. MEDIASTINUM/LISA: No mass or lymphadenopathy. HEART/PERICARDIUM: Unremarkable. AORTA:  No aneurysm. PULMONARY ARTERIES:No pulmonary embolism. LUNGS/PLEURA: Mild patchy groundglass opacity right lower lobe. No pulmonary  edema, pleural effusion, or pneumothorax. Calcified granuloma left upper lobe. INCIDENTALLY IMAGED UPPER ABDOMEN: Possible splenomegaly. 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 BONES: No destructive bone lesion. ADDITIONAL COMMENTS:  N/A    Impression  No acute pulmonary embolus. 2. Mild right lower lobe airspace disease. 3. Splenomegaly and unchanged pulmonary artery aneurysm.         XR Results (most recent):  Results from [place] encounter on 02/23/22    XR CHEST PORT    Narrative  EXAM:  XR CHEST PORT    INDICATION: Shortness of breath    COMPARISON: Chest radiograph 10/13/2021    TECHNIQUE: Upright portable chest AP view    FINDINGS:    Left pectoral infusion port terminates over the mid SVC, slightly retracted  compared to prior (mid to lower SVC). Cardiac mediastinal silhouette is upper  limits of normal, stable. Lungs and pleural spaces grossly clear. Impression  1. No acute findings. 2.  Left pectoral infusion port terminates over the mid SVC, slightly retracted  compared to prior (mid to lower SVC). This is likely still in appropriate  position for use, but correlate clinically. MRI Results (most recent):  Results from [place] encounter on 01/27/22    MRI BREAST BI W WO CONT    Narrative  Examination: Bilateral breast MRI with contrast    HISTORY: Known right breast cancer. Assess response to chemotherapy    COMPARISON: Breast MRI, mammogram 2021    TECHNIQUE:  Bilateral breast MRI was performed using a dedicated breast coil without  compression with the patient in the prone position. Precontrast T1-weighted  images with fat suppression were obtained followed by bolus injection of 20 mL  Prohance. Postcontrast dynamic and high-resolution images were acquired. T2-weighted axial imaging with fat suppression was also performed. The images  were analyzed using CAD analysis, enhancement curves, digital subtraction, and 2  and 3 dimensional reconstructions. FINDINGS:  There is marked background parenchymal enhancement. The breast tissue is heterogeneously dense, which could obscure detection of  small masses (approximately 51-75% glandular). Right breast:  In the right lateral breast at 8-9 o'clock posterior depth 10 cm from the nipple  there is susceptibility artifact from the biopsy clip but no residual  enhancement.     There is no suspicious axillary or internal mammary chain lymphadenopathy. Left breast:  No suspicious enhancement. There is no suspicious axillary or internal mammary chain lymphadenopathy. Impression  Right Breast:  1. BI-RADS Assessment Category 6: Known biopsy proven malignancy- Appropriate  action should be taken. Right breast biopsy clip with no residual suspicious  enhancement. Left Breast:  1. BI-RADS Assessment Category 1: Negative. RECOMMENDATIONS:  Continued oncologic/surgical management. A negative breast MRI examination speaks strongly against invasive cancer down  to a detection threshold of 3 to 5 mm but may not detect some lower grade or in  situ carcinomas. Therefore, routine clinical and mammographic followup are  recommended. A summary portfolio has been created in PACS. Subjective:   CHIEF COMPLAINT:  \"fever\"  Pertinent items are noted in HPI. HISTORY OF PRESENT ILLNESS:     Molina Segura is a 40 y.o. female whom presents with complaint noted above. We were asked to evaluation for the above problems.    Lenard Genre  Past Medical History:   Diagnosis Date    Bell's palsy     Breast cancer ([facility] [address] 2021    ER/AZ+ Her2 +      Past Surgical History:   Procedure Laterality Date    HX  SECTION      HX  SECTION      HX CHOLECYSTECTOMY      HX CHOLECYSTECTOMY      IR REMOVE TUNL CVAD W PORT/PUMP  3/1/2022     Social History     Tobacco Use    Smoking status: Never Smoker    Smokeless tobacco: Never Used   Substance Use Topics    Alcohol use: Yes     Comment: rarely      Family History   Problem Relation Age of Onset    Cancer Sister         leukemia      No Known Allergies        Current Facility-Administered Medications   Medication Dose Route Frequency    albuterol-ipratropium (DUO-NEB) 2.5 MG-0.5 MG/3 ML  3 mL Nebulization Q6H PRN    sodium chloride (NS) flush 5-10 mL  5-10 mL IntraVENous PRN    ondansetron (ZOFRAN) injection 4 mg  4 mg IntraVENous Q6H PRN    sodium chloride (NS) flush 5-40 mL  5-40 mL IntraVENous Q8H    sodium chloride (NS) flush 5-40 mL  5-40 mL IntraVENous PRN    acetaminophen (TYLENOL) tablet 650 mg  650 mg Oral Q6H PRN    Or    acetaminophen (TYLENOL) suppository 650 mg  650 mg Rectal Q6H PRN    polyethylene glycol (MIRALAX) packet 17 g  17 g Oral DAILY PRN    enoxaparin (LOVENOX) injection 40 mg  40 mg SubCUTAneous DAILY    cefepime (MAXIPIME) 2 g in 0.9% sodium chloride 10 mL IV syringe  2 g IntraVENous Q8H    azithromycin (ZITHROMAX) 500 mg in 0.9% sodium chloride 250 mL (VIAL-MATE)  500 mg IntraVENous Q24H     Facility-Administered Medications Ordered in Other Encounters   Medication Dose Route Frequency    lidocaine-EPINEPHrine (XYLOCAINE) 2 %-1:100,000 injection 400 mg  20 mL SubCUTAneous RAD ONCE            Prior to Admission medications    Medication Sig Start Date End Date Taking? Authorizing Provider   CRANIAL PROSTHESIS misc Alopecia/ breast cancer chemo/ wig 10/8/21  Yes Denny Bingham DO   prochlorperazine (Compazine) 5 mg tablet Take 1 tab by mouth every 6 hours as needed for nausea or vomiting 10/8/21  Yes Franko Lew NP   levonorgestreL (Mirena) 20 mcg/24 hours (6 yrs) 52 mg IUD 1 Device by IntraUTERine route once. Yes Provider, Historical   ondansetron (ZOFRAN ODT) 4 mg disintegrating tablet Take 1-2 Tablets by mouth every eight (8) hours as needed for Nausea or Vomiting. Patient not taking: Reported on 2/23/2022 10/8/21   Franko Lew NP   lidocaine-prilocaine (EMLA) topical cream Apply  to affected area as needed for Pain.  Apply to port-a-cath 30-60 minutes prior to access for chemo  Patient not taking: Reported on 2/23/2022 10/8/21   Franko Lew NP   dexAMETHasone (DECADRON) 4 mg tablet Take 2 tabs (8mg) by mouth twice daily the day before chemotherapy and the day after chemotherapy  Patient not taking: Reported on 2/23/2022 10/8/21   Franko Lew NP                Objective:   VITALS: Patient Vitals for the past 12 hrs:   Temp Pulse Resp BP SpO2   03/01/22 1622 98.7 °F (37.1 °C) 93 18 102/67 95 %   03/01/22 1605  96 16 115/68 96 %   03/01/22 1550  95 16 110/60 96 %   03/01/22 1545  95 16 107/68 96 %   03/01/22 1540  92 16 119/60 96 %   03/01/22 1400  (!) 105      03/01/22 1341 97.4 °F (36.3 °C) 100 16 112/77 96 %   03/01/22 1200  94      03/01/22 1000  (!) 104      03/01/22 0948 98 °F (36.7 °C) 95 16 102/73 96 %   03/01/22 0600  89           Visit Vitals  /67 (BP 1 Location: Right upper arm, BP Patient Position: At rest)   Pulse 93   Temp 98.7 °F (37.1 °C)   Resp 18   Ht 5' (1.524 m)   Wt 194 lb 3.2 oz (88.1 kg)   SpO2 95%   BMI 37.93 kg/m²         Extended / Orthostatic Vitals: Wt Readings from Last 3 Encounters:   03/01/22 194 lb 3.2 oz (88.1 kg)   02/14/22 209 lb (94.8 kg)   02/04/22 209 lb (94.8 kg)       No intake or output data in the 24 hours ending 03/01/22 1649       Neck: no JVD  Heart: regular rate and rhythm  Lungs: clear to auscultation bilaterally  Abdomen: soft, non-tender. Bowel sounds normal. No masses,  no organomegaly  Extremities: no edema          LAB DATA REVIEWED:      All Cardiac Markers in the last 24 hours:  No results found for: CPK, CK, CKMMB, CKMB, RCK3, CKMBT, CKMBPOC, CKNDX, CKND1, ABDON, TROPT, TROIQ, SABINA, TROPT, TNIPOC, BNP, BNPP, BNPNT      No results found for this or any previous visit (from the past 24 hour(s)). Lab Results   Component Value Date/Time    Cholesterol, total 198 06/08/2021 09:26 AM    HDL Cholesterol 43 06/08/2021 09:26 AM    LDL, calculated 117.2 (H) 06/08/2021 09:26 AM    VLDL, calculated 37.8 06/08/2021 09:26 AM    Triglyceride 189 (H) 06/08/2021 09:26 AM    CHOL/HDL Ratio 4.6 06/08/2021 09:26 AM             Procedures: see electronic medical records for all procedures/Xrays and details which were not copied into this note but were reviewed prior to creation of Plan.         Please note that this dictation was completed with [employer], the computer voice recognition software. Quite often unanticipated grammatical, syntax, homophones, and other interpretive errors are inadvertently transcribed by the computer software. Please disregard these errors. Please excuse any errors that have escaped final proofreading.           Care Plan discussed with:  Patient    Family    RN    Care Manager    Consultant/Specialist:     ________________________    ______________________________________________      Signed By: Sandhya Carrasquillo MD     March 1, 2022

## 2022-03-01 NOTE — PROGRESS NOTES
[facility] [facility] Adult  Hospitalist Group                                                                                          Hospitalist Progress Note  Edwin Aguilar MD  Answering service: [phone] from in house phone        Date of Service:  3/1/2022  NAME:  Wendy Agustin  :  1975  MRN:  [id number]      Admission Summary:      40 y.o lady w/ breast cancer on chemo who presents with fever. Symptoms began 2 days ago. She describes fatigue and a fever as high as 104 F. Associated symptoms include a non-productive cough and diarrhea that started earlier today. Some nausea without vomiting or abdominal pain. No dysuria or hematuria. Interval history / Subjective:   Patient seen and examined today  Patient will get the port removed today but has eaten breakfast so it will be deferred till tomorrow  Patient will also need LUIS cardiology consult     Assessment & Plan:     CAP w/ sepsis in an immunocompromised patient:  Acute hypoxic respiratory failure--solved patient is on room air  Staph aureus bacteremia  S/p chemoport   ? CAP in RLL, pt has on/off cough for almost a month PTA  --CXR reported Left pectoral infusion port terminates over the mid SVC, slightly retracted  compared to prior (mid to lower SVC). This is likely still in appropriate position for use, but correlate clinically. CTA chestno acute PE, mild right lower lobe airspace disease, splenomegaly and unchanged pulmonary artery aneurysm. -Patient still on 2 L nasal cannula, --patient seen walking around without oxygen today  DuoNebs  --MRSA screen neg   --IV cefepime and azithromycin  Initial blood culture positive for staph aureus,2/4 bottles, could be contaminant, final reports are pending. Will remove port as patient will not require any more chemo through the port  Repeat blood culture in process.   --ECHO did not report vegetations   --Repeat Paired B Cx so far neg     Hypokalemia  Hypomagnesemia  Replace as needed.     Breast cancer on chemo:   -Oncology on boardappreciate input. --completed Chemo and scheduled for Sx on march 15th  --port will be removed     Thrombocytopenia   Anemia  Splenomegaly   D/t infection and Breast Ca/Rx  Keep Hb >7   Plts improving        Code status:  Full code  Prophylaxis: Lovenox  Care Plan discussed with: Patient, RN, case management  Anticipated Disposition: 24 to 48 hours     Hospital Problems  Date Reviewed: 2/18/2022          Codes Class Noted POA    Sepsis ([facility] [address] ICD-10-CM: A41.9  ICD-9-CM: 038.9, 995.91  2/23/2022 Unknown                Review of Systems:   A comprehensive review of systems was negative except for that written in the HPI. Vital Signs:    Last 24hrs VS reviewed since prior progress note. Most recent are:  Visit Vitals  /73 (BP 1 Location: Right upper arm)   Pulse (!) 105   Temp 98 °F (36.7 °C)   Resp 16   Ht 5' (1.524 m)   Wt 88.1 kg (194 lb 3.2 oz)   SpO2 96%   BMI 37.93 kg/m²       No intake or output data in the 24 hours ending 03/01/22 4810     Physical Examination:     I had a face to face encounter with this patient and independently examined them on 3/1/2022 as outlined below:          Constitutional:  No acute distress, cooperative, pleasant    ENT:  Oral mucosa moist, oropharynx benign. Resp:  CTA bilaterally. No wheezing/rhonchi/rales. Chemo port site intact. No accessory muscle use. CV:  Regular rhythm, normal rate, no murmurs, gallops, rubs    GI:  Soft, non distended, non tender. normoactive bowel sounds, no hepatosplenomegaly     Musculoskeletal:  No edema, warm, 2+ pulses throughout    Neurologic:  Moves all extremities.   AAOx3, CN II-XII reviewed              Data Review:    Review and/or order of clinical lab test  Review and/or order of tests in the radiology section of CPT      Labs:     Recent Labs     02/27/22 0212   WBC 4.0   HGB 7.6*   HCT 25.5*        Recent Labs     02/27/22 0212      K 3.4*   * CO2 31   BUN 3*   CREA 0.42*   *   CA 8.7   MG 1.6     No results for input(s): ALT, AP, TBIL, TBILI, TP, ALB, GLOB, GGT, AML, LPSE in the last 72 hours. No lab exists for component: SGOT, GPT, AMYP, HLPSE  No results for input(s): INR, PTP, APTT, INREXT, INREXT in the last 72 hours. No results for input(s): FE, TIBC, PSAT, FERR in the last 72 hours. No results found for: FOL, RBCF   No results for input(s): PH, PCO2, PO2 in the last 72 hours. No results for input(s): CPK, CKNDX, TROIQ in the last 72 hours.     No lab exists for component: CPKMB  Lab Results   Component Value Date/Time    Cholesterol, total 198 06/08/2021 09:26 AM    HDL Cholesterol 43 06/08/2021 09:26 AM    LDL, calculated 117.2 (H) 06/08/2021 09:26 AM    Triglyceride 189 (H) 06/08/2021 09:26 AM    CHOL/HDL Ratio 4.6 06/08/2021 09:26 AM     No results found for: [facility]  Lab Results   Component Value Date/Time    Color YELLOW/STRAW 02/23/2022 03:38 AM    Appearance CLEAR 02/23/2022 03:38 AM    Specific gravity 1.015 02/23/2022 03:38 AM    Specific gravity 1.026 05/02/2016 01:35 PM    pH (UA) 6.5 02/23/2022 03:38 AM    Protein 30 (A) 02/23/2022 03:38 AM    Glucose Negative 02/23/2022 03:38 AM    Ketone 15 (A) 02/23/2022 03:38 AM    Bilirubin Negative 02/23/2022 03:38 AM    Urobilinogen 0.2 02/23/2022 03:38 AM    Nitrites Negative 02/23/2022 03:38 AM    Leukocyte Esterase TRACE (A) 02/23/2022 03:38 AM    Epithelial cells FEW 02/23/2022 03:38 AM    Bacteria 1+ (A) 02/23/2022 03:38 AM    WBC 5-10 02/23/2022 03:38 AM    RBC 0-5 02/23/2022 03:38 AM         Medications Reviewed:     Current Facility-Administered Medications   Medication Dose Route Frequency    albuterol-ipratropium (DUO-NEB) 2.5 MG-0.5 MG/3 ML  3 mL Nebulization Q6H PRN    sodium chloride (NS) flush 5-10 mL  5-10 mL IntraVENous PRN    ondansetron (ZOFRAN) injection 4 mg  4 mg IntraVENous Q6H PRN    sodium chloride (NS) flush 5-40 mL  5-40 mL IntraVENous Q8H    sodium chloride (NS) flush 5-40 mL  5-40 mL IntraVENous PRN    acetaminophen (TYLENOL) tablet 650 mg  650 mg Oral Q6H PRN    Or    acetaminophen (TYLENOL) suppository 650 mg  650 mg Rectal Q6H PRN    polyethylene glycol (MIRALAX) packet 17 g  17 g Oral DAILY PRN    enoxaparin (LOVENOX) injection 40 mg  40 mg SubCUTAneous DAILY    cefepime (MAXIPIME) 2 g in 0.9% sodium chloride 10 mL IV syringe  2 g IntraVENous Q8H    azithromycin (ZITHROMAX) 500 mg in 0.9% sodium chloride 250 mL (VIAL-MATE)  500 mg IntraVENous Q24H     Facility-Administered Medications Ordered in Other Encounters   Medication Dose Route Frequency    lidocaine-EPINEPHrine (XYLOCAINE) 2 %-1:100,000 injection 400 mg  20 mL SubCUTAneous RAD ONCE    lidocaine (XYLOCAINE) 10 mg/mL (1 %) injection 20 mL  20 mL SubCUTAneous RAD ONCE     ______________________________________________________________________  EXPECTED LENGTH OF STAY: 4d 19h  ACTUAL LENGTH OF STAY:          6                 Kanchan Fermin MD

## 2022-03-02 ENCOUNTER — ANESTHESIA EVENT (OUTPATIENT)
Dept: CARDIAC CATH/INVASIVE PROCEDURES | Age: 47
DRG: 721 | End: 2022-03-02
Payer: MEDICAID

## 2022-03-02 ENCOUNTER — ANESTHESIA (OUTPATIENT)
Dept: CARDIAC CATH/INVASIVE PROCEDURES | Age: 47
DRG: 721 | End: 2022-03-02
Payer: MEDICAID

## 2022-03-02 ENCOUNTER — APPOINTMENT (OUTPATIENT)
Dept: CARDIAC CATH/INVASIVE PROCEDURES | Age: 47
DRG: 721 | End: 2022-03-02
Attending: SPECIALIST
Payer: MEDICAID

## 2022-03-02 LAB
ANION GAP SERPL CALC-SCNC: 3 MMOL/L (ref 5–15)
BACTERIA SPEC CULT: NORMAL
BASOPHILS # BLD: 0 K/UL (ref 0–0.1)
BASOPHILS NFR BLD: 0 % (ref 0–1)
BUN SERPL-MCNC: 12 MG/DL (ref 6–20)
BUN/CREAT SERPL: 21 (ref 12–20)
CALCIUM SERPL-MCNC: 9.3 MG/DL (ref 8.5–10.1)
CHLORIDE SERPL-SCNC: 105 MMOL/L (ref 97–108)
CO2 SERPL-SCNC: 27 MMOL/L (ref 21–32)
CREAT SERPL-MCNC: 0.56 MG/DL (ref 0.55–1.02)
DIFFERENTIAL METHOD BLD: ABNORMAL
EOSINOPHIL # BLD: 0.1 K/UL (ref 0–0.4)
EOSINOPHIL NFR BLD: 2 % (ref 0–7)
ERYTHROCYTE [DISTWIDTH] IN BLOOD BY AUTOMATED COUNT: 16.1 % (ref 11.5–14.5)
GLUCOSE SERPL-MCNC: 139 MG/DL (ref 65–100)
HCT VFR BLD AUTO: 30.6 % (ref 35–47)
HGB BLD-MCNC: 9.3 G/DL (ref 11.5–16)
IMM GRANULOCYTES # BLD AUTO: 0 K/UL
IMM GRANULOCYTES NFR BLD AUTO: 0 %
LYMPHOCYTES # BLD: 1.4 K/UL (ref 0.8–3.5)
LYMPHOCYTES NFR BLD: 24 % (ref 12–49)
MCH RBC QN AUTO: 28.4 PG (ref 26–34)
MCHC RBC AUTO-ENTMCNC: 30.4 G/DL (ref 30–36.5)
MCV RBC AUTO: 93.6 FL (ref 80–99)
MONOCYTES # BLD: 0.4 K/UL (ref 0–1)
MONOCYTES NFR BLD: 7 % (ref 5–13)
NEUTS SEG # BLD: 3.9 K/UL (ref 1.8–8)
NEUTS SEG NFR BLD: 67 % (ref 32–75)
NRBC # BLD: 0 K/UL (ref 0–0.01)
NRBC BLD-RTO: 0 PER 100 WBC
PLATELET # BLD AUTO: 256 K/UL (ref 150–400)
PLATELET COMMENTS,PCOM: ABNORMAL
PMV BLD AUTO: 10.4 FL (ref 8.9–12.9)
POTASSIUM SERPL-SCNC: 4.2 MMOL/L (ref 3.5–5.1)
RBC # BLD AUTO: 3.27 M/UL (ref 3.8–5.2)
RBC MORPH BLD: ABNORMAL
SERVICE CMNT-IMP: NORMAL
SODIUM SERPL-SCNC: 135 MMOL/L (ref 136–145)
WBC # BLD AUTO: 5.8 K/UL (ref 3.6–11)

## 2022-03-02 PROCEDURE — 85025 COMPLETE CBC W/AUTO DIFF WBC: CPT

## 2022-03-02 PROCEDURE — 76060000032 HC ANESTHESIA 0.5 TO 1 HR

## 2022-03-02 PROCEDURE — 74011250636 HC RX REV CODE- 250/636: Performed by: NURSE ANESTHETIST, CERTIFIED REGISTERED

## 2022-03-02 PROCEDURE — 74011000250 HC RX REV CODE- 250: Performed by: NURSE ANESTHETIST, CERTIFIED REGISTERED

## 2022-03-02 PROCEDURE — 65660000000 HC RM CCU STEPDOWN

## 2022-03-02 PROCEDURE — 36573 INSJ PICC RS&I 5 YR+: CPT | Performed by: INTERNAL MEDICINE

## 2022-03-02 PROCEDURE — 74011000250 HC RX REV CODE- 250: Performed by: HOSPITALIST

## 2022-03-02 PROCEDURE — 74011250636 HC RX REV CODE- 250/636: Performed by: HOSPITALIST

## 2022-03-02 PROCEDURE — C8925 2D TEE W OR W/O FOL W/CON,IN: HCPCS

## 2022-03-02 PROCEDURE — 80048 BASIC METABOLIC PNL TOTAL CA: CPT

## 2022-03-02 PROCEDURE — 36415 COLL VENOUS BLD VENIPUNCTURE: CPT

## 2022-03-02 RX ORDER — SODIUM CHLORIDE 9 MG/ML
INJECTION, SOLUTION INTRAVENOUS
Status: DISCONTINUED | OUTPATIENT
Start: 2022-03-02 | End: 2022-03-02 | Stop reason: HOSPADM

## 2022-03-02 RX ORDER — LIDOCAINE HYDROCHLORIDE 20 MG/ML
INJECTION, SOLUTION INFILTRATION; PERINEURAL AS NEEDED
Status: DISCONTINUED | OUTPATIENT
Start: 2022-03-02 | End: 2022-03-02 | Stop reason: HOSPADM

## 2022-03-02 RX ORDER — PHENYLEPHRINE HCL IN 0.9% NACL 0.4MG/10ML
SYRINGE (ML) INTRAVENOUS AS NEEDED
Status: DISCONTINUED | OUTPATIENT
Start: 2022-03-02 | End: 2022-03-02 | Stop reason: HOSPADM

## 2022-03-02 RX ORDER — PROPOFOL 10 MG/ML
INJECTION, EMULSION INTRAVENOUS AS NEEDED
Status: DISCONTINUED | OUTPATIENT
Start: 2022-03-02 | End: 2022-03-02 | Stop reason: HOSPADM

## 2022-03-02 RX ADMIN — PROPOFOL 50 MG: 10 INJECTION, EMULSION INTRAVENOUS at 15:53

## 2022-03-02 RX ADMIN — SODIUM CHLORIDE: 900 INJECTION, SOLUTION INTRAVENOUS at 15:30

## 2022-03-02 RX ADMIN — LIDOCAINE HYDROCHLORIDE 60 MG: 20 INJECTION, SOLUTION INFILTRATION; PERINEURAL at 15:36

## 2022-03-02 RX ADMIN — PROPOFOL 50 MG: 10 INJECTION, EMULSION INTRAVENOUS at 15:45

## 2022-03-02 RX ADMIN — SODIUM CHLORIDE, PRESERVATIVE FREE 10 ML: 5 INJECTION INTRAVENOUS at 19:57

## 2022-03-02 RX ADMIN — Medication 100 MCG: at 15:50

## 2022-03-02 RX ADMIN — PROPOFOL 100 MG: 10 INJECTION, EMULSION INTRAVENOUS at 15:36

## 2022-03-02 RX ADMIN — PROPOFOL 50 MG: 10 INJECTION, EMULSION INTRAVENOUS at 15:50

## 2022-03-02 RX ADMIN — CEFEPIME 2 G: 2 INJECTION, POWDER, FOR SOLUTION INTRAVENOUS at 09:01

## 2022-03-02 RX ADMIN — PROPOFOL 50 MG: 10 INJECTION, EMULSION INTRAVENOUS at 15:42

## 2022-03-02 RX ADMIN — CEFEPIME 2 G: 2 INJECTION, POWDER, FOR SOLUTION INTRAVENOUS at 19:57

## 2022-03-02 RX ADMIN — CEFEPIME 2 G: 2 INJECTION, POWDER, FOR SOLUTION INTRAVENOUS at 02:16

## 2022-03-02 NOTE — PROGRESS NOTES
Anesthesia name: Nay Ellington CRNA    Anesthesia is present for case. Refer to anesthesia log for vitals.

## 2022-03-02 NOTE — PROGRESS NOTES
[facility] [facility] Adult  Hospitalist Group                                                                                          Hospitalist Progress Note  Ashley Lamb MD  Answering service: [phone] from in house phone        Date of Service:  3/2/2022  NAME:  Mary Ellen Pruitt  :  1975  MRN:  [id number]      Admission Summary:      40 y.o lady w/ breast cancer on chemo who presents with fever. Symptoms began 2 days ago. She describes fatigue and a fever as high as 104 F. Associated symptoms include a non-productive cough and diarrhea that started earlier today. Some nausea without vomiting or abdominal pain. No dysuria or hematuria. Interval history / Subjective:   Patient seen and examined today port removed for LUIS today  Blood culture staph aureus 2 x 4 awaiting sensitivities     Assessment & Plan:     CAP w/ sepsis in an immunocompromised patient:  Acute hypoxic respiratory failure--solved patient is on room air  Staph aureus bacteremia  S/p chemoport --removed on 3/1/2022  ? CAP in RLL, pt has on/off cough for almost a month PTA  --CXR reported Left pectoral infusion port terminates over the mid SVC, slightly retracted  compared to prior (mid to lower SVC). This is likely still in appropriate position for use, but correlate clinically. CTA chestno acute PE, mild right lower lobe airspace disease, splenomegaly and unchanged pulmonary artery aneurysm. -Patient still on 2 L nasal cannula, --patient seen walking around without oxygen today  DuoNebs  --MRSA screen neg   --IV cefepime and azithromycin ID following  Initial blood culture positive for staph aureus,2/4 bottles, could be contaminant, final reports are pending. Repeat blood culture in process. --ECHO did not report vegetations LUIS pending  --Repeat Paired B Cx so far neg     Hypokalemia  Hypomagnesemia  Replace as needed.     Breast cancer on chemo:   -Oncology on boardappreciate input.   --completed Chemo and scheduled for Sx on march 15th  --port will be removed     Thrombocytopenia   Anemia  Splenomegaly   D/t infection and Breast Ca/Rx  Keep Hb >7   Plts improving        Code status:  Full code  Prophylaxis: Lovenox  Care Plan discussed with: Patient, RN, case management  Anticipated Disposition: 24 to 48 hours     Hospital Problems  Date Reviewed: 2/18/2022          Codes Class Noted POA    Sepsis ([facility] [address] ICD-10-CM: A41.9  ICD-9-CM: 038.9, 995.91  2/23/2022 Unknown                Review of Systems:   A comprehensive review of systems was negative except for that written in the HPI. Vital Signs:    Last 24hrs VS reviewed since prior progress note. Most recent are:  Visit Vitals  /60   Pulse 85   Temp 98 °F (36.7 °C)   Resp 18   Ht 5' (1.524 m)   Wt 90.5 kg (199 lb 8 oz)   SpO2 95%   BMI 38.96 kg/m²       No intake or output data in the 24 hours ending 03/02/22 1047     Physical Examination:     I had a face to face encounter with this patient and independently examined them on 3/2/2022 as outlined below:          Constitutional:  No acute distress, cooperative, pleasant    ENT:  Oral mucosa moist, oropharynx benign. Resp:  CTA bilaterally. No wheezing/rhonchi/rales. Chemo port site intact. No accessory muscle use. CV:  Regular rhythm, normal rate, no murmurs, gallops, rubs    GI:  Soft, non distended, non tender. normoactive bowel sounds, no hepatosplenomegaly     Musculoskeletal:  No edema, warm, 2+ pulses throughout    Neurologic:  Moves all extremities.   AAOx3, CN II-XII reviewed              Data Review:    Review and/or order of clinical lab test  Review and/or order of tests in the radiology section of CPT      Labs:     Recent Labs     03/02/22  0330   WBC 5.8   HGB 9.3*   HCT 30.6*        Recent Labs     03/02/22  0330   *   K 4.2      CO2 27   BUN 12   CREA 0.56   *   CA 9.3     No results for input(s): ALT, AP, TBIL, TBILI, TP, ALB, GLOB, GGT, AML, LPSE in the last 72 hours. No lab exists for component: SGOT, GPT, AMYP, HLPSE  No results for input(s): INR, PTP, APTT, INREXT, INREXT in the last 72 hours. No results for input(s): FE, TIBC, PSAT, FERR in the last 72 hours. No results found for: FOL, RBCF   No results for input(s): PH, PCO2, PO2 in the last 72 hours. No results for input(s): CPK, CKNDX, TROIQ in the last 72 hours.     No lab exists for component: CPKMB  Lab Results   Component Value Date/Time    Cholesterol, total 198 06/08/2021 09:26 AM    HDL Cholesterol 43 06/08/2021 09:26 AM    LDL, calculated 117.2 (H) 06/08/2021 09:26 AM    Triglyceride 189 (H) 06/08/2021 09:26 AM    CHOL/HDL Ratio 4.6 06/08/2021 09:26 AM     No results found for: [facility]  Lab Results   Component Value Date/Time    Color YELLOW/STRAW 02/23/2022 03:38 AM    Appearance CLEAR 02/23/2022 03:38 AM    Specific gravity 1.015 02/23/2022 03:38 AM    Specific gravity 1.026 05/02/2016 01:35 PM    pH (UA) 6.5 02/23/2022 03:38 AM    Protein 30 (A) 02/23/2022 03:38 AM    Glucose Negative 02/23/2022 03:38 AM    Ketone 15 (A) 02/23/2022 03:38 AM    Bilirubin Negative 02/23/2022 03:38 AM    Urobilinogen 0.2 02/23/2022 03:38 AM    Nitrites Negative 02/23/2022 03:38 AM    Leukocyte Esterase TRACE (A) 02/23/2022 03:38 AM    Epithelial cells FEW 02/23/2022 03:38 AM    Bacteria 1+ (A) 02/23/2022 03:38 AM    WBC 5-10 02/23/2022 03:38 AM    RBC 0-5 02/23/2022 03:38 AM         Medications Reviewed:     Current Facility-Administered Medications   Medication Dose Route Frequency    albuterol-ipratropium (DUO-NEB) 2.5 MG-0.5 MG/3 ML  3 mL Nebulization Q6H PRN    sodium chloride (NS) flush 5-10 mL  5-10 mL IntraVENous PRN    ondansetron (ZOFRAN) injection 4 mg  4 mg IntraVENous Q6H PRN    sodium chloride (NS) flush 5-40 mL  5-40 mL IntraVENous Q8H    sodium chloride (NS) flush 5-40 mL  5-40 mL IntraVENous PRN    acetaminophen (TYLENOL) tablet 650 mg  650 mg Oral Q6H PRN    Or    acetaminophen (TYLENOL) suppository 650 mg  650 mg Rectal Q6H PRN    polyethylene glycol (MIRALAX) packet 17 g  17 g Oral DAILY PRN    enoxaparin (LOVENOX) injection 40 mg  40 mg SubCUTAneous DAILY    cefepime (MAXIPIME) 2 g in 0.9% sodium chloride 10 mL IV syringe  2 g IntraVENous Q8H     ______________________________________________________________________  EXPECTED LENGTH OF STAY: 4d 19h  ACTUAL LENGTH OF STAY:          7                 Randi Pickens MD

## 2022-03-02 NOTE — PROGRESS NOTES
Bedside shift change report given to Ricardo Savage (oncoming nurse) by Shital Wright (offgoing nurse). Report included the following information SBAR, Kardex, Intake/Output, MAR and Recent Results.

## 2022-03-02 NOTE — ANESTHESIA POSTPROCEDURE EVALUATION
* No procedures listed *. MAC    Anesthesia Post Evaluation        Patient location during evaluation: PACU  Patient participation: complete - patient participated  Level of consciousness: awake and alert  Pain management: adequate  Airway patency: patent  Anesthetic complications: no  Cardiovascular status: acceptable  Respiratory status: acceptable  Hydration status: acceptable  Comments: I have seen and evaluated the patient and is ready for discharge. Jonas Short MD    Post anesthesia nausea and vomiting:  none      INITIAL Post-op Vital signs:   Vitals Value Taken Time   /69 03/02/22 1645   Temp     Pulse 87 03/02/22 1645   Resp 18 03/02/22 1645   SpO2 95 % 03/02/22 1645   Vitals shown include unvalidated device data.

## 2022-03-02 NOTE — ANESTHESIA PREPROCEDURE EVALUATION
Relevant Problems   No relevant active problems       Anesthetic History   No history of anesthetic complications            Review of Systems / Medical History  Patient summary reviewed, nursing notes reviewed and pertinent labs reviewed    Pulmonary  Within defined limits                 Neuro/Psych   Within defined limits           Cardiovascular  Within defined limits                  Comments: Echo 2/27/22    Left Ventricle: Left ventricle size is normal. Normal wall thickness. Normal wall motion. Normal left ventricular systolic function with a visually estimated EF of 60 - 65%.    GI/Hepatic/Renal  Within defined limits              Endo/Other        Obesity, cancer and anemia     Other Findings              Physical Exam    Airway  Mallampati: II  TM Distance: 4 - 6 cm  Neck ROM: normal range of motion   Mouth opening: Normal     Cardiovascular    Rhythm: regular  Rate: normal         Dental  No notable dental hx       Pulmonary  Breath sounds clear to auscultation               Abdominal  GI exam deferred       Other Findings            Anesthetic Plan    ASA: 2  Anesthesia type: MAC          Induction: Intravenous  Anesthetic plan and risks discussed with: Patient

## 2022-03-02 NOTE — PROGRESS NOTES
Transition of Care Plan   RUR- 10% Moderate Risk   DISPOSITION: The disposition plan is pending medical progression and recommendation.  F/U with PCP/Specialist     Transport: [facility]/family     CM to assist with NARGIS needs as they arise.     CY Thomas, CRM, LMHP-e  Available in Perfect Serve

## 2022-03-02 NOTE — PROGRESS NOTES
TRANSFER - IN REPORT:    Verbal report received from  [facility] on Serg Joshi, Procedure LUIS , from the Cardiac Cath lab, for routine progression of care. Report consisted of patients Situation, Background, Assessment and Recommendations(SBAR). Information from the following report(s) Procedure Summary, MAR and Recent Results was reviewed with the receiving clinician. Opportunity for questions and clarification was provided. Assessment completed upon patients arrival to [address] [place] [address] and care assumed. Cardiac Cath Lab Recovery Arrival Note:     Serg Joshi arrived to [facility] recovery area. Patient procedure= LUIS. Patient on cardiac monitor, non-invasive blood pressure, Patient status doing well without problems. Patient is A&Ox 4. Patient reports no pain, no chest pain, non/v. Procedure site without any bleeding.

## 2022-03-02 NOTE — PROCEDURES
Preliminary LUIS report    No convincing evidence for vegetation at the level of the valves.     Full report to follow

## 2022-03-02 NOTE — ADDENDUM NOTE
Addendum  created 03/02/22 1716 by Andrew Kaur MD    Clinical Note Signed, Review and Sign - Ready for Procedure, Review and Sign - Signed

## 2022-03-02 NOTE — PROGRESS NOTES
TRANSFER - IN REPORT:    Verbal report received from Jennifer Oswald (name) on Richard Chin  being received from CATH lab(unit) for routine progression of care      Report consisted of patients Situation, Background, Assessment and   Recommendations(SBAR). Information from the following report(s) SBAR, Kardex, Procedure Summary, MAR and Recent Results was reviewed with the receiving nurse. Opportunity for questions and clarification was provided. Assessment completed upon patients arrival to unit and care assumed.

## 2022-03-02 NOTE — PROGRESS NOTES
TRANSFER - OUT REPORT:    Verbal report given to [facility], RN on Rachel Perez being transferred to [facility] for routine progression of care       Report consisted of patients Situation, Background, Assessment and   Recommendations(SBAR). Information from the following report(s) Procedure Summary was reviewed with the receiving nurse. Opportunity for questions and clarification was provided.

## 2022-03-02 NOTE — PROGRESS NOTES
Verbal shift change report given to [address]  (oncoming nurse) by Bert Grace  (offgoing nurse). Report included the following information SBAR, Kardex, MAR and Recent Results. Patient off floor for procedure in cath lab.

## 2022-03-03 PROCEDURE — 74011000250 HC RX REV CODE- 250: Performed by: HOSPITALIST

## 2022-03-03 PROCEDURE — 94760 N-INVAS EAR/PLS OXIMETRY 1: CPT

## 2022-03-03 PROCEDURE — 74011000250 HC RX REV CODE- 250: Performed by: INTERNAL MEDICINE

## 2022-03-03 PROCEDURE — 74011250636 HC RX REV CODE- 250/636: Performed by: HOSPITALIST

## 2022-03-03 PROCEDURE — 74011000250 HC RX REV CODE- 250: Performed by: EMERGENCY MEDICINE

## 2022-03-03 PROCEDURE — 77030020365 HC SOL INJ SOD CL 0.9% 50ML

## 2022-03-03 PROCEDURE — C1751 CATH, INF, PER/CENT/MIDLINE: HCPCS

## 2022-03-03 PROCEDURE — 76937 US GUIDE VASCULAR ACCESS: CPT

## 2022-03-03 PROCEDURE — 65660000000 HC RM CCU STEPDOWN

## 2022-03-03 PROCEDURE — 74011250636 HC RX REV CODE- 250/636: Performed by: INTERNAL MEDICINE

## 2022-03-03 RX ADMIN — CEFAZOLIN 2 G: 1 INJECTION, POWDER, FOR SOLUTION INTRAMUSCULAR; INTRAVENOUS at 15:44

## 2022-03-03 RX ADMIN — SODIUM CHLORIDE, PRESERVATIVE FREE 10 ML: 5 INJECTION INTRAVENOUS at 02:54

## 2022-03-03 RX ADMIN — SODIUM CHLORIDE, PRESERVATIVE FREE 10 ML: 5 INJECTION INTRAVENOUS at 15:45

## 2022-03-03 RX ADMIN — CEFEPIME 2 G: 2 INJECTION, POWDER, FOR SOLUTION INTRAVENOUS at 02:53

## 2022-03-03 NOTE — PROGRESS NOTES
03/02/22 2004   Vital Signs   Temp 97.8 °F (36.6 °C)   Temp Source Oral   Pulse (Heart Rate) (!) 107   Heart Rate Source Monitor   Cardiac Rhythm Sinus Tachy   Resp Rate 18   O2 Sat (%) 95 %   Level of Consciousness Alert (0)   BP 97/66   MAP (Calculated) 76   BP 1 Method Automatic   BP 1 Location Right arm   BP Patient Position At rest   MEWS Score 3   patient resting in bed, no signs of distress.

## 2022-03-03 NOTE — PROGRESS NOTES
Transition of Care Plan   RUR- 10% Moderate Risk   DISPOSITION: The disposition plan is home with family assistance.  IV Antibiotics - [facility] accepted patient.  F/U with PCP/Specialist     Transport: Family    Per attending, during this morning's IDRs patient is stable for discharge. IV antibiotics order has been uploaded to all scripts for review. CM received a call this afternoon from [facility] who reports that they can accept patient. Order created for [facility], CM to submit for review. At 3:33pm - FRANKO unable to find [facility] agency to accept patient at this time. At 4:08pm - CM contacted [facility] at [employer] who reports that they are unable to accept patient due to insurance. Because we are unable to confirm a home health agency for skilled nursing on behalf of patient. The next option would be to contact the [facility] and patient will have labs drawn and PICC. CM to set up and confirm upon arrival tomorrow morning. At 4:29pm - CM contacted attempted to contact Dr. Marilyn Whitehead office to provide the above update. CM left call back information. Awaiting call back. At 4:36pm - FRANKO received a call from Dr. Marilyn Whitehead and patient will stay at [facility] to get IV antibiotics. Per Dr. Marilyn Whitehead. At 4:38pm - CM contacted [place] coordinator at [employer] to provide the most recent update, CM left a VM. CM also provided this update to attending verbally.     Madhu Youssef, MSW, CRM, LMHP-e  Available in Perfect Serve

## 2022-03-03 NOTE — PROGRESS NOTES
IV Antibiotic Orders    1. Diagnosis:  MSSA bacteremia/line infection  2. Routine PICC care  3. Antibiotic:  Cefazolin 2 grams IV Q 8 hours  4. Lab each Monday:   CBC/diff/platelets   BMP  5. Lab each Thursday:   CBC/diff/platelets   BMP  6. Fax lab to Dr. Olivier Whitney @ [phone].  7.  Call Dr. Olivier Whitney @ [phone] for WBC under 4 or creatinine over 2  8. Duration of therapy: 3/11/22 and remove line   Please call Dr. Olivier Whitney @ [phone] before stopping therapy. 9.  Allergies: none to current regimen    10.  Call [phone] with name of Marie Carrillo MD

## 2022-03-03 NOTE — PROGRESS NOTES
PICC Placement Note    PRE-PROCEDURE VERIFICATION  Correct Procedure: yes  Correct Site:  yes  Temperature: Temp: 98 °F (36.7 °C), Temperature Source: Temp Source: Oral  Recent Labs     03/02/22  0330   BUN 12   CREA 0.56      WBC 5.8     Allergies: Patient has no known allergies. Education materials for PICC Care given: yes. See Patient Education activity for further details. PICC Booklet placed at bedside: yes    PROCEDURE DETAIL  Consent was obtained and all questions were answered related to risks and benefits. A double lumen PICC line was inserted, as a sterile procedure using ultrasound and modified Seldinger technique for Home IV Therapy. The following documentation is in addition to the PICC properties in the lines/airways flowsheet :  Lot #: [id number]  Lidocaine 1% administered intradermally :yes  Internal Catheter Total Length: 42 (cm)  Vein Selection for PICC:left basilic  Central Line Bundle followed yes  Complication Related to Insertion:no    The placement was verified by EKG, MAX P WAVE @ 42 (cm). PER EKG PICC TIP @ C/A junction.           Line is okay to use: yes    Kristy Gaming RN

## 2022-03-03 NOTE — PROGRESS NOTES
Problem: Falls - Risk of  Goal: *Absence of Falls  Description: Document [place] Fall Risk and appropriate interventions in the flowsheet.   Outcome: Progressing Towards Goal  Note: Fall Risk Interventions:            Medication Interventions: Evaluate medications/consider consulting pharmacy                   Problem: Patient Education: Go to Patient Education Activity  Goal: Patient/Family Education  Outcome: Progressing Towards Goal

## 2022-03-03 NOTE — PROGRESS NOTES
1600: Bedside and Verbal shift change report given to SHALOM Joshua RN (oncoming nurse) by Chato Stern RN (offgoing nurse). Report included the following information SBAR, Kardex, Intake/Output, MAR, Recent Results, Med Rec Status, Cardiac Rhythm NSR/ST and Alarm Parameters . 1930: Bedside and Verbal shift change report given to RN (oncoming nurse) by SHALOM Joshua RN (offgoing nurse). Report included the following information SBAR, Kardex, Intake/Output, MAR, Recent Results, Med Rec Status, Cardiac Rhythm NSR/ST and Alarm Parameters .

## 2022-03-03 NOTE — PROGRESS NOTES
ID Progress Note  3/3/2022  Late entry note for 3/2 visit  Subjective:     She is feeling some better, no s/s concerning for metastatic infection--LUIS results noted. Objective:     Antibiotics:  1. Cefepime  2. Vancomycin   3. Azithromycin       Vitals:   Visit Vitals  BP 99/67 (BP 1 Location: Left upper arm, BP Patient Position: At rest;Supine)   Pulse 81   Temp 98 °F (36.7 °C)   Resp 18   Ht 5' (1.524 m)   Wt 90.5 kg (199 lb 8.3 oz)   SpO2 96%   BMI 38.97 kg/m²        Tmax:  Temp (24hrs), Av.8 °F (36.6 °C), Min:97.6 °F (36.4 °C), Max:98 °F (36.7 °C)      Exam:  Lungs:  clear to auscultation bilaterally  Heart:  regular rate and rhythm  Abdomen:  soft, non-tender. Bowel sounds normal. No masses,  no organomegaly  Skin:  no rash or abnormalities    Labs:      Recent Labs     22  0330   WBC 5.8   HGB 9.3*      BUN 12   CREA 0.56       Cultures:     No results found for: [facility]  Lab Results   Component Value Date/Time    Culture result: NO GROWTH 5 DAYS 2022 11:35 AM    Culture result: (A) 2022 02:24 PM     STAPHYLOCOCCUS AUREUS GROWING IN 2 OF 4 BOTTLES DRAWN SITE=LA REFER TO [id number] FOR SENSITIVITIES    Culture result: REMAINING BOTTLE(S) HAS/HAVE NO GROWTH IN 5 DAYS 2022 02:24 PM    Culture result:  2022 02:24 PM     (NOTE) CALLED POSITIVE BLOOD CULTURE OF GPC IN CLUSTERS GROWING IN 1 OUT OF 4 BOTTLES TO BLAZE ORDAZ AT 0014 ON 22. AT       Radiology:     Line/Insert Date:           Assessment:     1. MSSA bacteremia--likely line infection  2. Breast cancer--chemo complete? 3. No current s/s of metastatic infection, but she does need cardiac workup    Objective:     1. Needs 2 weeks IV therapy from the time of negative blood cultures  2. Blood cultures negative  3. Will need PICC, etc at some point--ordered  4. D/W patient  5.  HH orders on chart    Elgin Cortes MD

## 2022-03-04 VITALS
WEIGHT: 199.52 LBS | RESPIRATION RATE: 16 BRPM | SYSTOLIC BLOOD PRESSURE: 96 MMHG | HEIGHT: 60 IN | BODY MASS INDEX: 39.17 KG/M2 | DIASTOLIC BLOOD PRESSURE: 59 MMHG | HEART RATE: 106 BPM | OXYGEN SATURATION: 99 % | TEMPERATURE: 98.4 F

## 2022-03-04 LAB
ALBUMIN SERPL-MCNC: 2.9 G/DL (ref 3.5–5)
ANION GAP SERPL CALC-SCNC: 4 MMOL/L (ref 5–15)
BASOPHILS # BLD: 0 K/UL (ref 0–0.1)
BASOPHILS NFR BLD: 1 % (ref 0–1)
BUN SERPL-MCNC: 12 MG/DL (ref 6–20)
BUN/CREAT SERPL: 20 (ref 12–20)
CALCIUM SERPL-MCNC: 9.1 MG/DL (ref 8.5–10.1)
CHLORIDE SERPL-SCNC: 111 MMOL/L (ref 97–108)
CO2 SERPL-SCNC: 27 MMOL/L (ref 21–32)
CREAT SERPL-MCNC: 0.6 MG/DL (ref 0.55–1.02)
DIFFERENTIAL METHOD BLD: ABNORMAL
EOSINOPHIL # BLD: 0.1 K/UL (ref 0–0.4)
EOSINOPHIL NFR BLD: 2 % (ref 0–7)
ERYTHROCYTE [DISTWIDTH] IN BLOOD BY AUTOMATED COUNT: 15.9 % (ref 11.5–14.5)
GLUCOSE SERPL-MCNC: 133 MG/DL (ref 65–100)
HCT VFR BLD AUTO: 30.7 % (ref 35–47)
HGB BLD-MCNC: 9.4 G/DL (ref 11.5–16)
IMM GRANULOCYTES # BLD AUTO: 0.1 K/UL (ref 0–0.04)
IMM GRANULOCYTES NFR BLD AUTO: 1 % (ref 0–0.5)
LYMPHOCYTES # BLD: 1.4 K/UL (ref 0.8–3.5)
LYMPHOCYTES NFR BLD: 28 % (ref 12–49)
MCH RBC QN AUTO: 28.1 PG (ref 26–34)
MCHC RBC AUTO-ENTMCNC: 30.6 G/DL (ref 30–36.5)
MCV RBC AUTO: 91.9 FL (ref 80–99)
MONOCYTES # BLD: 0.4 K/UL (ref 0–1)
MONOCYTES NFR BLD: 8 % (ref 5–13)
NEUTS SEG # BLD: 3.1 K/UL (ref 1.8–8)
NEUTS SEG NFR BLD: 60 % (ref 32–75)
NRBC # BLD: 0 K/UL (ref 0–0.01)
NRBC BLD-RTO: 0 PER 100 WBC
PHOSPHATE SERPL-MCNC: 3.2 MG/DL (ref 2.6–4.7)
PLATELET # BLD AUTO: 225 K/UL (ref 150–400)
PMV BLD AUTO: 10.2 FL (ref 8.9–12.9)
POTASSIUM SERPL-SCNC: 4.2 MMOL/L (ref 3.5–5.1)
RBC # BLD AUTO: 3.34 M/UL (ref 3.8–5.2)
SODIUM SERPL-SCNC: 142 MMOL/L (ref 136–145)
WBC # BLD AUTO: 5.1 K/UL (ref 3.6–11)

## 2022-03-04 PROCEDURE — 85025 COMPLETE CBC W/AUTO DIFF WBC: CPT

## 2022-03-04 PROCEDURE — 80069 RENAL FUNCTION PANEL: CPT

## 2022-03-04 PROCEDURE — 94760 N-INVAS EAR/PLS OXIMETRY 1: CPT

## 2022-03-04 PROCEDURE — 74011250636 HC RX REV CODE- 250/636: Performed by: INTERNAL MEDICINE

## 2022-03-04 PROCEDURE — 74011000250 HC RX REV CODE- 250: Performed by: HOSPITALIST

## 2022-03-04 PROCEDURE — 36415 COLL VENOUS BLD VENIPUNCTURE: CPT

## 2022-03-04 PROCEDURE — 74011000250 HC RX REV CODE- 250: Performed by: INTERNAL MEDICINE

## 2022-03-04 PROCEDURE — 74011250636 HC RX REV CODE- 250/636: Performed by: HOSPITALIST

## 2022-03-04 RX ADMIN — SODIUM CHLORIDE, PRESERVATIVE FREE 10 ML: 5 INJECTION INTRAVENOUS at 08:55

## 2022-03-04 RX ADMIN — ENOXAPARIN SODIUM 40 MG: 100 INJECTION SUBCUTANEOUS at 08:44

## 2022-03-04 RX ADMIN — CEFAZOLIN 2 G: 1 INJECTION, POWDER, FOR SOLUTION INTRAMUSCULAR; INTRAVENOUS at 00:54

## 2022-03-04 RX ADMIN — SODIUM CHLORIDE, PRESERVATIVE FREE 10 ML: 5 INJECTION INTRAVENOUS at 00:55

## 2022-03-04 RX ADMIN — CEFAZOLIN 2 G: 1 INJECTION, POWDER, FOR SOLUTION INTRAMUSCULAR; INTRAVENOUS at 18:03

## 2022-03-04 RX ADMIN — CEFAZOLIN 2 G: 1 INJECTION, POWDER, FOR SOLUTION INTRAMUSCULAR; INTRAVENOUS at 08:44

## 2022-03-04 NOTE — PROGRESS NOTES
[address] [facility] Adult  Hospitalist Group                                                                                          Hospitalist Progress Note  Iraj Jeronimo MD  Answering service: [phone] OR [phone] from in house phone        Date of Service:  3/3/2022  NAME:  Kathy Cardenas  :  1975  MRN:  [id number]      Admission Summary:      40 y.o lady w/ breast cancer on chemo who presents with fever. Symptoms began 2 days ago. She describes fatigue and a fever as high as 104 F. Associated symptoms include a non-productive cough and diarrhea that started earlier today. Some nausea without vomiting or abdominal pain. No dysuria or hematuria. Interval history / Subjective:   Patient seen and examined    Sitting on the bed,denied any chest pain,SOB-asked when she can go home-explained that we are waiting for home IV abxre     Assessment & Plan:     CAP w/ sepsis in an immunocompromised patient:resolved  Acute hypoxic respiratory failure--resolved   Staph aureus bacteremia  S/p chemoport --removed on 3/1/2022  ? CAP in RLL, pt has on/off cough for almost a month PTA  --CXR reported Left pectoral infusion port terminates over the mid SVC, slightly retracted  compared to prior (mid to lower SVC). This is likely still in appropriate position for use, but correlate clinically. CTA chestno acute PE, mild right lower lobe airspace disease, splenomegaly and unchanged pulmonary artery aneurysm.  -on ancef now, LUIS negative  -CM working on home health-unable to find one so far        Hypokalemia  Hypomagnesemia  Replace as needed.     Breast cancer on chemo:   -Oncology on boardappreciate input.   --completed Chemo and scheduled for Sx on   --port is removed     Thrombocytopenia -resolved  Anemia  Splenomegaly   OP follow up       Code status:  Full code  Prophylaxis: Lovenox  Care Plan discussed with: Patient, RN, case management  Anticipated Disposition: 24 to 48 hours     Hospital Problems  Date Reviewed: 2/18/2022          Codes Class Noted POA    Sepsis [facility]) ICD-10-CM: A41.9  ICD-9-CM: 038.9, 995.91  2/23/2022 Unknown                Review of Systems:   A comprehensive review of systems was negative except for that written in the HPI. Vital Signs:    Last 24hrs VS reviewed since prior progress note. Most recent are:  Visit Vitals  /68 (BP 1 Location: Right upper arm, BP Patient Position: Sitting)   Pulse 97   Temp 98.3 °F (36.8 °C)   Resp 18   Ht 5' (1.524 m)   Wt 90.5 kg (199 lb 8.3 oz)   SpO2 96%   BMI 38.97 kg/m²         Intake/Output Summary (Last 24 hours) at 3/3/2022 2258  Last data filed at 3/3/2022 1640  Gross per 24 hour   Intake 260 ml   Output    Net 260 ml        Physical Examination:     I had a face to face encounter with this patient and independently examined them on 3/3/2022 as outlined below:          Constitutional:  No acute distress, cooperative, pleasant    ENT:  Oral mucosa moist, oropharynx benign. Resp:  CTA bilaterally. No wheezing/rhonchi/rales. No accessory muscle use. CV:  Regular rhythm, normal rate, no murmurs, gallops, rubs    GI:  Soft, non distended, non tender. normoactive bowel sounds, no hepatosplenomegaly     Musculoskeletal:  No LE edema    Neurologic:  Moves all extremities. AAOx3, CN II-XII reviewed              Data Review:    Review and/or order of clinical lab test  Review and/or order of tests in the radiology section of CPT      Labs:     Recent Labs     03/02/22  0330   WBC 5.8   HGB 9.3*   HCT 30.6*        Recent Labs     03/02/22  0330   *   K 4.2      CO2 27   BUN 12   CREA 0.56   *   CA 9.3     No results for input(s): ALT, AP, TBIL, TBILI, TP, ALB, GLOB, GGT, AML, LPSE in the last 72 hours. No lab exists for component: SGOT, GPT, AMYP, HLPSE  No results for input(s): INR, PTP, APTT, INREXT, INREXT in the last 72 hours. No results for input(s): FE, TIBC, PSAT, FERR in the last 72 hours. No results found for: FOL, RBCF   No results for input(s): PH, PCO2, PO2 in the last 72 hours. No results for input(s): CPK, CKNDX, TROIQ in the last 72 hours.     No lab exists for component: CPKMB  Lab Results   Component Value Date/Time    Cholesterol, total 198 06/08/2021 09:26 AM    HDL Cholesterol 43 06/08/2021 09:26 AM    LDL, calculated 117.2 (H) 06/08/2021 09:26 AM    Triglyceride 189 (H) 06/08/2021 09:26 AM    CHOL/HDL Ratio 4.6 06/08/2021 09:26 AM     No results found for: [facility]  Lab Results   Component Value Date/Time    Color YELLOW/STRAW 02/23/2022 03:38 AM    Appearance CLEAR 02/23/2022 03:38 AM    Specific gravity 1.015 02/23/2022 03:38 AM    Specific gravity 1.026 05/02/2016 01:35 PM    pH (UA) 6.5 02/23/2022 03:38 AM    Protein 30 (A) 02/23/2022 03:38 AM    Glucose Negative 02/23/2022 03:38 AM    Ketone 15 (A) 02/23/2022 03:38 AM    Bilirubin Negative 02/23/2022 03:38 AM    Urobilinogen 0.2 02/23/2022 03:38 AM    Nitrites Negative 02/23/2022 03:38 AM    Leukocyte Esterase TRACE (A) 02/23/2022 03:38 AM    Epithelial cells FEW 02/23/2022 03:38 AM    Bacteria 1+ (A) 02/23/2022 03:38 AM    WBC 5-10 02/23/2022 03:38 AM    RBC 0-5 02/23/2022 03:38 AM         Medications Reviewed:     Current Facility-Administered Medications   Medication Dose Route Frequency    ceFAZolin (ANCEF) 2 g in sterile water (preservative free) 20 mL IV syringe  2 g IntraVENous Q8H    albuterol-ipratropium (DUO-NEB) 2.5 MG-0.5 MG/3 ML  3 mL Nebulization Q6H PRN    sodium chloride (NS) flush 5-10 mL  5-10 mL IntraVENous PRN    ondansetron (ZOFRAN) injection 4 mg  4 mg IntraVENous Q6H PRN    sodium chloride (NS) flush 5-40 mL  5-40 mL IntraVENous Q8H    sodium chloride (NS) flush 5-40 mL  5-40 mL IntraVENous PRN    acetaminophen (TYLENOL) tablet 650 mg  650 mg Oral Q6H PRN    Or    acetaminophen (TYLENOL) suppository 650 mg  650 mg Rectal Q6H PRN    polyethylene glycol (MIRALAX) packet 17 g  17 g Oral DAILY PRN    enoxaparin (LOVENOX) injection 40 mg  40 mg SubCUTAneous DAILY     ______________________________________________________________________  EXPECTED LENGTH OF STAY: 4d 19h  ACTUAL LENGTH OF STAY:          20 Cody King MD

## 2022-03-04 NOTE — PROGRESS NOTES
NUTRITION  Reason for Screen: LOS        RECOMMENDATIONS:   1. Continue regular diet, encourage PO intakes. Interventions   - continue current diet       Information obtained from:   Chart review  Past Medical History:   Diagnosis Date    Bell's palsy     Breast cancer ([facility] [address] 09/2021    ER/SD+ Her2 +     Pt reviewed for ongoing LOS. Pt eating well, no acute malnutrition risk triggers identified. Appears pt likely will remain admitted throughout IV abx infusion x 2 more weeks. Appetite stable, will continue to follow throughout stay as needed.     Diet:  regular  Supplements: None  Meal Intake:   Patient Vitals for the past 168 hrs:   % Diet Eaten   03/03/22 1359 76 - 100%   03/02/22 1957 26 - 50%   02/28/22 1348 76 - 100%   02/28/22 0906 76 - 100%   02/26/22 1010 76 - 100%       Weight Changes:   Stable  Wt Readings from Last 10 Encounters:   03/02/22 90.5 kg (199 lb 8.3 oz)   02/14/22 94.8 kg (209 lb)   02/04/22 94.8 kg (209 lb)   02/03/22 96.5 kg (212 lb 12.8 oz)   01/14/22 96.6 kg (213 lb)   01/13/22 97.8 kg (215 lb 11.2 oz)   01/10/22 96.2 kg (212 lb)   01/05/22 95 kg (209 lb 7 oz)   12/23/21 95.3 kg (210 lb)   12/15/21 96.4 kg (212 lb 8.4 oz)       Nutrition-Related Concerns Identified:  None      PLAN:   - Continue current diet  - Rescreen per policy    Will revisit per policy    Fernando Lopez RD     Contact via Perfect Serve

## 2022-03-04 NOTE — PROGRESS NOTES
ID Progress Note  3/3/2022  Late entry note for 3/2 visit  Subjective:     She would like to go, but insurance is not covering antibiotic therapy. I explained that she is on the most appropriate therapy and this should be continued. Objective:     Antibiotics:  1. Cefazolin        Vitals:   Visit Vitals  /68 (BP 1 Location: Right upper arm, BP Patient Position: Sitting)   Pulse 74   Temp 98.3 °F (36.8 °C)   Resp 17   Ht 5' (1.524 m)   Wt 90.5 kg (199 lb 8.3 oz)   SpO2 100%   BMI 38.97 kg/m²        Tmax:  Temp (24hrs), Av °F (36.7 °C), Min:97.6 °F (36.4 °C), Max:98.3 °F (36.8 °C)      Exam:  Lungs:  clear to auscultation bilaterally  Heart:  regular rate and rhythm  Abdomen:  soft, non-tender. Bowel sounds normal. No masses,  no organomegaly  Skin:  no rash or abnormalities    Labs:      Recent Labs     22  0330   WBC 5.8   HGB 9.3*      BUN 12   CREA 0.56       Cultures:     No results found for: SD  Lab Results   Component Value Date/Time    Culture result: NO GROWTH 5 DAYS 2022 11:35 AM    Culture result: (A) 2022 02:24 PM     STAPHYLOCOCCUS AUREUS GROWING IN 2 OF 4 BOTTLES DRAWN SITE=LA REFER TO [id number] FOR SENSITIVITIES    Culture result: REMAINING BOTTLE(S) HAS/HAVE NO GROWTH IN 5 DAYS 2022 02:24 PM    Culture result:  2022 02:24 PM     (NOTE) CALLED POSITIVE BLOOD CULTURE OF GPC IN CLUSTERS GROWING IN 1 OUT OF 4 BOTTLES TO BLAZE ORDAZ AT 0014 ON 22. AT       Radiology:     Line/Insert Date:           Assessment:     1. MSSA bacteremia--likely line infection  2. Breast cancer--chemo complete? 3. No current s/s of metastatic infection, but she does need cardiac workup    Objective:     1. Needs 2 weeks IV therapy from the time of negative blood cultures  2. Blood cultures negative  3.  D/W patient    Charity May MD

## 2022-03-04 NOTE — PROGRESS NOTES
[address] [facility] Adult  Hospitalist Group                                                                                          Hospitalist Progress Note  Marieta Goodpasture, MD  Answering service: [phone] from in house phone        Date of Service:  3/4/2022  NAME:  Solo Colón  :  1975  MRN:  [id number]      Admission Summary:      40 y.o lady w/ breast cancer on chemo who presents with fever. Symptoms began 2 days ago. She describes fatigue and a fever as high as 104 F. Associated symptoms include a non-productive cough and diarrhea that started earlier today. Some nausea without vomiting or abdominal pain. No dysuria or hematuria. Interval history / Subjective:   Patient seen and examined    She denied any chest pain,SOB     Assessment & Plan:     CAP w/ sepsis in an immunocompromised patient:resolved  Acute hypoxic respiratory failure--resolved   Staph aureus bacteremia  S/p chemoport --removed on 3/1/2022  ? CAP in RLL, pt has on/off cough for almost a month PTA  --CXR reported Left pectoral infusion port terminates over the mid SVC, slightly retracted  compared to prior (mid to lower SVC). This is likely still in appropriate position for use, but correlate clinically. CTA chestno acute PE, mild right lower lobe airspace disease, splenomegaly and unchanged pulmonary artery aneurysm.  -on ancef now, LUIS negative  Discussed with CM this afternoon to see  if [facility] can deliver IV abx and if patient can come to [facility] for PICC line care and labs        Hypokalemia-resolved  Hypomagnesemia  Replace as needed.     Breast cancer on chemo:   -Oncology on boardappreciate input.   --completed Chemo and scheduled for Sx on   --port is removed     Thrombocytopenia -resolved  Anemia  Splenomegaly   OP follow up       Code status:  Full code  Prophylaxis: Lovenox  Care Plan discussed with: Patient, RN, case management  Anticipated Disposition: 24 to 48 hours     Hospital Problems  Date Reviewed: 2/18/2022          Codes Class Noted POA    Sepsis [facility]) ICD-10-CM: A41.9  ICD-9-CM: 038.9, 995.91  2/23/2022 Unknown                Review of Systems:   A comprehensive review of systems was negative except for that written in the HPI. Vital Signs:    Last 24hrs VS reviewed since prior progress note. Most recent are:  Visit Vitals  BP (!) 96/59 (BP 1 Location: Left upper arm, BP Patient Position: At rest)   Pulse (!) 106   Temp 98.4 °F (36.9 °C)   Resp 16   Ht 5' (1.524 m)   Wt 90.5 kg (199 lb 8.3 oz)   SpO2 99%   BMI 38.97 kg/m²         Intake/Output Summary (Last 24 hours) at 3/4/2022 1626  Last data filed at 3/4/2022 1448  Gross per 24 hour   Intake 520 ml   Output    Net 520 ml        Physical Examination:     I had a face to face encounter with this patient and independently examined them on 3/4/2022 as outlined below:          Constitutional:  No acute distress, cooperative, pleasant    ENT:  Oral mucosa moist, EOMI,anicteric sclera   Resp:  CTA bilaterally. No wheezing/rhonchi/rales. No accessory muscle use. CV:  Regular rhythm, normal rate, S1,S2 nl    GI:  Soft, non distended, non tender. normoactive bowel sounds, no hepatosplenomegaly     Musculoskeletal:  No LE edema    Neurologic:  Moves all extremities. AAOx3, CN II-XII reviewed              Data Review:    Review and/or order of clinical lab test  Review and/or order of tests in the radiology section of CPT      Labs:     Recent Labs     03/04/22 0359 03/02/22 0330   WBC 5.1 5.8   HGB 9.4* 9.3*   HCT 30.7* 30.6*    256     Recent Labs     03/04/22 0359 03/02/22 0330    135*   K 4.2 4.2   * 105   CO2 27 27   BUN 12 12   CREA 0.60 0.56   * 139*   CA 9.1 9.3   PHOS 3.2  --      Recent Labs     03/04/22 0359   ALB 2.9*     No results for input(s): INR, PTP, APTT, INREXT, INREXT in the last 72 hours. No results for input(s): FE, TIBC, PSAT, FERR in the last 72 hours.    No results found for: FOL, RBCF   No results for input(s): PH, PCO2, PO2 in the last 72 hours. No results for input(s): CPK, CKNDX, TROIQ in the last 72 hours.     No lab exists for component: CPKMB  Lab Results   Component Value Date/Time    Cholesterol, total 198 06/08/2021 09:26 AM    HDL Cholesterol 43 06/08/2021 09:26 AM    LDL, calculated 117.2 (H) 06/08/2021 09:26 AM    Triglyceride 189 (H) 06/08/2021 09:26 AM    CHOL/HDL Ratio 4.6 06/08/2021 09:26 AM     No results found for: [facility]  Lab Results   Component Value Date/Time    Color YELLOW/STRAW 02/23/2022 03:38 AM    Appearance CLEAR 02/23/2022 03:38 AM    Specific gravity 1.015 02/23/2022 03:38 AM    Specific gravity 1.026 05/02/2016 01:35 PM    pH (UA) 6.5 02/23/2022 03:38 AM    Protein 30 (A) 02/23/2022 03:38 AM    Glucose Negative 02/23/2022 03:38 AM    Ketone 15 (A) 02/23/2022 03:38 AM    Bilirubin Negative 02/23/2022 03:38 AM    Urobilinogen 0.2 02/23/2022 03:38 AM    Nitrites Negative 02/23/2022 03:38 AM    Leukocyte Esterase TRACE (A) 02/23/2022 03:38 AM    Epithelial cells FEW 02/23/2022 03:38 AM    Bacteria 1+ (A) 02/23/2022 03:38 AM    WBC 5-10 02/23/2022 03:38 AM    RBC 0-5 02/23/2022 03:38 AM         Medications Reviewed:     Current Facility-Administered Medications   Medication Dose Route Frequency    ceFAZolin (ANCEF) 2 g in sterile water (preservative free) 20 mL IV syringe  2 g IntraVENous Q8H    albuterol-ipratropium (DUO-NEB) 2.5 MG-0.5 MG/3 ML  3 mL Nebulization Q6H PRN    sodium chloride (NS) flush 5-10 mL  5-10 mL IntraVENous PRN    ondansetron (ZOFRAN) injection 4 mg  4 mg IntraVENous Q6H PRN    sodium chloride (NS) flush 5-40 mL  5-40 mL IntraVENous Q8H    sodium chloride (NS) flush 5-40 mL  5-40 mL IntraVENous PRN    acetaminophen (TYLENOL) tablet 650 mg  650 mg Oral Q6H PRN    Or    acetaminophen (TYLENOL) suppository 650 mg  650 mg Rectal Q6H PRN    polyethylene glycol (MIRALAX) packet 17 g  17 g Oral DAILY PRN    enoxaparin (LOVENOX) injection 40 mg  40 mg SubCUTAneous DAILY     ______________________________________________________________________  EXPECTED LENGTH OF STAY: 4d 19h  ACTUAL LENGTH OF STAY:          [address] [facility] [place] MD

## 2022-03-04 NOTE — PROGRESS NOTES
Transition of Care Plan   RUR- 10% Moderate Risk   DISPOSITION: The disposition plan is home with family assistance.  IV ABX - [facility] accepted   [facility] - pending review.  F/U with PCP/Specialist     Transport: Family     This morning, FRANKO provided verbal update that was provided by assigned ID Physician yesterday that patient would need to stay at the hospital to get her IV ABX because of medication there are no [facility] agencies able to accept patient at this time. This afternoon, CM spoke with attending and assigned ID Physician who now reports if [facility] can deliver iv abx, then patient can go to the [address] twice a week to get her labs completed. Deepti Higgins [address]  [address], [address]  [phone]    At 1:50pm -  contacted [facility] to confirm. FRANKO spoke with [facility] who is requesting that order be faxed to [phone], included with progress and facesheet for review. At 1:57pm -  provided update to [facility] with [facility]. At 2:05pm -  faxed requested documents for review. At 2:49pm -  contacted [facility] at the [facility] to confirm they have received fax, [facility] confirmed that they have received fax and is currently in review. At 4:28pm -  contacted [facility] and was informed, \"everyone has left for the day and no one is available tomorrow or Sunday to provide scheduling information. \"    CY Bhakta, CRM, LMHP-e  Available in Perfect Serve

## 2022-03-04 NOTE — PROGRESS NOTES
Transition of Care Plan   RUR: 10%   Disposition: The disposition plan is home with IV Abx & SN through [facility]   F/U with PCP/Specialist     Transport: family         CM spoke with Rosemarie Espitia with [facility] [phone] who confirmed they are able to provide skilled nursing for the patient as previously patient's insurance was not covering but they were able to \"rerun\" and it has been accepted. Rosemarie Espitia is coming to do the teach with the patient and family in a few moments and has everything set up with the patient upon discharge.       6:26 PM  Silvana Mullen, FREDERIC

## 2022-03-04 NOTE — PROGRESS NOTES
Bedside shift change report given to kati sesay rn (oncoming nurse) by Jey Greene (offgoing nurse). Report included the following information SBAR and Kardex.

## 2022-03-04 NOTE — PROGRESS NOTES
Problem: Falls - Risk of  Goal: *Absence of Falls  Description: Document Isis Don Fall Risk and appropriate interventions in the flowsheet.   Outcome: Resolved/Met  Note: Fall Risk Interventions:            Medication Interventions: Teach patient to arise slowly

## 2022-03-05 NOTE — ROUTINE PROCESS
Pt was given instructions at bedside by Hugo Damian from [address] [place] [address]. Pt discharged to home.

## 2022-03-06 NOTE — DISCHARGE SUMMARY
Discharge Summary       PATIENT ID: Willie Bean  MRN: [id number]   YOB: 1975    DATE OF ADMISSION: 2/23/2022  4:01 AM    DATE OF DISCHARGE: 3/4/2022   PRIMARY CARE PROVIDER: Natacha Acosta MD     ATTENDING PHYSICIAN: China Orozco MD    DISCHARGING PROVIDER: Lauren Newton MD    To contact this individual call [phone] and ask the  to page. If unavailable ask to be transferred the Adult Hospitalist Department. CONSULTATIONS: IP CONSULT TO HEMATOLOGY  IP CONSULT TO INFECTIOUS DISEASES    PROCEDURES/SURGERIES: * No surgery found *    DISCHARGE DIAGNOSES:   ADMISSION SUMMARY AND HOSPITAL COURSE:       DISCHARGE DIAGNOSES / PLAN:       43 y.o lady w/ breast cancer on chemo who presents with fever. Symptoms began 2 days ago. She describes fatigue and a fever as high as 104 F. Associated symptoms include a non-productive cough and diarrhea that started earlier today. Some nausea without vomiting or abdominal pain. No dysuria or hematuria. CAP w/ sepsis in an immunocompromised patient:resolved  Acute hypoxic respiratory failure--resolved   Staph aureus bacteremia  S/p chemoport --removed on 3/1/2022  ? CAP in RLL, pt has on/off cough for almost a month PTA  --  CTA chestno acute PE, mild right lower lobe airspace disease, splenomegaly and unchanged pulmonary artery aneurysm.  -on ancef now, LUIS negative          Hypokalemia-resolved  Hypomagnesemia  Replace as needed.     Breast cancer on chemo:   -Oncology on boardappreciate input.   --completed Chemo and scheduled for Sx on march 15th  --port is removed      Thrombocytopenia -resolved  Anemia  Splenomegaly   OP follow up         .IR REMOVE TUNL CVAD W PORT/PUMP    Result Date: 3/1/2022  PROCEDURE: Port removal PRE PROCEDURE DIAGNOSIS: Bacteremia POST PROCEDURE DIAGNOSIS: SAME OPERATING PHYSICIAN: Robson Hicks M.D. ESTIMATED BLOOD LOSS: None SPECIMENS REMOVED: Port removed in its entirety FLUOROSCOPY DOSE (air kerma): 0.6 mGy COMPLICATIONS: None immediate. Procedure and findings: After informed consent was obtained, the patient was brought to the angiographic suite, and placed on the angiographic table in a supine position. The left neck and chest were prepped and draped in maximum sterile barrier fashion.  fluoroscopic image demonstrates left internal jugular Port-A-Cath. The skin over the right chest was anesthetized with lidocaine. After adequate local anesthesia, a horizontal incision was made. The subcutaneous pocket was accessed and using a combination of sharp and blunt dissection the port was removed. The pocket was closed primarily , with a deep interrupted 2-0 Vicryl suture. The incision was then closed with Dermabond. A dry sterile dressing was applied. The patient tolerated the procedure well. Post procedure fluoroscopic image demonstrates removal of the entire port and catheter without evidence for foreign radiopaque retained body. Successful removal of left internal jugular vein port catheter. CTA CHEST W OR W WO CONT    Result Date: 2/23/2022  INDICATION:  SOB, fever EXAM:  CTA Chest with contrast for Pulmonary Embolus COMPARISON:  May 2, 2016 TECHNIQUE:  Following the uneventful intravenous administration of IV contrast thin helical axial images were obtained through the chest. 3D image postprocessing was performed. CT dose reduction was achieved through use of a standardized protocol tailored for this examination and automatic exposure control for dose modulation. FINDINGS: THYROID: Unremarkable. MEDIASTINUM/LISA: No mass or lymphadenopathy. HEART/PERICARDIUM: Unremarkable. AORTA:  No aneurysm. PULMONARY ARTERIES:No pulmonary embolism. LUNGS/PLEURA: Mild patchy groundglass opacity right lower lobe. No pulmonary edema, pleural effusion, or pneumothorax. Calcified granuloma left upper lobe. INCIDENTALLY IMAGED UPPER ABDOMEN: Possible splenomegaly. 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  BONES: No destructive bone lesion. ADDITIONAL COMMENTS:  N/A     No acute pulmonary embolus. 2. Mild right lower lobe airspace disease. 3. Splenomegaly and unchanged pulmonary artery aneurysm. XR CHEST PORT    Result Date: 2/23/2022  EXAM:  XR CHEST PORT INDICATION: Shortness of breath COMPARISON: Chest radiograph 10/13/2021 TECHNIQUE: Upright portable chest AP view FINDINGS: Left pectoral infusion port terminates over the mid SVC, slightly retracted compared to prior (mid to lower SVC). Cardiac mediastinal silhouette is upper limits of normal, stable. Lungs and pleural spaces grossly clear. 1.  No acute findings. 2.  Left pectoral infusion port terminates over the mid SVC, slightly retracted compared to prior (mid to lower SVC). This is likely still in appropriate position for use, but correlate clinically. ECHO ADULT COMPLETE    Result Date: 2/27/2022    Left Ventricle: Left ventricle size is normal. Normal wall thickness. Normal wall motion. Normal left ventricular systolic function with a visually estimated EF of 60 - 65%. ECHO LUIS W OR WO CONTRAST    Result Date: 3/4/2022    Left Ventricle: Left ventricle size is normal. Normal wall thickness. Normal wall motion. Normal left ventricular systolic function with a visually estimated EF of 55 - 60%. NOTIFY YOUR PHYSICIAN FOR ANY OF THE FOLLOWING:   Fever over 101 degrees for 24 hours. Chest pain, shortness of breath, fever, chills, nausea, vomiting, diarrhea, change in mentation, falling, weakness, bleeding. Severe pain or pain not relieved by medications, as well as any other signs or symptoms that you may have questions about.     FOLLOW UP APPOINTMENTS:    Follow-up Information     Follow up With Specialties Details Why Contact Mayco Pruett MD Infectious Disease In 1 week  [address] [place]  [phone]      Ana Cristina Trimble MD Infectious Disease In 2 weeks  [address] [place] [address] [address], Srinivas Szymanski DO Hematology and Oncology, Internal Medicine, Hematology, Oncology   [address] [place] ([place]) [address] [place] [address]  [phone]           ADDITIONAL CARE RECOMMENDATIONS:   -follow up with PCP,oncologist and infectious disease specialist  -CBC,BMP in 3 days    IV Antibiotic Orders     1. Diagnosis:  MSSA bacteremia/line infection  2. Routine PICC care  3. Antibiotic:  Cefazolin 2 grams IV Q 8 hours  4. Lab each Monday:             CBC/diff/platelets             BMP  5. Lab each Thursday:             CBC/diff/platelets             BMP  6. Fax lab to Dr. Jaskaran De La Fuente @ [phone].  7.  Call Dr. Jaskaran De La Fuente @ [phone] for WBC under 4 or creatinine over 2  8. Duration of therapy: 3/11/22 and remove line             Please call Dr. Jaskaran De La Fuente @ [phone] before stopping therapy. 9.  Allergies: none to current regimen    10. Call [phone] with name of Sandeep Castillo MD    DIET: Regular Diet      ACTIVITY: Activity as tolerated    WOUND CARE: na          DISCHARGE MEDICATIONS:  Discharge Medication List as of 3/4/2022  7:27 PM      START taking these medications    Details   ceFAZolin 2 g IV syringe 2 g by IntraVENous route every eight (8) hours. , No Print, Disp-1 Dose, R-0         CONTINUE these medications which have NOT CHANGED    Details   CRANIAL PROSTHESIS misc Alopecia/ breast cancer chemo/ wig, Print, Disp-1 Each, R-0      prochlorperazine (Compazine) 5 mg tablet Take 1 tab by mouth every 6 hours as needed for nausea or vomiting, Normal, Disp-30 Tablet, R-1      levonorgestreL (Mirena) 20 mcg/24 hours (6 yrs) 52 mg IUD 1 Device by IntraUTERine route once., Historical Med             DISPOSITION:  x  Home With:   OT  PT  HH  RN       Long term SNF/Inpatient Rehab    Independent/assisted living    Hospice    Other:       PATIENT CONDITION AT DISCHARGE:     Functional status    Poor     Deconditioned    x Independent Cognition    x Lucid     Forgetful     Dementia      Catheters/lines (plus indication)    [place]     PICC     PEG    x None      Code status   x  Full code     DNR      PHYSICAL EXAMINATION AT DISCHARGE:    Constitutional:  No acute distress, cooperative, pleasant    ENT:  Oral mucosa moist, EOMI,anicteric sclera   Resp:  CTA bilaterally. No wheezing/rhonchi/rales. No accessory muscle use. CV:  Regular rhythm, normal rate, S1,S2 nl    GI:  Soft, non distended, non tender. normoactive bowel sounds, no hepatosplenomegaly     Musculoskeletal:  No LE edema    Neurologic:  Moves all extremities.   AAOx3, CN II-XII reviewed            CHRONIC MEDICAL DIAGNOSES:  Problem List as of 3/4/2022 Date Reviewed: 2/18/2022          Codes Class Noted - Resolved    Sepsis ([facility] ICD-10-CM: A41.9  ICD-9-CM: 038.9, 995.91  2/23/2022 - Present        Chemotherapy induced diarrhea ICD-10-CM: K52.1, T45.1X5A  ICD-9-CM: 787.91, E933.1  12/22/2021 - Present        Chemotherapy follow-up examination ICD-10-CM: Q14  ICD-9-CM: V67.2  11/11/2021 - Present        Breast mass, right ICD-10-CM: N63.10  ICD-9-CM: 611.72  10/21/2021 - Present        Port-A-Cath in place ICD-10-CM: Z95.828  ICD-9-CM: V45.89  10/21/2021 - Present        Encounter for antineoplastic chemotherapy ICD-10-CM: Z51.11  ICD-9-CM: V58.11  10/21/2021 - Present        Encounter for monitoring cardiotoxic drug therapy ICD-10-CM: Z51.81, Z79.899  ICD-9-CM: V58.83, V58.69  10/21/2021 - Present        Premenopausal patient ICD-10-CM: N95.9  ICD-9-CM: 627.2  10/21/2021 - Present        Malignant neoplasm of right breast in female, estrogen receptor positive ([facility] ICD-10-CM: C50.911, Z17.0  ICD-9-CM: 174.9, V86.0  10/8/2021 - Present        Cholelithiasis ICD-10-CM: K80.20  ICD-9-CM: 574.20  5/2/2016 - Present        Upper abdominal pain ICD-10-CM: R10.10  ICD-9-CM: 789.09  5/2/2016 - Present               minutes were spent with the patient on counseling and coordination of care    Signed:   Shayna Conroy MD  3/6/2022  6:45 PM    Cc:Stu Yuen MD

## 2022-03-06 NOTE — DISCHARGE INSTRUCTIONS
Discharge Instructions       PATIENT ID: Gera Harman  MRN: [id number]   YOB: 1975    DATE OF ADMISSION: 2/23/2022  4:01 AM    DATE OF DISCHARGE: 3/3/2022    PRIMARY CARE PROVIDER: Yung Morin MD     ATTENDING PHYSICIAN: Joseline Taylor MD  DISCHARGING PROVIDER: Chanel Chan MD    To contact this individual call [phone] and ask the  to page. If unavailable ask to be transferred the Adult Hospitalist Department. DISCHARGE DIAGNOSES -Staph aureus bacteremia, breast cancer    CONSULTATIONS: IP CONSULT TO HOSPITALIST  IP CONSULT TO HEMATOLOGY  IP CONSULT TO INFECTIOUS DISEASES  IP CONSULT TO CARDIOLOGY    PROCEDURES/SURGERIES: * No surgery found *    PENDING TEST RESULTS:   At the time of discharge the following test results are still pending: none    FOLLOW UP APPOINTMENTS:   Follow-up Information     Follow up With Specialties Details Why Contact Info    Yung Morin MD Infectious Disease In 1 week  [address] [facility]  [phone]      Robert Siegel MD Infectious Disease In 2 weeks  [address] [phone]  [address] Hematology and Oncology, Internal Medicine, Hematology, Oncology   [address] [place] [id number] [phone]             ADDITIONAL CARE RECOMMENDATIONS:   -follow up with PCP,oncologist and infectious disease specialist  -CBC,BMP in 3 days    IV Antibiotic Orders     1. Diagnosis:  MSSA bacteremia/line infection  2. Routine PICC care  3. Antibiotic:  Cefazolin 2 grams IV Q 8 hours  4. Lab each Monday:             CBC/diff/platelets             BMP  5. Lab each Thursday:             CBC/diff/platelets             BMP  6. Fax lab to Dr. Mitzy Leone @ [phone].  7.  Call Dr. Mitzy Leone @ [phone] for WBC under 4 or creatinine over 2  8.   Duration of therapy: 3/11/22 and remove line             Please call Dr. Mitzy Leone @ [phone] before stopping therapy. 9.  Allergies: none to current regimen    10. Call [phone] with name of Sandeep PIERO Castillo MD    DIET: Regular Diet      ACTIVITY: Activity as tolerated    WOUND CARE: na    EQUIPMENT needed: ***      Radiology      DISCHARGE MEDICATIONS:   See Medication Reconciliation Form    · It is important that you take the medication exactly as they are prescribed. · Keep your medication in the bottles provided by the pharmacist and keep a list of the medication names, dosages, and times to be taken in your wallet. · Do not take other medications without consulting your doctor. NOTIFY YOUR PHYSICIAN FOR ANY OF THE FOLLOWING:   Fever over 101 degrees for 24 hours. Chest pain, shortness of breath, fever, chills, nausea, vomiting, diarrhea, change in mentation, falling, weakness, bleeding. Severe pain or pain not relieved by medications. Or, any other signs or symptoms that you may have questions about.       DISPOSITION:   x Home With:   OT  PT  [place]  RN       SNF/Inpatient Rehab/LTAC    Independent/assisted living    Hospice    Other:           Signed:   Cheryl Dejesus MD  3/3/2022  2:42 PM

## 2022-03-08 DIAGNOSIS — C50.411 MALIGNANT NEOPLASM OF UPPER-OUTER QUADRANT OF RIGHT FEMALE BREAST, UNSPECIFIED ESTROGEN RECEPTOR STATUS (HCC): Primary | ICD-10-CM

## 2022-03-10 ENCOUNTER — HOSPITAL ENCOUNTER (OUTPATIENT)
Dept: PREADMISSION TESTING | Age: 47
Discharge: HOME | End: 2022-03-10
Attending: SURGERY
Payer: MEDICAID

## 2022-03-10 ENCOUNTER — TRANSCRIBE ORDER (OUTPATIENT)
Dept: REGISTRATION | Age: 47
End: 2022-03-10

## 2022-03-10 DIAGNOSIS — U07.1 COVID-19: Primary | ICD-10-CM

## 2022-03-10 DIAGNOSIS — U07.1 COVID-19: ICD-10-CM

## 2022-03-10 PROCEDURE — U0005 INFEC AGEN DETEC AMPLI PROBE: HCPCS

## 2022-03-11 ENCOUNTER — HOSPITAL ENCOUNTER (OUTPATIENT)
Dept: MAMMOGRAPHY | Age: 47
Discharge: HOME | End: 2022-03-11
Payer: MEDICAID

## 2022-03-11 ENCOUNTER — HOSPITAL ENCOUNTER (OUTPATIENT)
Dept: MAMMOGRAPHY | Age: 47
Discharge: HOME | End: 2022-03-11
Attending: SURGERY
Payer: MEDICAID

## 2022-03-11 DIAGNOSIS — Z85.3 HISTORY OF CANCER OF RIGHT BREAST: ICD-10-CM

## 2022-03-11 DIAGNOSIS — C50.411 MALIGNANT NEOPLASM OF UPPER-OUTER QUADRANT OF RIGHT FEMALE BREAST, UNSPECIFIED ESTROGEN RECEPTOR STATUS (HCC): ICD-10-CM

## 2022-03-11 LAB
SARS-COV-2, XPLCVT: NOT DETECTED
SOURCE, COVRS: NORMAL

## 2022-03-11 PROCEDURE — 19283 PERQ DEV BREAST 1ST STRTCTC: CPT

## 2022-03-11 PROCEDURE — 74011000250 HC RX REV CODE- 250: Performed by: RADIOLOGY

## 2022-03-11 PROCEDURE — 77065 DX MAMMO INCL CAD UNI: CPT

## 2022-03-11 RX ORDER — LIDOCAINE HYDROCHLORIDE 10 MG/ML
5 INJECTION INFILTRATION; PERINEURAL
Status: COMPLETED | OUTPATIENT
Start: 2022-03-11 | End: 2022-03-11

## 2022-03-11 RX ADMIN — LIDOCAINE HYDROCHLORIDE 5 ML: 10 INJECTION, SOLUTION INFILTRATION; PERINEURAL at 09:10

## 2022-03-11 NOTE — PROGRESS NOTES
Patient tolerated right breast mag seed placement well with scant bleeding. Site was covered with a gauze and tegaderm. Discharge instructions were reviewed with the patient and she was provided with a written copy as well. Encouraged her to call with any questions or concerns.

## 2022-03-14 ENCOUNTER — HOSPITAL ENCOUNTER (OUTPATIENT)
Dept: NUCLEAR MEDICINE | Age: 47
Discharge: HOME | End: 2022-03-14
Attending: SURGERY
Payer: MEDICAID

## 2022-03-14 ENCOUNTER — ANESTHESIA EVENT (OUTPATIENT)
Dept: MEDSURG UNIT | Age: 47
End: 2022-03-14
Payer: MEDICAID

## 2022-03-14 DIAGNOSIS — C50.411 MALIGNANT NEOPLASM OF UPPER-OUTER QUADRANT OF RIGHT FEMALE BREAST, UNSPECIFIED ESTROGEN RECEPTOR STATUS (HCC): ICD-10-CM

## 2022-03-14 PROCEDURE — 78195 LYMPH SYSTEM IMAGING: CPT

## 2022-03-15 ENCOUNTER — HOSPITAL ENCOUNTER (OUTPATIENT)
Age: 47
Setting detail: OUTPATIENT SURGERY
Discharge: HOME | End: 2022-03-15
Attending: SURGERY | Admitting: SURGERY
Payer: MEDICAID

## 2022-03-15 ENCOUNTER — ANESTHESIA (OUTPATIENT)
Dept: MEDSURG UNIT | Age: 47
End: 2022-03-15
Payer: MEDICAID

## 2022-03-15 ENCOUNTER — APPOINTMENT (OUTPATIENT)
Dept: MAMMOGRAPHY | Age: 47
End: 2022-03-15
Attending: SURGERY
Payer: MEDICAID

## 2022-03-15 VITALS
WEIGHT: 209 LBS | HEART RATE: 92 BPM | SYSTOLIC BLOOD PRESSURE: 134 MMHG | TEMPERATURE: 97.5 F | OXYGEN SATURATION: 95 % | DIASTOLIC BLOOD PRESSURE: 84 MMHG | RESPIRATION RATE: 18 BRPM | BODY MASS INDEX: 40.82 KG/M2

## 2022-03-15 DIAGNOSIS — C50.911 MALIGNANT NEOPLASM OF RIGHT BREAST IN FEMALE, ESTROGEN RECEPTOR POSITIVE, UNSPECIFIED SITE OF BREAST (HCC): Primary | ICD-10-CM

## 2022-03-15 DIAGNOSIS — Z17.0 MALIGNANT NEOPLASM OF RIGHT BREAST IN FEMALE, ESTROGEN RECEPTOR POSITIVE, UNSPECIFIED SITE OF BREAST (HCC): Primary | ICD-10-CM

## 2022-03-15 LAB
HCG UR QL: NEGATIVE
HGB BLD-MCNC: 10.7 G/DL (ref 11.5–16)

## 2022-03-15 PROCEDURE — 38525 BIOPSY/REMOVAL LYMPH NODES: CPT | Performed by: SURGERY

## 2022-03-15 PROCEDURE — 88341 IMHCHEM/IMCYTCHM EA ADD ANTB: CPT

## 2022-03-15 PROCEDURE — 74011250636 HC RX REV CODE- 250/636: Performed by: NURSE ANESTHETIST, CERTIFIED REGISTERED

## 2022-03-15 PROCEDURE — 77030040361 HC SLV COMPR DVT MDII -B: Performed by: SURGERY

## 2022-03-15 PROCEDURE — 77030011267 HC ELECTRD BLD COVD -A: Performed by: SURGERY

## 2022-03-15 PROCEDURE — 76030000003 HC AMB SURG OR TIME 1.5 TO 2: Performed by: SURGERY

## 2022-03-15 PROCEDURE — 88342 IMHCHEM/IMCYTCHM 1ST ANTB: CPT

## 2022-03-15 PROCEDURE — 77030010509 HC AIRWY LMA MSK TELE -A: Performed by: ANESTHESIOLOGY

## 2022-03-15 PROCEDURE — C9290 INJ, BUPIVACAINE LIPOSOME: HCPCS | Performed by: SURGERY

## 2022-03-15 PROCEDURE — 76060000063 HC AMB SURG ANES 1.5 TO 2 HR: Performed by: SURGERY

## 2022-03-15 PROCEDURE — 77030002996 HC SUT SLK J&J -A: Performed by: SURGERY

## 2022-03-15 PROCEDURE — 76210000036 HC AMBSU PH I REC 1.5 TO 2 HR: Performed by: SURGERY

## 2022-03-15 PROCEDURE — 77030002933 HC SUT MCRYL J&J -A: Performed by: SURGERY

## 2022-03-15 PROCEDURE — 19301 PARTIAL MASTECTOMY: CPT | Performed by: SURGERY

## 2022-03-15 PROCEDURE — 2709999900 HC NON-CHARGEABLE SUPPLY: Performed by: SURGERY

## 2022-03-15 PROCEDURE — 74011250636 HC RX REV CODE- 250/636: Performed by: SURGERY

## 2022-03-15 PROCEDURE — 81025 URINE PREGNANCY TEST: CPT

## 2022-03-15 PROCEDURE — 77030010507 HC ADH SKN DERMBND J&J -B: Performed by: SURGERY

## 2022-03-15 PROCEDURE — 88332 PATH CONSLTJ SURG EA ADD BLK: CPT

## 2022-03-15 PROCEDURE — 77030038213 HC SUPP BRA COMPRSS PSTOP COND -B: Performed by: SURGERY

## 2022-03-15 PROCEDURE — 74011000250 HC RX REV CODE- 250: Performed by: SURGERY

## 2022-03-15 PROCEDURE — 88307 TISSUE EXAM BY PATHOLOGIST: CPT

## 2022-03-15 PROCEDURE — 88331 PATH CONSLTJ SURG 1 BLK 1SPC: CPT

## 2022-03-15 PROCEDURE — 85018 HEMOGLOBIN: CPT

## 2022-03-15 PROCEDURE — 74011000250 HC RX REV CODE- 250: Performed by: NURSE ANESTHETIST, CERTIFIED REGISTERED

## 2022-03-15 PROCEDURE — 74011250636 HC RX REV CODE- 250/636: Performed by: ANESTHESIOLOGY

## 2022-03-15 PROCEDURE — 77030041680 HC PNCL ELECSURG SMK EVAC CNMD -B: Performed by: SURGERY

## 2022-03-15 RX ORDER — SODIUM CHLORIDE, SODIUM LACTATE, POTASSIUM CHLORIDE, CALCIUM CHLORIDE 600; 310; 30; 20 MG/100ML; MG/100ML; MG/100ML; MG/100ML
75 INJECTION, SOLUTION INTRAVENOUS CONTINUOUS
Status: DISCONTINUED | OUTPATIENT
Start: 2022-03-15 | End: 2022-03-15 | Stop reason: HOSPADM

## 2022-03-15 RX ORDER — CEFAZOLIN SODIUM 1 G/3ML
INJECTION, POWDER, FOR SOLUTION INTRAMUSCULAR; INTRAVENOUS AS NEEDED
Status: DISCONTINUED | OUTPATIENT
Start: 2022-03-15 | End: 2022-03-15 | Stop reason: HOSPADM

## 2022-03-15 RX ORDER — DEXMEDETOMIDINE HYDROCHLORIDE 100 UG/ML
INJECTION, SOLUTION INTRAVENOUS AS NEEDED
Status: DISCONTINUED | OUTPATIENT
Start: 2022-03-15 | End: 2022-03-15 | Stop reason: HOSPADM

## 2022-03-15 RX ORDER — MIDAZOLAM HYDROCHLORIDE 1 MG/ML
0.5 INJECTION, SOLUTION INTRAMUSCULAR; INTRAVENOUS
Status: DISCONTINUED | OUTPATIENT
Start: 2022-03-15 | End: 2022-03-15 | Stop reason: HOSPADM

## 2022-03-15 RX ORDER — LIDOCAINE HYDROCHLORIDE 10 MG/ML
0.1 INJECTION, SOLUTION EPIDURAL; INFILTRATION; INTRACAUDAL; PERINEURAL AS NEEDED
Status: DISCONTINUED | OUTPATIENT
Start: 2022-03-15 | End: 2022-03-15 | Stop reason: HOSPADM

## 2022-03-15 RX ORDER — MIDAZOLAM HYDROCHLORIDE 1 MG/ML
1 INJECTION, SOLUTION INTRAMUSCULAR; INTRAVENOUS AS NEEDED
Status: DISCONTINUED | OUTPATIENT
Start: 2022-03-15 | End: 2022-03-15 | Stop reason: HOSPADM

## 2022-03-15 RX ORDER — LIDOCAINE HYDROCHLORIDE 20 MG/ML
INJECTION, SOLUTION EPIDURAL; INFILTRATION; INTRACAUDAL; PERINEURAL AS NEEDED
Status: DISCONTINUED | OUTPATIENT
Start: 2022-03-15 | End: 2022-03-15 | Stop reason: HOSPADM

## 2022-03-15 RX ORDER — SODIUM CHLORIDE 0.9 % (FLUSH) 0.9 %
5-40 SYRINGE (ML) INJECTION EVERY 8 HOURS
Status: DISCONTINUED | OUTPATIENT
Start: 2022-03-15 | End: 2022-03-15 | Stop reason: HOSPADM

## 2022-03-15 RX ORDER — OXYCODONE AND ACETAMINOPHEN 5; 325 MG/1; MG/1
1 TABLET ORAL
Qty: 30 TABLET | Refills: 0 | Status: SHIPPED | OUTPATIENT
Start: 2022-03-15 | End: 2022-03-22

## 2022-03-15 RX ORDER — FENTANYL CITRATE 50 UG/ML
25 INJECTION, SOLUTION INTRAMUSCULAR; INTRAVENOUS
Status: DISCONTINUED | OUTPATIENT
Start: 2022-03-15 | End: 2022-03-15 | Stop reason: HOSPADM

## 2022-03-15 RX ORDER — HYDROMORPHONE HYDROCHLORIDE 2 MG/ML
INJECTION, SOLUTION INTRAMUSCULAR; INTRAVENOUS; SUBCUTANEOUS AS NEEDED
Status: DISCONTINUED | OUTPATIENT
Start: 2022-03-15 | End: 2022-03-15 | Stop reason: HOSPADM

## 2022-03-15 RX ORDER — MIDAZOLAM HYDROCHLORIDE 1 MG/ML
INJECTION, SOLUTION INTRAMUSCULAR; INTRAVENOUS AS NEEDED
Status: DISCONTINUED | OUTPATIENT
Start: 2022-03-15 | End: 2022-03-15 | Stop reason: HOSPADM

## 2022-03-15 RX ORDER — SODIUM CHLORIDE 9 MG/ML
50 INJECTION, SOLUTION INTRAVENOUS CONTINUOUS
Status: DISCONTINUED | OUTPATIENT
Start: 2022-03-15 | End: 2022-03-15 | Stop reason: HOSPADM

## 2022-03-15 RX ORDER — MORPHINE SULFATE 2 MG/ML
2 INJECTION, SOLUTION INTRAMUSCULAR; INTRAVENOUS
Status: DISCONTINUED | OUTPATIENT
Start: 2022-03-15 | End: 2022-03-15 | Stop reason: HOSPADM

## 2022-03-15 RX ORDER — FENTANYL CITRATE 50 UG/ML
50 INJECTION, SOLUTION INTRAMUSCULAR; INTRAVENOUS AS NEEDED
Status: DISCONTINUED | OUTPATIENT
Start: 2022-03-15 | End: 2022-03-15 | Stop reason: HOSPADM

## 2022-03-15 RX ORDER — OXYCODONE AND ACETAMINOPHEN 5; 325 MG/1; MG/1
1 TABLET ORAL AS NEEDED
Status: DISCONTINUED | OUTPATIENT
Start: 2022-03-15 | End: 2022-03-15 | Stop reason: HOSPADM

## 2022-03-15 RX ORDER — HYDROMORPHONE HYDROCHLORIDE 1 MG/ML
0.2 INJECTION, SOLUTION INTRAMUSCULAR; INTRAVENOUS; SUBCUTANEOUS
Status: DISCONTINUED | OUTPATIENT
Start: 2022-03-15 | End: 2022-03-15 | Stop reason: HOSPADM

## 2022-03-15 RX ORDER — ONDANSETRON 2 MG/ML
4 INJECTION INTRAMUSCULAR; INTRAVENOUS AS NEEDED
Status: DISCONTINUED | OUTPATIENT
Start: 2022-03-15 | End: 2022-03-15 | Stop reason: HOSPADM

## 2022-03-15 RX ORDER — DEXAMETHASONE SODIUM PHOSPHATE 4 MG/ML
INJECTION, SOLUTION INTRA-ARTICULAR; INTRALESIONAL; INTRAMUSCULAR; INTRAVENOUS; SOFT TISSUE AS NEEDED
Status: DISCONTINUED | OUTPATIENT
Start: 2022-03-15 | End: 2022-03-15 | Stop reason: HOSPADM

## 2022-03-15 RX ORDER — DIPHENHYDRAMINE HYDROCHLORIDE 50 MG/ML
12.5 INJECTION, SOLUTION INTRAMUSCULAR; INTRAVENOUS AS NEEDED
Status: DISCONTINUED | OUTPATIENT
Start: 2022-03-15 | End: 2022-03-15 | Stop reason: HOSPADM

## 2022-03-15 RX ORDER — SODIUM CHLORIDE 0.9 % (FLUSH) 0.9 %
5-40 SYRINGE (ML) INJECTION AS NEEDED
Status: DISCONTINUED | OUTPATIENT
Start: 2022-03-15 | End: 2022-03-15 | Stop reason: HOSPADM

## 2022-03-15 RX ORDER — SODIUM CHLORIDE, SODIUM LACTATE, POTASSIUM CHLORIDE, CALCIUM CHLORIDE 600; 310; 30; 20 MG/100ML; MG/100ML; MG/100ML; MG/100ML
INJECTION, SOLUTION INTRAVENOUS
Status: DISCONTINUED | OUTPATIENT
Start: 2022-03-15 | End: 2022-03-15 | Stop reason: HOSPADM

## 2022-03-15 RX ORDER — FENTANYL CITRATE 50 UG/ML
INJECTION, SOLUTION INTRAMUSCULAR; INTRAVENOUS AS NEEDED
Status: DISCONTINUED | OUTPATIENT
Start: 2022-03-15 | End: 2022-03-15 | Stop reason: HOSPADM

## 2022-03-15 RX ORDER — EPHEDRINE SULFATE/0.9% NACL/PF 50 MG/5 ML
SYRINGE (ML) INTRAVENOUS AS NEEDED
Status: DISCONTINUED | OUTPATIENT
Start: 2022-03-15 | End: 2022-03-15 | Stop reason: HOSPADM

## 2022-03-15 RX ORDER — ONDANSETRON 2 MG/ML
INJECTION INTRAMUSCULAR; INTRAVENOUS AS NEEDED
Status: DISCONTINUED | OUTPATIENT
Start: 2022-03-15 | End: 2022-03-15 | Stop reason: HOSPADM

## 2022-03-15 RX ORDER — PROPOFOL 10 MG/ML
INJECTION, EMULSION INTRAVENOUS AS NEEDED
Status: DISCONTINUED | OUTPATIENT
Start: 2022-03-15 | End: 2022-03-15 | Stop reason: HOSPADM

## 2022-03-15 RX ADMIN — DEXAMETHASONE SODIUM PHOSPHATE 8 MG: 4 INJECTION, SOLUTION INTRAMUSCULAR; INTRAVENOUS at 07:41

## 2022-03-15 RX ADMIN — FENTANYL CITRATE 50 MCG: 50 INJECTION, SOLUTION INTRAMUSCULAR; INTRAVENOUS at 07:36

## 2022-03-15 RX ADMIN — PROPOFOL 200 MG: 10 INJECTION, EMULSION INTRAVENOUS at 07:39

## 2022-03-15 RX ADMIN — HYDROMORPHONE HYDROCHLORIDE 0.5 MG: 2 INJECTION, SOLUTION INTRAMUSCULAR; INTRAVENOUS; SUBCUTANEOUS at 08:46

## 2022-03-15 RX ADMIN — ONDANSETRON HYDROCHLORIDE 4 MG: 2 INJECTION, SOLUTION INTRAMUSCULAR; INTRAVENOUS at 08:50

## 2022-03-15 RX ADMIN — Medication 10 MG: at 08:54

## 2022-03-15 RX ADMIN — MIDAZOLAM HYDROCHLORIDE 2 MG: 1 INJECTION, SOLUTION INTRAMUSCULAR; INTRAVENOUS at 07:30

## 2022-03-15 RX ADMIN — SODIUM CHLORIDE, POTASSIUM CHLORIDE, SODIUM LACTATE AND CALCIUM CHLORIDE: 600; 310; 30; 20 INJECTION, SOLUTION INTRAVENOUS at 07:00

## 2022-03-15 RX ADMIN — CEFAZOLIN 2 G: 330 INJECTION, POWDER, FOR SOLUTION INTRAMUSCULAR; INTRAVENOUS at 07:45

## 2022-03-15 RX ADMIN — DEXMEDETOMIDINE HYDROCHLORIDE 10 MCG: 100 INJECTION, SOLUTION, CONCENTRATE INTRAVENOUS at 08:08

## 2022-03-15 RX ADMIN — MIDAZOLAM HYDROCHLORIDE 3 MG: 1 INJECTION, SOLUTION INTRAMUSCULAR; INTRAVENOUS at 07:34

## 2022-03-15 RX ADMIN — HYDROMORPHONE HYDROCHLORIDE 0.5 MG: 2 INJECTION, SOLUTION INTRAMUSCULAR; INTRAVENOUS; SUBCUTANEOUS at 07:58

## 2022-03-15 RX ADMIN — DEXMEDETOMIDINE HYDROCHLORIDE 4 MCG: 100 INJECTION, SOLUTION, CONCENTRATE INTRAVENOUS at 08:52

## 2022-03-15 RX ADMIN — LIDOCAINE HYDROCHLORIDE 80 MG: 20 INJECTION, SOLUTION EPIDURAL; INFILTRATION; INTRACAUDAL; PERINEURAL at 07:39

## 2022-03-15 RX ADMIN — FENTANYL CITRATE 50 MCG: 50 INJECTION, SOLUTION INTRAMUSCULAR; INTRAVENOUS at 07:39

## 2022-03-15 RX ADMIN — DEXMEDETOMIDINE HYDROCHLORIDE 4 MCG: 100 INJECTION, SOLUTION, CONCENTRATE INTRAVENOUS at 08:44

## 2022-03-15 RX ADMIN — DEXMEDETOMIDINE HYDROCHLORIDE 4 MCG: 100 INJECTION, SOLUTION, CONCENTRATE INTRAVENOUS at 08:42

## 2022-03-15 RX ADMIN — DEXMEDETOMIDINE HYDROCHLORIDE 4 MCG: 100 INJECTION, SOLUTION, CONCENTRATE INTRAVENOUS at 08:46

## 2022-03-15 RX ADMIN — SODIUM CHLORIDE, POTASSIUM CHLORIDE, SODIUM LACTATE AND CALCIUM CHLORIDE 75 ML/HR: 600; 310; 30; 20 INJECTION, SOLUTION INTRAVENOUS at 09:32

## 2022-03-15 RX ADMIN — HYDROMORPHONE HYDROCHLORIDE 0.5 MG: 2 INJECTION, SOLUTION INTRAMUSCULAR; INTRAVENOUS; SUBCUTANEOUS at 08:01

## 2022-03-15 RX ADMIN — SODIUM CHLORIDE, POTASSIUM CHLORIDE, SODIUM LACTATE AND CALCIUM CHLORIDE 75 ML/HR: 600; 310; 30; 20 INJECTION, SOLUTION INTRAVENOUS at 06:55

## 2022-03-15 NOTE — BRIEF OP NOTE
Brief Postoperative Note    Patient: Benedicto Taylor  YOB: 1975  MRN: [id number]    Date of Procedure: 3/15/2022     Pre-Op Diagnosis: RIGHT BREAST CANCER    Post-Op Diagnosis: Same as preoperative diagnosis. Procedure(s):  RIGHT BREAST MAGSEED GUIDED LUMPECTOMY  RIGHT AXILLARY SENTINEL NODE BIOPSY    Surgeon(s):   Clint Sifuentes MD    Surgical Assistant: Surg Asst-1: Ayan KAMARA    Anesthesia: General     Estimated Blood Loss (mL): Minimal    Complications: None    Specimens:   ID Type Source Tests Collected by Time Destination   1 : right axillary sentinal node #1 Frozen Section   Clint Sifuentes MD 3/15/2022 0800 Pathology   2 : right axillary sentinal node #2 Frozen Section Lymph Node  Clint Sifuentes MD 3/15/2022 0803 Pathology   3 : right axillary sentinal node #3 Frozen Section Lymph Node  Clint Sifuentes MD 3/15/2022 1947 Pathology   4 : RIGHT LUMPECTOMY  Fresh Breast  Clint Sifuentes MD 3/15/2022 0820 Pathology        Implants: * No implants in log *    Drains: * No LDAs found *    Findings: sln negative on frozen    Electronically Signed by Paula Kirk MD on 3/15/2022 at 8:59 AM  Dictated stat

## 2022-03-15 NOTE — H&P
History and Physical    HISTORY OF PRESENT ILLNESS  Mary Ellen Pruitt is a 40 y.o. female. HPI:  ESTABLISHED Patient here for follow up RIGHT breast cancer. Denies pain and no new areas of concern. Has been doing Neoadjuvant TCHP chemo 10/21/21 - Current. Cycle 7 on 2/25/22     Breast history -   Referring - Dr. Barbara Damico- [address]  · Initially had abnormal mammo at [address] with calcifications on RIGHT. No mass reported/dense breast tissue  · RIGHT Breast Biopsy 9/9/21: PATH -  IDC, grade 3 ER/AR strong+ Her2 3+  · Genetic testing negative  · Breast MRI 9/17/21: RIGHT BREAST:Background parenchymal enhancement: Severe. There is a biopsy clip in the lower, outer quadrant of the right breast, junction of the middle and deep thirds. There is linear, branching enhancement which extends anterior to the biopsy clip over approximately 3.6 cm. There are no suspicious internal mammary or axillary chain lymph nodes. LEFT BREAST: Background parenchymal enhancement: Severe. There is no suspicious masslike or nonmasslike enhancement within the left breast to indicate breast carcinoma. There is a small mass in the lower, outer quadrant with an adjacent biopsy clip. There is an additional mass without significant enhancement in the lateral, middle 3rd, just below the nipple line. There are no suspicious internal mammary or axillary chain lymph nodes  · Neoadjuvant TCHP chemo 10/21/21 - started - Dr. Alisa Garcia     Family history -   No family history of breast cancer  Genetic testing negative - 2021     Breast imaging-   Mammogram [address] 8-19-21, BI-RADS 4 linear calcs right breast   MRI Results (most recent):  Results from [place] encounter on 01/27/22     MRI BREAST BI W WO CONT     Narrative  Examination: Bilateral breast MRI with contrast     HISTORY: Known right breast cancer.  Assess response to chemotherapy     COMPARISON: Breast MRI, mammogram 2021     TECHNIQUE:  Bilateral breast MRI was performed using a dedicated breast coil without  compression with the patient in the prone position. Precontrast T1-weighted  images with fat suppression were obtained followed by bolus injection of 20 mL  Prohance. Postcontrast dynamic and high-resolution images were acquired. T2-weighted axial imaging with fat suppression was also performed. The images  were analyzed using CAD analysis, enhancement curves, digital subtraction, and 2  and 3 dimensional reconstructions.     FINDINGS:  There is marked background parenchymal enhancement. The breast tissue is heterogeneously dense, which could obscure detection of  small masses (approximately 51-75% glandular).       Right breast:  In the right lateral breast at 8-9 o'clock posterior depth 10 cm from the nipple  there is susceptibility artifact from the biopsy clip but no residual  enhancement.     There is no suspicious axillary or internal mammary chain lymphadenopathy.     Left breast:  No suspicious enhancement.     There is no suspicious axillary or internal mammary chain lymphadenopathy.     Impression  Right Breast:  1. BI-RADS Assessment Category 6: Known biopsy proven malignancy- Appropriate  action should be taken. Right breast biopsy clip with no residual suspicious  enhancement.     Left Breast:  1. BI-RADS Assessment Category 1: Negative.        RECOMMENDATIONS:  Continued oncologic/surgical management.     A negative breast MRI examination speaks strongly against invasive cancer down  to a detection threshold of 3 to 5 mm but may not detect some lower grade or in  situ carcinomas. Therefore, routine clinical and mammographic followup are  recommended.     A summary portfolio has been created in PACS.     Review of Systems   All other systems reviewed and are negative.        Physical Exam  Vitals and nursing note reviewed. Chest:   Breasts: Breasts are symmetrical.      Right: No inverted nipple, mass, nipple discharge, skin change or tenderness.       Left: No inverted nipple, mass, nipple discharge, skin change or tenderness.         Lymphadenopathy:      Cervical: No cervical adenopathy.          ASSESSMENT and PLAN      ICD-10-CM ICD-9-CM     1. Malignant neoplasm of upper-outer quadrant of right female breast, unspecified estrogen receptor status ([facility]  C50.411 174. 4        - looks like pcr on breast mri  - plan is right magseed guided lumpectomy, sln biopsy   This is scheduled. Discussed procedure and risks  Risks include bleeding, bruising, scar, infection, need for more surgery and lymphedema  30 minutes was spent with patient on counseling and coordination of care.

## 2022-03-15 NOTE — OP NOTES
[address]   OPERATIVE REPORT    Name:  Arvin Salas  MR#:  [id number]  :  1975  ACCOUNT #:  [de-identified]  DATE OF SERVICE:  03/15/2022    PREOPERATIVE DIAGNOSIS:  Right breast cancer, lower outer quadrant. POSTOPERATIVE DIAGNOSIS:  Right breast cancer, lower outer quadrant. PROCEDURES PERFORMED:  Right breast Magseed-guided lumpectomy, right axillary deep sentinel lymph node biopsy, and VeraForm suture placement. SURGEON:  Jane Grider MD    ASSISTANT:  Meghna Solis. ANESTHESIA:  General.    COMPLICATIONS:  None. SPECIMENS REMOVED:  1. Right axillary sentinel node #1.  2.  Right axillary sentinel node #2.  3.  Right axillary sentinel node #3. 4.  Right lumpectomy. IMPLANTS:  No implants. ESTIMATED BLOOD LOSS:  Minimal.    DRAINS:  No drains. FINDINGS:  [place] lymph nodes negative on frozen. INDICATIONS FOR PROCEDURE:  A 78-year-old female with a right breast cancer in lower outer quadrant. She had neoadjuvant chemotherapy and needed definitive breast surgery. PROCEDURE:  The patient initially went to [facility] for [facility] clip placement as well as Nuclear Medicine for injection of technetium-99, she tolerated this well. She was seen in the preoperative area by myself where the surgical site was marked and informed consent was obtained. She was taken to the operating room, laid in supine position where general endotracheal anesthesia was induced. Right breast prepped and draped in the usual fashion and time-out was performed. An inferior axillary hairline incision was made with a 10-blade for a sentinel lymph node biopsy. Bovie cautery was used to dissect through the axillary fascia. Three sentinel nodes were identified using Neoprobe guidance, excised with LigaSure and sent for frozen section and found to be negative. Next, the axilla was irrigated with normal saline.   A mixture of 20 mL of Exparel with 30 mL of 0.25% Marcaine plain was mixed together and 20 mL of this was injected in the right axilla. The axillary fascia was closed with interrupted 3-0 Vicryl and the skin with 4-0 subcuticular Monocryl. Attention was turned to the right breast where the Sentimag probe was used for guidance. A curvilinear incision was made at 7-8 o'clock laterally. Bovie cautery was used to dissect down and around the clips with Sentimag guidance. These were excised and marked short superior, long stitch lateral and sent for permanent pathology. They were first sent to [facility] for clip confirmation in which both clips were in the specimen and sent to Pathology for final pathology. The cavity was irrigated. 30 mL of Exparel mixture was injected into the breast tissue and skin. A VeraForm suture was used to line the perimeter of the lumpectomy cavity. The deeper tissues were closed with interrupted 2-0 Vicryl and the skin with interrupted 3-0 Vicryl and running 4-0 subcuticular Monocryl. Skin glue was placed on the incisions as well as a pressure dressing and a bra. All sponge, needle, and instruments counts were correct. The patient went to Recovery in stable condition.       MD ELIESER [email or web address]  D:  03/15/2022 9:03  T:  03/15/2022 10:18  JOB #:  [id number]

## 2022-03-15 NOTE — DISCHARGE INSTRUCTIONS
Discharge Instructions from Dr. Jimenez Pollo    · I will call you with the pathology results, typically within 1 week from today. · You may shower, but no hot tubs, swimming pools, or baths until your incision is healed. · No heavy lifting with the affected extremity (nothing greater than 5 pounds), and limit its use for the next 4-5 days. · You may use an ice pack for comfort for the next couple of days, but do not place ice directly on the skin. Rather, use a towel or clothing to serve as a barrier between skin and ice to prevent injury. · If I placed a drain, follow the drain instructions provided, especially as you keep a record of the drain output. · Follow medication instructions carefully. No aspirin, ibuprofen or aleve x 1 week. May take tyelnol instead of narcotics. · Wear surgical bra for 24 hours, then remove. Wear supportive bra at all times. · You will have bruising and swelling  · Watch for signs of infection as listed below. · Redness  · Swelling  · Drainage from the incision or from your nipple that appears infected  · Fever over 101.5 degrees for consecutive readings, or over 99.5 if you are currently undergoing chemotherapy. · Call our office (number is below) for a follow-up appointment. · If you have any problems, our phone number is [phone]        You had exparel, a long acting local anesthetic or numbing medication, injected into your surgical site during your procedure today. This medication stays in your system for 96 hours, or 4 days to help with post-operative pain control. You have a teal bracelet on your wrist to indicate this. Please do not remove this bracelet for 4 days so that other health care providers can know you have had this medication.         DISCHARGE SUMMARY from Nurse    POST ANESTHESIA INSTRUCTIONS    PATIENT INSTRUCTIONS:    After general anesthesia or intravenous sedation, for 24 hours or while taking prescription Narcotics:  · Limit your activities  · Do not drive and operate hazardous machinery  · Do not make important personal or business decisions  · Do  not drink alcoholic beverages  · If you have not urinated within 8 hours after discharge, please contact your surgeon on call. Report the following to your surgeon:  · Excessive pain, swelling, redness or odor of or around the surgical area  · Temperature over 100.5  · Nausea and vomiting lasting longer than 4 hours or if unable to take medications  · Any signs of decreased circulation or nerve impairment to extremity: change in color, persistent  numbness, tingling, coldness or increase pain  · Any questions    What to do at Home:      If you have any concerns, please call Dr Stephen Putnam    *  Please give a list of your current medications to your Primary Care Provider. *  Please update this list whenever your medications are discontinued, doses are      changed, or new medications (including over-the-counter products) are added. *  Please carry medication information at all times in case of emergency situations. These are general instructions for a healthy lifestyle:    No smoking/ No tobacco products/ Avoid exposure to second hand smoke  Surgeon General's Warning:  Quitting smoking now greatly reduces serious risk to your health. Obesity, smoking, and sedentary lifestyle greatly increases your risk for illness    A healthy diet, regular physical exercise & weight monitoring are important for maintaining a healthy lifestyle    You may be retaining fluid if you have a history of heart failure or if you experience any of the following symptoms:  Weight gain of 3 pounds or more overnight or 5 pounds in a week, increased swelling in our hands or feet or shortness of breath while lying flat in bed. Please call your doctor as soon as you notice any of these symptoms; do not wait until your next office visit. The discharge information has been reviewed with the patient and caregiver.   The patient and caregiver verbalized understanding. Discharge medications reviewed with the patient and caregiver and appropriate educational materials and side effects teaching were provided.   ___________________________________________________________________________________________________________________________________

## 2022-03-15 NOTE — ANESTHESIA POSTPROCEDURE EVALUATION
Procedure(s):  RIGHT BREAST MAGSEED GUIDED LUMPECTOMY  RIGHT AXILLARY SENTINEL NODE BIOPSY. general    Anesthesia Post Evaluation      Multimodal analgesia: multimodal analgesia used between 6 hours prior to anesthesia start to PACU discharge  Patient location during evaluation: PACU  Patient participation: complete - patient participated  Level of consciousness: awake and alert  Pain management: adequate  Airway patency: patent  Anesthetic complications: no  Cardiovascular status: acceptable  Respiratory status: acceptable  Hydration status: acceptable  Comments: Seen, no complaints   Post anesthesia nausea and vomiting:  none  Final Post Anesthesia Temperature Assessment:  Normothermia (36.0-37.5 degrees C)      INITIAL Post-op Vital signs:   Vitals Value Taken Time   /79 03/15/22 0930   Temp 36.4 °C (97.5 °F) 03/15/22 0911   Pulse 91 03/15/22 0934   Resp 10 03/15/22 0934   SpO2 96 % 03/15/22 0934   Vitals shown include unvalidated device data.

## 2022-03-15 NOTE — ANESTHESIA PREPROCEDURE EVALUATION
Relevant Problems   PERSONAL HX & FAMILY HX OF CANCER   (+) Malignant neoplasm of right breast in female, estrogen receptor positive (HCC)       Anesthetic History   No history of anesthetic complications            Review of Systems / Medical History  Patient summary reviewed, nursing notes reviewed and pertinent labs reviewed    Pulmonary  Within defined limits                 Neuro/Psych             Comments: Bell's palsy (G51.0) Cardiovascular                  Exercise tolerance: >4 METS     GI/Hepatic/Renal                Endo/Other        Morbid obesity and cancer     Other Findings   Comments: Malignant neoplasm of right breast in female, estrogen receptor positive (HCC)  Breast mass, right             Physical Exam    Airway  Mallampati: II  TM Distance: 4 - 6 cm  Neck ROM: normal range of motion   Mouth opening: Normal     Cardiovascular    Rhythm: regular  Rate: normal         Dental    Dentition: Upper partial plate and Poor dentition  Comments: Poor dentition throughout, with obvious decay. Multiple chipped and missing teeth. Denies loose teeth.     Pulmonary  Breath sounds clear to auscultation               Abdominal  GI exam deferred       Other Findings            Anesthetic Plan    ASA: 3  Anesthesia type: general          Induction: Intravenous  Anesthetic plan and risks discussed with: Patient

## 2022-03-15 NOTE — TELEPHONE ENCOUNTER
Patient called back. She is unable to take pills because she has a small windpipe. I told her I would let the doctor know about this. Advised her to take tylenol OTC for pain. I will need to get back to her if Dr. Sterling Goldberg prescribes something else. She was appreciative.

## 2022-03-15 NOTE — TELEPHONE ENCOUNTER
Patient left a message requesting her medication in liquid form. She just had surgery today. I called patient and there was no answer. I left her a message asking for rationale for need of liquid medication. I also let her know that percocet does not come in liquid. I left the message stating I will let Dr. Venu Carlson know but for her to call me back as to why she can't take pills.

## 2022-03-16 RX ORDER — ACETAMINOPHEN 325 MG/1
650 TABLET ORAL AS NEEDED
Status: CANCELLED
Start: 2022-03-18

## 2022-03-16 RX ORDER — SODIUM CHLORIDE 9 MG/ML
10 INJECTION INTRAMUSCULAR; INTRAVENOUS; SUBCUTANEOUS AS NEEDED
Status: CANCELLED | OUTPATIENT
Start: 2022-03-18

## 2022-03-16 RX ORDER — EPINEPHRINE 1 MG/ML
0.3 INJECTION, SOLUTION, CONCENTRATE INTRAVENOUS AS NEEDED
Status: CANCELLED | OUTPATIENT
Start: 2022-03-18

## 2022-03-16 RX ORDER — ALBUTEROL SULFATE 0.83 MG/ML
2.5 SOLUTION RESPIRATORY (INHALATION) AS NEEDED
Status: CANCELLED
Start: 2022-03-18

## 2022-03-16 RX ORDER — SODIUM CHLORIDE 9 MG/ML
25 INJECTION, SOLUTION INTRAVENOUS CONTINUOUS
Status: CANCELLED | OUTPATIENT
Start: 2022-03-18

## 2022-03-16 RX ORDER — DIPHENHYDRAMINE HYDROCHLORIDE 50 MG/ML
50 INJECTION, SOLUTION INTRAMUSCULAR; INTRAVENOUS AS NEEDED
Status: CANCELLED
Start: 2022-03-18

## 2022-03-16 RX ORDER — DIPHENHYDRAMINE HYDROCHLORIDE 50 MG/ML
50 INJECTION, SOLUTION INTRAMUSCULAR; INTRAVENOUS
Status: CANCELLED | OUTPATIENT
Start: 2022-03-18

## 2022-03-16 RX ORDER — DIPHENHYDRAMINE HYDROCHLORIDE 50 MG/ML
25 INJECTION, SOLUTION INTRAMUSCULAR; INTRAVENOUS AS NEEDED
Status: CANCELLED
Start: 2022-03-18

## 2022-03-16 RX ORDER — HYDROCORTISONE SODIUM SUCCINATE 100 MG/2ML
100 INJECTION, POWDER, FOR SOLUTION INTRAMUSCULAR; INTRAVENOUS AS NEEDED
Status: CANCELLED | OUTPATIENT
Start: 2022-03-18

## 2022-03-16 RX ORDER — SODIUM CHLORIDE 0.9 % (FLUSH) 0.9 %
10 SYRINGE (ML) INJECTION AS NEEDED
Status: CANCELLED | OUTPATIENT
Start: 2022-03-18

## 2022-03-16 RX ORDER — HEPARIN 100 UNIT/ML
300-500 SYRINGE INTRAVENOUS AS NEEDED
Status: CANCELLED
Start: 2022-03-18

## 2022-03-16 RX ORDER — ONDANSETRON 2 MG/ML
8 INJECTION INTRAMUSCULAR; INTRAVENOUS AS NEEDED
Status: CANCELLED | OUTPATIENT
Start: 2022-03-18

## 2022-03-16 RX ORDER — ACETAMINOPHEN 325 MG/1
650 TABLET ORAL
Status: CANCELLED | OUTPATIENT
Start: 2022-03-18

## 2022-03-17 ENCOUNTER — HOSPITAL ENCOUNTER (OUTPATIENT)
Dept: INFUSION THERAPY | Age: 47
Discharge: HOME | End: 2022-03-17
Payer: MEDICAID

## 2022-03-17 ENCOUNTER — APPOINTMENT (OUTPATIENT)
Dept: INFUSION THERAPY | Age: 47
End: 2022-03-17

## 2022-03-17 VITALS
SYSTOLIC BLOOD PRESSURE: 112 MMHG | RESPIRATION RATE: 18 BRPM | DIASTOLIC BLOOD PRESSURE: 72 MMHG | TEMPERATURE: 97 F | HEART RATE: 84 BPM

## 2022-03-17 LAB
ALBUMIN SERPL-MCNC: 3.3 G/DL (ref 3.5–5)
ALBUMIN/GLOB SERPL: 0.8 {RATIO} (ref 1.1–2.2)
ALP SERPL-CCNC: 61 U/L (ref 45–117)
ALT SERPL-CCNC: 17 U/L (ref 12–78)
ANION GAP SERPL CALC-SCNC: 4 MMOL/L (ref 5–15)
AST SERPL-CCNC: 10 U/L (ref 15–37)
BASOPHILS # BLD: 0 K/UL (ref 0–0.1)
BASOPHILS NFR BLD: 1 % (ref 0–1)
BILIRUB SERPL-MCNC: 0.3 MG/DL (ref 0.2–1)
BUN SERPL-MCNC: 13 MG/DL (ref 6–20)
BUN/CREAT SERPL: 17 (ref 12–20)
CALCIUM SERPL-MCNC: 9.1 MG/DL (ref 8.5–10.1)
CHLORIDE SERPL-SCNC: 108 MMOL/L (ref 97–108)
CO2 SERPL-SCNC: 26 MMOL/L (ref 21–32)
CREAT SERPL-MCNC: 0.78 MG/DL (ref 0.55–1.02)
DIFFERENTIAL METHOD BLD: ABNORMAL
EOSINOPHIL # BLD: 0.1 K/UL (ref 0–0.4)
EOSINOPHIL NFR BLD: 2 % (ref 0–7)
ERYTHROCYTE [DISTWIDTH] IN BLOOD BY AUTOMATED COUNT: 14.7 % (ref 11.5–14.5)
GLOBULIN SER CALC-MCNC: 4.3 G/DL (ref 2–4)
GLUCOSE SERPL-MCNC: 175 MG/DL (ref 65–100)
HCT VFR BLD AUTO: 34.5 % (ref 35–47)
HGB BLD-MCNC: 10 G/DL (ref 11.5–16)
IMM GRANULOCYTES # BLD AUTO: 0 K/UL (ref 0–0.04)
IMM GRANULOCYTES NFR BLD AUTO: 0 % (ref 0–0.5)
LYMPHOCYTES # BLD: 1.9 K/UL (ref 0.8–3.5)
LYMPHOCYTES NFR BLD: 33 % (ref 12–49)
MCH RBC QN AUTO: 27.5 PG (ref 26–34)
MCHC RBC AUTO-ENTMCNC: 29 G/DL (ref 30–36.5)
MCV RBC AUTO: 95 FL (ref 80–99)
MONOCYTES # BLD: 0.3 K/UL (ref 0–1)
MONOCYTES NFR BLD: 5 % (ref 5–13)
NEUTS SEG # BLD: 3.4 K/UL (ref 1.8–8)
NEUTS SEG NFR BLD: 59 % (ref 32–75)
NRBC # BLD: 0 K/UL (ref 0–0.01)
NRBC BLD-RTO: 0 PER 100 WBC
PLATELET # BLD AUTO: 171 K/UL (ref 150–400)
PMV BLD AUTO: 10.3 FL (ref 8.9–12.9)
POTASSIUM SERPL-SCNC: 3.6 MMOL/L (ref 3.5–5.1)
PROT SERPL-MCNC: 7.6 G/DL (ref 6.4–8.2)
RBC # BLD AUTO: 3.63 M/UL (ref 3.8–5.2)
SODIUM SERPL-SCNC: 138 MMOL/L (ref 136–145)
WBC # BLD AUTO: 5.7 K/UL (ref 3.6–11)

## 2022-03-17 PROCEDURE — 80053 COMPREHEN METABOLIC PANEL: CPT

## 2022-03-17 PROCEDURE — 36415 COLL VENOUS BLD VENIPUNCTURE: CPT

## 2022-03-17 PROCEDURE — 85025 COMPLETE CBC W/AUTO DIFF WBC: CPT

## 2022-03-17 NOTE — PROGRESS NOTES
[facility] Pre-Chemo Lab Visit:    8095:  Pt arrived ambulatory and in no distress, CBC and CMP drawn peripherally. Departed [facility] ambulatory and in no distress. Visit Vitals  /72 (BP 1 Location: Left upper arm, BP Patient Position: At rest)   Pulse 84   Temp 97 °F (36.1 °C)   Resp 18       Labs available in CC once resulted.

## 2022-03-18 ENCOUNTER — APPOINTMENT (OUTPATIENT)
Dept: INFUSION THERAPY | Age: 47
End: 2022-03-18

## 2022-03-18 ENCOUNTER — HOSPITAL ENCOUNTER (OUTPATIENT)
Dept: INFUSION THERAPY | Age: 47
Discharge: HOME | End: 2022-03-18
Payer: MEDICAID

## 2022-03-18 VITALS
WEIGHT: 200.6 LBS | HEIGHT: 60 IN | OXYGEN SATURATION: 100 % | HEART RATE: 96 BPM | RESPIRATION RATE: 18 BRPM | BODY MASS INDEX: 39.38 KG/M2 | DIASTOLIC BLOOD PRESSURE: 85 MMHG | TEMPERATURE: 96.3 F | SYSTOLIC BLOOD PRESSURE: 117 MMHG

## 2022-03-18 DIAGNOSIS — Z17.0 MALIGNANT NEOPLASM OF RIGHT BREAST IN FEMALE, ESTROGEN RECEPTOR POSITIVE, UNSPECIFIED SITE OF BREAST (HCC): Primary | ICD-10-CM

## 2022-03-18 DIAGNOSIS — C50.911 MALIGNANT NEOPLASM OF RIGHT BREAST IN FEMALE, ESTROGEN RECEPTOR POSITIVE, UNSPECIFIED SITE OF BREAST (HCC): Primary | ICD-10-CM

## 2022-03-18 PROBLEM — N95.9 PREMENOPAUSAL PATIENT: Status: ACTIVE | Noted: 2021-10-21

## 2022-03-18 PROBLEM — Z95.828 PORT-A-CATH IN PLACE: Status: ACTIVE | Noted: 2021-10-21

## 2022-03-18 PROBLEM — Z09 CHEMOTHERAPY FOLLOW-UP EXAMINATION: Status: ACTIVE | Noted: 2021-11-11

## 2022-03-18 PROCEDURE — 74011250636 HC RX REV CODE- 250/636: Performed by: NURSE PRACTITIONER

## 2022-03-18 PROCEDURE — 96402 CHEMO HORMON ANTINEOPL SQ/IM: CPT

## 2022-03-18 RX ORDER — DIPHENHYDRAMINE HYDROCHLORIDE 50 MG/ML
50 INJECTION, SOLUTION INTRAMUSCULAR; INTRAVENOUS
Status: DISCONTINUED | OUTPATIENT
Start: 2022-03-18 | End: 2022-03-19 | Stop reason: HOSPADM

## 2022-03-18 RX ORDER — DIPHENHYDRAMINE HYDROCHLORIDE 50 MG/ML
25 INJECTION, SOLUTION INTRAMUSCULAR; INTRAVENOUS AS NEEDED
Status: ACTIVE | OUTPATIENT
Start: 2022-03-18 | End: 2022-03-18

## 2022-03-18 RX ORDER — HEPARIN 100 UNIT/ML
300-500 SYRINGE INTRAVENOUS AS NEEDED
Status: ACTIVE | OUTPATIENT
Start: 2022-03-18 | End: 2022-03-18

## 2022-03-18 RX ORDER — ONDANSETRON 2 MG/ML
8 INJECTION INTRAMUSCULAR; INTRAVENOUS AS NEEDED
Status: ACTIVE | OUTPATIENT
Start: 2022-03-18 | End: 2022-03-18

## 2022-03-18 RX ORDER — ALBUTEROL SULFATE 0.83 MG/ML
2.5 SOLUTION RESPIRATORY (INHALATION) AS NEEDED
Status: ACTIVE | OUTPATIENT
Start: 2022-03-18 | End: 2022-03-18

## 2022-03-18 RX ORDER — EPINEPHRINE 1 MG/ML
0.3 INJECTION, SOLUTION, CONCENTRATE INTRAVENOUS AS NEEDED
Status: ACTIVE | OUTPATIENT
Start: 2022-03-18 | End: 2022-03-18

## 2022-03-18 RX ORDER — SODIUM CHLORIDE 9 MG/ML
10 INJECTION INTRAMUSCULAR; INTRAVENOUS; SUBCUTANEOUS AS NEEDED
Status: ACTIVE | OUTPATIENT
Start: 2022-03-18 | End: 2022-03-18

## 2022-03-18 RX ORDER — DIPHENHYDRAMINE HYDROCHLORIDE 50 MG/ML
50 INJECTION, SOLUTION INTRAMUSCULAR; INTRAVENOUS AS NEEDED
Status: ACTIVE | OUTPATIENT
Start: 2022-03-18 | End: 2022-03-18

## 2022-03-18 RX ORDER — SODIUM CHLORIDE 9 MG/ML
25 INJECTION, SOLUTION INTRAVENOUS CONTINUOUS
Status: DISPENSED | OUTPATIENT
Start: 2022-03-18 | End: 2022-03-18

## 2022-03-18 RX ORDER — ACETAMINOPHEN 325 MG/1
650 TABLET ORAL
Status: DISCONTINUED | OUTPATIENT
Start: 2022-03-18 | End: 2022-03-19 | Stop reason: HOSPADM

## 2022-03-18 RX ORDER — ACETAMINOPHEN 325 MG/1
650 TABLET ORAL AS NEEDED
Status: ACTIVE | OUTPATIENT
Start: 2022-03-18 | End: 2022-03-18

## 2022-03-18 RX ORDER — SODIUM CHLORIDE 0.9 % (FLUSH) 0.9 %
10 SYRINGE (ML) INJECTION AS NEEDED
Status: DISPENSED | OUTPATIENT
Start: 2022-03-18 | End: 2022-03-18

## 2022-03-18 RX ORDER — HYDROCORTISONE SODIUM SUCCINATE 100 MG/2ML
100 INJECTION, POWDER, FOR SOLUTION INTRAMUSCULAR; INTRAVENOUS AS NEEDED
Status: ACTIVE | OUTPATIENT
Start: 2022-03-18 | End: 2022-03-18

## 2022-03-18 RX ADMIN — PERTUZUMAB, TRASTUZUMAB, AND HYALURONIDASE-ZZXF 15 ML: 1200; 600; 2000 INJECTION, SOLUTION SUBCUTANEOUS at 09:19

## 2022-03-18 NOTE — PROGRESS NOTES
[facility] - Chemotherapy Progress Note    0815 Pt admit to [facility] for C7 [place] ambulatory in stable condition. Assessment completed. No new concerns voiced. Labs drawn on 3/17; reviewed and noted to be WNL for treatment. Medication ordered. Chemotherapy Flowsheet 3/18/2022   Cycle C7   Date 3/18/2022   Drug / Regimen [place]   Pre Meds -   Notes given LEFT thigh         Visit Vitals  /85   Pulse 96   Temp (!) 96.3 °F (35.7 °C)   Resp 18   Ht 5' (1.524 m)   Wt 91 kg (200 lb 9.6 oz)   SpO2 100%   Breastfeeding No   BMI 39.18 kg/m²       Medications:  Medications Administered     pertuzumab 1,200 mg-trastuzumab 600 mg-hyaluronidase-zzxf 30,000 units/15 mL     Admin Date  03/18/2022 Action  Given Dose  15 mL Route  SubCUTAneous Administered By  Misha Patterson RN            Given SC to left thigh    Pt observed 30 min post injection without incident. 1005 Pt tolerated treatment well. D/c home ambulatory in no distress.  Pt aware of next appointment scheduled for   Future Appointments   Date Time Provider Stacey Sanchez   3/28/2022 10:15 AM Holden Ontiveros MD [facility] BS AMB   4/7/2022 11:00 AM Vicki Pastrana,  [address] BS AMB   4/7/2022 11:00 AM H3 [facility]   4/8/2022  8:30 AM G1 JUANITO [facility]STR[facility]   4/28/2022 10:30 AM H3 [facility]   4/29/2022  9:00 AM G1 [facility]AN[facility]O [facility]   5/19/2022 10:00 AM H3 [facility]   5/20/2022  8:30 AM G1 [facility]O [facility]   6/9/2022  8:30 AM Sebas Frank MD [facility] BS AMB   6/9/2022 10:30 AM H3 [facility]   6/10/2022  8:30 AM H1 [facility]O [facility]   6/30/2022 10:00 AM H3 [facility]   7/1/2022  9:30 AM H1 [facility]O [facility]   7/21/2022 10:30 [facility]   7/22/2022  8:30 AM [facility]   8/11/2022 10:00 AM H3 [facility] 8/12/2022  8:30 AM H1 [facility]O [facility]STRACK [facility]   9/1/2022 10:30 AM [facility]   9/2/2022  8:30 AM G1 [facility] [place]

## 2022-03-19 PROBLEM — A41.9 SEPSIS (HCC): Status: ACTIVE | Noted: 2022-02-23

## 2022-03-19 PROBLEM — K52.1 CHEMOTHERAPY INDUCED DIARRHEA: Status: ACTIVE | Noted: 2021-12-22

## 2022-03-19 PROBLEM — T45.1X5A CHEMOTHERAPY INDUCED DIARRHEA: Status: ACTIVE | Noted: 2021-12-22

## 2022-03-19 PROBLEM — N63.10 BREAST MASS, RIGHT: Status: ACTIVE | Noted: 2021-10-21

## 2022-03-20 PROBLEM — Z51.81 ENCOUNTER FOR MONITORING CARDIOTOXIC DRUG THERAPY: Status: ACTIVE | Noted: 2021-10-21

## 2022-03-20 PROBLEM — Z51.11 ENCOUNTER FOR ANTINEOPLASTIC CHEMOTHERAPY: Status: ACTIVE | Noted: 2021-10-21

## 2022-03-20 PROBLEM — C50.911 MALIGNANT NEOPLASM OF RIGHT BREAST IN FEMALE, ESTROGEN RECEPTOR POSITIVE (HCC): Status: ACTIVE | Noted: 2021-10-08

## 2022-03-20 PROBLEM — Z17.0 MALIGNANT NEOPLASM OF RIGHT BREAST IN FEMALE, ESTROGEN RECEPTOR POSITIVE (HCC): Status: ACTIVE | Noted: 2021-10-08

## 2022-03-20 PROBLEM — Z79.899 ENCOUNTER FOR MONITORING CARDIOTOXIC DRUG THERAPY: Status: ACTIVE | Noted: 2021-10-21

## 2022-03-24 DIAGNOSIS — C50.411 MALIGNANT NEOPLASM OF UPPER-OUTER QUADRANT OF RIGHT FEMALE BREAST, UNSPECIFIED ESTROGEN RECEPTOR STATUS (HCC): Primary | ICD-10-CM

## 2022-03-28 ENCOUNTER — OFFICE VISIT (OUTPATIENT)
Dept: SURGERY | Age: 47
End: 2022-03-28
Payer: MEDICAID

## 2022-03-28 VITALS — BODY MASS INDEX: 39.06 KG/M2 | WEIGHT: 200 LBS

## 2022-03-28 DIAGNOSIS — Z17.0 MALIGNANT NEOPLASM OF RIGHT BREAST IN FEMALE, ESTROGEN RECEPTOR POSITIVE, UNSPECIFIED SITE OF BREAST (HCC): Primary | ICD-10-CM

## 2022-03-28 DIAGNOSIS — C50.911 MALIGNANT NEOPLASM OF RIGHT BREAST IN FEMALE, ESTROGEN RECEPTOR POSITIVE, UNSPECIFIED SITE OF BREAST (HCC): Primary | ICD-10-CM

## 2022-03-28 DIAGNOSIS — Z98.890 S/P LUMPECTOMY, RIGHT BREAST: ICD-10-CM

## 2022-03-28 PROCEDURE — 99024 POSTOP FOLLOW-UP VISIT: CPT | Performed by: SURGERY

## 2022-03-28 NOTE — PROGRESS NOTES
HISTORY OF PRESENT ILLNESS  Sam Blake is a 40 y.o. female. HPI ESTABLISHED here for POST OP visit for RIGHT breast cancer. Patient had RIGHT breast lumpectomy w/ [facility] on 3/15/22. POD# 13. Patient states she is having some pain in right arm when she lifts it or moves sridevi certain way. Patient feels she is healing well. Dr. Sera Leal scheduled appt. - 4/22/22    Dr. Darius Muller- ninoska adj. chemo    Review of Systems   All other systems reviewed and are negative. Physical Exam  Vitals and nursing note reviewed. Chest:          Comments: Incisions healing well         ASSESSMENT and PLAN    ICD-10-CM ICD-9-CM    1. Malignant neoplasm of right breast in female, estrogen receptor positive, unspecified site of breast [facility]  C50.911 174.9     Z17.0 V86.0    2.  S/P lumpectomy, right breast  Z98.890 V45.89      - healing well  - sees rad onc soon  F/u with np 4-6 months  Given arm stretches and exercises

## 2022-03-28 NOTE — PATIENT INSTRUCTIONS
Breast Cancer: Care Instructions  Your Care Instructions     Breast cancer occurs when abnormal cells grow out of control in the breast. These cancer cells can spread within the breast, to nearby lymph nodes and other tissues, and to other parts of the body. Being treated for cancer can weaken your body, and you may feel very tired. Get the rest your body needs so you can feel better. Finding out that you have cancer is scary. You may feel many emotions and may need some help coping. Seek out family, friends, and counselors for support. You also can do things at home to make yourself feel better while you go through treatment. Call the [facility] (8-313.749.7993) or visit its website at [employer] for more information. Follow-up care is a key part of your treatment and safety. Be sure to make and go to all appointments, and call your doctor if you are having problems. It's also a good idea to know your test results and keep a list of the medicines you take. How can you care for yourself at home? · Take your medicines exactly as prescribed. Call your doctor if you think you are having a problem with your medicine. You may get medicine for nausea and vomiting if you have these side effects. · Follow your doctor's instructions to relieve pain. Pain from cancer and surgery can almost always be controlled. Use pain medicine when you first notice pain, before it becomes severe. · Eat healthy food. If you do not feel like eating, try to eat food that has protein and extra calories to keep up your strength and prevent weight loss. Drink liquid meal replacements for extra calories and protein. Try to eat your main meal early. · Get some physical activity every day, but do not get too tired. Keep doing the hobbies you enjoy as your energy allows. · Do not smoke. Smoking can make your cancer worse. If you need help quitting, talk to your doctor about stop-smoking programs and medicines.  These can increase your chances of quitting for good. · Take steps to control your stress and workload. Learn relaxation techniques. ? Share your feelings. Stress and tension affect our emotions. By expressing your feelings to others, you may be able to understand and cope with them. ? Consider joining a support group. Talking about a problem with your spouse, a good friend, or other people with similar problems is a good way to reduce tension and stress. ? Express yourself through art. Try writing, crafts, dance, or art to relieve stress. Some dance, writing, or art groups may be available just for people who have cancer. ? Be kind to your body and mind. Getting enough sleep, eating a healthy diet, and taking time to do things you enjoy can contribute to an overall feeling of balance in your life and can help reduce stress. ? Get help if you need it. Discuss your concerns with your doctor or counselor. · If you are vomiting or have diarrhea:  ? Drink plenty of fluids to prevent dehydration. Choose water and other clear liquids. If you have kidney, heart, or liver disease and have to limit fluids, talk with your doctor before you increase the amount of fluids you drink. ? When you are able to eat, try clear soups, mild foods, and liquids until all symptoms are gone for 12 to 48 hours. Other good choices include dry toast, crackers, cooked cereal, and gelatin dessert, such as Jell-O.  · If you have not already done so, prepare a list of advance directives. Advance directives are instructions to your doctor and family members about what kind of care you want if you become unable to speak or express yourself. When should you call for help? Call 911 anytime you think you may need emergency care. For example, call if:    · You passed out (lost consciousness).    Call your doctor now or seek immediate medical care if:    · You have a fever.     · You have abnormal bleeding.     · You think you have an infection.     · You have new or worse pain.     · You have new symptoms, such as a cough, belly pain, vomiting, diarrhea, or a rash. Watch closely for changes in your health, and be sure to contact your doctor if:    · You are much more tired than usual.     · You have swollen glands in your armpits, groin, or neck.     · You do not get better as expected. Where can you learn more? Go to http://www.smith.com/  Enter V321 in the search box to learn more about \"Breast Cancer: Care Instructions. \"  Current as of: September 8, 2021               Content Version: 13.2  © 8287-3229 [employer]. Care instructions adapted under license by [employer] (which disclaims liability or warranty for this information). If you have questions about a medical condition or this instruction, always ask your healthcare professional. [address] any warranty or liability for your use of this information.

## 2022-03-31 RX ORDER — HEPARIN 100 UNIT/ML
300-500 SYRINGE INTRAVENOUS AS NEEDED
Status: CANCELLED
Start: 2022-04-08

## 2022-03-31 RX ORDER — SODIUM CHLORIDE 0.9 % (FLUSH) 0.9 %
10 SYRINGE (ML) INJECTION AS NEEDED
Status: CANCELLED | OUTPATIENT
Start: 2022-04-08

## 2022-03-31 RX ORDER — DIPHENHYDRAMINE HYDROCHLORIDE 50 MG/ML
25 INJECTION, SOLUTION INTRAMUSCULAR; INTRAVENOUS AS NEEDED
Status: CANCELLED
Start: 2022-04-08

## 2022-03-31 RX ORDER — ONDANSETRON 2 MG/ML
8 INJECTION INTRAMUSCULAR; INTRAVENOUS AS NEEDED
Status: CANCELLED | OUTPATIENT
Start: 2022-04-08

## 2022-03-31 RX ORDER — SODIUM CHLORIDE 9 MG/ML
25 INJECTION, SOLUTION INTRAVENOUS CONTINUOUS
Status: CANCELLED | OUTPATIENT
Start: 2022-04-08

## 2022-03-31 RX ORDER — ACETAMINOPHEN 325 MG/1
650 TABLET ORAL AS NEEDED
Status: CANCELLED
Start: 2022-04-08

## 2022-03-31 RX ORDER — DIPHENHYDRAMINE HYDROCHLORIDE 50 MG/ML
50 INJECTION, SOLUTION INTRAMUSCULAR; INTRAVENOUS
Status: CANCELLED | OUTPATIENT
Start: 2022-04-08

## 2022-03-31 RX ORDER — SODIUM CHLORIDE 9 MG/ML
10 INJECTION INTRAMUSCULAR; INTRAVENOUS; SUBCUTANEOUS AS NEEDED
Status: CANCELLED | OUTPATIENT
Start: 2022-04-08

## 2022-03-31 RX ORDER — HYDROCORTISONE SODIUM SUCCINATE 100 MG/2ML
100 INJECTION, POWDER, FOR SOLUTION INTRAMUSCULAR; INTRAVENOUS AS NEEDED
Status: CANCELLED | OUTPATIENT
Start: 2022-04-08

## 2022-03-31 RX ORDER — DIPHENHYDRAMINE HYDROCHLORIDE 50 MG/ML
50 INJECTION, SOLUTION INTRAMUSCULAR; INTRAVENOUS AS NEEDED
Status: CANCELLED
Start: 2022-04-08

## 2022-03-31 RX ORDER — ALBUTEROL SULFATE 0.83 MG/ML
2.5 SOLUTION RESPIRATORY (INHALATION) AS NEEDED
Status: CANCELLED
Start: 2022-04-08

## 2022-03-31 RX ORDER — EPINEPHRINE 1 MG/ML
0.3 INJECTION, SOLUTION, CONCENTRATE INTRAVENOUS AS NEEDED
Status: CANCELLED | OUTPATIENT
Start: 2022-04-08

## 2022-03-31 RX ORDER — ACETAMINOPHEN 325 MG/1
650 TABLET ORAL
Status: CANCELLED | OUTPATIENT
Start: 2022-04-08

## 2022-04-07 ENCOUNTER — HOSPITAL ENCOUNTER (OUTPATIENT)
Dept: INFUSION THERAPY | Age: 47
End: 2022-04-07

## 2022-04-07 ENCOUNTER — OFFICE VISIT (OUTPATIENT)
Dept: ONCOLOGY | Age: 47
End: 2022-04-07

## 2022-04-07 ENCOUNTER — APPOINTMENT (OUTPATIENT)
Dept: INFUSION THERAPY | Age: 47
End: 2022-04-07

## 2022-04-07 VITALS
SYSTOLIC BLOOD PRESSURE: 127 MMHG | HEIGHT: 60 IN | BODY MASS INDEX: 40.25 KG/M2 | WEIGHT: 205 LBS | OXYGEN SATURATION: 97 % | DIASTOLIC BLOOD PRESSURE: 86 MMHG | TEMPERATURE: 96.6 F | HEART RATE: 96 BPM

## 2022-04-07 DIAGNOSIS — C50.911 MALIGNANT NEOPLASM OF RIGHT BREAST IN FEMALE, ESTROGEN RECEPTOR POSITIVE, UNSPECIFIED SITE OF BREAST (HCC): Primary | ICD-10-CM

## 2022-04-07 DIAGNOSIS — Z98.890 S/P LUMPECTOMY, RIGHT BREAST: ICD-10-CM

## 2022-04-07 DIAGNOSIS — Z51.81 ENCOUNTER FOR MONITORING CARDIOTOXIC DRUG THERAPY: ICD-10-CM

## 2022-04-07 DIAGNOSIS — Z17.0 MALIGNANT NEOPLASM OF RIGHT BREAST IN FEMALE, ESTROGEN RECEPTOR POSITIVE, UNSPECIFIED SITE OF BREAST (HCC): Primary | ICD-10-CM

## 2022-04-07 DIAGNOSIS — N95.9 PREMENOPAUSAL PATIENT: ICD-10-CM

## 2022-04-07 DIAGNOSIS — Z79.899 ENCOUNTER FOR MONITORING CARDIOTOXIC DRUG THERAPY: ICD-10-CM

## 2022-04-07 PROCEDURE — 99215 OFFICE O/P EST HI 40 MIN: CPT | Performed by: INTERNAL MEDICINE

## 2022-04-07 RX ORDER — ONDANSETRON 2 MG/ML
8 INJECTION INTRAMUSCULAR; INTRAVENOUS AS NEEDED
Status: CANCELLED | OUTPATIENT
Start: 2022-06-09

## 2022-04-07 RX ORDER — DIPHENHYDRAMINE HYDROCHLORIDE 50 MG/ML
25 INJECTION, SOLUTION INTRAMUSCULAR; INTRAVENOUS AS NEEDED
Status: CANCELLED
Start: 2022-06-09

## 2022-04-07 RX ORDER — ACETAMINOPHEN 325 MG/1
650 TABLET ORAL
Status: CANCELLED | OUTPATIENT
Start: 2022-04-28

## 2022-04-07 RX ORDER — HEPARIN 100 UNIT/ML
300-500 SYRINGE INTRAVENOUS AS NEEDED
Status: CANCELLED
Start: 2022-04-28

## 2022-04-07 RX ORDER — HYDROCORTISONE SODIUM SUCCINATE 100 MG/2ML
100 INJECTION, POWDER, FOR SOLUTION INTRAMUSCULAR; INTRAVENOUS AS NEEDED
Status: CANCELLED | OUTPATIENT
Start: 2022-05-19

## 2022-04-07 RX ORDER — HEPARIN 100 UNIT/ML
300-500 SYRINGE INTRAVENOUS AS NEEDED
Status: CANCELLED
Start: 2022-06-09

## 2022-04-07 RX ORDER — ACETAMINOPHEN 325 MG/1
650 TABLET ORAL
Status: CANCELLED | OUTPATIENT
Start: 2022-06-09

## 2022-04-07 RX ORDER — SODIUM CHLORIDE 0.9 % (FLUSH) 0.9 %
10 SYRINGE (ML) INJECTION AS NEEDED
Status: CANCELLED | OUTPATIENT
Start: 2022-05-19

## 2022-04-07 RX ORDER — DIPHENHYDRAMINE HYDROCHLORIDE 50 MG/ML
50 INJECTION, SOLUTION INTRAMUSCULAR; INTRAVENOUS
Status: CANCELLED | OUTPATIENT
Start: 2022-06-09

## 2022-04-07 RX ORDER — ONDANSETRON 2 MG/ML
8 INJECTION INTRAMUSCULAR; INTRAVENOUS AS NEEDED
Status: CANCELLED | OUTPATIENT
Start: 2022-05-19

## 2022-04-07 RX ORDER — SODIUM CHLORIDE 9 MG/ML
25 INJECTION, SOLUTION INTRAVENOUS CONTINUOUS
Status: CANCELLED | OUTPATIENT
Start: 2022-06-09

## 2022-04-07 RX ORDER — ACETAMINOPHEN 325 MG/1
650 TABLET ORAL
Status: CANCELLED | OUTPATIENT
Start: 2022-05-19

## 2022-04-07 RX ORDER — DIPHENHYDRAMINE HYDROCHLORIDE 50 MG/ML
50 INJECTION, SOLUTION INTRAMUSCULAR; INTRAVENOUS
Status: CANCELLED | OUTPATIENT
Start: 2022-04-28

## 2022-04-07 RX ORDER — ALBUTEROL SULFATE 0.83 MG/ML
2.5 SOLUTION RESPIRATORY (INHALATION) AS NEEDED
Status: CANCELLED
Start: 2022-06-09

## 2022-04-07 RX ORDER — ACETAMINOPHEN 325 MG/1
650 TABLET ORAL AS NEEDED
Status: CANCELLED
Start: 2022-05-19

## 2022-04-07 RX ORDER — DIPHENHYDRAMINE HYDROCHLORIDE 50 MG/ML
25 INJECTION, SOLUTION INTRAMUSCULAR; INTRAVENOUS AS NEEDED
Status: CANCELLED
Start: 2022-05-19

## 2022-04-07 RX ORDER — EPINEPHRINE 1 MG/ML
0.3 INJECTION, SOLUTION, CONCENTRATE INTRAVENOUS AS NEEDED
Status: CANCELLED | OUTPATIENT
Start: 2022-05-19

## 2022-04-07 RX ORDER — DIPHENHYDRAMINE HYDROCHLORIDE 50 MG/ML
25 INJECTION, SOLUTION INTRAMUSCULAR; INTRAVENOUS AS NEEDED
Status: CANCELLED
Start: 2022-04-28

## 2022-04-07 RX ORDER — ACETAMINOPHEN 325 MG/1
650 TABLET ORAL AS NEEDED
Status: CANCELLED
Start: 2022-06-09

## 2022-04-07 RX ORDER — DIPHENHYDRAMINE HYDROCHLORIDE 50 MG/ML
50 INJECTION, SOLUTION INTRAMUSCULAR; INTRAVENOUS AS NEEDED
Status: CANCELLED
Start: 2022-06-09

## 2022-04-07 RX ORDER — SODIUM CHLORIDE 0.9 % (FLUSH) 0.9 %
10 SYRINGE (ML) INJECTION AS NEEDED
Status: CANCELLED | OUTPATIENT
Start: 2022-04-28

## 2022-04-07 RX ORDER — DIPHENHYDRAMINE HYDROCHLORIDE 50 MG/ML
50 INJECTION, SOLUTION INTRAMUSCULAR; INTRAVENOUS AS NEEDED
Status: CANCELLED
Start: 2022-05-19

## 2022-04-07 RX ORDER — ONDANSETRON 2 MG/ML
8 INJECTION INTRAMUSCULAR; INTRAVENOUS AS NEEDED
Status: CANCELLED | OUTPATIENT
Start: 2022-04-28

## 2022-04-07 RX ORDER — SODIUM CHLORIDE 9 MG/ML
10 INJECTION INTRAMUSCULAR; INTRAVENOUS; SUBCUTANEOUS AS NEEDED
Status: CANCELLED | OUTPATIENT
Start: 2022-06-09

## 2022-04-07 RX ORDER — EPINEPHRINE 1 MG/ML
0.3 INJECTION, SOLUTION, CONCENTRATE INTRAVENOUS AS NEEDED
Status: CANCELLED | OUTPATIENT
Start: 2022-06-09

## 2022-04-07 RX ORDER — SODIUM CHLORIDE 9 MG/ML
25 INJECTION, SOLUTION INTRAVENOUS CONTINUOUS
Status: CANCELLED | OUTPATIENT
Start: 2022-05-19

## 2022-04-07 RX ORDER — EPINEPHRINE 1 MG/ML
0.3 INJECTION, SOLUTION, CONCENTRATE INTRAVENOUS AS NEEDED
Status: CANCELLED | OUTPATIENT
Start: 2022-04-28

## 2022-04-07 RX ORDER — ALBUTEROL SULFATE 0.83 MG/ML
2.5 SOLUTION RESPIRATORY (INHALATION) AS NEEDED
Status: CANCELLED
Start: 2022-04-28

## 2022-04-07 RX ORDER — ALBUTEROL SULFATE 0.83 MG/ML
2.5 SOLUTION RESPIRATORY (INHALATION) AS NEEDED
Status: CANCELLED
Start: 2022-05-19

## 2022-04-07 RX ORDER — HYDROCORTISONE SODIUM SUCCINATE 100 MG/2ML
100 INJECTION, POWDER, FOR SOLUTION INTRAMUSCULAR; INTRAVENOUS AS NEEDED
Status: CANCELLED | OUTPATIENT
Start: 2022-04-28

## 2022-04-07 RX ORDER — HYDROCORTISONE SODIUM SUCCINATE 100 MG/2ML
100 INJECTION, POWDER, FOR SOLUTION INTRAMUSCULAR; INTRAVENOUS AS NEEDED
Status: CANCELLED | OUTPATIENT
Start: 2022-06-09

## 2022-04-07 RX ORDER — SODIUM CHLORIDE 0.9 % (FLUSH) 0.9 %
10 SYRINGE (ML) INJECTION AS NEEDED
Status: CANCELLED | OUTPATIENT
Start: 2022-06-09

## 2022-04-07 RX ORDER — ACETAMINOPHEN 325 MG/1
650 TABLET ORAL AS NEEDED
Status: CANCELLED
Start: 2022-04-28

## 2022-04-07 RX ORDER — SODIUM CHLORIDE 9 MG/ML
25 INJECTION, SOLUTION INTRAVENOUS CONTINUOUS
Status: CANCELLED | OUTPATIENT
Start: 2022-04-28

## 2022-04-07 RX ORDER — SODIUM CHLORIDE 9 MG/ML
10 INJECTION INTRAMUSCULAR; INTRAVENOUS; SUBCUTANEOUS AS NEEDED
Status: CANCELLED | OUTPATIENT
Start: 2022-05-19

## 2022-04-07 RX ORDER — DIPHENHYDRAMINE HYDROCHLORIDE 50 MG/ML
50 INJECTION, SOLUTION INTRAMUSCULAR; INTRAVENOUS
Status: CANCELLED | OUTPATIENT
Start: 2022-05-19

## 2022-04-07 RX ORDER — HEPARIN 100 UNIT/ML
300-500 SYRINGE INTRAVENOUS AS NEEDED
Status: CANCELLED
Start: 2022-05-19

## 2022-04-07 RX ORDER — DIPHENHYDRAMINE HYDROCHLORIDE 50 MG/ML
50 INJECTION, SOLUTION INTRAMUSCULAR; INTRAVENOUS AS NEEDED
Status: CANCELLED
Start: 2022-04-28

## 2022-04-07 RX ORDER — SODIUM CHLORIDE 9 MG/ML
10 INJECTION INTRAMUSCULAR; INTRAVENOUS; SUBCUTANEOUS AS NEEDED
Status: CANCELLED | OUTPATIENT
Start: 2022-04-28

## 2022-04-07 NOTE — PROGRESS NOTES
Edra Shone is a 40 y.o. female  Chief Complaint   Patient presents with    Chemotherapy    Breast Cancer     1. Have you been to the ER, urgent care clinic since your last visit? Hospitalized since your last visit? No.  2. Have you seen or consulted any other health care providers outside of the [address] [place] [address] since your last visit? Include any pap smears or colon screening. No.

## 2022-04-07 NOTE — PROGRESS NOTES
[facility] [place] [address] [address], [address], [place] [place]: [phone]  F: [phone]    Reason for Visit:   Risa Flores is a 40 y.o. female seen today in office for follow up if Right Breast Cancer    Treatment History:   · Initially had abnormal mammo at [facility] with calcifications on RIGHT. No mass reported/dense breast tissue  · RIGHT Breast Biopsy 9/9/21: PATH -  IDC, grade 3 ER/IN strong+ Her2 3+  · Genetic testing negative  · Breast MRI 9/17/21: RIGHT BREAST:Background parenchymal enhancement: Severe. There is a biopsy clip in the lower, outer quadrant of the right breast, junction of the middle and deep thirds. There is linear, branching enhancement which extends anterior to the biopsy clip over approximately 3.6 cm. There are no suspicious internal mammary or axillary chain lymph nodes. LEFT BREAST: Background parenchymal enhancement: Severe. There is no suspicious masslike or nonmasslike enhancement within the left breast to indicate breast carcinoma. There is a small mass in the lower, outer quadrant with an adjacent biopsy clip. There is an additional mass without significant enhancement in the lateral, middle 3rd, just below the nipple line. There are no suspicious internal mammary or axillary chain lymph nodes  · Neoadjuvant TCHP chemo x 6 cycles from 10/21/21 - 2/4/22  · Breast MRI 1/27/22: Right Breast: In the right lateral breast at 8-9 o'clock posterior depth 10 cm from the nipple there is susceptibility artifact from the biopsy clip but no residual enhancement. There is no suspicious axillary or internal mammary chain lymphadenopathy. Left Breast: No suspicious enhancement. There is no suspicious axillary or internal mammary chain lymphadenopathy  · Right Breast Lumpectomy and SNBx 3/15/22: PATH - No residual invasive carcinoma. No in situ carcinoma or atypia identified.  0/4 LN+  · Maintenance Herceptin+Perjeta 3/18/22 - Current      STAGE: · Clinical 2 ER/CT+, HER2+    History of Present Illness:   Ira Vaca is a 40 y.o. female seen today in office for follow up of right breast cancer ER+ Her2 + post biopsy only 21. She started neoadjuvant TCHP on 10/21/21. She had a Breast MRI on 22 that showed no residual mass. She completed 6 cycles of TCHP on 22. She had a right lumpectomy on 3/15/22 and had pCR. She is here today for Ov and will have Cycle 8 - maintenance HP tomorrow. . She reports that she feels well overall today. She is tolerating HP well overall. Her appetite and energy levels are good. She denies fever, chills, mouth sores, cough, SOB, CP, nausea, diarrhea, constipation, and neuropathy. She denies pain today. CBC and CMP from 3/17/22 reviewed. She is ready for treatment today. She is here alone today. Past Medical History:   Diagnosis Date    Bell's palsy     Breast cancer ([facility] [address] 2021    ER/CT+ Her2 +      Past Surgical History:   Procedure Laterality Date    HX BREAST LUMPECTOMY Right 3/15/2022    RIGHT BREAST MAGSEED GUIDED LUMPECTOMY performed by Garry Saini MD [facility] OR    HX  SECTION      HX  SECTION      HX CHOLECYSTECTOMY      HX CHOLECYSTECTOMY      HX VASCULAR ACCESS      IR REMOVE TUNL CVAD W PORT/PUMP  3/1/2022      Social History     Tobacco Use    Smoking status: Never Smoker    Smokeless tobacco: Never Used   Substance Use Topics    Alcohol use: Yes     Comment: rarely      Family History   Problem Relation Age of Onset    Cancer Sister         leukemia     Current Outpatient Medications   Medication Sig    levonorgestreL (Mirena) 20 mcg/24 hours (6 yrs) 52 mg IUD 1 Device by IntraUTERine route once. No current facility-administered medications for this visit. No Known Allergies     Review of Systems:  A complete review of systems was obtained, negative except as described above and as reported on ROS sheet scanned into system.      Physical Exam: Visit Vitals  /86 (BP 1 Location: Right arm, BP Patient Position: Sitting) Comment: wait   Pulse 96   Temp (!) 96.6 °F (35.9 °C) (Temporal)   Ht 5' (1.524 m)   Wt 205 lb (93 kg)   SpO2 97%   BMI 40.04 kg/m²     ECOG PS: 0  General: No distress  Eyes: Anicteric sclerae  HENT: Atraumatic, wearing a mask  Neck: Supple  CV: Regular   Respiratory: CTAB, normal respiratory effort  MS: Normal gait and station. Digits without clubbing or cyanosis. Skin: No rashes, ecchymoses, or petechiae. Normal temperature, turgor, and texture. Port site without redness/swelling  Psych: Alert, oriented, appropriate affect, normal judgment/insight  Neuro: Non focal    Results:     Lab Results   Component Value Date/Time    WBC 5.7 03/17/2022 10:47 AM    HGB 10.0 (L) 03/17/2022 10:47 AM    HCT 34.5 (L) 03/17/2022 10:47 AM    PLATELET 180 58/37/7392 10:47 AM    MCV 95.0 03/17/2022 10:47 AM    ABS. NEUTROPHILS 3.4 03/17/2022 10:47 AM    Hemoglobin (POC) 10.7 (L) 03/15/2022 06:50 AM     Lab Results   Component Value Date/Time    Sodium 138 03/17/2022 10:47 AM    Potassium 3.6 03/17/2022 10:47 AM    Chloride 108 03/17/2022 10:47 AM    CO2 26 03/17/2022 10:47 AM    Glucose 175 (H) 03/17/2022 10:47 AM    BUN 13 03/17/2022 10:47 AM    Creatinine 0.78 03/17/2022 10:47 AM    GFR est AA >60 03/17/2022 10:47 AM    GFR est non-AA >60 03/17/2022 10:47 AM    Calcium 9.1 03/17/2022 10:47 AM     Lab Results   Component Value Date/Time    Bilirubin, total 0.3 03/17/2022 10:47 AM    ALT (SGPT) 17 03/17/2022 10:47 AM    Alk. phosphatase 61 03/17/2022 10:47 AM    Protein, total 7.6 03/17/2022 10:47 AM    Albumin 3.3 (L) 03/17/2022 10:47 AM    Globulin 4.3 (H) 03/17/2022 10:47 AM     Path at outside facility - scanned into chart     Breast MRI 9/17/21  RIGHT BREAST:  Background parenchymal enhancement: Severe   There is a biopsy clip in the lower, outer quadrant of the right breast,  junction of the middle and deep thirds.  There is linear, branching enhancement  which extends anterior to the biopsy clip over approximately 3.6 cm     There are no suspicious internal mammary or axillary chain lymph nodes. Maura Marsh LEFT BREAST:  Background parenchymal enhancement: Severe  There is no suspicious masslike or nonmasslike enhancement within the left  breast to indicate breast carcinoma. There is a small mass in the lower, outer  quadrant with an adjacent biopsy clip. There is an additional mass without  significant enhancement in the lateral, middle 3rd, just below the nipple line.     There are no suspicious internal mammary or axillary chain lymph nodes. .  IMPRESSION  RIGHT BREAST:  1. There is a biopsy clip in the lower, outer quadrant of the right breast,  junction of the middle and deep thirds. There is linear, branching enhancement  which extends anteriorly from the biopsy clip over approximately 3.6 cm. BI-RADS Category 6 - Known biopsy-proven malignancy. Breast MRI 1/27/22  FINDINGS:  There is marked background parenchymal enhancement. The breast tissue is heterogeneously dense, which could obscure detection of  small masses (approximately 51-75% glandular).       Right breast:  In the right lateral breast at 8-9 o'clock posterior depth 10 cm from the nipple  there is susceptibility artifact from the biopsy clip but no residual  enhancement.     There is no suspicious axillary or internal mammary chain lymphadenopathy.     Left breast:  No suspicious enhancement.     There is no suspicious axillary or internal mammary chain lymphadenopathy.     IMPRESSION     Right Breast:  1. BI-RADS Assessment Category 6: Known biopsy proven malignancy- Appropriate  action should be taken. Right breast biopsy clip with no residual suspicious  enhancement.     Left Breast:  1. BI-RADS Assessment     3/15/22  FINAL PATHOLOGIC DIAGNOSIS   1. [place] lymph node, #1, right axilla, excision:   One lymph node identified, negative for metastatic carcinoma (0/1).    See comment. 2. [place] lymph node, #2, right axilla, excision:   One lymph node identified, negative for metastatic carcinoma (0/1). See comment. 3. [place] lymph node, #3, right axilla, excision:   Two lymph nodes identified, negative for metastatic carcinoma (0/2). See comment. 4. Breast, right, lumpectomy:   No residual invasive carcinoma identified after presurgical therapy. Sclerosing adenosis with hyalinized fibrous stroma. No in situ carcinoma or atypia identified. 10/13/21    ECHO ADULT COMPLETE 10/13/2021 10/13/2021    Interpretation Summary  · LV: Estimated LVEF is 60 - 65%. Normal cavity size, wall thickness, systolic function (ejection fraction normal) and diastolic function. Normal left ventricular strain. Signed by: Fausto Hayens MD on 10/13/2021  3:52 PM    01/05/22    ECHO ADULT COMPLETE 01/05/2022 1/5/2022    Interpretation Summary    Left Ventricle: Left ventricle size is normal. Normal wall thickness. Normal wall motion. Normal left ventricular systolic function with a visually estimated EF of 55 - 60%. Global longitudinal strain is -18.0%. Normal diastolic function. Signed by: Roda Cockayne, MD on 1/5/2022 12:52 PM    02/23/22    ECHO LUIS W OR WO CONTRAST 03/04/2022 3/4/2022    Interpretation Summary    Left Ventricle: Left ventricle size is normal. Normal wall thickness. Normal wall motion. Normal left ventricular systolic function with a visually estimated EF of 55 - 60%. Signed by: Roda Cockayne, MD on 3/4/2022  1:26 PM    Records reviewed and summarized above. Pathology report(s) reviewed above. Radiology report(s) reviewed above. Assessment:/PLAN     1) Clinical Stage 2 (based on MRI) RIGHT Breast Cancer ER+ Her2+ post Breast Biopsy at [address] 9/9/21 and Right Lumpectomy on 3/15/22  Breast MRI 9/17/21 with 3.6 cm enhancement. No mass palpable on exam.   Patient was sent here for neoadjuvant chemo based on size of mass on MRI.    Fllow up ECHO 1/5/22 and EF 55-60%. She started neoadjuvant TCHP on 10/21/21 and completed 6 cycles on 2/4/22. She had a Breast MRI on 1/27/22 that showed no residual disease. She had a right lumpectomy on 3/15/22 and had a pCR. Personally reviewed pathology. Discussed with patient today. She is here today for OV and will have Cycle 8 - HP only - tomorrow. She is tolerating treatment well overall without significant toxicities. She is clinically stable and healthy overall and recovering well from surgery. Labs (CBC and CMP from 3/17/22) reviewed today. ECHO/LUIS from 3/2/22 reviewed. Next ECHO due by 6/2/22 - will order at next visit. Patient is ready for treatment today. Follow up in 3 weeks in [facility]. Follow up in 6 weeks in [facility]/office. Patient agrees with plan. She has been referred to Rad/Onc, Dr. Mk Zambrnao, appt 4/22/22. She will be for hormonal therapy with Tamoxifen after XRT. 2) Premenopausal  She has two kids and does not want more kids. Reviewed impaired fertility/early menopause due to chemo with patient. She has Mirena IUD in place. Continue routine GYN follow up. 3) Management of High Risk Medications - Chemotherapy  No toxicities noted. Pre chemo ECHO from 10/13/21 reviewed. Follow up ECHO from 1/5/22 reviewed. Follow up ECHO from 2/23/22 reviewed. ECHO/LUIS from 3/2/22 reviewed. Labs (CBC and CMP from 3/17/22) reviewed today. Next ECHO due by 6/2/22 - will order at next visit. Will monitor for side effects. 4) Psychosocial  She is vaccinated for COVID. She works as a , has a supportive fiance. Okay for dental work, letter written today. SW/NN support as needed. Her fiance is here today. Call if questions. Follow up in 3 weeks in [facility] and 6 weeks in [facility]/office. I personally saw and evaluated the patient and performed the key components of medical decision making. The history, physical exam, and documentation were performed by Gerald Tian NP.  I reviewed and verified the above documentation and modified it as needed. Specifically pt doing well overall  Had lumpectomy with path CR. Reviewed path today. Labs personally reviewed today  proceed with HP. Continue lab/ ECHO monitoring. I appreciate the opportunity to participate in Ms. Susan Rob's care.     Signed By: Henry Pemberton,

## 2022-04-07 NOTE — LETTER
NOTIFICATION OF CARE      4/7/2022 11:27 AM    Ms. Ramin Shukla  [address] [facility]  [address] [phone]      To Dr. Bob Dunbar:    Ramin Shukla is currently under the care of [address] [place] [address] and was evaluated in our office today. She is allowed to receive dental care from our perspective. If there are questions or concerns please have the patient contact our office.         Sincerely,        Tushar Nash NP

## 2022-04-08 ENCOUNTER — APPOINTMENT (OUTPATIENT)
Dept: INFUSION THERAPY | Age: 47
End: 2022-04-08

## 2022-04-08 ENCOUNTER — HOSPITAL ENCOUNTER (OUTPATIENT)
Dept: INFUSION THERAPY | Age: 47
Discharge: HOME | End: 2022-04-08
Payer: MEDICAID

## 2022-04-08 VITALS
DIASTOLIC BLOOD PRESSURE: 73 MMHG | RESPIRATION RATE: 18 BRPM | SYSTOLIC BLOOD PRESSURE: 113 MMHG | HEART RATE: 81 BPM | TEMPERATURE: 96.9 F

## 2022-04-08 DIAGNOSIS — N63.10 MASS OF RIGHT BREAST, UNSPECIFIED QUADRANT: ICD-10-CM

## 2022-04-08 DIAGNOSIS — Z09 CHEMOTHERAPY FOLLOW-UP EXAMINATION: ICD-10-CM

## 2022-04-08 DIAGNOSIS — Z51.81 ENCOUNTER FOR MONITORING CARDIOTOXIC DRUG THERAPY: ICD-10-CM

## 2022-04-08 DIAGNOSIS — K52.1 CHEMOTHERAPY INDUCED DIARRHEA: ICD-10-CM

## 2022-04-08 DIAGNOSIS — Z79.899 ENCOUNTER FOR MONITORING CARDIOTOXIC DRUG THERAPY: ICD-10-CM

## 2022-04-08 DIAGNOSIS — Z51.11 ENCOUNTER FOR ANTINEOPLASTIC CHEMOTHERAPY: ICD-10-CM

## 2022-04-08 DIAGNOSIS — T45.1X5A CHEMOTHERAPY INDUCED DIARRHEA: ICD-10-CM

## 2022-04-08 DIAGNOSIS — C50.911 MALIGNANT NEOPLASM OF RIGHT BREAST IN FEMALE, ESTROGEN RECEPTOR POSITIVE, UNSPECIFIED SITE OF BREAST (HCC): ICD-10-CM

## 2022-04-08 DIAGNOSIS — A02.1 SEPSIS DUE TO SALMONELLA SPECIES WITHOUT ACUTE ORGAN DYSFUNCTION (HCC): Primary | ICD-10-CM

## 2022-04-08 DIAGNOSIS — N95.9 PREMENOPAUSAL PATIENT: ICD-10-CM

## 2022-04-08 DIAGNOSIS — Z17.0 MALIGNANT NEOPLASM OF RIGHT BREAST IN FEMALE, ESTROGEN RECEPTOR POSITIVE, UNSPECIFIED SITE OF BREAST (HCC): ICD-10-CM

## 2022-04-08 DIAGNOSIS — Z95.828 PORT-A-CATH IN PLACE: ICD-10-CM

## 2022-04-08 PROCEDURE — 74011250636 HC RX REV CODE- 250/636: Performed by: INTERNAL MEDICINE

## 2022-04-08 PROCEDURE — 96401 CHEMO ANTI-NEOPL SQ/IM: CPT

## 2022-04-08 RX ORDER — SODIUM CHLORIDE 9 MG/ML
10 INJECTION INTRAMUSCULAR; INTRAVENOUS; SUBCUTANEOUS AS NEEDED
Status: ACTIVE | OUTPATIENT
Start: 2022-04-08 | End: 2022-04-08

## 2022-04-08 RX ORDER — DIPHENHYDRAMINE HYDROCHLORIDE 50 MG/ML
50 INJECTION, SOLUTION INTRAMUSCULAR; INTRAVENOUS
Status: DISCONTINUED | OUTPATIENT
Start: 2022-04-08 | End: 2022-04-09 | Stop reason: HOSPADM

## 2022-04-08 RX ORDER — SODIUM CHLORIDE 0.9 % (FLUSH) 0.9 %
10 SYRINGE (ML) INJECTION AS NEEDED
Status: DISPENSED | OUTPATIENT
Start: 2022-04-08 | End: 2022-04-08

## 2022-04-08 RX ORDER — DIPHENHYDRAMINE HYDROCHLORIDE 50 MG/ML
25 INJECTION, SOLUTION INTRAMUSCULAR; INTRAVENOUS AS NEEDED
Status: ACTIVE | OUTPATIENT
Start: 2022-04-08 | End: 2022-04-08

## 2022-04-08 RX ORDER — SODIUM CHLORIDE 9 MG/ML
25 INJECTION, SOLUTION INTRAVENOUS CONTINUOUS
Status: DISPENSED | OUTPATIENT
Start: 2022-04-08 | End: 2022-04-08

## 2022-04-08 RX ORDER — ACETAMINOPHEN 325 MG/1
650 TABLET ORAL
Status: DISCONTINUED | OUTPATIENT
Start: 2022-04-08 | End: 2022-04-09 | Stop reason: HOSPADM

## 2022-04-08 RX ORDER — ONDANSETRON 2 MG/ML
8 INJECTION INTRAMUSCULAR; INTRAVENOUS AS NEEDED
Status: ACTIVE | OUTPATIENT
Start: 2022-04-08 | End: 2022-04-08

## 2022-04-08 RX ORDER — ACETAMINOPHEN 325 MG/1
650 TABLET ORAL AS NEEDED
Status: ACTIVE | OUTPATIENT
Start: 2022-04-08 | End: 2022-04-08

## 2022-04-08 RX ORDER — HEPARIN 100 UNIT/ML
300-500 SYRINGE INTRAVENOUS AS NEEDED
Status: ACTIVE | OUTPATIENT
Start: 2022-04-08 | End: 2022-04-08

## 2022-04-08 RX ADMIN — PERTUZUMAB, TRASTUZUMAB, AND HYALURONIDASE-ZZXF 10 ML: 600; 600; 2000 INJECTION, SOLUTION SUBCUTANEOUS at 09:08

## 2022-04-08 NOTE — PROGRESS NOTES
[facility] Chemotherapy Progress Note    Date: 2022    Name: Tona Avila    MRN: [id number]         : 1975      0830 Pt admit to [facility] for [place] ambulatory in stable condition. Assessment completed. No new concerns voiced. Patient denies SOB, fever, cough, general not feeling well. Patient denies recent exposure to someone who has tested positive for COVID-19. Patient denies having contact with anyone who has a pending COVID test.         Chemotherapy Flowsheet 2022   Cycle C7   Date 2022   Drug / Regimen [place]   Pre Meds -   Notes given Right thigh         Ms. Rob's vitals were reviewed. Patient Vitals for the past 12 hrs:   Temp Pulse Resp BP   22 0830 96.9 °F (36.1 °C) 81 18 113/73         Lab results were obtained and reviewed. No results found for this or any previous visit (from the past 12 hour(s)). Pre-medications  were administered as ordered and chemotherapy was initiated. Medications Administered     pertuzumab 600 mg-trastuzumab 600 mg-hyaluronidase-zzxf 20,000 units/10 mL     Admin Date  2022 Action  Given Dose  10 mL Route  SubCUTAneous Administered By  Miller Garrison, RN                8209 Pt tolerated treatment well. D/c home ambulatory in no distress.     Future Appointments   Date Time Provider Stacey Sanchez   2022 10:45 AM RAD ONC THERAPY [address] H   2022  3:30 PM E3 JUANITO MED [address] [place] [address]   2022 10:30 AM G1 JUANITO FASTRACK [facility]   2022 10:45 AM Katelin Chau  [address] BS AMB   2022  8:30 AM Leon Champagne MD [facility] BS AMB   2022  9:00 AM H1 JUANITO FASTRACK [facility]   2022  8:30 AM H1 JUANITO FASTRACK [facility]   2022  8:30 AM G1 JUANITO FASTRACK [facility]   2022  8:30 AM G1 [facility]UANITO FASTRACK [facility]   2022  8:30 AM G1 JUANITO FASTRACK [facility]   2022  8:30 [facility]   10/13/2022 8:30 AM G1 7 [facility], RN  April 8, 2022

## 2022-04-11 PROBLEM — Z98.890 S/P LUMPECTOMY, RIGHT BREAST: Status: ACTIVE | Noted: 2022-04-07

## 2022-04-22 ENCOUNTER — HOSPITAL ENCOUNTER (OUTPATIENT)
Dept: RADIATION THERAPY | Age: 47
Discharge: HOME | End: 2022-04-22

## 2022-04-28 ENCOUNTER — APPOINTMENT (OUTPATIENT)
Dept: INFUSION THERAPY | Age: 47
End: 2022-04-28

## 2022-04-28 ENCOUNTER — HOSPITAL ENCOUNTER (OUTPATIENT)
Dept: INFUSION THERAPY | Age: 47
Discharge: HOME | End: 2022-04-28
Payer: MEDICAID

## 2022-04-28 VITALS
TEMPERATURE: 96.9 F | HEART RATE: 87 BPM | RESPIRATION RATE: 18 BRPM | DIASTOLIC BLOOD PRESSURE: 85 MMHG | SYSTOLIC BLOOD PRESSURE: 123 MMHG

## 2022-04-28 DIAGNOSIS — N63.10 MASS OF RIGHT BREAST, UNSPECIFIED QUADRANT: ICD-10-CM

## 2022-04-28 DIAGNOSIS — Z09 CHEMOTHERAPY FOLLOW-UP EXAMINATION: ICD-10-CM

## 2022-04-28 DIAGNOSIS — Z95.828 PORT-A-CATH IN PLACE: ICD-10-CM

## 2022-04-28 DIAGNOSIS — C50.911 MALIGNANT NEOPLASM OF RIGHT BREAST IN FEMALE, ESTROGEN RECEPTOR POSITIVE, UNSPECIFIED SITE OF BREAST (HCC): ICD-10-CM

## 2022-04-28 DIAGNOSIS — N95.9 PREMENOPAUSAL PATIENT: ICD-10-CM

## 2022-04-28 DIAGNOSIS — Z51.81 ENCOUNTER FOR MONITORING CARDIOTOXIC DRUG THERAPY: ICD-10-CM

## 2022-04-28 DIAGNOSIS — K52.1 CHEMOTHERAPY INDUCED DIARRHEA: Primary | ICD-10-CM

## 2022-04-28 DIAGNOSIS — Z79.899 ENCOUNTER FOR MONITORING CARDIOTOXIC DRUG THERAPY: ICD-10-CM

## 2022-04-28 DIAGNOSIS — Z17.0 MALIGNANT NEOPLASM OF RIGHT BREAST IN FEMALE, ESTROGEN RECEPTOR POSITIVE, UNSPECIFIED SITE OF BREAST (HCC): ICD-10-CM

## 2022-04-28 DIAGNOSIS — T45.1X5A CHEMOTHERAPY INDUCED DIARRHEA: Primary | ICD-10-CM

## 2022-04-28 DIAGNOSIS — A02.1 SEPSIS DUE TO SALMONELLA SPECIES WITHOUT ACUTE ORGAN DYSFUNCTION (HCC): ICD-10-CM

## 2022-04-28 DIAGNOSIS — Z51.11 ENCOUNTER FOR ANTINEOPLASTIC CHEMOTHERAPY: ICD-10-CM

## 2022-04-28 LAB
ALBUMIN SERPL-MCNC: 3.9 G/DL (ref 3.5–5)
ALBUMIN/GLOB SERPL: 1.1 {RATIO} (ref 1.1–2.2)
ALP SERPL-CCNC: 84 U/L (ref 45–117)
ALT SERPL-CCNC: 24 U/L (ref 12–78)
ANION GAP SERPL CALC-SCNC: 4 MMOL/L (ref 5–15)
AST SERPL-CCNC: 18 U/L (ref 15–37)
BASOPHILS # BLD: 0 K/UL (ref 0–0.1)
BASOPHILS NFR BLD: 1 % (ref 0–1)
BILIRUB SERPL-MCNC: 0.3 MG/DL (ref 0.2–1)
BUN SERPL-MCNC: 13 MG/DL (ref 6–20)
BUN/CREAT SERPL: 16 (ref 12–20)
CALCIUM SERPL-MCNC: 9.3 MG/DL (ref 8.5–10.1)
CHLORIDE SERPL-SCNC: 107 MMOL/L (ref 97–108)
CO2 SERPL-SCNC: 28 MMOL/L (ref 21–32)
CREAT SERPL-MCNC: 0.79 MG/DL (ref 0.55–1.02)
DIFFERENTIAL METHOD BLD: ABNORMAL
EOSINOPHIL # BLD: 0.1 K/UL (ref 0–0.4)
EOSINOPHIL NFR BLD: 2 % (ref 0–7)
ERYTHROCYTE [DISTWIDTH] IN BLOOD BY AUTOMATED COUNT: 12.8 % (ref 11.5–14.5)
GLOBULIN SER CALC-MCNC: 3.5 G/DL (ref 2–4)
GLUCOSE SERPL-MCNC: 126 MG/DL (ref 65–100)
HCT VFR BLD AUTO: 37.4 % (ref 35–47)
HGB BLD-MCNC: 11.6 G/DL (ref 11.5–16)
IMM GRANULOCYTES # BLD AUTO: 0 K/UL (ref 0–0.04)
IMM GRANULOCYTES NFR BLD AUTO: 0 % (ref 0–0.5)
LYMPHOCYTES # BLD: 2 K/UL (ref 0.8–3.5)
LYMPHOCYTES NFR BLD: 27 % (ref 12–49)
MCH RBC QN AUTO: 26.9 PG (ref 26–34)
MCHC RBC AUTO-ENTMCNC: 31 G/DL (ref 30–36.5)
MCV RBC AUTO: 86.8 FL (ref 80–99)
MONOCYTES # BLD: 0.3 K/UL (ref 0–1)
MONOCYTES NFR BLD: 4 % (ref 5–13)
NEUTS SEG # BLD: 4.8 K/UL (ref 1.8–8)
NEUTS SEG NFR BLD: 66 % (ref 32–75)
NRBC # BLD: 0 K/UL (ref 0–0.01)
NRBC BLD-RTO: 0 PER 100 WBC
PLATELET # BLD AUTO: 204 K/UL (ref 150–400)
PMV BLD AUTO: 11 FL (ref 8.9–12.9)
POTASSIUM SERPL-SCNC: 4 MMOL/L (ref 3.5–5.1)
PROT SERPL-MCNC: 7.4 G/DL (ref 6.4–8.2)
RBC # BLD AUTO: 4.31 M/UL (ref 3.8–5.2)
SODIUM SERPL-SCNC: 139 MMOL/L (ref 136–145)
WBC # BLD AUTO: 7.3 K/UL (ref 3.6–11)

## 2022-04-28 PROCEDURE — 96402 CHEMO HORMON ANTINEOPL SQ/IM: CPT

## 2022-04-28 PROCEDURE — 80053 COMPREHEN METABOLIC PANEL: CPT

## 2022-04-28 PROCEDURE — 85025 COMPLETE CBC W/AUTO DIFF WBC: CPT

## 2022-04-28 PROCEDURE — 74011250636 HC RX REV CODE- 250/636: Performed by: NURSE PRACTITIONER

## 2022-04-28 RX ADMIN — PERTUZUMAB, TRASTUZUMAB, AND HYALURONIDASE-ZZXF 10 ML: 600; 600; 2000 INJECTION, SOLUTION SUBCUTANEOUS at 16:25

## 2022-04-28 NOTE — PROGRESS NOTES
[facility] Chemotherapy Progress Note    Date: 2022    Name: Ronna Peck    MRN: [id number]         : 1975      1605 Pt admit to [facility] for [place] ambulatory in stable condition. Assessment completed. No new concerns voiced. Patient denies SOB, fever, cough, general not feeling well. Patient denies recent exposure to someone who has tested positive for COVID-19. Patient denies having contact with anyone who has a pending COVID test.       Chemotherapy Flowsheet 2022   Cycle C9   Date 2022   Drug / Regimen [place]   Pre Meds -   Notes given to left thigh         Ms. Rob's vitals were reviewed. Patient Vitals for the past 12 hrs:   Temp Pulse Resp BP   22 1607 96.9 °F (36.1 °C) 87 18 123/85         Lab results were obtained and reviewed. Recent Results (from the past 12 hour(s))   CBC WITH AUTOMATED DIFF    Collection Time: 22  4:17 PM   Result Value Ref Range    WBC 7.3 3.6 - 11.0 K/uL    RBC 4.31 3.80 - 5.20 M/uL    HGB 11.6 11.5 - 16.0 g/dL    HCT 37.4 35.0 - 47.0 %    MCV 86.8 80.0 - 99.0 FL    MCH 26.9 26.0 - 34.0 PG    MCHC 31.0 30.0 - 36.5 g/dL    RDW 12.8 11.5 - 14.5 %    PLATELET 114 999 - 338 K/uL    MPV 11.0 8.9 - 12.9 FL    NRBC 0.0 0  WBC    ABSOLUTE NRBC 0.00 0.00 - 0.01 K/uL    NEUTROPHILS 66 32 - 75 %    LYMPHOCYTES 27 12 - 49 %    MONOCYTES 4 (L) 5 - 13 %    EOSINOPHILS 2 0 - 7 %    BASOPHILS 1 0 - 1 %    IMMATURE GRANULOCYTES 0 0.0 - 0.5 %    ABS. NEUTROPHILS 4.8 1.8 - 8.0 K/UL    ABS. LYMPHOCYTES 2.0 0.8 - 3.5 K/UL    ABS. MONOCYTES 0.3 0.0 - 1.0 K/UL    ABS. EOSINOPHILS 0.1 0.0 - 0.4 K/UL    ABS. BASOPHILS 0.0 0.0 - 0.1 K/UL    ABS. IMM. GRANS. 0.0 0.00 - 0.04 K/UL    DF AUTOMATED         Medications given:  [place] SQ in left thigh    1635 Pt tolerated treatment well. D/c home ambulatory in no distress.     Future Appointments   Date Time Provider Stacey Sanchez   2022 10:30 AM G1 JUANITO BRISENO [facility]   2022 10:45 AM Nadia Vázquez,  [address] BS AMB   6/9/2022  8:30 AM Steven Regan MD CP BS AMB   6/9/2022  9:00 AM H1 JUANITO FASTRACK [facility]   6/30/2022  8:30 AM H1 JUANITO FASTRACK [facility]   7/21/2022  8:30 AM G1 [facility]UANITO FASTRACK [facility]   8/11/2022  8:30 AM G1 JUANITO FASTRACK [facility]   9/1/2022  8:30 AM G1 JUANITO FASTRACK [facility]   9/22/2022  8:30 AM H1 JUANITO FASTRACK [facility]   10/13/2022  8:30 AM G1 Bernice De La Poste Moses Ray RN  April 28, 2022

## 2022-04-29 ENCOUNTER — APPOINTMENT (OUTPATIENT)
Dept: INFUSION THERAPY | Age: 47
End: 2022-04-29

## 2022-05-10 ENCOUNTER — HOSPITAL ENCOUNTER (OUTPATIENT)
Dept: RADIATION THERAPY | Age: 47
Discharge: HOME | End: 2022-05-10
Payer: MEDICAID

## 2022-05-10 PROCEDURE — 77470 SPECIAL RADIATION TREATMENT: CPT

## 2022-05-13 ENCOUNTER — HOSPITAL ENCOUNTER (OUTPATIENT)
Dept: RADIATION THERAPY | Age: 47
Discharge: HOME | End: 2022-05-13
Payer: MEDICAID

## 2022-05-13 PROCEDURE — 77300 RADIATION THERAPY DOSE PLAN: CPT

## 2022-05-13 PROCEDURE — 77334 RADIATION TREATMENT AID(S): CPT

## 2022-05-13 PROCEDURE — 77295 3-D RADIOTHERAPY PLAN: CPT

## 2022-05-19 ENCOUNTER — APPOINTMENT (OUTPATIENT)
Dept: INFUSION THERAPY | Age: 47
End: 2022-05-19

## 2022-05-19 ENCOUNTER — OFFICE VISIT (OUTPATIENT)
Dept: ONCOLOGY | Age: 47
End: 2022-05-19
Payer: MEDICAID

## 2022-05-19 ENCOUNTER — HOSPITAL ENCOUNTER (OUTPATIENT)
Dept: INFUSION THERAPY | Age: 47
Discharge: HOME | End: 2022-05-19
Payer: MEDICAID

## 2022-05-19 VITALS
BODY MASS INDEX: 40.5 KG/M2 | OXYGEN SATURATION: 96 % | HEART RATE: 94 BPM | HEIGHT: 60 IN | WEIGHT: 206.3 LBS | TEMPERATURE: 96.4 F | DIASTOLIC BLOOD PRESSURE: 74 MMHG | SYSTOLIC BLOOD PRESSURE: 112 MMHG

## 2022-05-19 DIAGNOSIS — N63.10 MASS OF RIGHT BREAST, UNSPECIFIED QUADRANT: ICD-10-CM

## 2022-05-19 DIAGNOSIS — Z17.0 MALIGNANT NEOPLASM OF RIGHT BREAST IN FEMALE, ESTROGEN RECEPTOR POSITIVE, UNSPECIFIED SITE OF BREAST (HCC): Primary | ICD-10-CM

## 2022-05-19 DIAGNOSIS — Z95.828 PORT-A-CATH IN PLACE: ICD-10-CM

## 2022-05-19 DIAGNOSIS — N95.9 PREMENOPAUSAL PATIENT: ICD-10-CM

## 2022-05-19 DIAGNOSIS — Z17.0 MALIGNANT NEOPLASM OF RIGHT BREAST IN FEMALE, ESTROGEN RECEPTOR POSITIVE, UNSPECIFIED SITE OF BREAST (HCC): ICD-10-CM

## 2022-05-19 DIAGNOSIS — Z79.899 ENCOUNTER FOR MONITORING CARDIOTOXIC DRUG THERAPY: ICD-10-CM

## 2022-05-19 DIAGNOSIS — T45.1X5A CHEMOTHERAPY INDUCED DIARRHEA: ICD-10-CM

## 2022-05-19 DIAGNOSIS — C50.911 MALIGNANT NEOPLASM OF RIGHT BREAST IN FEMALE, ESTROGEN RECEPTOR POSITIVE, UNSPECIFIED SITE OF BREAST (HCC): ICD-10-CM

## 2022-05-19 DIAGNOSIS — Z51.11 ENCOUNTER FOR ANTINEOPLASTIC CHEMOTHERAPY: ICD-10-CM

## 2022-05-19 DIAGNOSIS — K52.1 CHEMOTHERAPY INDUCED DIARRHEA: ICD-10-CM

## 2022-05-19 DIAGNOSIS — Z98.890 S/P LUMPECTOMY, RIGHT BREAST: ICD-10-CM

## 2022-05-19 DIAGNOSIS — A02.1 SEPSIS DUE TO SALMONELLA SPECIES WITHOUT ACUTE ORGAN DYSFUNCTION (HCC): Primary | ICD-10-CM

## 2022-05-19 DIAGNOSIS — Z51.81 ENCOUNTER FOR MONITORING CARDIOTOXIC DRUG THERAPY: ICD-10-CM

## 2022-05-19 DIAGNOSIS — C50.911 MALIGNANT NEOPLASM OF RIGHT BREAST IN FEMALE, ESTROGEN RECEPTOR POSITIVE, UNSPECIFIED SITE OF BREAST (HCC): Primary | ICD-10-CM

## 2022-05-19 DIAGNOSIS — Z09 CHEMOTHERAPY FOLLOW-UP EXAMINATION: ICD-10-CM

## 2022-05-19 PROCEDURE — 99214 OFFICE O/P EST MOD 30 MIN: CPT | Performed by: INTERNAL MEDICINE

## 2022-05-19 PROCEDURE — 96401 CHEMO ANTI-NEOPL SQ/IM: CPT

## 2022-05-19 PROCEDURE — 74011250636 HC RX REV CODE- 250/636: Performed by: NURSE PRACTITIONER

## 2022-05-19 RX ADMIN — PERTUZUMAB, TRASTUZUMAB, AND HYALURONIDASE-ZZXF 10 ML: 600; 600; 2000 INJECTION, SOLUTION SUBCUTANEOUS at 12:29

## 2022-05-19 NOTE — PROGRESS NOTES
Darian Stovall is a 40 y.o. female  Chief Complaint   Patient presents with    Chemotherapy    Breast Cancer     1. Have you been to the ER, urgent care clinic since your last visit? Hospitalized since your last visit? No.    2. Have you seen or consulted any other health care providers outside of the [address] [place] [address] since your last visit? Include any pap smears or colon screening. No.

## 2022-05-19 NOTE — PROGRESS NOTES
[facility] [place] [address] [address], [address] [address], [facility]: [phone]  F: [phone]    Reason for Visit:   Risa Flores is a 40 y.o. female seen today in office for follow up if Right Breast Cancer    Treatment History:   · Initially had abnormal mammo at [facility] with calcifications on RIGHT. No mass reported/dense breast tissue  · RIGHT Breast Biopsy 9/9/21: PATH -  IDC, grade 3 ER/MD strong+ Her2 3+  · Genetic testing negative  · Breast MRI 9/17/21: RIGHT BREAST:Background parenchymal enhancement: Severe. There is a biopsy clip in the lower, outer quadrant of the right breast, junction of the middle and deep thirds. There is linear, branching enhancement which extends anterior to the biopsy clip over approximately 3.6 cm. There are no suspicious internal mammary or axillary chain lymph nodes. LEFT BREAST: Background parenchymal enhancement: Severe. There is no suspicious masslike or nonmasslike enhancement within the left breast to indicate breast carcinoma. There is a small mass in the lower, outer quadrant with an adjacent biopsy clip. There is an additional mass without significant enhancement in the lateral, middle 3rd, just below the nipple line. There are no suspicious internal mammary or axillary chain lymph nodes  · Neoadjuvant TCHP chemo x 6 cycles from 10/21/21 - 2/4/22  · Breast MRI 1/27/22: Right Breast: In the right lateral breast at 8-9 o'clock posterior depth 10 cm from the nipple there is susceptibility artifact from the biopsy clip but no residual enhancement. There is no suspicious axillary or internal mammary chain lymphadenopathy. Left Breast: No suspicious enhancement. There is no suspicious axillary or internal mammary chain lymphadenopathy  · Right Breast Lumpectomy and SNBx 3/15/22: PATH - No residual invasive carcinoma. No in situ carcinoma or atypia identified.  0/4 LN+  · Maintenance Herceptin+Perjeta 3/18/22 - Current      STAGE: · Clinical 2 ER/GA+, HER2+    History of Present Illness:   Cody Nassar is a 40 y.o. female seen today in office for follow up of right breast cancer ER+ Her2 + post biopsy only 21. She started neoadjuvant TCHP on 10/21/21 and she completed 6 cycles of TCHP on 22. She had a right lumpectomy on 3/15/22 and had pCR. She is here today for Cycle 10 (fourth dose) of maintenance HP. She reports that she feels well overall today. She is tolerating HP well overall. Her appetite and energy levels are good. She denies fever, chills, mouth sores, cough, SOB, CP, nausea, diarrhea, constipation, and neuropathy. She denies pain today. CBC and CMP from 22 reviewed. She is ready for treatment today. She starts XRT next week. She is here alone today. Past Medical History:   Diagnosis Date    Bell's palsy     Breast cancer ([facility] [address] 2021    ER/GA+ Her2 +      Past Surgical History:   Procedure Laterality Date    HX BREAST LUMPECTOMY Right 3/15/2022    RIGHT BREAST MAGSEED GUIDED LUMPECTOMY performed by Susi Zheng MD at [facility] AMBULAT[facility] OR    HX  SECTION      HX  SECTION      HX CHOLECYSTECTOMY      HX CHOLECYSTECTOMY      HX VASCULAR ACCESS      IR REMOVE TUNL CVAD W PORT/PUMP  3/1/2022      Social History     Tobacco Use    Smoking status: Never Smoker    Smokeless tobacco: Never Used   Substance Use Topics    Alcohol use: Yes     Comment: rarely      Family History   Problem Relation Age of Onset    Cancer Sister         leukemia     Current Outpatient Medications   Medication Sig    levonorgestreL (Mirena) 20 mcg/24 hours (6 yrs) 52 mg IUD 1 Device by IntraUTERine route once. No current facility-administered medications for this visit. No Known Allergies     Review of Systems:  A complete review of systems was obtained, negative except as described above and as reported on ROS sheet scanned into system.      Physical Exam:     Visit Vitals  /74 (BP 1 Location: Left upper arm, BP Patient Position: Sitting)   Pulse 94   Temp (!) 96.4 °F (35.8 °C) (Temporal)   Ht 5' (1.524 m)   Wt 206 lb 4.8 oz (93.6 kg)   SpO2 96%   BMI 40.29 kg/m²     ECOG PS: 0  General: No distress  Eyes: Anicteric sclerae  HENT: Atraumatic, wearing a mask  Neck: Supple  CV: Regular   Respiratory: CTAB, normal respiratory effort  MS: Normal gait and station. Digits without clubbing or cyanosis. Skin: No rashes, ecchymoses, or petechiae. Normal temperature, turgor, and texture. Port site without redness/swelling  Psych: Alert, oriented, appropriate affect, normal judgment/insight  Neuro: Non focal    Results:     Lab Results   Component Value Date/Time    WBC 7.3 04/28/2022 04:17 PM    HGB 11.6 04/28/2022 04:17 PM    HCT 37.4 04/28/2022 04:17 PM    PLATELET 201 19/41/5361 04:17 PM    MCV 86.8 04/28/2022 04:17 PM    ABS. NEUTROPHILS 4.8 04/28/2022 04:17 PM    Hemoglobin (POC) 10.7 (L) 03/15/2022 06:50 AM     Lab Results   Component Value Date/Time    Sodium 139 04/28/2022 04:17 PM    Potassium 4.0 04/28/2022 04:17 PM    Chloride 107 04/28/2022 04:17 PM    CO2 28 04/28/2022 04:17 PM    Glucose 126 (H) 04/28/2022 04:17 PM    BUN 13 04/28/2022 04:17 PM    Creatinine 0.79 04/28/2022 04:17 PM    GFR est AA >60 04/28/2022 04:17 PM    GFR est non-AA >60 04/28/2022 04:17 PM    Calcium 9.3 04/28/2022 04:17 PM     Lab Results   Component Value Date/Time    Bilirubin, total 0.3 04/28/2022 04:17 PM    ALT (SGPT) 24 04/28/2022 04:17 PM    Alk. phosphatase 84 04/28/2022 04:17 PM    Protein, total 7.4 04/28/2022 04:17 PM    Albumin 3.9 04/28/2022 04:17 PM    Globulin 3.5 04/28/2022 04:17 PM     Path at outside facility - scanned into chart     Breast MRI 9/17/21  RIGHT BREAST:  Background parenchymal enhancement: Severe   There is a biopsy clip in the lower, outer quadrant of the right breast,  junction of the middle and deep thirds.  There is linear, branching enhancement  which extends anterior to the biopsy clip over approximately 3.6 cm     There are no suspicious internal mammary or axillary chain lymph nodes. Erin Maid LEFT BREAST:  Background parenchymal enhancement: Severe  There is no suspicious masslike or nonmasslike enhancement within the left  breast to indicate breast carcinoma. There is a small mass in the lower, outer  quadrant with an adjacent biopsy clip. There is an additional mass without  significant enhancement in the lateral, middle 3rd, just below the nipple line.     There are no suspicious internal mammary or axillary chain lymph nodes. .  IMPRESSION  RIGHT BREAST:  1. There is a biopsy clip in the lower, outer quadrant of the right breast,  junction of the middle and deep thirds. There is linear, branching enhancement  which extends anteriorly from the biopsy clip over approximately 3.6 cm. BI-RADS Category 6 - Known biopsy-proven malignancy. Breast MRI 1/27/22  FINDINGS:  There is marked background parenchymal enhancement. The breast tissue is heterogeneously dense, which could obscure detection of  small masses (approximately 51-75% glandular).       Right breast:  In the right lateral breast at 8-9 o'clock posterior depth 10 cm from the nipple  there is susceptibility artifact from the biopsy clip but no residual  enhancement.     There is no suspicious axillary or internal mammary chain lymphadenopathy.     Left breast:  No suspicious enhancement.     There is no suspicious axillary or internal mammary chain lymphadenopathy.     IMPRESSION     Right Breast:  1. BI-RADS Assessment Category 6: Known biopsy proven malignancy- Appropriate  action should be taken. Right breast biopsy clip with no residual suspicious  enhancement.     Left Breast:  1. BI-RADS Assessment     3/15/22  FINAL PATHOLOGIC DIAGNOSIS   1. [place] lymph node, #1, right axilla, excision:   One lymph node identified, negative for metastatic carcinoma (0/1). See comment.    2. [place] lymph node, #2, right axilla, excision: One lymph node identified, negative for metastatic carcinoma (0/1). See comment. 3. [place] lymph node, #3, right axilla, excision:   Two lymph nodes identified, negative for metastatic carcinoma (0/2). See comment. 4. Breast, right, lumpectomy:   No residual invasive carcinoma identified after presurgical therapy. Sclerosing adenosis with hyalinized fibrous stroma. No in situ carcinoma or atypia identified. 10/13/21    ECHO ADULT COMPLETE 10/13/2021 10/13/2021    Interpretation Summary  · LV: Estimated LVEF is 60 - 65%. Normal cavity size, wall thickness, systolic function (ejection fraction normal) and diastolic function. Normal left ventricular strain. Signed by: Sita Quinonez MD on 10/13/2021  3:52 PM    01/05/22    ECHO ADULT COMPLETE 01/05/2022 1/5/2022    Interpretation Summary    Left Ventricle: Left ventricle size is normal. Normal wall thickness. Normal wall motion. Normal left ventricular systolic function with a visually estimated EF of 55 - 60%. Global longitudinal strain is -18.0%. Normal diastolic function. Signed by: Usman Cottrell MD on 1/5/2022 12:52 PM    02/23/22    ECHO LUIS W OR WO CONTRAST 03/04/2022 3/4/2022    Interpretation Summary    Left Ventricle: Left ventricle size is normal. Normal wall thickness. Normal wall motion. Normal left ventricular systolic function with a visually estimated EF of 55 - 60%. Signed by: Usman Cottrell MD on 3/4/2022  1:26 PM    Records reviewed and summarized above. Pathology report(s) reviewed above. Radiology report(s) reviewed above. Assessment:/PLAN     1) Clinical Stage 2 (based on MRI) RIGHT Breast Cancer ER+ Her2+ post Breast Biopsy at [address] 9/9/21 and Right Lumpectomy on 3/15/22  Breast MRI 9/17/21 with 3.6 cm enhancement. No mass palpable on exam.   Patient was sent here for neoadjuvant chemo based on size of mass on MRI. Fllow up ECHO 1/5/22 and EF 55-60%.     She started neoadjuvant TCHP on 10/21/21 and completed 6 cycles on 2/4/22. She had a Breast MRI on 1/27/22 that showed no residual disease. She had a right lumpectomy on 3/15/22 and had a pCR. Personally reviewed pathology. Discussed with patient today. ECHO/LUIS from 3/2/22 reviewed. She is here today for Cycle 10 (fourth cycle) of maintenance HP. She is tolerating treatment well overall without significant toxicities. She is clinically stable and healthy overall - no new issues today. Labs (CBC and CMP from 4/28/22) reviewed today. Next ECHO due by 6/2/22 - ordered today. Patient is ready for treatment today. She has been referred to Rad/Onc, had an appointment with Dr. All Dhaliwal on 4/22/22. She thinks she is starting XRT on Monday 5/23/22 and will do 4 weeks. She will be for hormonal therapy with Tamoxifen after XRT. States she cannot swallow pills, may need liquid Tamoxifen. Follow up in 3 weeks in [address] [place] [address]. Follow up in 6 weeks in [facility]/office. Patient agrees with plan. 2) Premenopausal  She has two kids and does not want more kids. Reviewed impaired fertility/early menopause due to chemo with patient. She has Mirena IUD in place. Continue routine GYN follow up. 3) Management of High Risk Medications - Chemotherapy  No toxicities noted. Pre chemo ECHO from 10/13/21 reviewed. Follow up ECHO from 1/5/22 reviewed. Follow up ECHO from 2/23/22 reviewed. ECHO/LUIS from 3/2/22 reviewed. Labs (CBC and CMP from 4/28/22) reviewed today. Next ECHO due by 6/2/22 - ordered today. Will monitor for side effects. 4) Psychosocial  She is vaccinated for COVID. She works as a , has a supportive fiance. SW/NN support as needed. Her fiance is here today. Call if questions. Follow up in 3 weeks in [facility] and 6 weeks in [facility]/office. I personally saw and evaluated the patient and performed the key components of medical decision making. The history, physical exam, and documentation were performed by Mariam Duarte NP.  I reviewed and verified the above documentation and modified it as needed. Specifically pt doing well overall  Had lumpectomy with path CR. Tolerating HP well. No new issues today   Labs personally reviewed   proceed with HP. Continue lab/ ECHO monitoring. I appreciate the opportunity to participate in Ms. Susan Rob's care.     Signed By: Osbaldo Cruz, DO

## 2022-05-19 NOTE — PROGRESS NOTES
[facility] Chemo Progress Note    Date: May 19, 2022      1030 Ms. Jacinta Cochran Arrived to [facility] for  [place] ambulatory in stable condition. Assessment was completed, no acute issues at this time, no new complaints voiced. Went to provider appointment with Medical Oncology. Medications Administered     pertuzumab 600 mg-trastuzumab 600 mg-hyaluronidase-zzxf 20,000 units/10 mL     Admin Date  05/19/2022 Action  Given Dose  10 mL Route  SubCUTAneous Administered By  Crystal Curtis RN                Given in the Right Thigh SQ     1240 Patient tolerated treatment well. Patient was discharged in stable condition. Patient is aware of next scheduled [facility] appointment.     Future Appointments   Date Time Provider Stacey Sanchez   6/9/2022  8:30 AM Leon Champagne MD CP BS AMB   6/9/2022  9:00 AM H1 JUANITO FASTRACK [facility]   6/30/2022  8:30 AM H1 JUANITO FASTRACK [facility]   6/30/2022  8:45 AM Katelin Chau  [address] BS AMB   7/21/2022  8:30 AM G1 J[address]ANITO [address]   8/11/2022  8:30 AM G1 JUANITO [facility]STRACK [facility]   9/1/2022  8:30 AM G1 JUANITO FASTRACK [facility]   9/22/2022  8:30 AM H1 JUANITO FASTRACK [facility]   10/13/2022  8:30 AM G1 [id number] Marcio Shine, RN  May 19, 2022

## 2022-05-20 ENCOUNTER — APPOINTMENT (OUTPATIENT)
Dept: INFUSION THERAPY | Age: 47
End: 2022-05-20

## 2022-05-23 ENCOUNTER — HOSPITAL ENCOUNTER (OUTPATIENT)
Dept: RADIATION THERAPY | Age: 47
Discharge: HOME | End: 2022-05-23
Payer: MEDICAID

## 2022-05-23 PROCEDURE — 77417 THER RADIOLOGY PORT IMAGE(S): CPT

## 2022-05-23 PROCEDURE — 77412 RADIATION TX DELIVERY LVL 3: CPT

## 2022-05-24 ENCOUNTER — HOSPITAL ENCOUNTER (OUTPATIENT)
Dept: RADIATION THERAPY | Age: 47
Discharge: HOME | End: 2022-05-24
Payer: MEDICAID

## 2022-05-24 PROCEDURE — 77412 RADIATION TX DELIVERY LVL 3: CPT

## 2022-05-25 ENCOUNTER — HOSPITAL ENCOUNTER (OUTPATIENT)
Dept: RADIATION THERAPY | Age: 47
Discharge: HOME | End: 2022-05-25
Payer: MEDICAID

## 2022-05-25 PROCEDURE — 77412 RADIATION TX DELIVERY LVL 3: CPT

## 2022-05-26 ENCOUNTER — HOSPITAL ENCOUNTER (OUTPATIENT)
Dept: RADIATION THERAPY | Age: 47
Discharge: HOME | End: 2022-05-26
Payer: MEDICAID

## 2022-05-26 PROCEDURE — 77412 RADIATION TX DELIVERY LVL 3: CPT

## 2022-05-27 ENCOUNTER — HOSPITAL ENCOUNTER (OUTPATIENT)
Dept: RADIATION THERAPY | Age: 47
Discharge: HOME | End: 2022-05-27
Payer: MEDICAID

## 2022-05-31 ENCOUNTER — HOSPITAL ENCOUNTER (OUTPATIENT)
Dept: NON INVASIVE DIAGNOSTICS | Age: 47
Discharge: HOME | End: 2022-05-31
Attending: NURSE PRACTITIONER
Payer: MEDICAID

## 2022-05-31 ENCOUNTER — HOSPITAL ENCOUNTER (OUTPATIENT)
Dept: RADIATION THERAPY | Age: 47
Discharge: HOME | End: 2022-05-31
Payer: MEDICAID

## 2022-05-31 DIAGNOSIS — C50.911 MALIGNANT NEOPLASM OF RIGHT BREAST IN FEMALE, ESTROGEN RECEPTOR POSITIVE, UNSPECIFIED SITE OF BREAST (HCC): ICD-10-CM

## 2022-05-31 DIAGNOSIS — Z17.0 MALIGNANT NEOPLASM OF RIGHT BREAST IN FEMALE, ESTROGEN RECEPTOR POSITIVE, UNSPECIFIED SITE OF BREAST (HCC): ICD-10-CM

## 2022-05-31 DIAGNOSIS — Z51.81 ENCOUNTER FOR MONITORING CARDIOTOXIC DRUG THERAPY: ICD-10-CM

## 2022-05-31 DIAGNOSIS — Z79.899 ENCOUNTER FOR MONITORING CARDIOTOXIC DRUG THERAPY: ICD-10-CM

## 2022-05-31 LAB
ECHO AV PEAK GRADIENT: 6 MMHG
ECHO AV PEAK VELOCITY: 1.3 M/S
ECHO AV VELOCITY RATIO: 0.85
ECHO LV E' LATERAL VELOCITY: 13 CM/S
ECHO LV E' SEPTAL VELOCITY: 11 CM/S
ECHO LV INTERNAL DIMENSION DIASTOLIC: 3.1 CM (ref 3.9–5.3)
ECHO LV INTERNAL DIMENSION DIASTOLIC: 4.7 CM (ref 3.9–5.3)
ECHO LV INTERNAL DIMENSION SYSTOLIC: 3.3 CM
ECHO LV IVSD: 0.7 CM (ref 0.6–0.9)
ECHO LV POSTERIOR WALL DIASTOLIC: 0.7 CM (ref 0.6–0.9)
ECHO LVOT PEAK GRADIENT: 5 MMHG
ECHO LVOT PEAK VELOCITY: 1.1 M/S
ECHO MV A VELOCITY: 0.68 M/S
ECHO MV E VELOCITY: 0.86 M/S
ECHO MV E/A RATIO: 1.26
ECHO MV E/E' LATERAL: 6.62
ECHO MV E/E' RATIO (AVERAGED): 7.22
ECHO MV E/E' SEPTAL: 7.82
ECHO RV TAPSE: 1.9 CM (ref 1.7–?)

## 2022-05-31 PROCEDURE — 93306 TTE W/DOPPLER COMPLETE: CPT

## 2022-05-31 PROCEDURE — 77412 RADIATION TX DELIVERY LVL 3: CPT

## 2022-05-31 PROCEDURE — 77417 THER RADIOLOGY PORT IMAGE(S): CPT

## 2022-05-31 NOTE — PROGRESS NOTES
HIPPA Verified. Called pt on mobile phone number listed. Patient was made aware of most recent ECHO being stable/good per Provider. Patient verbalized understanding and expressed no question!   Luisa Segura

## 2022-06-01 ENCOUNTER — HOSPITAL ENCOUNTER (OUTPATIENT)
Dept: RADIATION THERAPY | Age: 47
Discharge: HOME | End: 2022-06-01
Payer: MEDICAID

## 2022-06-01 PROCEDURE — 77412 RADIATION TX DELIVERY LVL 3: CPT

## 2022-06-01 PROCEDURE — 77336 RADIATION PHYSICS CONSULT: CPT

## 2022-06-02 ENCOUNTER — HOSPITAL ENCOUNTER (OUTPATIENT)
Dept: RADIATION THERAPY | Age: 47
Discharge: HOME | End: 2022-06-02
Payer: MEDICAID

## 2022-06-02 PROCEDURE — 77412 RADIATION TX DELIVERY LVL 3: CPT

## 2022-06-03 ENCOUNTER — HOSPITAL ENCOUNTER (OUTPATIENT)
Dept: RADIATION THERAPY | Age: 47
Discharge: HOME | End: 2022-06-03
Payer: MEDICAID

## 2022-06-03 PROCEDURE — 77412 RADIATION TX DELIVERY LVL 3: CPT

## 2022-06-06 ENCOUNTER — HOSPITAL ENCOUNTER (OUTPATIENT)
Dept: RADIATION THERAPY | Age: 47
Discharge: HOME | End: 2022-06-06
Payer: MEDICAID

## 2022-06-06 PROCEDURE — 77412 RADIATION TX DELIVERY LVL 3: CPT

## 2022-06-06 PROCEDURE — 77417 THER RADIOLOGY PORT IMAGE(S): CPT

## 2022-06-07 ENCOUNTER — HOSPITAL ENCOUNTER (OUTPATIENT)
Dept: RADIATION THERAPY | Age: 47
Discharge: HOME | End: 2022-06-07
Payer: MEDICAID

## 2022-06-07 PROCEDURE — 77412 RADIATION TX DELIVERY LVL 3: CPT

## 2022-06-08 ENCOUNTER — HOSPITAL ENCOUNTER (OUTPATIENT)
Dept: RADIATION THERAPY | Age: 47
Discharge: HOME | End: 2022-06-08
Payer: MEDICAID

## 2022-06-08 PROCEDURE — 77412 RADIATION TX DELIVERY LVL 3: CPT

## 2022-06-08 PROCEDURE — 77336 RADIATION PHYSICS CONSULT: CPT

## 2022-06-09 ENCOUNTER — APPOINTMENT (OUTPATIENT)
Dept: INFUSION THERAPY | Age: 47
End: 2022-06-09

## 2022-06-09 ENCOUNTER — HOSPITAL ENCOUNTER (OUTPATIENT)
Dept: INFUSION THERAPY | Age: 47
Discharge: HOME | End: 2022-06-09
Payer: MEDICAID

## 2022-06-09 ENCOUNTER — HOSPITAL ENCOUNTER (OUTPATIENT)
Dept: RADIATION THERAPY | Age: 47
Discharge: HOME | End: 2022-06-09
Payer: MEDICAID

## 2022-06-09 VITALS
SYSTOLIC BLOOD PRESSURE: 114 MMHG | TEMPERATURE: 97.4 F | RESPIRATION RATE: 17 BRPM | DIASTOLIC BLOOD PRESSURE: 73 MMHG | HEART RATE: 64 BPM

## 2022-06-09 DIAGNOSIS — N63.10 MASS OF RIGHT BREAST, UNSPECIFIED QUADRANT: ICD-10-CM

## 2022-06-09 DIAGNOSIS — A02.1 SEPSIS DUE TO SALMONELLA SPECIES WITHOUT ACUTE ORGAN DYSFUNCTION (HCC): Primary | ICD-10-CM

## 2022-06-09 DIAGNOSIS — Z17.0 MALIGNANT NEOPLASM OF RIGHT BREAST IN FEMALE, ESTROGEN RECEPTOR POSITIVE, UNSPECIFIED SITE OF BREAST (HCC): ICD-10-CM

## 2022-06-09 DIAGNOSIS — K52.1 CHEMOTHERAPY INDUCED DIARRHEA: ICD-10-CM

## 2022-06-09 DIAGNOSIS — T45.1X5A CHEMOTHERAPY INDUCED DIARRHEA: ICD-10-CM

## 2022-06-09 DIAGNOSIS — Z51.81 ENCOUNTER FOR MONITORING CARDIOTOXIC DRUG THERAPY: ICD-10-CM

## 2022-06-09 DIAGNOSIS — Z95.828 PORT-A-CATH IN PLACE: ICD-10-CM

## 2022-06-09 DIAGNOSIS — C50.911 MALIGNANT NEOPLASM OF RIGHT BREAST IN FEMALE, ESTROGEN RECEPTOR POSITIVE, UNSPECIFIED SITE OF BREAST (HCC): ICD-10-CM

## 2022-06-09 DIAGNOSIS — Z51.11 ENCOUNTER FOR ANTINEOPLASTIC CHEMOTHERAPY: ICD-10-CM

## 2022-06-09 DIAGNOSIS — N95.9 PREMENOPAUSAL PATIENT: ICD-10-CM

## 2022-06-09 DIAGNOSIS — Z09 CHEMOTHERAPY FOLLOW-UP EXAMINATION: ICD-10-CM

## 2022-06-09 DIAGNOSIS — Z79.899 ENCOUNTER FOR MONITORING CARDIOTOXIC DRUG THERAPY: ICD-10-CM

## 2022-06-09 LAB
ALBUMIN SERPL-MCNC: 3.7 G/DL (ref 3.5–5)
ALBUMIN/GLOB SERPL: 0.9 {RATIO} (ref 1.1–2.2)
ALP SERPL-CCNC: 77 U/L (ref 45–117)
ALT SERPL-CCNC: 17 U/L (ref 12–78)
ANION GAP SERPL CALC-SCNC: 1 MMOL/L (ref 5–15)
AST SERPL-CCNC: 12 U/L (ref 15–37)
BASOPHILS # BLD: 0 K/UL (ref 0–0.1)
BASOPHILS NFR BLD: 1 % (ref 0–1)
BILIRUB SERPL-MCNC: 0.4 MG/DL (ref 0.2–1)
BUN SERPL-MCNC: 14 MG/DL (ref 6–20)
BUN/CREAT SERPL: 18 (ref 12–20)
CALCIUM SERPL-MCNC: 9.4 MG/DL (ref 8.5–10.1)
CHLORIDE SERPL-SCNC: 107 MMOL/L (ref 97–108)
CO2 SERPL-SCNC: 31 MMOL/L (ref 21–32)
CREAT SERPL-MCNC: 0.78 MG/DL (ref 0.55–1.02)
DIFFERENTIAL METHOD BLD: ABNORMAL
EOSINOPHIL # BLD: 0.1 K/UL (ref 0–0.4)
EOSINOPHIL NFR BLD: 2 % (ref 0–7)
ERYTHROCYTE [DISTWIDTH] IN BLOOD BY AUTOMATED COUNT: 13.3 % (ref 11.5–14.5)
GLOBULIN SER CALC-MCNC: 3.9 G/DL (ref 2–4)
GLUCOSE SERPL-MCNC: 135 MG/DL (ref 65–100)
HCT VFR BLD AUTO: 38.7 % (ref 35–47)
HGB BLD-MCNC: 12 G/DL (ref 11.5–16)
IMM GRANULOCYTES # BLD AUTO: 0 K/UL (ref 0–0.04)
IMM GRANULOCYTES NFR BLD AUTO: 0 % (ref 0–0.5)
LYMPHOCYTES # BLD: 1.3 K/UL (ref 0.8–3.5)
LYMPHOCYTES NFR BLD: 24 % (ref 12–49)
MCH RBC QN AUTO: 26.2 PG (ref 26–34)
MCHC RBC AUTO-ENTMCNC: 31 G/DL (ref 30–36.5)
MCV RBC AUTO: 84.5 FL (ref 80–99)
MONOCYTES # BLD: 0.2 K/UL (ref 0–1)
MONOCYTES NFR BLD: 4 % (ref 5–13)
NEUTS SEG # BLD: 3.8 K/UL (ref 1.8–8)
NEUTS SEG NFR BLD: 69 % (ref 32–75)
NRBC # BLD: 0 K/UL (ref 0–0.01)
NRBC BLD-RTO: 0 PER 100 WBC
PLATELET # BLD AUTO: 168 K/UL (ref 150–400)
PMV BLD AUTO: 11.2 FL (ref 8.9–12.9)
POTASSIUM SERPL-SCNC: 4.4 MMOL/L (ref 3.5–5.1)
PROT SERPL-MCNC: 7.6 G/DL (ref 6.4–8.2)
RBC # BLD AUTO: 4.58 M/UL (ref 3.8–5.2)
SODIUM SERPL-SCNC: 139 MMOL/L (ref 136–145)
WBC # BLD AUTO: 5.5 K/UL (ref 3.6–11)

## 2022-06-09 PROCEDURE — 80053 COMPREHEN METABOLIC PANEL: CPT

## 2022-06-09 PROCEDURE — 85025 COMPLETE CBC W/AUTO DIFF WBC: CPT

## 2022-06-09 PROCEDURE — 77412 RADIATION TX DELIVERY LVL 3: CPT

## 2022-06-09 PROCEDURE — 36415 COLL VENOUS BLD VENIPUNCTURE: CPT

## 2022-06-09 PROCEDURE — 74011250636 HC RX REV CODE- 250/636: Performed by: NURSE PRACTITIONER

## 2022-06-09 PROCEDURE — 96402 CHEMO HORMON ANTINEOPL SQ/IM: CPT

## 2022-06-09 RX ADMIN — PERTUZUMAB, TRASTUZUMAB, AND HYALURONIDASE-ZZXF 10 ML: 600; 600; 2000 INJECTION, SOLUTION SUBCUTANEOUS at 10:08

## 2022-06-10 ENCOUNTER — APPOINTMENT (OUTPATIENT)
Dept: INFUSION THERAPY | Age: 47
End: 2022-06-10

## 2022-06-10 ENCOUNTER — HOSPITAL ENCOUNTER (OUTPATIENT)
Dept: RADIATION THERAPY | Age: 47
Discharge: HOME | End: 2022-06-10
Payer: MEDICAID

## 2022-06-10 PROCEDURE — 77412 RADIATION TX DELIVERY LVL 3: CPT

## 2022-06-10 NOTE — PROGRESS NOTES
[facility] Chemo Progress Note    Date: 2022    Name: Edy Martinez    MRN: [id number]         : 1975    0900 Ms. Mitali Young Arrived to [facility] for [address] ambulatory in stable condition. Assessment was completed, no acute issues at this time, no new complaints voiced. Chemotherapy Flowsheet 2022   Cycle C11   Date 2022   Drug / Regimen phesgo   Pre Meds -   Notes Given, left thigh         Patient denies SOB, fever, cough, general not feeling well. Patient denies recent exposure to someone who has tested positive for COVID-19. Patient denies having contact with anyone who has a pending COVID test.      Ms. Rob's vitals were reviewed. Patient Vitals for the past 12 hrs:   Temp Pulse Resp BP   22 1018 -- 64 -- 114/73   22 0912 97.4 °F (36.3 °C) 64 17 115/78         Lab results were obtained and reviewed. Recent Results (from the past 12 hour(s))   CBC WITH AUTOMATED DIFF    Collection Time: 22  9:15 AM   Result Value Ref Range    WBC 5.5 3.6 - 11.0 K/uL    RBC 4.58 3.80 - 5.20 M/uL    HGB 12.0 11.5 - 16.0 g/dL    HCT 38.7 35.0 - 47.0 %    MCV 84.5 80.0 - 99.0 FL    MCH 26.2 26.0 - 34.0 PG    MCHC 31.0 30.0 - 36.5 g/dL    RDW 13.3 11.5 - 14.5 %    PLATELET 587 650 - 518 K/uL    MPV 11.2 8.9 - 12.9 FL    NRBC 0.0 0  WBC    ABSOLUTE NRBC 0.00 0.00 - 0.01 K/uL    NEUTROPHILS 69 32 - 75 %    LYMPHOCYTES 24 12 - 49 %    MONOCYTES 4 (L) 5 - 13 %    EOSINOPHILS 2 0 - 7 %    BASOPHILS 1 0 - 1 %    IMMATURE GRANULOCYTES 0 0.0 - 0.5 %    ABS. NEUTROPHILS 3.8 1.8 - 8.0 K/UL    ABS. LYMPHOCYTES 1.3 0.8 - 3.5 K/UL    ABS. MONOCYTES 0.2 0.0 - 1.0 K/UL    ABS. EOSINOPHILS 0.1 0.0 - 0.4 K/UL    ABS. BASOPHILS 0.0 0.0 - 0.1 K/UL    ABS. IMM.  GRANS. 0.0 0.00 - 0.04 K/UL    DF AUTOMATED     METABOLIC PANEL, COMPREHENSIVE    Collection Time: 22  9:15 AM   Result Value Ref Range    Sodium 139 136 - 145 mmol/L    Potassium 4.4 3.5 - 5.1 mmol/L    Chloride 107 97 - 108 mmol/L CO2 31 21 - 32 mmol/L    Anion gap 1 (L) 5 - 15 mmol/L    Glucose 135 (H) 65 - 100 mg/dL    BUN 14 6 - 20 MG/DL    Creatinine 0.78 0.55 - 1.02 MG/DL    BUN/Creatinine ratio 18 12 - 20      GFR est AA >60 >60 ml/min/1.73m2    GFR est non-AA >60 >60 ml/min/1.73m2    Calcium 9.4 8.5 - 10.1 MG/DL    Bilirubin, total 0.4 0.2 - 1.0 MG/DL    ALT (SGPT) 17 12 - 78 U/L    AST (SGOT) 12 (L) 15 - 37 U/L    Alk. phosphatase 77 45 - 117 U/L    Protein, total 7.6 6.4 - 8.2 g/dL    Albumin 3.7 3.5 - 5.0 g/dL    Globulin 3.9 2.0 - 4.0 g/dL    A-G Ratio 0.9 (L) 1.1 - 2.2           Medications Administered     pertuzumab 600 mg-trastuzumab 600 mg-hyaluronidase-zzxf 20,000 units/10 mL     Admin Date  06/09/2022 Action  Given Dose  10 mL Route  SubCUTAneous Administered By  Nicole Horne, [address] Patient tolerated treatment well. Patient was discharged from [facility] in stable condition. Patient aware of next appointment.      Future Appointments   Date Time Provider Stacey Sanchez   6/10/2022  8:45 AM RAD ONC THERAPY [address] [facility]   6/13/2022  8:45 AM RAD ONC THERAPY [facility]   6/21/2022  8:30 AM Judith Soto MD CP BS AMB   6/30/2022  8:30 AM H1 [facility]ANITO FASTRACK [facility]   6/30/2022  8:45 AM Denny Armstrong  [address] BS AMB   7/21/2022  8:30 AM G1 [address]   8/11/2022  8:30 AM G1 JUANITO FASTRACK [facility] H   9/1/2022  8:30 AM G1 J[facility]ITO FASTRACK [facility] H   9/22/2022  8:30 AM H1 JUANITO FASTRACK [facility]   10/13/2022  8:30 AM G1 [id number] ASAD [place] RN  June 9, 2022

## 2022-06-13 ENCOUNTER — HOSPITAL ENCOUNTER (OUTPATIENT)
Dept: RADIATION THERAPY | Age: 47
Discharge: HOME | End: 2022-06-13
Payer: MEDICAID

## 2022-06-13 PROCEDURE — 77417 THER RADIOLOGY PORT IMAGE(S): CPT

## 2022-06-13 PROCEDURE — 77412 RADIATION TX DELIVERY LVL 3: CPT

## 2022-06-14 ENCOUNTER — HOSPITAL ENCOUNTER (OUTPATIENT)
Dept: RADIATION THERAPY | Age: 47
Discharge: HOME | End: 2022-06-14
Payer: MEDICAID

## 2022-06-14 PROCEDURE — 77412 RADIATION TX DELIVERY LVL 3: CPT

## 2022-06-15 ENCOUNTER — HOSPITAL ENCOUNTER (OUTPATIENT)
Dept: RADIATION THERAPY | Age: 47
Discharge: HOME | End: 2022-06-15
Payer: MEDICAID

## 2022-06-15 PROCEDURE — 77336 RADIATION PHYSICS CONSULT: CPT

## 2022-06-15 PROCEDURE — 77412 RADIATION TX DELIVERY LVL 3: CPT

## 2022-06-16 ENCOUNTER — HOSPITAL ENCOUNTER (OUTPATIENT)
Dept: RADIATION THERAPY | Age: 47
Discharge: HOME | End: 2022-06-16
Payer: MEDICAID

## 2022-06-16 PROCEDURE — 77412 RADIATION TX DELIVERY LVL 3: CPT

## 2022-06-16 PROCEDURE — 77280 THER RAD SIMULAJ FIELD SMPL: CPT

## 2022-06-17 ENCOUNTER — HOSPITAL ENCOUNTER (OUTPATIENT)
Dept: RADIATION THERAPY | Age: 47
Discharge: HOME | End: 2022-06-17
Payer: MEDICAID

## 2022-06-17 PROCEDURE — 77412 RADIATION TX DELIVERY LVL 3: CPT

## 2022-06-20 ENCOUNTER — HOSPITAL ENCOUNTER (OUTPATIENT)
Dept: RADIATION THERAPY | Age: 47
Discharge: HOME | End: 2022-06-20
Payer: MEDICAID

## 2022-06-20 PROCEDURE — 77412 RADIATION TX DELIVERY LVL 3: CPT

## 2022-06-21 ENCOUNTER — HOSPITAL ENCOUNTER (OUTPATIENT)
Dept: RADIATION THERAPY | Age: 47
Discharge: HOME | End: 2022-06-21
Payer: MEDICAID

## 2022-06-21 ENCOUNTER — OFFICE VISIT (OUTPATIENT)
Dept: INTERNAL MEDICINE CLINIC | Age: 47
End: 2022-06-21

## 2022-06-21 VITALS
HEART RATE: 76 BPM | SYSTOLIC BLOOD PRESSURE: 107 MMHG | HEIGHT: 60 IN | TEMPERATURE: 97.7 F | BODY MASS INDEX: 39.85 KG/M2 | OXYGEN SATURATION: 98 % | DIASTOLIC BLOOD PRESSURE: 73 MMHG | WEIGHT: 203 LBS | RESPIRATION RATE: 16 BRPM

## 2022-06-21 DIAGNOSIS — Z23 ENCOUNTER FOR IMMUNIZATION: ICD-10-CM

## 2022-06-21 DIAGNOSIS — R20.0 NUMBNESS AND TINGLING OF BOTH FEET: ICD-10-CM

## 2022-06-21 DIAGNOSIS — R20.2 NUMBNESS AND TINGLING OF BOTH FEET: ICD-10-CM

## 2022-06-21 DIAGNOSIS — Z00.00 WELL WOMAN EXAM (NO GYNECOLOGICAL EXAM): Primary | ICD-10-CM

## 2022-06-21 PROCEDURE — 90677 PCV20 VACCINE IM: CPT | Performed by: INTERNAL MEDICINE

## 2022-06-21 PROCEDURE — 77412 RADIATION TX DELIVERY LVL 3: CPT

## 2022-06-21 PROCEDURE — 99396 PREV VISIT EST AGE 40-64: CPT | Performed by: INTERNAL MEDICINE

## 2022-06-21 NOTE — PATIENT INSTRUCTIONS
If tingling in feet worsens, can do non-fasting labs here to evaluate further. Based on A1c resul today, can repeat in 3-6mo as a non-fasting, finger-stick (POC) lab in clinic at follow-up. Well Visit, Ages 25 to 48: Care Instructions  Overview     Well visits can help you stay healthy. Your doctor has checked your overall health and may have suggested ways to take good care of yourself. Your doctor also may have recommended tests. At home, you can help prevent illness with healthy eating, regular exercise, and other steps. Follow-up care is a key part of your treatment and safety. Be sure to make and go to all appointments, and call your doctor if you are having problems. It's also a good idea to know your test results and keep a list of the medicines you take. How can you care for yourself at home? · Get screening tests that you and your doctor decide on. Screening helps find diseases before any symptoms appear. · Eat healthy foods. Choose fruits, vegetables, whole grains, protein, and low-fat dairy foods. Limit fat, especially saturated fat. Reduce salt in your diet. · Limit alcohol. If you are a man, have no more than 2 drinks a day or 14 drinks a week. If you are a woman, have no more than 1 drink a day or 7 drinks a week. · Get at least 30 minutes of physical activity on most days of the week. Walking is a good choice. You also may want to do other activities, such as running, swimming, cycling, or playing tennis or team sports. Discuss any changes in your exercise program with your doctor. · Reach and stay at a healthy weight. This will lower your risk for many problems, such as obesity, diabetes, heart disease, and high blood pressure. · Do not smoke or allow others to smoke around you. If you need help quitting, talk to your doctor about stop-smoking programs and medicines. These can increase your chances of quitting for good. · Care for your mental health.  It is easy to get weighed down by worry and stress. Learn strategies to manage stress, like deep breathing and mindfulness, and stay connected with your family and community. If you find you often feel sad or hopeless, talk with your doctor. Treatment can help. · Talk to your doctor about whether you have any risk factors for sexually transmitted infections (STIs). You can help prevent STIs if you wait to have sex with a new partner (or partners) until you've each been tested for STIs. It also helps if you use condoms (male or female condoms) and if you limit your sex partners to one person who only has sex with you. Vaccines are available for some STIs, such as HPV. · Use birth control if it's important to you to prevent pregnancy. Talk with your doctor about the choices available and what might be best for you. · If you think you may have a problem with alcohol or drug use, talk to your doctor. This includes prescription medicines (such as amphetamines and opioids) and illegal drugs (such as cocaine and methamphetamine). Your doctor can help you figure out what type of treatment is best for you. · Protect your skin from too much sun. When you're outdoors from 10 a.m. to 4 p.m., stay in the shade or cover up with clothing and a hat with a wide brim. Wear sunglasses that block UV rays. Even when it's cloudy, put broad-spectrum sunscreen (SPF 30 or higher) on any exposed skin. · See a dentist one or two times a year for checkups and to have your teeth cleaned. · Wear a seat belt in the car. When should you call for help? Watch closely for changes in your health, and be sure to contact your doctor if you have any problems or symptoms that concern you. Where can you learn more? Go to http://www.[employer]com/  Enter P072 in the search box to learn more about \"Well Visit, Ages 25 to 48: Care Instructions. \"  Current as of: October 6, 2021               Content Version: 13.2  © 6616-6552 [employer], [employer]. Care instructions adapted under license by [employer] (which disclaims liability or warranty for this information). If you have questions about a medical condition or this instruction, always ask your healthcare professional. [address] any warranty or liability for your use of this information. Colon Cancer Screening: Care Instructions  Overview     Colorectal cancer occurs in the colon or rectum. That's the lower part of your digestive system. It often starts in small growths called polyps in the colon or rectum. Polyps are usually found with screening tests. Depending on the type of test, any polyps found may be removed during the tests. Colorectal cancer usually does not cause symptoms at first. But regular tests can help find it early, before it spreads and becomes harder to treat. Your risk for colorectal cancer gets higher as you get older. Experts recommend starting screening at age 39 for people who are at average risk. Talk with your doctor about your risk and when to start and stop screening. You may have one of several tests. Follow-up care is a key part of your treatment and safety. Be sure to make and go to all appointments, and call your doctor if you are having problems. It's also a good idea to know your test results and keep a list of the medicines you take. What are the main screening tests for colon cancer? The screening tests are:  Stool tests. These include the guaiac fecal occult blood test (gFOBT), the fecal immunochemical test (FIT), and the combined fecal immunochemical test and stool DNA test (FIT-DNA). These tests check stool samples for signs of cancer. If your test is positive, you will need to have a colonoscopy. Sigmoidoscopy. This test lets your doctor look at the lining of your rectum and the lowest part of your colon. Your doctor uses a lighted tube called a sigmoidoscope.  This test can't find cancers or polyps in the upper part of your colon. In some cases, polyps that are found can be removed. But if your doctor finds polyps, you will need to have a colonoscopy to check the upper part of your colon. Colonoscopy. This test lets your doctor look at the lining of your rectum and your entire colon. The doctor uses a thin, flexible tool called a colonoscope. It can also be used to remove polyps or get a tissue sample (biopsy). A less common test is CT colonography (CTC). It's also called virtual colonoscopy. Who should be screened for colorectal cancer? Your risk for colorectal cancer gets higher as you get older. Experts recommend starting screening at age 39 for people who are at average risk. Talk with your doctor about your risk and when to start and stop screening. How often you need screening depends on the type of test you get:  Stool tests. Every year for FIT or gFOBT. Every 1 to 3 years for sDNA, also called FIT-DNA. Tests that look inside the colon. Every 5 years for sigmoidoscopy. (If you do the FIT test every year, you can get this test every 10 years.)  Every 5 years for CT colonography (virtual colonoscopy). Every 10 years for colonoscopy. Experts agree that people at higher risk may need to be tested sooner and more often. This includes people who have a strong family history of colon cancer. Talk to your doctor about which test is best for you and when to be tested. When should you call for help? Watch closely for changes in your health, and be sure to contact your doctor if:    · You have any changes in your bowel habits.     · You have any problems. Where can you learn more? Go to http://www.gray.com/  Enter M541 in the search box to learn more about \"Colon Cancer Screening: Care Instructions. \"  Current as of: September 8, 2021               Content Version: 13.2  © 2006-2022 [employer].    Care instructions adapted under license by [employer] (which disclaims liability or warranty for this information). If you have questions about a medical condition or this instruction, always ask your healthcare professional. [address] any warranty or liability for your use of this information.

## 2022-06-21 NOTE — PROGRESS NOTES
rm 16    Tingling in feet bilat worse in right  Had coffee this morning a little creamer    Chief Complaint   Patient presents with    Physical     1. Have you been to the ER, urgent care clinic since your last visit? Hospitalized since your last visit? No    2. Have you seen or consulted any other health care providers outside of the [address] [place] [address] since your last visit? Include any pap smears or colon screening.  No     Visit Vitals  /73 (BP 1 Location: Left arm, BP Patient Position: Sitting, BP Cuff Size: Adult)   Pulse 76   Temp 97.7 °F (36.5 °C) (Oral)   Resp 16   Ht 5' (1.524 m)   Wt 203 lb (92.1 kg)   SpO2 98%   BMI 39.65 kg/m²

## 2022-06-21 NOTE — PROGRESS NOTES
Darian Stovall (: 1975) is a 40 y.o. female, established patient, here for evaluation of the following chief complaint(s):  Chief Complaint   Patient presents with    Physical       Assessment and Plan:       ICD-10-CM ICD-9-CM    1. Well woman exam (no gynecological exam)  Z00.00 V70.0 LIPID PANEL      HEMOGLOBIN A1C WITH EAG   2. Numbness and tingling of both feet  R20.0 782.0     R20.2     3. Encounter for immunization  Z23 V03.89 PNEUMOCOCCAL, PCV20, PREVNAR 20, (AGE 18 YRS+), IM, PF       1:  Screenings reviewed at visit. 2.  Not interested in further lab eval.  She will review with oncology to clarify if related to prior therapy with them. She is aware may be related to DM/blood sugar-related changes. 3.  Immunization(s) reviewed and updated at visit. Follow-up and Dispositions    · Return in about 1 year (around 2023) for yearly physical, fasting labs. lab results and schedule of future lab studies reviewed with patient  reviewed medications and side effects in detail    For additional documentation of information and/or recommendations discussed this visit, please see notes in instructions. Plan and evaluation (above) reviewed with pt at visit  Patient voiced understanding of plan and provided with time to ask/review questions. After Visit Summary (AVS) provided to pt after visit with additional instructions as needed/reviewed.       Future Appointments   Date Time Provider Stacey Sanchez   2022  9:00 AM RAD ONC THERAPY [address] H   2022  9:00 AM RAD ONC THERAPY [facility] H   2022  8:30 AM H1 JUANITO FASTRACK [facility] H   2022  8:45 AM Denny Max Smart,  [address] BS AMB   2022  8:30 AM G1 JUANITO [address] H   2022  8:30 AM G1 JUANITO FASTRACK [facility] H   2022  8:30 AM G1 JUANITO FASTRACK [facility] H   2022  8:30 AM H1 JUANITO FASTRACK [facility] H 10/13/2022  8:30 AM G1 J[address]ANITO [address]     Future Appointments   Date Time Provider Stacey Sanchez   6/30/2022  8:30 AM H1 [facility]ANITO [facility]ACK [facility] H   6/30/2022  8:45 AM Denny Loraine Dumontond,  [address] BS AMB   7/21/2022  8:30 AM G1 JUANITO [facility] H   8/11/2022  8:30 AM G1 JUANITO [facility] H   9/1/2022  8:30 AM G1 JUANITO [facility]ACK [facility] H   9/22/2022  8:30 AM H1 JUANITO [facility]ACK [facility] H   10/13/2022  8:30 AM G1 JUANITO [facility]STRACK [facility]   --Updated future visits after patient check-out. History of Present Illness:     Notes (nursing/rooming note copied below in italics):  Tingling in feet bilat worse in right  Had coffee this morning a little creamer    Here for physical.  Last wellness visit June 2021 with me. Updated STI and routine fasting labs then. Lab Results   Component Value Date/Time    Hemoglobin A1c 6.9 (H) 06/08/2021 09:26 AM     Lab Results   Component Value Date/Time    Cholesterol, total 198 06/08/2021 09:26 AM    HDL Cholesterol 43 06/08/2021 09:26 AM    LDL, calculated 117.2 (H) 06/08/2021 09:26 AM    VLDL, calculated 37.8 06/08/2021 09:26 AM    Triglyceride 189 (H) 06/08/2021 09:26 AM    CHOL/HDL Ratio 4.6 06/08/2021 09:26 AM     Other screenings normal, including full STI screening. Had recent CMP and H18 6-9-22-reviewed/normal.      Has had interim XRT and follow-up for right breast cancer. Notes no problems with breast cancer therapy. Last XRT is today. Reviewed updating labs and fasting today. Health Maintenance Due   Topic Date Due    Pneumococcal 0-64 years (1 - PCV) Never done    Cervical cancer screen  Never done    COVID-19 Vaccine (3 - Inadvertent risk 4-dose series) 06/30/2021    Colorectal Cancer Screening Combo  Never done     --She is not sure when last pap done. She has IUD. Thinks seen last year--has gyn, and just sees every 3 years.   Release completed today at visit.    --She has done one COVID booster. Updated in record from card at visit. --She has received a kit--possibly from her insurance. She has not opened it--just rec'd in mail one day without call or clarification why sent/received. She will review with insurance and let us know what she prefers. Reviewed we did not send her this kit. --Reviewed PCV-20 and updated at visit. Nursing screenings reviewed by provider at visit. Past Medical History:   Diagnosis Date    Bell's palsy     Breast cancer ([facility] [address] 2021    ER/ME+ Her2 +     Past Surgical History:   Procedure Laterality Date    HX BREAST LUMPECTOMY Right 3/15/2022    RIGHT BREAST MAGSEED GUIDED LUMPECTOMY [facility]UL[facility]    HX  SECTION      HX  SECTION      HX CHOLECYSTECTOMY      HX CHOLECYSTECTOMY      HX VASCULAR ACCESS      IR REMOVE TUNL CVAD W PORT/PUMP  3/1/2022        Prior to Admission medications    Medication Sig Start Date End Date Taking? Authorizing Provider   levonorgestreL (Mirena) 20 mcg/24 hours (6 yrs) 52 mg IUD 1 Device by IntraUTERine route once. Yes Provider, Historical        ROS    Vitals:    22 0839   BP: 107/73   Pulse: 76   Resp: 16   Temp: 97.7 °F (36.5 °C)   TempSrc: Oral   SpO2: 98%   Weight: 203 lb (92.1 kg)   Height: 5' (1.524 m)      Body mass index is 39.65 kg/m². Physical Exam:     Physical Exam  Vitals and nursing note reviewed. Constitutional:       General: She is not in acute distress. Appearance: Normal appearance. She is well-developed. She is not diaphoretic. HENT:      Head: Normocephalic and atraumatic. Right Ear: Tympanic membrane, ear canal and external ear normal.      Left Ear: Tympanic membrane, ear canal and external ear normal.      Mouth/Throat:      Mouth: Mucous membranes are moist.      Pharynx: Oropharynx is clear. Eyes:      General: No scleral icterus. Right eye: No discharge. Left eye: No discharge. Conjunctiva/sclera: Conjunctivae normal.   Cardiovascular:      Rate and Rhythm: Normal rate and regular rhythm. Pulses: Normal pulses. Heart sounds: Normal heart sounds. No murmur heard. No friction rub. No gallop. Pulmonary:      Effort: Pulmonary effort is normal. No respiratory distress. Breath sounds: Normal breath sounds. No stridor. No wheezing, rhonchi or rales. Abdominal:      General: Bowel sounds are normal. There is no distension. Palpations: Abdomen is soft. Tenderness: There is no abdominal tenderness. There is no guarding. Musculoskeletal:         General: No swelling, tenderness, deformity or signs of injury. Cervical back: Neck supple. No rigidity or tenderness. No muscular tenderness. Right lower leg: No edema. Left lower leg: No edema. Lymphadenopathy:      Cervical: No cervical adenopathy. Skin:     General: Skin is warm. Coloration: Skin is not jaundiced or pale. Findings: No bruising, erythema or rash. Neurological:      General: No focal deficit present. Mental Status: She is alert. Motor: No abnormal muscle tone. Coordination: Coordination normal.      Gait: Gait normal.   Psychiatric:         Mood and Affect: Mood normal.         Behavior: Behavior normal.         Thought Content: Thought content normal.         Judgment: Judgment normal.         An electronic signature was used to authenticate this note.   -- Medina Reyes MD

## 2022-06-22 ENCOUNTER — APPOINTMENT (OUTPATIENT)
Dept: RADIATION THERAPY | Age: 47
End: 2022-06-22
Payer: MEDICAID

## 2022-06-22 LAB
CHOLEST SERPL-MCNC: 147 MG/DL
EST. AVERAGE GLUCOSE BLD GHB EST-MCNC: 146 MG/DL
HBA1C MFR BLD: 6.7 % (ref 4–5.6)
HDLC SERPL-MCNC: 44 MG/DL
HDLC SERPL: 3.3 {RATIO} (ref 0–5)
LDLC SERPL CALC-MCNC: 83.8 MG/DL (ref 0–100)
TRIGL SERPL-MCNC: 96 MG/DL (ref ?–150)
VLDLC SERPL CALC-MCNC: 19.2 MG/DL

## 2022-06-23 RX ORDER — EPINEPHRINE 1 MG/ML
0.3 INJECTION, SOLUTION, CONCENTRATE INTRAVENOUS AS NEEDED
Status: CANCELLED | OUTPATIENT
Start: 2022-06-30

## 2022-06-23 RX ORDER — DIPHENHYDRAMINE HYDROCHLORIDE 50 MG/ML
25 INJECTION, SOLUTION INTRAMUSCULAR; INTRAVENOUS AS NEEDED
Status: CANCELLED
Start: 2022-06-30

## 2022-06-23 RX ORDER — ALBUTEROL SULFATE 0.83 MG/ML
2.5 SOLUTION RESPIRATORY (INHALATION) AS NEEDED
Status: CANCELLED
Start: 2022-06-30

## 2022-06-23 RX ORDER — ACETAMINOPHEN 325 MG/1
650 TABLET ORAL AS NEEDED
Status: CANCELLED
Start: 2022-06-30

## 2022-06-23 RX ORDER — HYDROCORTISONE SODIUM SUCCINATE 100 MG/2ML
100 INJECTION, POWDER, FOR SOLUTION INTRAMUSCULAR; INTRAVENOUS AS NEEDED
Status: CANCELLED | OUTPATIENT
Start: 2022-06-30

## 2022-06-23 RX ORDER — ONDANSETRON 2 MG/ML
8 INJECTION INTRAMUSCULAR; INTRAVENOUS AS NEEDED
Status: CANCELLED | OUTPATIENT
Start: 2022-06-30

## 2022-06-23 RX ORDER — DIPHENHYDRAMINE HYDROCHLORIDE 50 MG/ML
50 INJECTION, SOLUTION INTRAMUSCULAR; INTRAVENOUS AS NEEDED
Status: CANCELLED
Start: 2022-06-30

## 2022-06-30 ENCOUNTER — OFFICE VISIT (OUTPATIENT)
Dept: ONCOLOGY | Age: 47
End: 2022-06-30
Payer: MEDICAID

## 2022-06-30 ENCOUNTER — HOSPITAL ENCOUNTER (OUTPATIENT)
Dept: INFUSION THERAPY | Age: 47
Discharge: HOME | End: 2022-06-30
Payer: MEDICAID

## 2022-06-30 ENCOUNTER — APPOINTMENT (OUTPATIENT)
Dept: INFUSION THERAPY | Age: 47
End: 2022-06-30

## 2022-06-30 VITALS
TEMPERATURE: 97 F | OXYGEN SATURATION: 97 % | SYSTOLIC BLOOD PRESSURE: 101 MMHG | RESPIRATION RATE: 18 BRPM | BODY MASS INDEX: 39.84 KG/M2 | HEART RATE: 78 BPM | WEIGHT: 204 LBS | DIASTOLIC BLOOD PRESSURE: 68 MMHG

## 2022-06-30 VITALS
SYSTOLIC BLOOD PRESSURE: 116 MMHG | DIASTOLIC BLOOD PRESSURE: 78 MMHG | HEART RATE: 70 BPM | TEMPERATURE: 97.7 F | RESPIRATION RATE: 18 BRPM

## 2022-06-30 DIAGNOSIS — N63.10 MASS OF RIGHT BREAST, UNSPECIFIED QUADRANT: ICD-10-CM

## 2022-06-30 DIAGNOSIS — N95.9 PREMENOPAUSAL PATIENT: ICD-10-CM

## 2022-06-30 DIAGNOSIS — Z17.0 MALIGNANT NEOPLASM OF RIGHT BREAST IN FEMALE, ESTROGEN RECEPTOR POSITIVE, UNSPECIFIED SITE OF BREAST (HCC): Primary | ICD-10-CM

## 2022-06-30 DIAGNOSIS — Z95.828 PORT-A-CATH IN PLACE: ICD-10-CM

## 2022-06-30 DIAGNOSIS — Z09 CHEMOTHERAPY FOLLOW-UP EXAMINATION: ICD-10-CM

## 2022-06-30 DIAGNOSIS — Z79.899 ENCOUNTER FOR MONITORING CARDIOTOXIC DRUG THERAPY: ICD-10-CM

## 2022-06-30 DIAGNOSIS — Z79.810 USE OF TAMOXIFEN (NOLVADEX): ICD-10-CM

## 2022-06-30 DIAGNOSIS — Z51.81 ENCOUNTER FOR MONITORING CARDIOTOXIC DRUG THERAPY: ICD-10-CM

## 2022-06-30 DIAGNOSIS — K52.1 CHEMOTHERAPY INDUCED DIARRHEA: ICD-10-CM

## 2022-06-30 DIAGNOSIS — Z98.890 S/P LUMPECTOMY, RIGHT BREAST: ICD-10-CM

## 2022-06-30 DIAGNOSIS — Z17.0 MALIGNANT NEOPLASM OF RIGHT BREAST IN FEMALE, ESTROGEN RECEPTOR POSITIVE, UNSPECIFIED SITE OF BREAST (HCC): ICD-10-CM

## 2022-06-30 DIAGNOSIS — T45.1X5A CHEMOTHERAPY INDUCED DIARRHEA: ICD-10-CM

## 2022-06-30 DIAGNOSIS — Z51.11 ENCOUNTER FOR ANTINEOPLASTIC CHEMOTHERAPY: ICD-10-CM

## 2022-06-30 DIAGNOSIS — C50.911 MALIGNANT NEOPLASM OF RIGHT BREAST IN FEMALE, ESTROGEN RECEPTOR POSITIVE, UNSPECIFIED SITE OF BREAST (HCC): ICD-10-CM

## 2022-06-30 DIAGNOSIS — A02.1 SEPSIS DUE TO SALMONELLA SPECIES WITHOUT ACUTE ORGAN DYSFUNCTION (HCC): Primary | ICD-10-CM

## 2022-06-30 DIAGNOSIS — C50.911 MALIGNANT NEOPLASM OF RIGHT BREAST IN FEMALE, ESTROGEN RECEPTOR POSITIVE, UNSPECIFIED SITE OF BREAST (HCC): Primary | ICD-10-CM

## 2022-06-30 PROCEDURE — 99215 OFFICE O/P EST HI 40 MIN: CPT | Performed by: INTERNAL MEDICINE

## 2022-06-30 PROCEDURE — 74011250636 HC RX REV CODE- 250/636: Performed by: INTERNAL MEDICINE

## 2022-06-30 PROCEDURE — 96402 CHEMO HORMON ANTINEOPL SQ/IM: CPT

## 2022-06-30 RX ORDER — ACETAMINOPHEN 325 MG/1
650 TABLET ORAL
Status: DISCONTINUED | OUTPATIENT
Start: 2022-06-30 | End: 2022-07-01 | Stop reason: HOSPADM

## 2022-06-30 RX ORDER — DIPHENHYDRAMINE HYDROCHLORIDE 50 MG/ML
50 INJECTION, SOLUTION INTRAMUSCULAR; INTRAVENOUS
Status: DISCONTINUED | OUTPATIENT
Start: 2022-06-30 | End: 2022-07-01 | Stop reason: HOSPADM

## 2022-06-30 RX ORDER — SODIUM CHLORIDE 9 MG/ML
25 INJECTION, SOLUTION INTRAVENOUS CONTINUOUS
Status: DISPENSED | OUTPATIENT
Start: 2022-06-30 | End: 2022-06-30

## 2022-06-30 RX ORDER — HEPARIN 100 UNIT/ML
300-500 SYRINGE INTRAVENOUS AS NEEDED
Status: ACTIVE | OUTPATIENT
Start: 2022-06-30 | End: 2022-06-30

## 2022-06-30 RX ORDER — SODIUM CHLORIDE 9 MG/ML
10 INJECTION INTRAMUSCULAR; INTRAVENOUS; SUBCUTANEOUS AS NEEDED
Status: ACTIVE | OUTPATIENT
Start: 2022-06-30 | End: 2022-06-30

## 2022-06-30 RX ORDER — SODIUM CHLORIDE 0.9 % (FLUSH) 0.9 %
10 SYRINGE (ML) INJECTION AS NEEDED
Status: DISPENSED | OUTPATIENT
Start: 2022-06-30 | End: 2022-06-30

## 2022-06-30 RX ADMIN — PERTUZUMAB, TRASTUZUMAB, AND HYALURONIDASE-ZZXF 10 ML: 600; 600; 2000 INJECTION, SOLUTION SUBCUTANEOUS at 09:35

## 2022-06-30 NOTE — PROGRESS NOTES
[facility] Chemotherapy Progress Note    Date: 2022    Name: Iona Huddleston    MRN: [id number]         : 1975      0900 Pt admit to [facility] for [place] ambulatory in stable condition. Assessment completed. No new concerns voiced. Chemotherapy Flowsheet 2022   Cycle C12   Date 2022   Drug / Regimen [place]   Pre Meds -   Notes given Right thigh          Ms. Rob's vitals were reviewed. Patient Vitals for the past 12 hrs:   Temp Pulse Resp BP   22 0907 97.7 °F (36.5 °C) 70 18 116/78       Pre-medications  were administered as ordered and chemotherapy was initiated. Medications Administered     pertuzumab 600 mg-trastuzumab 600 mg-hyaluronidase-zzxf 20,000 units/10 mL     Admin Date  2022 Action  Given Dose  10 mL Route  SubCUTAneous Administered By  Lyndsay Edouard, SUSHMA                Two nurses verified prior to administering:Drug name, Drug doseInfusion volume or drug volume when prepared in a syringe, Rate of administration, Route of administration, Expiration dates and/or times, Appearance and physical integrity of the drugs, Rate set on infusion pump, when used, Sequencing of drug administration. 0945 Pt tolerated treatment well. D/c home ambulatory in no distress.      Future Appointments   Date Time Provider Stacey Sanchez   2022  8:30 AM G1 JUANITO [address]   2022  8:30 AM G1 JUANITO FASTRACK [facility]   2022  8:30 AM G1 JUANITO FASTRACK [facility] H   2022  8:30 AM H1 JUANITO FASTRACK [facility] H   10/13/2022  8:30 AM G1 [address] [facility] [address], RN  2022

## 2022-06-30 NOTE — PROGRESS NOTES
[facility] [place] [facility] [place] [address] [address], [address], [place]: [phone]  F: [phone]    Reason for Visit:   Eboni Aguayo is a 40 y.o. female seen today in office for follow up if Right Breast Cancer    Treatment History:   · Initially had abnormal mammo at [address] with calcifications on RIGHT. No mass reported/dense breast tissue  · RIGHT Breast Biopsy 9/9/21: PATH -  IDC, grade 3 ER/ND strong+ Her2 3+  · Genetic testing negative  · Breast MRI 9/17/21: RIGHT BREAST:Background parenchymal enhancement: Severe. There is a biopsy clip in the lower, outer quadrant of the right breast, junction of the middle and deep thirds. There is linear, branching enhancement which extends anterior to the biopsy clip over approximately 3.6 cm. There are no suspicious internal mammary or axillary chain lymph nodes. LEFT BREAST: Background parenchymal enhancement: Severe. There is no suspicious masslike or nonmasslike enhancement within the left breast to indicate breast carcinoma. There is a small mass in the lower, outer quadrant with an adjacent biopsy clip. There is an additional mass without significant enhancement in the lateral, middle 3rd, just below the nipple line. There are no suspicious internal mammary or axillary chain lymph nodes  · Neoadjuvant TCHP chemo x 6 cycles from 10/21/21 - 2/4/22  · Breast MRI 1/27/22: Right Breast: In the right lateral breast at 8-9 o'clock posterior depth 10 cm from the nipple there is susceptibility artifact from the biopsy clip but no residual enhancement. There is no suspicious axillary or internal mammary chain lymphadenopathy. Left Breast: No suspicious enhancement. There is no suspicious axillary or internal mammary chain lymphadenopathy  · Right Breast Lumpectomy and SNBx 3/15/22: PATH - No residual invasive carcinoma. No in situ carcinoma or atypia identified.  0/4 LN+  · Maintenance Herceptin+Perjeta 3/18/22 - Current   · XRT 5/23/22 - 22     STAGE:   · Clinical 2 ER/DC+, HER2+    History of Present Illness:   Tona Avila is a 40 y.o. female seen today in office for follow up of right breast cancer ER+ Her2 + post biopsy only 21. She started neoadjuvant TCHP on 10/21/21 and she completed 6 cycles of TCHP on 22. She had a right lumpectomy on 3/15/22 and had pCR. She started radiation on 22 and finished on 22. She is here today for Cycle 12 (sixth dose) of maintenance HP. She reports that she feels well overall today. She is tolerating HP well overall. Her appetite and energy levels are good. She denies fever, chills, mouth sores, cough, SOB, CP, nausea, diarrhea, constipation, and neuropathy. She denies pain today. CBC and CMP from 22 reviewed. She is ready for treatment today. She is here alone today. Past Medical History:   Diagnosis Date    Bell's palsy     Breast cancer ([facility] [address] 2021    ER/DC+ Her2 +      Past Surgical History:   Procedure Laterality Date    HX BREAST LUMPECTOMY Right 3/15/2022    RIGHT BREAST MAGSEED GUIDED LUMPECTOMY performed by Murtaza Collins [facility] at [facility] AMBULATORY OR    HX  SECTION      HX  SECTION      HX CHOLECYSTECTOMY      HX CHOLECYSTECTOMY      HX VASCULAR ACCESS      IR REMOVE TUNL CVAD W PORT/PUMP  3/1/2022      Social History     Tobacco Use    Smoking status: Never Smoker    Smokeless tobacco: Never Used   Substance Use Topics    Alcohol use: Yes     Comment: rarely      Family History   Problem Relation Age of Onset    Cancer Sister         leukemia     Current Outpatient Medications   Medication Sig    tamoxifen 20 mg/10 mL solution Take 20 mg by mouth daily.  levonorgestreL (Mirena) 20 mcg/24 hours (6 yrs) 52 mg IUD 1 Device by IntraUTERine route once. No current facility-administered medications for this visit.      Facility-Administered Medications Ordered in Other Visits   Medication Dose Route Frequency    0.9% sodium chloride infusion  25 mL/hr IntraVENous CONTINUOUS    diphenhydrAMINE (BENADRYL) injection 50 mg  50 mg IntraVENous ONCE PRN    acetaminophen (TYLENOL) tablet 650 mg  650 mg Oral ONCE PRN    pertuzumab 600 mg-trastuzumab 600 mg-hyaluronidase-zzxf 20,000 units/10 mL  10 mL SubCUTAneous ONCE    sodium chloride (NS) flush 10 mL  10 mL IntraVENous PRN    0.9% sodium chloride injection 10 mL  10 mL IntraVENous PRN    heparin (porcine) pf 300-500 Units  300-500 Units InterCATHeter PRN      No Known Allergies     Review of Systems:  A complete review of systems was obtained, negative except as described above and as reported on ROS sheet scanned into system. Physical Exam:     Visit Vitals  /68 (BP 1 Location: Right upper arm, BP Patient Position: Sitting)   Pulse 78   Temp 97 °F (36.1 °C)   Resp 18   Wt 204 lb (92.5 kg)   SpO2 97%   BMI 39.84 kg/m²     ECOG PS: 0  General: No distress  Eyes: Anicteric sclerae  HENT: Atraumatic, wearing a mask  Neck: Supple  CV: Regular   Respiratory: CTAB, normal respiratory effort  MS: Normal gait and station. Digits without clubbing or cyanosis. Skin: No rashes, ecchymoses, or petechiae. Normal temperature, turgor, and texture  Psych: Alert, oriented, appropriate affect, normal judgment/insight  Breast: Post XRT skin changes to right breast. Left breast not examined   Neuro: Non focal    Results:     Lab Results   Component Value Date/Time    WBC 5.5 06/09/2022 09:15 AM    HGB 12.0 06/09/2022 09:15 AM    HCT 38.7 06/09/2022 09:15 AM    PLATELET 967 00/23/9895 09:15 AM    MCV 84.5 06/09/2022 09:15 AM    ABS.  NEUTROPHILS 3.8 06/09/2022 09:15 AM    Hemoglobin (POC) 10.7 (L) 03/15/2022 06:50 AM     Lab Results   Component Value Date/Time    Sodium 139 06/09/2022 09:15 AM    Potassium 4.4 06/09/2022 09:15 AM    Chloride 107 06/09/2022 09:15 AM    CO2 31 06/09/2022 09:15 AM    Glucose 135 (H) 06/09/2022 09:15 AM    BUN 14 06/09/2022 09:15 AM    Creatinine 0.78 06/09/2022 09:15 AM GFR est AA >60 06/09/2022 09:15 AM    GFR est non-AA >60 06/09/2022 09:15 AM    Calcium 9.4 06/09/2022 09:15 AM     Lab Results   Component Value Date/Time    Bilirubin, total 0.4 06/09/2022 09:15 AM    ALT (SGPT) 17 06/09/2022 09:15 AM    Alk. phosphatase 77 06/09/2022 09:15 AM    Protein, total 7.6 06/09/2022 09:15 AM    Albumin 3.7 06/09/2022 09:15 AM    Globulin 3.9 06/09/2022 09:15 AM     Path at outside facility - scanned into chart     Breast MRI 9/17/21  RIGHT BREAST:  Background parenchymal enhancement: Severe   There is a biopsy clip in the lower, outer quadrant of the right breast,  junction of the middle and deep thirds. There is linear, branching enhancement  which extends anterior to the biopsy clip over approximately 3.6 cm     There are no suspicious internal mammary or axillary chain lymph nodes. Erin Maid LEFT BREAST:  Background parenchymal enhancement: Severe  There is no suspicious masslike or nonmasslike enhancement within the left  breast to indicate breast carcinoma. There is a small mass in the lower, outer  quadrant with an adjacent biopsy clip. There is an additional mass without  significant enhancement in the lateral, middle 3rd, just below the nipple line.     There are no suspicious internal mammary or axillary chain lymph nodes. .  IMPRESSION  RIGHT BREAST:  1. There is a biopsy clip in the lower, outer quadrant of the right breast,  junction of the middle and deep thirds. There is linear, branching enhancement  which extends anteriorly from the biopsy clip over approximately 3.6 cm. BI-RADS Category 6 - Known biopsy-proven malignancy. Breast MRI 1/27/22  FINDINGS:  There is marked background parenchymal enhancement. The breast tissue is heterogeneously dense, which could obscure detection of  small masses (approximately 51-75% glandular).          Right breast:  In the right lateral breast at 8-9 o'clock posterior depth 10 cm from the nipple  there is susceptibility artifact from the biopsy clip but no residual  enhancement.     There is no suspicious axillary or internal mammary chain lymphadenopathy.     Left breast:  No suspicious enhancement.     There is no suspicious axillary or internal mammary chain lymphadenopathy.     IMPRESSION     Right Breast:  1. BI-RADS Assessment Category 6: Known biopsy proven malignancy- Appropriate  action should be taken. Right breast biopsy clip with no residual suspicious  enhancement.     Left Breast:  1. BI-RADS Assessment     3/15/22  FINAL PATHOLOGIC DIAGNOSIS   1. [place] lymph node, #1, right axilla, excision:   One lymph node identified, negative for metastatic carcinoma (0/1). See comment. 2. [place] lymph node, #2, right axilla, excision:   One lymph node identified, negative for metastatic carcinoma (0/1). See comment. 3. [place] lymph node, #3, right axilla, excision:   Two lymph nodes identified, negative for metastatic carcinoma (0/2). See comment. 4. Breast, right, lumpectomy:   No residual invasive carcinoma identified after presurgical therapy. Sclerosing adenosis with hyalinized fibrous stroma. No in situ carcinoma or atypia identified. 10/13/21    ECHO ADULT COMPLETE 10/13/2021 10/13/2021    Interpretation Summary  · LV: Estimated LVEF is 60 - 65%. Normal cavity size, wall thickness, systolic function (ejection fraction normal) and diastolic function. Normal left ventricular strain. Signed by: Mary Izquierdo MD on 10/13/2021  3:52 PM    01/05/22    ECHO ADULT COMPLETE 01/05/2022 1/5/2022    Interpretation Summary    Left Ventricle: Left ventricle size is normal. Normal wall thickness. Normal wall motion. Normal left ventricular systolic function with a visually estimated EF of 55 - 60%. Global longitudinal strain is -18.0%. Normal diastolic function.     Signed by: Isadora Mcginnis MD on 1/5/2022 12:52 PM    02/23/22    ECHO LUIS W OR WO CONTRAST 03/04/2022 3/4/2022    Interpretation Summary   Left Ventricle: Left ventricle size is normal. Normal wall thickness. Normal wall motion. Normal left ventricular systolic function with a visually estimated EF of 55 - 60%. Signed by: Regina Gordon MD on 3/4/2022  1:26 PM    05/31/22    ECHO ADULT COMPLETE 05/31/2022 5/31/2022    Interpretation Summary    Left Ventricle: Normal left ventricular systolic function with a visually estimated EF of 55 - 60%. Not well visualized. Left ventricle size is normal. Normal wall thickness. Normal wall motion. Normal diastolic function. Signed by: Gaby San MD on 5/31/2022  2:27 PM    Records reviewed and summarized above. Pathology report(s) reviewed above. Radiology report(s) reviewed above. Assessment:/PLAN     1) Clinical Stage 2 (based on MRI) RIGHT Breast Cancer ER+ Her2+ post Breast Biopsy at [address] 9/9/21 and Right Lumpectomy on 3/15/22  Breast MRI 9/17/21 with 3.6 cm enhancement. No mass palpable on exam.   Patient was sent here for neoadjuvant chemo based on size of mass on MRI. Fllow up ECHO 1/5/22 and EF 55-60%. She started neoadjuvant TCHP on 10/21/21 and completed 6 cycles on 2/4/22. She had a Breast MRI on 1/27/22 that showed no residual disease. She had a right lumpectomy on 3/15/22 and had a pCR. Personally reviewed pathology. Discussed with patient. ECHO/LUIS from 3/2/22 reviewed. Follow up ECHO 5/31/22 good with EF 55-60%. She started radiation on 5/23/22 and 6/21/22. She is here today for Cycle 12 (sixth cycle) of maintenance HP. She is tolerating treatment well overall without significant toxicities. She is clinically stable and healthy overall - no new issues today. Labs (CBC and CMP from 6/9/22) reviewed today. Patient is ready for treatment today. Follow up in 3 weeks in [facility]. Follow up in 6 weeks in [facility]/office. Patient agrees with plan. She will be for hormonal therapy with Tamoxifen after XRT. States she cannot swallow pills, ordered liquid Tamoxifen today. 2) Premenopausal  She has two kids and does not want more kids. Reviewed impaired fertility/early menopause due to chemo with patient. She has Mirena IUD in place. Continue routine GYN follow up. 3) Management of High Risk Medications - Chemotherapy  No toxicities noted. Pre chemo ECHO from 10/13/21 reviewed. Follow up ECHO from 1/5/22 reviewed. Follow up ECHO from 2/23/22 reviewed. ECHO/LUIS from 3/2/22 reviewed. Follow up ECHO from 5/31/22 reviewed. Labs (CBC and CMP from 6/9/22) reviewed today. Will monitor for side effects. 4) Psychosocial  She is vaccinated for COVID. She works as a , has a supportive fiance. SW/NN support as needed. Her fiance is here today. Call if questions. Follow up in 3 weeks in [facility] and 6 weeks in [facility]office. I personally saw and evaluated the patient and performed the key components of medical decision making. The history, physical exam, and documentation were performed by Chandrakant Moon NP. I reviewed and verified the above documentation and modified it as needed. Specifically pt doing well overall  Had lumpectomy with path CR. Tolerating HP well. No new issues today   Finished XRT  For adjuvant tamoxifen. Reviewed tamoxifen today and pt agreeable  Labs personally reviewed   proceed with HP. Continue lab/ ECHO monitoring. I appreciate the opportunity to participate in Ms. Susan Rob's care.     Signed By: Teo Hi DO

## 2022-06-30 NOTE — PROGRESS NOTES
Samantha Snyder is a 40 y.o. female    Chief Complaint   Patient presents with    Chemotherapy     Right Breast Cancer  1. Have you been to the ER, urgent care clinic since your last visit? Hospitalized since your last visit? No    2. Have you seen or consulted any other health care providers outside of the [address] [place] [address] since your last visit? Include any pap smears or colon screening.  No

## 2022-06-30 NOTE — PATIENT INSTRUCTIONS
Tamoxifen (By mouth)   Tamoxifen (oz-PCK-g-fen)  Treats breast cancer. May prevent breast cancer in women who have a high risk. Brand Name(s): Nolvadex, Soltamox   There may be other brand names for this medicine. When This Medicine Should Not Be Used: You should not use this medicine if you have had an allergic reaction to tamoxifen, or if you are pregnant. You should not use this medicine if you are also using blood thinners (such as warfarin, Coumadin®), or if you have had a blood clot or blood clotting problems. How to Use This Medicine:   Liquid, Tablet  · Medicines used to treat cancer are very strong and can have many side effects. Before receiving this medicine, make sure you understand all the risks and benefits. It is important for you to work closely with your doctor during your treatment. · This medicine should come with a Medication Guide. Ask your pharmacist for a copy if you do not have one. · Your doctor will tell you how much medicine to use. Do not use more than directed. You may need to take this medicine for 5 years or longer. · Swallow the tablet whole. You may take this medicine with or without food. · Measure the oral liquid medicine with a marked measuring spoon, oral syringe, or medicine cup. · Take this medicine at the same time each day. If a dose is missed:   · Take a dose as soon as you remember. If it is almost time for your next dose, wait until then and take a regular dose. Do not take extra medicine to make up for a missed dose. How to Store and Dispose of This Medicine:   · Store the medicine in a closed container at room temperature, away from heat, moisture, and direct light. Do not store in the refrigerator or freezer. · Ask your pharmacist, doctor, or health caregiver about the best way to dispose of any leftover medicine after you have finished your treatment. You will also need to throw away old medicine after the expiration date has passed.   · Keep all medicine out of the reach of children. Never share your medicine with anyone. Drugs and Foods to Avoid:   Ask your doctor or pharmacist before using any other medicine, including over-the-counter medicines, vitamins, and herbal products. · Make sure your doctor knows if you are also using aminoglutethimide (Cytadren®), bromocriptine (Parlodel®), letrozole (Femara®), rifampin (Rifadin®, Rimactane®), or other cancer treatments. · Birth control pills, implants, or shots may not work while you are using tamoxifen. To keep from getting pregnant, use another form of birth control. Other forms include condoms, a diaphragm, or contraceptive foam or jelly. Warnings While Using This Medicine:   · It is not safe to take this medicine during pregnancy. It could harm an unborn baby. Tell your doctor right away if you become pregnant. Keep using effective birth control for at least 2 months after you stop treatment. · To make sure you are not pregnant, you may start taking this medicine while you are having your menstrual period. Also, you must have a negative pregnancy test before you will be allowed to take this medicine. · Make sure your doctor knows if you have liver disease, cataracts, eye or vision problems, hypercalcemia (high calcium in the blood), or high cholesterol or triglycerides (fat) in the blood. · Do not breastfeed while you are using this medicine. · Your doctor will check your progress and the effects of this medicine at regular visits. Keep all appointments. It is important for women to have regular gynecologic check-ups while taking tamoxifen. · Make sure any doctor or dentist who treats you knows that you are using this medicine. · This medicine may increase your risk of developing other rare but serious conditions, such as stroke, a blood clot in the lungs or veins, or cancer of the uterus. Talk with your doctor about these risks and your personal situation.   · This medicine may cause changes in your menstrual periods, which could be a sign of a serious problem. Tell your doctor about any unusual vaginal bleeding or discharge. · Some of the side effects of this medicine may not appear for months or years, or after you have stopped using this medicine. Tell your doctor if you have later side effects. · Liver problems may occur while you are using this medicine. Stop using this medicine and check with your doctor right away if you are having more than one of these symptoms: abdominal pain or tenderness; angelo-colored stools; dark urine; decreased appetite; fever; headache; itching; loss of appetite; nausea and vomiting; skin rash; swelling of the feet or lower legs; unusual tiredness or weakness; or yellow eyes or skin. Possible Side Effects While Using This Medicine:   Call your doctor right away if you notice any of these side effects:  · Allergic reaction: Itching or hives, swelling in your face or hands, swelling or tingling in your mouth or throat, chest tightness, trouble breathing  · Chest pain, shortness of breath, or coughing up blood. · Dark-colored urine or pale stools. · Fever, chills, cough, sore throat, and body aches. · Heavy or abnormal vaginal bleeding, pelvic pain or pressure. · Nausea, vomiting, loss of appetite, or pain in your upper stomach. · New breast lumps. · Numbness or weakness in your arm or leg, or on one side of your body. · Pain in your lower leg (calf). · Sudden or severe headache, or problems with vision, speech, or walking. · Swelling in your hands, ankles, or feet. · Unusual bleeding, bruising, or weakness. · Yellowing of your skin or the whites of your eyes. If you notice these less serious side effects, talk with your doctor:   · Back or joint pain. · Blurred vision, change in color vision. · Constipation, diarrhea, or stomach pain or upset. · Headache. · Hot flashes, vaginal discharge. · Increased tumor pain or bone pain.   · Loss of interest in sex or trouble having sex (in men). · Rash. · Trouble with sleeping. If you notice other side effects that you think are caused by this medicine, tell your doctor. Call your doctor for medical advice about side effects. You may report side effects to FDA at 8-182-AAN-9446  © 2017 [facility] Information is for End User's use only and may not be sold, redistributed or otherwise used for commercial purposes. The above information is an  only. It is not intended as medical advice for individual conditions or treatments. Talk to your doctor, nurse or pharmacist before following any medical regimen to see if it is safe and effective for you.

## 2022-07-01 ENCOUNTER — APPOINTMENT (OUTPATIENT)
Dept: INFUSION THERAPY | Age: 47
End: 2022-07-01

## 2022-07-14 RX ORDER — SODIUM CHLORIDE 9 MG/ML
25 INJECTION, SOLUTION INTRAVENOUS CONTINUOUS
Status: CANCELLED | OUTPATIENT
Start: 2022-07-21

## 2022-07-14 RX ORDER — ONDANSETRON 2 MG/ML
8 INJECTION INTRAMUSCULAR; INTRAVENOUS AS NEEDED
Status: CANCELLED | OUTPATIENT
Start: 2022-07-21

## 2022-07-14 RX ORDER — DIPHENHYDRAMINE HYDROCHLORIDE 50 MG/ML
25 INJECTION, SOLUTION INTRAMUSCULAR; INTRAVENOUS AS NEEDED
Status: CANCELLED
Start: 2022-07-21

## 2022-07-14 RX ORDER — DIPHENHYDRAMINE HYDROCHLORIDE 50 MG/ML
50 INJECTION, SOLUTION INTRAMUSCULAR; INTRAVENOUS
Status: CANCELLED | OUTPATIENT
Start: 2022-07-21

## 2022-07-14 RX ORDER — SODIUM CHLORIDE 0.9 % (FLUSH) 0.9 %
10 SYRINGE (ML) INJECTION AS NEEDED
Status: CANCELLED | OUTPATIENT
Start: 2022-07-21

## 2022-07-14 RX ORDER — HEPARIN 100 UNIT/ML
300-500 SYRINGE INTRAVENOUS AS NEEDED
Status: CANCELLED
Start: 2022-07-21

## 2022-07-14 RX ORDER — ACETAMINOPHEN 325 MG/1
650 TABLET ORAL
Status: CANCELLED | OUTPATIENT
Start: 2022-07-21

## 2022-07-14 RX ORDER — ACETAMINOPHEN 325 MG/1
650 TABLET ORAL AS NEEDED
Status: CANCELLED
Start: 2022-07-21

## 2022-07-14 RX ORDER — EPINEPHRINE 1 MG/ML
0.3 INJECTION, SOLUTION, CONCENTRATE INTRAVENOUS AS NEEDED
Status: CANCELLED | OUTPATIENT
Start: 2022-07-21

## 2022-07-14 RX ORDER — SODIUM CHLORIDE 9 MG/ML
10 INJECTION INTRAMUSCULAR; INTRAVENOUS; SUBCUTANEOUS AS NEEDED
Status: CANCELLED | OUTPATIENT
Start: 2022-07-21

## 2022-07-14 RX ORDER — ALBUTEROL SULFATE 0.83 MG/ML
2.5 SOLUTION RESPIRATORY (INHALATION) AS NEEDED
Status: CANCELLED
Start: 2022-07-21

## 2022-07-14 RX ORDER — HYDROCORTISONE SODIUM SUCCINATE 100 MG/2ML
100 INJECTION, POWDER, FOR SOLUTION INTRAMUSCULAR; INTRAVENOUS AS NEEDED
Status: CANCELLED | OUTPATIENT
Start: 2022-07-21

## 2022-07-14 RX ORDER — DIPHENHYDRAMINE HYDROCHLORIDE 50 MG/ML
50 INJECTION, SOLUTION INTRAMUSCULAR; INTRAVENOUS AS NEEDED
Status: CANCELLED
Start: 2022-07-21

## 2022-07-21 ENCOUNTER — APPOINTMENT (OUTPATIENT)
Dept: INFUSION THERAPY | Age: 47
End: 2022-07-21

## 2022-07-21 ENCOUNTER — HOSPITAL ENCOUNTER (OUTPATIENT)
Dept: INFUSION THERAPY | Age: 47
Discharge: HOME | End: 2022-07-21
Payer: MEDICAID

## 2022-07-21 VITALS
RESPIRATION RATE: 18 BRPM | DIASTOLIC BLOOD PRESSURE: 76 MMHG | HEART RATE: 88 BPM | SYSTOLIC BLOOD PRESSURE: 113 MMHG | TEMPERATURE: 97.5 F

## 2022-07-21 DIAGNOSIS — T45.1X5A CHEMOTHERAPY INDUCED DIARRHEA: ICD-10-CM

## 2022-07-21 DIAGNOSIS — N95.9 PREMENOPAUSAL PATIENT: ICD-10-CM

## 2022-07-21 DIAGNOSIS — Z51.81 ENCOUNTER FOR MONITORING CARDIOTOXIC DRUG THERAPY: ICD-10-CM

## 2022-07-21 DIAGNOSIS — Z51.11 ENCOUNTER FOR ANTINEOPLASTIC CHEMOTHERAPY: ICD-10-CM

## 2022-07-21 DIAGNOSIS — C50.911 MALIGNANT NEOPLASM OF RIGHT BREAST IN FEMALE, ESTROGEN RECEPTOR POSITIVE, UNSPECIFIED SITE OF BREAST (HCC): ICD-10-CM

## 2022-07-21 DIAGNOSIS — Z17.0 MALIGNANT NEOPLASM OF RIGHT BREAST IN FEMALE, ESTROGEN RECEPTOR POSITIVE, UNSPECIFIED SITE OF BREAST (HCC): ICD-10-CM

## 2022-07-21 DIAGNOSIS — Z79.899 ENCOUNTER FOR MONITORING CARDIOTOXIC DRUG THERAPY: ICD-10-CM

## 2022-07-21 DIAGNOSIS — Z09 CHEMOTHERAPY FOLLOW-UP EXAMINATION: ICD-10-CM

## 2022-07-21 DIAGNOSIS — Z95.828 PORT-A-CATH IN PLACE: ICD-10-CM

## 2022-07-21 DIAGNOSIS — A02.1 SEPSIS DUE TO SALMONELLA SPECIES WITHOUT ACUTE ORGAN DYSFUNCTION (HCC): Primary | ICD-10-CM

## 2022-07-21 DIAGNOSIS — N63.10 MASS OF RIGHT BREAST, UNSPECIFIED QUADRANT: ICD-10-CM

## 2022-07-21 DIAGNOSIS — K52.1 CHEMOTHERAPY INDUCED DIARRHEA: ICD-10-CM

## 2022-07-21 LAB
ALBUMIN SERPL-MCNC: 3.4 G/DL (ref 3.5–5)
ALBUMIN/GLOB SERPL: 1 {RATIO} (ref 1.1–2.2)
ALP SERPL-CCNC: 87 U/L (ref 45–117)
ALT SERPL-CCNC: 22 U/L (ref 12–78)
ANION GAP SERPL CALC-SCNC: 7 MMOL/L (ref 5–15)
AST SERPL-CCNC: 14 U/L (ref 15–37)
BASOPHILS # BLD: 0 K/UL (ref 0–0.1)
BASOPHILS NFR BLD: 1 % (ref 0–1)
BILIRUB SERPL-MCNC: 0.2 MG/DL (ref 0.2–1)
BUN SERPL-MCNC: 14 MG/DL (ref 6–20)
BUN/CREAT SERPL: 18 (ref 12–20)
CALCIUM SERPL-MCNC: 8.9 MG/DL (ref 8.5–10.1)
CHLORIDE SERPL-SCNC: 106 MMOL/L (ref 97–108)
CO2 SERPL-SCNC: 26 MMOL/L (ref 21–32)
CREAT SERPL-MCNC: 0.76 MG/DL (ref 0.55–1.02)
DIFFERENTIAL METHOD BLD: ABNORMAL
EOSINOPHIL # BLD: 0.1 K/UL (ref 0–0.4)
EOSINOPHIL NFR BLD: 2 % (ref 0–7)
ERYTHROCYTE [DISTWIDTH] IN BLOOD BY AUTOMATED COUNT: 14.8 % (ref 11.5–14.5)
GLOBULIN SER CALC-MCNC: 3.4 G/DL (ref 2–4)
GLUCOSE SERPL-MCNC: 208 MG/DL (ref 65–100)
HCT VFR BLD AUTO: 38.8 % (ref 35–47)
HGB BLD-MCNC: 12.2 G/DL (ref 11.5–16)
IMM GRANULOCYTES # BLD AUTO: 0 K/UL (ref 0–0.04)
IMM GRANULOCYTES NFR BLD AUTO: 0 % (ref 0–0.5)
LYMPHOCYTES # BLD: 1.2 K/UL (ref 0.8–3.5)
LYMPHOCYTES NFR BLD: 22 % (ref 12–49)
MCH RBC QN AUTO: 25.5 PG (ref 26–34)
MCHC RBC AUTO-ENTMCNC: 31.4 G/DL (ref 30–36.5)
MCV RBC AUTO: 81 FL (ref 80–99)
MONOCYTES # BLD: 0.3 K/UL (ref 0–1)
MONOCYTES NFR BLD: 5 % (ref 5–13)
NEUTS SEG # BLD: 3.9 K/UL (ref 1.8–8)
NEUTS SEG NFR BLD: 70 % (ref 32–75)
NRBC # BLD: 0 K/UL (ref 0–0.01)
NRBC BLD-RTO: 0 PER 100 WBC
PLATELET # BLD AUTO: 168 K/UL (ref 150–400)
PMV BLD AUTO: 10.6 FL (ref 8.9–12.9)
POTASSIUM SERPL-SCNC: 4.2 MMOL/L (ref 3.5–5.1)
PROT SERPL-MCNC: 6.8 G/DL (ref 6.4–8.2)
RBC # BLD AUTO: 4.79 M/UL (ref 3.8–5.2)
SODIUM SERPL-SCNC: 139 MMOL/L (ref 136–145)
WBC # BLD AUTO: 5.5 K/UL (ref 3.6–11)

## 2022-07-21 PROCEDURE — 36415 COLL VENOUS BLD VENIPUNCTURE: CPT

## 2022-07-21 PROCEDURE — 96402 CHEMO HORMON ANTINEOPL SQ/IM: CPT

## 2022-07-21 PROCEDURE — 74011250636 HC RX REV CODE- 250/636: Performed by: INTERNAL MEDICINE

## 2022-07-21 PROCEDURE — 80053 COMPREHEN METABOLIC PANEL: CPT

## 2022-07-21 PROCEDURE — 85025 COMPLETE CBC W/AUTO DIFF WBC: CPT

## 2022-07-21 RX ADMIN — PERTUZUMAB, TRASTUZUMAB, AND HYALURONIDASE-ZZXF 10 ML: 600; 600; 2000 INJECTION, SOLUTION SUBCUTANEOUS at 09:49

## 2022-07-21 NOTE — PROGRESS NOTES
[facility] Chemotherapy Progress Note    Date: 2022    Name: Patricio Carolina    MRN: [id number]         : 1975      0830 Pt admit to [facility] for P[place] ambulatory in stable condition. Assessment completed. No new concerns voiced. Patient denies SOB, fever, cough, general not feeling well. Patient denies recent exposure to someone who has tested positive for COVID-19. Patient denies having contact with anyone who has a pending COVID test.    Ms. Rob's vitals were reviewed. Patient Vitals for the past 12 hrs:   Temp Pulse Resp BP   22 0831 97.5 °F (36.4 °C) 88 18 113/76     Lab results were obtained and reviewed. See pending lab results    Medications given:  Phesgo SQ in left thigh    0950 Pt tolerated treatment well. D/c home ambulatory in no distress.     Future Appointments   Date Time Provider Stacey Sanchez   2022  8:30 AM G1 [address]   2022  8:30 AM G1 JUANITO FASTRACK [facility]   2022  8:45 AM Denny Hope,  [address] BS AMB   2022  8:30 AM G1 JUANITO FASTRACK [facility]   2022  8:30 AM H1 JUANITO [facility]STRACK [facility]   10/13/2022  8:30 AM G1 Bernice Leale Moses Ray RN  2022

## 2022-07-22 ENCOUNTER — APPOINTMENT (OUTPATIENT)
Dept: INFUSION THERAPY | Age: 47
End: 2022-07-22

## 2022-07-26 ENCOUNTER — HOSPITAL ENCOUNTER (OUTPATIENT)
Dept: RADIATION THERAPY | Age: 47
Discharge: HOME | End: 2022-07-26

## 2022-08-04 RX ORDER — DIPHENHYDRAMINE HYDROCHLORIDE 50 MG/ML
25 INJECTION, SOLUTION INTRAMUSCULAR; INTRAVENOUS AS NEEDED
Status: CANCELLED
Start: 2022-08-11

## 2022-08-04 RX ORDER — HEPARIN 100 UNIT/ML
300-500 SYRINGE INTRAVENOUS AS NEEDED
Status: CANCELLED
Start: 2022-08-11

## 2022-08-04 RX ORDER — EPINEPHRINE 1 MG/ML
0.3 INJECTION, SOLUTION, CONCENTRATE INTRAVENOUS AS NEEDED
Status: CANCELLED | OUTPATIENT
Start: 2022-08-11

## 2022-08-04 RX ORDER — HYDROCORTISONE SODIUM SUCCINATE 100 MG/2ML
100 INJECTION, POWDER, FOR SOLUTION INTRAMUSCULAR; INTRAVENOUS AS NEEDED
Status: CANCELLED | OUTPATIENT
Start: 2022-08-11

## 2022-08-04 RX ORDER — DIPHENHYDRAMINE HYDROCHLORIDE 50 MG/ML
50 INJECTION, SOLUTION INTRAMUSCULAR; INTRAVENOUS
Status: CANCELLED | OUTPATIENT
Start: 2022-08-11

## 2022-08-04 RX ORDER — SODIUM CHLORIDE 9 MG/ML
10 INJECTION INTRAMUSCULAR; INTRAVENOUS; SUBCUTANEOUS AS NEEDED
Status: CANCELLED | OUTPATIENT
Start: 2022-08-11

## 2022-08-04 RX ORDER — ONDANSETRON 2 MG/ML
8 INJECTION INTRAMUSCULAR; INTRAVENOUS AS NEEDED
Status: CANCELLED | OUTPATIENT
Start: 2022-08-11

## 2022-08-04 RX ORDER — ACETAMINOPHEN 325 MG/1
650 TABLET ORAL AS NEEDED
Status: CANCELLED
Start: 2022-08-11

## 2022-08-04 RX ORDER — ACETAMINOPHEN 325 MG/1
650 TABLET ORAL
Status: CANCELLED | OUTPATIENT
Start: 2022-08-11

## 2022-08-04 RX ORDER — ALBUTEROL SULFATE 0.83 MG/ML
2.5 SOLUTION RESPIRATORY (INHALATION) AS NEEDED
Status: CANCELLED
Start: 2022-08-11

## 2022-08-04 RX ORDER — SODIUM CHLORIDE 0.9 % (FLUSH) 0.9 %
10 SYRINGE (ML) INJECTION AS NEEDED
Status: CANCELLED | OUTPATIENT
Start: 2022-08-11

## 2022-08-04 RX ORDER — DIPHENHYDRAMINE HYDROCHLORIDE 50 MG/ML
50 INJECTION, SOLUTION INTRAMUSCULAR; INTRAVENOUS AS NEEDED
Status: CANCELLED
Start: 2022-08-11

## 2022-08-11 ENCOUNTER — HOSPITAL ENCOUNTER (OUTPATIENT)
Dept: INFUSION THERAPY | Age: 47
Discharge: HOME | End: 2022-08-11
Payer: MEDICAID

## 2022-08-11 ENCOUNTER — OFFICE VISIT (OUTPATIENT)
Dept: ONCOLOGY | Age: 47
End: 2022-08-11
Payer: MEDICAID

## 2022-08-11 ENCOUNTER — APPOINTMENT (OUTPATIENT)
Dept: INFUSION THERAPY | Age: 47
End: 2022-08-11

## 2022-08-11 VITALS
HEART RATE: 68 BPM | RESPIRATION RATE: 18 BRPM | TEMPERATURE: 98 F | DIASTOLIC BLOOD PRESSURE: 72 MMHG | SYSTOLIC BLOOD PRESSURE: 107 MMHG

## 2022-08-11 VITALS
SYSTOLIC BLOOD PRESSURE: 107 MMHG | BODY MASS INDEX: 40.56 KG/M2 | WEIGHT: 206.6 LBS | HEART RATE: 81 BPM | TEMPERATURE: 98 F | DIASTOLIC BLOOD PRESSURE: 72 MMHG | OXYGEN SATURATION: 97 % | HEIGHT: 60 IN

## 2022-08-11 DIAGNOSIS — Z17.0 MALIGNANT NEOPLASM OF RIGHT BREAST IN FEMALE, ESTROGEN RECEPTOR POSITIVE, UNSPECIFIED SITE OF BREAST (HCC): ICD-10-CM

## 2022-08-11 DIAGNOSIS — Z51.11 ENCOUNTER FOR ANTINEOPLASTIC CHEMOTHERAPY: ICD-10-CM

## 2022-08-11 DIAGNOSIS — Z09 CHEMOTHERAPY FOLLOW-UP EXAMINATION: ICD-10-CM

## 2022-08-11 DIAGNOSIS — Z79.899 ENCOUNTER FOR MONITORING CARDIOTOXIC DRUG THERAPY: ICD-10-CM

## 2022-08-11 DIAGNOSIS — A02.1 SEPSIS DUE TO SALMONELLA SPECIES WITHOUT ACUTE ORGAN DYSFUNCTION (HCC): Primary | ICD-10-CM

## 2022-08-11 DIAGNOSIS — K52.1 CHEMOTHERAPY INDUCED DIARRHEA: ICD-10-CM

## 2022-08-11 DIAGNOSIS — Z95.828 PORT-A-CATH IN PLACE: ICD-10-CM

## 2022-08-11 DIAGNOSIS — N63.10 MASS OF RIGHT BREAST, UNSPECIFIED QUADRANT: ICD-10-CM

## 2022-08-11 DIAGNOSIS — T45.1X5A CHEMOTHERAPY INDUCED DIARRHEA: ICD-10-CM

## 2022-08-11 DIAGNOSIS — N95.9 PREMENOPAUSAL PATIENT: ICD-10-CM

## 2022-08-11 DIAGNOSIS — Z98.890 S/P LUMPECTOMY, RIGHT BREAST: ICD-10-CM

## 2022-08-11 DIAGNOSIS — Z17.0 MALIGNANT NEOPLASM OF RIGHT BREAST IN FEMALE, ESTROGEN RECEPTOR POSITIVE, UNSPECIFIED SITE OF BREAST (HCC): Primary | ICD-10-CM

## 2022-08-11 DIAGNOSIS — Z51.81 ENCOUNTER FOR MONITORING CARDIOTOXIC DRUG THERAPY: ICD-10-CM

## 2022-08-11 DIAGNOSIS — Z79.810 USE OF TAMOXIFEN (NOLVADEX): ICD-10-CM

## 2022-08-11 DIAGNOSIS — C50.911 MALIGNANT NEOPLASM OF RIGHT BREAST IN FEMALE, ESTROGEN RECEPTOR POSITIVE, UNSPECIFIED SITE OF BREAST (HCC): Primary | ICD-10-CM

## 2022-08-11 DIAGNOSIS — C50.911 MALIGNANT NEOPLASM OF RIGHT BREAST IN FEMALE, ESTROGEN RECEPTOR POSITIVE, UNSPECIFIED SITE OF BREAST (HCC): ICD-10-CM

## 2022-08-11 PROCEDURE — 96402 CHEMO HORMON ANTINEOPL SQ/IM: CPT

## 2022-08-11 PROCEDURE — 74011250636 HC RX REV CODE- 250/636: Performed by: INTERNAL MEDICINE

## 2022-08-11 PROCEDURE — 99214 OFFICE O/P EST MOD 30 MIN: CPT | Performed by: INTERNAL MEDICINE

## 2022-08-11 RX ORDER — SODIUM CHLORIDE 9 MG/ML
25 INJECTION, SOLUTION INTRAVENOUS CONTINUOUS
Status: DISPENSED | OUTPATIENT
Start: 2022-08-11 | End: 2022-08-11

## 2022-08-11 RX ADMIN — PERTUZUMAB, TRASTUZUMAB, AND HYALURONIDASE-ZZXF 10 ML: 600; 600; 2000 INJECTION, SOLUTION SUBCUTANEOUS at 09:51

## 2022-08-11 NOTE — LETTER
8/11/2022    Patient: Adele Jimenez   YOB: 1975   Date of Visit: 8/11/2022     Ellyn Cee MD  [address] [place] [id number]  Via In Basket    Dear Ellyn Cee MD,      Thank you for referring Ms. Kaci Wooten to [address] [place] [address] for evaluation. My notes for this consultation are attached. If you have questions, please do not hesitate to call me. I look forward to following your patient along with you.       Sincerely,    Manuel Alford, DO

## 2022-08-11 NOTE — PROGRESS NOTES
[facility] at [address], [id number] [place] [address] Road, [place]: [phone]  F: [phone]    Reason for Visit:   Royce Ortiz is a 40 y.o. female seen today in office for follow up if Right Breast Cancer    Treatment History:   Initially had abnormal mammo at [address] with calcifications on RIGHT. No mass reported/dense breast tissue  RIGHT Breast Biopsy 9/9/21: PATH -  IDC, grade 3 ER/[place] strong+ Her2 3+  Genetic testing negative  Breast MRI 9/17/21: RIGHT BREAST:Background parenchymal enhancement: Severe. There is a biopsy clip in the lower, outer quadrant of the right breast, junction of the middle and deep thirds. There is linear, branching enhancement which extends anterior to the biopsy clip over approximately 3.6 cm. There are no suspicious internal mammary or axillary chain lymph nodes. LEFT BREAST: Background parenchymal enhancement: Severe. There is no suspicious masslike or nonmasslike enhancement within the left breast to indicate breast carcinoma. There is a small mass in the lower, outer quadrant with an adjacent biopsy clip. There is an additional mass without significant enhancement in the lateral, middle 3rd, just below the nipple line. There are no suspicious internal mammary or axillary chain lymph nodes  Neoadjuvant TCHP chemo x 6 cycles from 10/21/21 - 2/4/22  Breast MRI 1/27/22: Right Breast: In the right lateral breast at 8-9 o'clock posterior depth 10 cm from the nipple there is susceptibility artifact from the biopsy clip but no residual enhancement. There is no suspicious axillary or internal mammary chain lymphadenopathy. Left Breast: No suspicious enhancement. There is no suspicious axillary or internal mammary chain lymphadenopathy  Right Breast Lumpectomy and SNBx 3/15/22: PATH - No residual invasive carcinoma. No in situ carcinoma or atypia identified.  0/4 LN+  Maintenance Herceptin+Perjeta 3/18/22 - Current   XRT 5/23/22 - 6/21/22  Adjuvant hormonal therapy with Tamoxifen  - Current     STAGE:   Clinical 2 ER/SD+, HER2+    History of Present Illness:   Jsoe Mcclure is a 40 y.o. female seen today in office for follow up of right breast cancer ER+ Her2 + post biopsy only 21. She started neoadjuvant TCHP on 10/21/21 and she completed 6 cycles of TCHP on 22. She had a right lumpectomy on 3/15/22 and had pCR. She started radiation on 22 and finished on 22. She is here today for Cycle 14 (eighth dose) of maintenance HP. She is also on adjuvant hormonal therapy with Tamoxifen now. She reports that she feels well overall today. She is tolerating HP well overall. Her appetite and energy levels are good. She denies fever, chills, mouth sores, cough, SOB, CP, nausea, diarrhea, constipation, and neuropathy. She denies pain today. CBC and CMP from 22 reviewed. She is ready for treatment today. She is here alone today. Past Medical History:   Diagnosis Date    Bell's palsy     Breast cancer ([facility] [address] 2021    ER/SD+ Her2 +      Past Surgical History:   Procedure Laterality Date    HX BREAST LUMPECTOMY Right 3/15/2022    RIGHT BREAST MAGSEED GUIDED LUMPECTOMY performed by Sadi Lindsay MD at [facility] AMBULATORY OR    HX  SECTION      HX  SECTION      HX CHOLECYSTECTOMY      HX CHOLECYSTECTOMY      HX VASCULAR ACCESS      IR REMOVE TUNL CVAD W PORT/PUMP  3/1/2022      Social History     Tobacco Use    Smoking status: Never    Smokeless tobacco: Never   Substance Use Topics    Alcohol use: Yes     Comment: rarely      Family History   Problem Relation Age of Onset    Cancer Sister         leukemia     Current Outpatient Medications   Medication Sig    tamoxifen 20 mg/10 mL solution Take 20 mg by mouth daily. levonorgestreL (Mirena) 20 mcg/24 hours (6 yrs) 52 mg IUD 1 Device by IntraUTERine route once. No current facility-administered medications for this visit.      Facility-Administered Medications Ordered in Other Visits   Medication Dose Route Frequency    0.9% sodium chloride infusion  25 mL/hr IntraVENous CONTINUOUS      No Known Allergies     Review of Systems:  A complete review of systems was obtained, negative except as described above and as reported on ROS sheet scanned into system. Physical Exam:     Visit Vitals  /72 (BP 1 Location: Left upper arm, BP Patient Position: Sitting)   Pulse 81   Temp 98 °F (36.7 °C) (Temporal)   Ht 5' (1.524 m)   Wt 206 lb 9.6 oz (93.7 kg)   SpO2 97%   BMI 40.35 kg/m²     ECOG PS: 0  General: No distress  Eyes: Anicteric sclerae  HENT: Atraumatic, wearing a mask  Neck: Supple  CV: Regular   Respiratory: CTAB, normal respiratory effort  MS: Normal gait and station. Digits without clubbing or cyanosis. Skin: No rashes, ecchymoses, or petechiae. Normal temperature, turgor, and texture  Psych: Alert, oriented, appropriate affect, normal judgment/insight  Breast: Post XRT skin changes to right breast. Left breast not examined   Neuro: Non focal    Results:     Lab Results   Component Value Date/Time    WBC 5.5 07/21/2022 08:44 AM    HGB 12.2 07/21/2022 08:44 AM    HCT 38.8 07/21/2022 08:44 AM    PLATELET 369 43/09/2547 08:44 AM    MCV 81.0 07/21/2022 08:44 AM    ABS. NEUTROPHILS 3.9 07/21/2022 08:44 AM    Hemoglobin (POC) 10.7 (L) 03/15/2022 06:50 AM     Lab Results   Component Value Date/Time    Sodium 139 07/21/2022 08:44 AM    Potassium 4.2 07/21/2022 08:44 AM    Chloride 106 07/21/2022 08:44 AM    CO2 26 07/21/2022 08:44 AM    Glucose 208 (H) 07/21/2022 08:44 AM    BUN 14 07/21/2022 08:44 AM    Creatinine 0.76 07/21/2022 08:44 AM    GFR est AA >60 07/21/2022 08:44 AM    GFR est non-AA >60 07/21/2022 08:44 AM    Calcium 8.9 07/21/2022 08:44 AM     Lab Results   Component Value Date/Time    Bilirubin, total 0.2 07/21/2022 08:44 AM    ALT (SGPT) 22 07/21/2022 08:44 AM    Alk.  phosphatase 87 07/21/2022 08:44 AM    Protein, total 6.8 07/21/2022 08:44 AM    Albumin 3.4 (L) 07/21/2022 08:44 AM    Globulin 3.4 07/21/2022 08:44 AM     Path at outside facility - scanned into chart     Breast MRI 9/17/21  RIGHT BREAST:  Background parenchymal enhancement: Severe   There is a biopsy clip in the lower, outer quadrant of the right breast,  junction of the middle and deep thirds. There is linear, branching enhancement  which extends anterior to the biopsy clip over approximately 3.6 cm     There are no suspicious internal mammary or axillary chain lymph nodes. Elisa Guanako LEFT BREAST:  Background parenchymal enhancement: Severe  There is no suspicious masslike or nonmasslike enhancement within the left  breast to indicate breast carcinoma. There is a small mass in the lower, outer  quadrant with an adjacent biopsy clip. There is an additional mass without  significant enhancement in the lateral, middle 3rd, just below the nipple line. There are no suspicious internal mammary or axillary chain lymph nodes. .  IMPRESSION  RIGHT BREAST:  1. There is a biopsy clip in the lower, outer quadrant of the right breast,  junction of the middle and deep thirds. There is linear, branching enhancement  which extends anteriorly from the biopsy clip over approximately 3.6 cm. BI-RADS Category 6 - Known biopsy-proven malignancy. Breast MRI 1/27/22  FINDINGS:  There is marked background parenchymal enhancement. The breast tissue is heterogeneously dense, which could obscure detection of  small masses (approximately 51-75% glandular). Right breast:  In the right lateral breast at 8-9 o'clock posterior depth 10 cm from the nipple  there is susceptibility artifact from the biopsy clip but no residual  enhancement. There is no suspicious axillary or internal mammary chain lymphadenopathy. Left breast:  No suspicious enhancement. There is no suspicious axillary or internal mammary chain lymphadenopathy. IMPRESSION     Right Breast:  1.  BI-RADS Assessment Category 6: Known biopsy proven malignancy- Appropriate  action should be taken. Right breast biopsy clip with no residual suspicious  enhancement. Left Breast:  1. BI-RADS Assessment     3/15/22  FINAL PATHOLOGIC DIAGNOSIS   1. [place] lymph node, #1, right axilla, excision:   One lymph node identified, negative for metastatic carcinoma (0/1). See comment. 2. [place] lymph node, #2, right axilla, excision:   One lymph node identified, negative for metastatic carcinoma (0/1). See comment. 3. [place] lymph node, #3, right axilla, excision:   Two lymph nodes identified, negative for metastatic carcinoma (0/2). See comment. 4. Breast, right, lumpectomy:   No residual invasive carcinoma identified after presurgical therapy. Sclerosing adenosis with hyalinized fibrous stroma. No in situ carcinoma or atypia identified. 10/13/21    ECHO ADULT COMPLETE 10/13/2021 10/13/2021    Interpretation Summary  · LV: Estimated LVEF is 60 - 65%. Normal cavity size, wall thickness, systolic function (ejection fraction normal) and diastolic function. Normal left ventricular strain. Signed by: Luisa Pang MD on 10/13/2021  3:52 PM    01/05/22    ECHO ADULT COMPLETE 01/05/2022 1/5/2022    Interpretation Summary    Left Ventricle: Left ventricle size is normal. Normal wall thickness. Normal wall motion. Normal left ventricular systolic function with a visually estimated EF of 55 - 60%. Global longitudinal strain is -18.0%. Normal diastolic function. Signed by: Nhung Ames MD on 1/5/2022 12:52 PM    02/23/22    ECHO LUIS W OR WO CONTRAST 03/04/2022 3/4/2022    Interpretation Summary    Left Ventricle: Left ventricle size is normal. Normal wall thickness. Normal wall motion. Normal left ventricular systolic function with a visually estimated EF of 55 - 60%.     Signed by: Nhung Ames MD on 3/4/2022  1:26 PM    05/31/22    ECHO ADULT COMPLETE 05/31/2022 5/31/2022    Interpretation Summary    Left Ventricle: Normal left ventricular systolic function with a visually estimated EF of 55 - 60%. Not well visualized. Left ventricle size is normal. Normal wall thickness. Normal wall motion. Normal diastolic function. Signed by: Joya Cox MD on 5/31/2022  2:27 PM    Records reviewed and summarized above. Pathology report(s) reviewed above. Radiology report(s) reviewed above. Assessment:/PLAN     1) Clinical Stage 2 (based on MRI) RIGHT Breast Cancer ER+ Her2+ post Breast Biopsy at [facility] 9/9/21 and Right Lumpectomy on 3/15/22  Breast MRI 9/17/21 with 3.6 cm enhancement. No mass palpable on exam.   Patient was sent here for neoadjuvant chemo based on size of mass on MRI. Fllow up ECHO 1/5/22 and EF 55-60%. She started neoadjuvant TCHP on 10/21/21 and completed 6 cycles on 2/4/22. She had a Breast MRI on 1/27/22 that showed no residual disease. She had a right lumpectomy on 3/15/22 and had a pCR. Personally reviewed pathology. Discussed with patient. ECHO/LUIS from 3/2/22 reviewed. Follow up ECHO 5/31/22 good with EF 55-60%. She started radiation on 5/23/22 and 6/21/22. She is here today for Cycle 14 (eighth cycle) of maintenance HP. She started Tamoxifen (liquid form as cannot swallow pills well). She is tolerating current regimen overall without significant toxicities. She is clinically stable and healthy overall - no new issues today. Labs (CBC and CMP from 7/21/22) reviewed today. Continue maintenance HP and Tamoxifen. Patient is ready for treatment today. Follow up in 3 weeks in [facility]. Follow up in 6 weeks in [facility]office. Patient agrees with plan. 2) Premenopausal  She has two kids and does not want more kids. Reviewed impaired fertility/early menopause due to chemo with patient. She has Mirena IUD in place. Continue routine GYN follow up. 3) Management of High Risk Medications - Chemotherapy  No toxicities noted. Pre chemo ECHO from 10/13/21 reviewed.   Follow up ECHO from 1/5/22 reviewed. Follow up ECHO from 2/23/22 reviewed. ECHO/LUIS from 3/2/22 reviewed. Follow up ECHO from 5/31/22 reviewed. Labs (CBC and CMP from 7/21/22) reviewed today. Follow up ECHO ordered today. Will monitor for side effects. 4) Psychosocial  She is vaccinated for COVID. She works as a , has a supportive fiance. SW/NN support as needed. She is here alone today. Call if questions. Follow up in 3 weeks in [facility] and 6 weeks in [facility]office. I personally saw and evaluated the patient and performed the key components of medical decision making. The history, physical exam, and documentation were performed by Cecily Woodard NP. I reviewed and verified the above documentation and modified it as needed. Specifically pt doing well overall  Tolerating HP well. No new issues today   on adjuvant tamoxifen. Labs personally reviewed   proceed with HP. Continue tamoxifen   Continue lab/ ECHO monitoring. I appreciate the opportunity to participate in Ms. Susan Rob's care.     Signed By: Palomo Vincent,

## 2022-08-11 NOTE — PROGRESS NOTES
Rebecca Fong is a 40 y.o. female  Chief Complaint   Patient presents with    Chemotherapy    Breast Cancer     1. Have you been to the ER, urgent care clinic since your last visit? Hospitalized since your last visit? No    2. Have you seen or consulted any other health care providers outside of the [address] [place] [address] since your last visit? Include any pap smears or colon screening.  No

## 2022-08-12 ENCOUNTER — APPOINTMENT (OUTPATIENT)
Dept: INFUSION THERAPY | Age: 47
End: 2022-08-12

## 2022-08-26 RX ORDER — ACETAMINOPHEN 325 MG/1
650 TABLET ORAL AS NEEDED
Status: CANCELLED
Start: 2022-09-01

## 2022-08-26 RX ORDER — SODIUM CHLORIDE 0.9 % (FLUSH) 0.9 %
10 SYRINGE (ML) INJECTION AS NEEDED
Status: CANCELLED | OUTPATIENT
Start: 2022-09-01

## 2022-08-26 RX ORDER — HYDROCORTISONE SODIUM SUCCINATE 100 MG/2ML
100 INJECTION, POWDER, FOR SOLUTION INTRAMUSCULAR; INTRAVENOUS AS NEEDED
Status: CANCELLED | OUTPATIENT
Start: 2022-09-01

## 2022-08-26 RX ORDER — ACETAMINOPHEN 325 MG/1
650 TABLET ORAL
Status: CANCELLED | OUTPATIENT
Start: 2022-09-01

## 2022-08-26 RX ORDER — DIPHENHYDRAMINE HYDROCHLORIDE 50 MG/ML
25 INJECTION, SOLUTION INTRAMUSCULAR; INTRAVENOUS AS NEEDED
Status: CANCELLED
Start: 2022-09-01

## 2022-08-26 RX ORDER — SODIUM CHLORIDE 9 MG/ML
10 INJECTION INTRAMUSCULAR; INTRAVENOUS; SUBCUTANEOUS AS NEEDED
Status: CANCELLED | OUTPATIENT
Start: 2022-09-01

## 2022-08-26 RX ORDER — DIPHENHYDRAMINE HYDROCHLORIDE 50 MG/ML
50 INJECTION, SOLUTION INTRAMUSCULAR; INTRAVENOUS
Status: CANCELLED | OUTPATIENT
Start: 2022-09-01

## 2022-08-26 RX ORDER — SODIUM CHLORIDE 9 MG/ML
25 INJECTION, SOLUTION INTRAVENOUS CONTINUOUS
Status: CANCELLED | OUTPATIENT
Start: 2022-09-01

## 2022-08-26 RX ORDER — ALBUTEROL SULFATE 0.83 MG/ML
2.5 SOLUTION RESPIRATORY (INHALATION) AS NEEDED
Status: CANCELLED
Start: 2022-09-01

## 2022-08-26 RX ORDER — EPINEPHRINE 1 MG/ML
0.3 INJECTION, SOLUTION, CONCENTRATE INTRAVENOUS AS NEEDED
Status: CANCELLED | OUTPATIENT
Start: 2022-09-01

## 2022-08-26 RX ORDER — ONDANSETRON 2 MG/ML
8 INJECTION INTRAMUSCULAR; INTRAVENOUS AS NEEDED
Status: CANCELLED | OUTPATIENT
Start: 2022-09-01

## 2022-08-26 RX ORDER — HEPARIN 100 UNIT/ML
300-500 SYRINGE INTRAVENOUS AS NEEDED
Status: CANCELLED
Start: 2022-09-01

## 2022-08-26 RX ORDER — DIPHENHYDRAMINE HYDROCHLORIDE 50 MG/ML
50 INJECTION, SOLUTION INTRAMUSCULAR; INTRAVENOUS AS NEEDED
Status: CANCELLED
Start: 2022-09-01

## 2022-08-29 ENCOUNTER — HOSPITAL ENCOUNTER (OUTPATIENT)
Dept: NON INVASIVE DIAGNOSTICS | Age: 47
Discharge: HOME | End: 2022-08-29
Attending: NURSE PRACTITIONER
Payer: MEDICAID

## 2022-08-29 VITALS
HEIGHT: 60 IN | SYSTOLIC BLOOD PRESSURE: 107 MMHG | DIASTOLIC BLOOD PRESSURE: 72 MMHG | BODY MASS INDEX: 40.56 KG/M2 | WEIGHT: 206.57 LBS

## 2022-08-29 DIAGNOSIS — Z17.0 MALIGNANT NEOPLASM OF RIGHT BREAST IN FEMALE, ESTROGEN RECEPTOR POSITIVE, UNSPECIFIED SITE OF BREAST (HCC): ICD-10-CM

## 2022-08-29 DIAGNOSIS — C50.911 MALIGNANT NEOPLASM OF RIGHT BREAST IN FEMALE, ESTROGEN RECEPTOR POSITIVE, UNSPECIFIED SITE OF BREAST (HCC): ICD-10-CM

## 2022-08-29 DIAGNOSIS — Z79.899 ENCOUNTER FOR MONITORING CARDIOTOXIC DRUG THERAPY: ICD-10-CM

## 2022-08-29 DIAGNOSIS — Z51.81 ENCOUNTER FOR MONITORING CARDIOTOXIC DRUG THERAPY: ICD-10-CM

## 2022-08-29 LAB
ECHO AV AREA PEAK VELOCITY: 2.4 CM2
ECHO AV AREA VTI: 2.8 CM2
ECHO AV AREA/BSA PEAK VELOCITY: 1.3 CM2/M2
ECHO AV AREA/BSA VTI: 1.5 CM2/M2
ECHO AV MEAN GRADIENT: 5 MMHG
ECHO AV MEAN VELOCITY: 1 M/S
ECHO AV PEAK GRADIENT: 9 MMHG
ECHO AV PEAK VELOCITY: 1.5 M/S
ECHO AV VELOCITY RATIO: 0.8
ECHO AV VTI: 31.2 CM
ECHO EST RA PRESSURE: 10 MMHG
ECHO LA DIAMETER INDEX: 1.69 CM/M2
ECHO LA DIAMETER: 3.2 CM
ECHO LA VOL 4C: 33 ML (ref 22–52)
ECHO LA VOLUME INDEX A4C: 17 ML/M2 (ref 16–34)
ECHO LV E' LATERAL VELOCITY: 18 CM/S
ECHO LV E' SEPTAL VELOCITY: 8 CM/S
ECHO LV FRACTIONAL SHORTENING: 26 % (ref 28–44)
ECHO LV INTERNAL DIMENSION DIASTOLE INDEX: 2.65 CM/M2
ECHO LV INTERNAL DIMENSION DIASTOLIC: 5 CM (ref 3.9–5.3)
ECHO LV INTERNAL DIMENSION SYSTOLIC INDEX: 1.96 CM/M2
ECHO LV INTERNAL DIMENSION SYSTOLIC: 3.7 CM
ECHO LV IVSD: 0.7 CM (ref 0.6–0.9)
ECHO LV MASS 2D: 114.7 G (ref 67–162)
ECHO LV MASS INDEX 2D: 60.7 G/M2 (ref 43–95)
ECHO LV POSTERIOR WALL DIASTOLIC: 0.7 CM (ref 0.6–0.9)
ECHO LV RELATIVE WALL THICKNESS RATIO: 0.28
ECHO LVOT AREA: 2.8 CM2
ECHO LVOT AV VTI INDEX: 0.94
ECHO LVOT DIAM: 1.9 CM
ECHO LVOT MEAN GRADIENT: 3 MMHG
ECHO LVOT PEAK GRADIENT: 6 MMHG
ECHO LVOT PEAK VELOCITY: 1.2 M/S
ECHO LVOT STROKE VOLUME INDEX: 44.1 ML/M2
ECHO LVOT SV: 83.3 ML
ECHO LVOT VTI: 29.4 CM
ECHO MV A VELOCITY: 0.72 M/S
ECHO MV AREA PHT: 3.4 CM2
ECHO MV AREA VTI: 2.7 CM2
ECHO MV E DECELERATION TIME (DT): 237.1 MS
ECHO MV E VELOCITY: 0.83 M/S
ECHO MV E/A RATIO: 1.15
ECHO MV E/E' LATERAL: 4.61
ECHO MV E/E' RATIO (AVERAGED): 7.49
ECHO MV E/E' SEPTAL: 10.38
ECHO MV LVOT VTI INDEX: 1.05
ECHO MV MAX VELOCITY: 0.9 M/S
ECHO MV MEAN GRADIENT: 1 MMHG
ECHO MV MEAN VELOCITY: 0.6 M/S
ECHO MV PEAK GRADIENT: 3 MMHG
ECHO MV PRESSURE HALF TIME (PHT): 65.6 MS
ECHO MV VTI: 31 CM
ECHO PV MAX VELOCITY: 1.1 M/S
ECHO PV PEAK GRADIENT: 5 MMHG
ECHO RIGHT VENTRICULAR SYSTOLIC PRESSURE (RVSP): 40 MMHG
ECHO RV FREE WALL PEAK S': 13 CM/S
ECHO RV TAPSE: 1.8 CM (ref 1.7–?)
ECHO TV REGURGITANT MAX VELOCITY: 2.74 M/S
ECHO TV REGURGITANT PEAK GRADIENT: 30 MMHG

## 2022-08-29 PROCEDURE — 93306 TTE W/DOPPLER COMPLETE: CPT

## 2022-08-29 NOTE — PROGRESS NOTES
Attempted to reach patient off mobile phone number listed. No answer, left a voicemail to give our office a call back!   Funmilayo Best

## 2022-08-30 NOTE — PROGRESS NOTES
HIPPA Verified. Called pt on mobile phone number listed. Patient was made aware of most recent ECHO report being normal/good per Provider reading. Patient was very appreciative over call!   Kit Nichols

## 2022-09-01 ENCOUNTER — APPOINTMENT (OUTPATIENT)
Dept: INFUSION THERAPY | Age: 47
End: 2022-09-01

## 2022-09-01 ENCOUNTER — HOSPITAL ENCOUNTER (OUTPATIENT)
Dept: INFUSION THERAPY | Age: 47
Discharge: HOME | End: 2022-09-01
Payer: MEDICAID

## 2022-09-01 VITALS
DIASTOLIC BLOOD PRESSURE: 75 MMHG | TEMPERATURE: 97.4 F | RESPIRATION RATE: 18 BRPM | OXYGEN SATURATION: 96 % | SYSTOLIC BLOOD PRESSURE: 121 MMHG | BODY MASS INDEX: 40.68 KG/M2 | WEIGHT: 207.2 LBS | HEIGHT: 60 IN | HEART RATE: 72 BPM

## 2022-09-01 DIAGNOSIS — Z17.0 MALIGNANT NEOPLASM OF RIGHT BREAST IN FEMALE, ESTROGEN RECEPTOR POSITIVE, UNSPECIFIED SITE OF BREAST (HCC): ICD-10-CM

## 2022-09-01 DIAGNOSIS — A02.1 SEPSIS DUE TO SALMONELLA SPECIES WITHOUT ACUTE ORGAN DYSFUNCTION (HCC): Primary | ICD-10-CM

## 2022-09-01 DIAGNOSIS — K52.1 CHEMOTHERAPY INDUCED DIARRHEA: ICD-10-CM

## 2022-09-01 DIAGNOSIS — N95.9 PREMENOPAUSAL PATIENT: ICD-10-CM

## 2022-09-01 DIAGNOSIS — C50.911 MALIGNANT NEOPLASM OF RIGHT BREAST IN FEMALE, ESTROGEN RECEPTOR POSITIVE, UNSPECIFIED SITE OF BREAST (HCC): ICD-10-CM

## 2022-09-01 DIAGNOSIS — Z09 CHEMOTHERAPY FOLLOW-UP EXAMINATION: ICD-10-CM

## 2022-09-01 DIAGNOSIS — N63.10 MASS OF RIGHT BREAST, UNSPECIFIED QUADRANT: ICD-10-CM

## 2022-09-01 DIAGNOSIS — Z51.81 ENCOUNTER FOR MONITORING CARDIOTOXIC DRUG THERAPY: ICD-10-CM

## 2022-09-01 DIAGNOSIS — Z95.828 PORT-A-CATH IN PLACE: ICD-10-CM

## 2022-09-01 DIAGNOSIS — Z79.899 ENCOUNTER FOR MONITORING CARDIOTOXIC DRUG THERAPY: ICD-10-CM

## 2022-09-01 DIAGNOSIS — Z51.11 ENCOUNTER FOR ANTINEOPLASTIC CHEMOTHERAPY: ICD-10-CM

## 2022-09-01 DIAGNOSIS — T45.1X5A CHEMOTHERAPY INDUCED DIARRHEA: ICD-10-CM

## 2022-09-01 LAB
ALBUMIN SERPL-MCNC: 3.4 G/DL (ref 3.5–5)
ALBUMIN/GLOB SERPL: 0.8 {RATIO} (ref 1.1–2.2)
ALP SERPL-CCNC: 79 U/L (ref 45–117)
ALT SERPL-CCNC: 19 U/L (ref 12–78)
ANION GAP SERPL CALC-SCNC: 4 MMOL/L (ref 5–15)
AST SERPL-CCNC: 8 U/L (ref 15–37)
BASOPHILS # BLD: 0 K/UL (ref 0–0.1)
BASOPHILS NFR BLD: 1 % (ref 0–1)
BILIRUB SERPL-MCNC: 0.4 MG/DL (ref 0.2–1)
BUN SERPL-MCNC: 15 MG/DL (ref 6–20)
BUN/CREAT SERPL: 19 (ref 12–20)
CALCIUM SERPL-MCNC: 9.1 MG/DL (ref 8.5–10.1)
CHLORIDE SERPL-SCNC: 109 MMOL/L (ref 97–108)
CO2 SERPL-SCNC: 26 MMOL/L (ref 21–32)
CREAT SERPL-MCNC: 0.8 MG/DL (ref 0.55–1.02)
DIFFERENTIAL METHOD BLD: ABNORMAL
EOSINOPHIL # BLD: 0.1 K/UL (ref 0–0.4)
EOSINOPHIL NFR BLD: 2 % (ref 0–7)
ERYTHROCYTE [DISTWIDTH] IN BLOOD BY AUTOMATED COUNT: 14.8 % (ref 11.5–14.5)
GLOBULIN SER CALC-MCNC: 4.1 G/DL (ref 2–4)
GLUCOSE SERPL-MCNC: 155 MG/DL (ref 65–100)
HCT VFR BLD AUTO: 36.6 % (ref 35–47)
HGB BLD-MCNC: 12 G/DL (ref 11.5–16)
IMM GRANULOCYTES # BLD AUTO: 0 K/UL (ref 0–0.04)
IMM GRANULOCYTES NFR BLD AUTO: 1 % (ref 0–0.5)
LYMPHOCYTES # BLD: 1.4 K/UL (ref 0.8–3.5)
LYMPHOCYTES NFR BLD: 24 % (ref 12–49)
MCH RBC QN AUTO: 26.4 PG (ref 26–34)
MCHC RBC AUTO-ENTMCNC: 32.8 G/DL (ref 30–36.5)
MCV RBC AUTO: 80.4 FL (ref 80–99)
MONOCYTES # BLD: 0.3 K/UL (ref 0–1)
MONOCYTES NFR BLD: 4 % (ref 5–13)
NEUTS SEG # BLD: 4.1 K/UL (ref 1.8–8)
NEUTS SEG NFR BLD: 68 % (ref 32–75)
NRBC # BLD: 0 K/UL (ref 0–0.01)
NRBC BLD-RTO: 0 PER 100 WBC
PLATELET # BLD AUTO: 155 K/UL (ref 150–400)
PMV BLD AUTO: 10.6 FL (ref 8.9–12.9)
POTASSIUM SERPL-SCNC: 4 MMOL/L (ref 3.5–5.1)
PROT SERPL-MCNC: 7.5 G/DL (ref 6.4–8.2)
RBC # BLD AUTO: 4.55 M/UL (ref 3.8–5.2)
SODIUM SERPL-SCNC: 139 MMOL/L (ref 136–145)
WBC # BLD AUTO: 6 K/UL (ref 3.6–11)

## 2022-09-01 PROCEDURE — 96401 CHEMO ANTI-NEOPL SQ/IM: CPT

## 2022-09-01 PROCEDURE — 74011250636 HC RX REV CODE- 250/636: Performed by: INTERNAL MEDICINE

## 2022-09-01 PROCEDURE — 85025 COMPLETE CBC W/AUTO DIFF WBC: CPT

## 2022-09-01 PROCEDURE — 80053 COMPREHEN METABOLIC PANEL: CPT

## 2022-09-01 PROCEDURE — 36415 COLL VENOUS BLD VENIPUNCTURE: CPT

## 2022-09-01 RX ADMIN — PERTUZUMAB, TRASTUZUMAB, AND HYALURONIDASE-ZZXF 10 ML: 600; 600; 2000 INJECTION, SOLUTION SUBCUTANEOUS at 09:15

## 2022-09-01 NOTE — PROGRESS NOTES
[facility] Progress Note    Date: 2022    Name: Hari Claudio    MRN: [id number]         : 1975    Ms. Tramaine Arriaza Arrived ambulatory and in no distress for cycle 15 day 1 of Phesgo regimen. Follow Up: Proceed with treatment    Assessment was completed and documented in flowsheets. No acute concerns at this time. Peripheral labs drawn and processed. Chemotherapy Flowsheet 2022   Cycle C15   Date 2022   Drug / Regimen Phesgo   Pre Meds -   Notes GIVEN L THIGH         Ms. Rob's vitals were reviewed. Patient Vitals for the past 12 hrs:   Temp Pulse Resp BP SpO2   22 0830 97.4 °F (36.3 °C) 72 18 121/75 96 %       Lines:   PICC Double Lumen  Left;Basilic (Active)        Lab results were obtained and reviewed. Labs within parameter for treatment. Recent Results (from the past 12 hour(s))   CBC WITH AUTOMATED DIFF    Collection Time: 22  8:35 AM   Result Value Ref Range    WBC 6.0 3.6 - 11.0 K/uL    RBC 4.55 3.80 - 5.20 M/uL    HGB 12.0 11.5 - 16.0 g/dL    HCT 36.6 35.0 - 47.0 %    MCV 80.4 80.0 - 99.0 FL    MCH 26.4 26.0 - 34.0 PG    MCHC 32.8 30.0 - 36.5 g/dL    RDW 14.8 (H) 11.5 - 14.5 %    PLATELET 043 929 - 510 K/uL    MPV 10.6 8.9 - 12.9 FL    NRBC 0.0 0  WBC    ABSOLUTE NRBC 0.00 0.00 - 0.01 K/uL    NEUTROPHILS 68 32 - 75 %    LYMPHOCYTES 24 12 - 49 %    MONOCYTES 4 (L) 5 - 13 %    EOSINOPHILS 2 0 - 7 %    BASOPHILS 1 0 - 1 %    IMMATURE GRANULOCYTES 1 (H) 0.0 - 0.5 %    ABS. NEUTROPHILS 4.1 1.8 - 8.0 K/UL    ABS. LYMPHOCYTES 1.4 0.8 - 3.5 K/UL    ABS. MONOCYTES 0.3 0.0 - 1.0 K/UL    ABS. EOSINOPHILS 0.1 0.0 - 0.4 K/UL    ABS. BASOPHILS 0.0 0.0 - 0.1 K/UL    ABS. IMM. GRANS. 0.0 0.00 - 0.04 K/UL    DF AUTOMATED         Pre-medications  were administered as ordered and chemotherapy was initiated.   Medications Administered       pertuzumab 600 mg-trastuzumab 600 mg-hyaluronidase-zzxf 20,000 units/10 mL       Admin Date  2022 Action  Given Dose  10 mL Route  SubCUTAneous Administered By  Delbert Tony, SUSHMA                    Medication education and side effect management reinforced with patient. They verbalized understanding. Ms. Paris Germain tolerated treatment well, patient was discharged from [place] [address] in stable condition. Patient is aware of upcoming appointments.       Future Appointments   Date Time Provider Stacey Sanchez   9/22/2022  8:30 AM H1 [facility]   9/22/2022  8:45 AM [facility] Janice Snellen,  [address] BS AMB   10/13/2022  8:30 AM G1 [address], RN  September 1, 2022

## 2022-09-02 ENCOUNTER — APPOINTMENT (OUTPATIENT)
Dept: INFUSION THERAPY | Age: 47
End: 2022-09-02

## 2022-09-15 RX ORDER — EPINEPHRINE 1 MG/ML
0.3 INJECTION, SOLUTION, CONCENTRATE INTRAVENOUS AS NEEDED
Status: CANCELLED | OUTPATIENT
Start: 2022-09-22

## 2022-09-15 RX ORDER — DIPHENHYDRAMINE HYDROCHLORIDE 50 MG/ML
50 INJECTION, SOLUTION INTRAMUSCULAR; INTRAVENOUS AS NEEDED
Status: CANCELLED
Start: 2022-09-22

## 2022-09-15 RX ORDER — ACETAMINOPHEN 325 MG/1
650 TABLET ORAL
Status: CANCELLED | OUTPATIENT
Start: 2022-09-22

## 2022-09-15 RX ORDER — DIPHENHYDRAMINE HYDROCHLORIDE 50 MG/ML
50 INJECTION, SOLUTION INTRAMUSCULAR; INTRAVENOUS
Status: CANCELLED | OUTPATIENT
Start: 2022-09-22

## 2022-09-15 RX ORDER — SODIUM CHLORIDE 9 MG/ML
10 INJECTION INTRAMUSCULAR; INTRAVENOUS; SUBCUTANEOUS AS NEEDED
Status: CANCELLED | OUTPATIENT
Start: 2022-09-22

## 2022-09-15 RX ORDER — SODIUM CHLORIDE 0.9 % (FLUSH) 0.9 %
10 SYRINGE (ML) INJECTION AS NEEDED
Status: CANCELLED | OUTPATIENT
Start: 2022-09-22

## 2022-09-15 RX ORDER — ACETAMINOPHEN 325 MG/1
650 TABLET ORAL AS NEEDED
Status: CANCELLED
Start: 2022-09-22

## 2022-09-15 RX ORDER — ALBUTEROL SULFATE 0.83 MG/ML
2.5 SOLUTION RESPIRATORY (INHALATION) AS NEEDED
Status: CANCELLED
Start: 2022-09-22

## 2022-09-15 RX ORDER — SODIUM CHLORIDE 9 MG/ML
25 INJECTION, SOLUTION INTRAVENOUS CONTINUOUS
Status: CANCELLED | OUTPATIENT
Start: 2022-09-22

## 2022-09-15 RX ORDER — DIPHENHYDRAMINE HYDROCHLORIDE 50 MG/ML
25 INJECTION, SOLUTION INTRAMUSCULAR; INTRAVENOUS AS NEEDED
Status: CANCELLED
Start: 2022-09-22

## 2022-09-15 RX ORDER — ONDANSETRON 2 MG/ML
8 INJECTION INTRAMUSCULAR; INTRAVENOUS AS NEEDED
Status: CANCELLED | OUTPATIENT
Start: 2022-09-22

## 2022-09-15 RX ORDER — HEPARIN 100 UNIT/ML
300-500 SYRINGE INTRAVENOUS AS NEEDED
Status: CANCELLED
Start: 2022-09-22

## 2022-09-15 RX ORDER — HYDROCORTISONE SODIUM SUCCINATE 100 MG/2ML
100 INJECTION, POWDER, FOR SOLUTION INTRAMUSCULAR; INTRAVENOUS AS NEEDED
Status: CANCELLED | OUTPATIENT
Start: 2022-09-22

## 2022-09-22 ENCOUNTER — HOSPITAL ENCOUNTER (OUTPATIENT)
Dept: INFUSION THERAPY | Age: 47
Discharge: HOME | End: 2022-09-22
Payer: MEDICAID

## 2022-09-22 ENCOUNTER — OFFICE VISIT (OUTPATIENT)
Dept: ONCOLOGY | Age: 47
End: 2022-09-22
Payer: MEDICAID

## 2022-09-22 VITALS
WEIGHT: 206.1 LBS | TEMPERATURE: 97.4 F | DIASTOLIC BLOOD PRESSURE: 54 MMHG | SYSTOLIC BLOOD PRESSURE: 108 MMHG | HEART RATE: 81 BPM | OXYGEN SATURATION: 97 % | BODY MASS INDEX: 40.46 KG/M2 | HEIGHT: 60 IN

## 2022-09-22 VITALS
TEMPERATURE: 97.4 F | HEIGHT: 60 IN | DIASTOLIC BLOOD PRESSURE: 54 MMHG | SYSTOLIC BLOOD PRESSURE: 108 MMHG | HEART RATE: 79 BPM | WEIGHT: 206.6 LBS | RESPIRATION RATE: 18 BRPM | BODY MASS INDEX: 40.56 KG/M2

## 2022-09-22 DIAGNOSIS — N63.10 MASS OF RIGHT BREAST, UNSPECIFIED QUADRANT: ICD-10-CM

## 2022-09-22 DIAGNOSIS — A02.1 SEPSIS DUE TO SALMONELLA SPECIES WITHOUT ACUTE ORGAN DYSFUNCTION (HCC): Primary | ICD-10-CM

## 2022-09-22 DIAGNOSIS — Z79.899 ENCOUNTER FOR MONITORING CARDIOTOXIC DRUG THERAPY: ICD-10-CM

## 2022-09-22 DIAGNOSIS — N95.9 PREMENOPAUSAL PATIENT: ICD-10-CM

## 2022-09-22 DIAGNOSIS — C50.911 MALIGNANT NEOPLASM OF RIGHT BREAST IN FEMALE, ESTROGEN RECEPTOR POSITIVE, UNSPECIFIED SITE OF BREAST (HCC): Primary | ICD-10-CM

## 2022-09-22 DIAGNOSIS — K52.1 CHEMOTHERAPY INDUCED DIARRHEA: ICD-10-CM

## 2022-09-22 DIAGNOSIS — Z79.810 USE OF TAMOXIFEN (NOLVADEX): ICD-10-CM

## 2022-09-22 DIAGNOSIS — Z51.81 ENCOUNTER FOR MONITORING CARDIOTOXIC DRUG THERAPY: ICD-10-CM

## 2022-09-22 DIAGNOSIS — Z95.828 PORT-A-CATH IN PLACE: ICD-10-CM

## 2022-09-22 DIAGNOSIS — Z98.890 S/P LUMPECTOMY, RIGHT BREAST: ICD-10-CM

## 2022-09-22 DIAGNOSIS — Z17.0 MALIGNANT NEOPLASM OF RIGHT BREAST IN FEMALE, ESTROGEN RECEPTOR POSITIVE, UNSPECIFIED SITE OF BREAST (HCC): Primary | ICD-10-CM

## 2022-09-22 DIAGNOSIS — Z17.0 MALIGNANT NEOPLASM OF RIGHT BREAST IN FEMALE, ESTROGEN RECEPTOR POSITIVE, UNSPECIFIED SITE OF BREAST (HCC): ICD-10-CM

## 2022-09-22 DIAGNOSIS — Z09 CHEMOTHERAPY FOLLOW-UP EXAMINATION: ICD-10-CM

## 2022-09-22 DIAGNOSIS — Z85.3 PERSONAL HISTORY OF BREAST CANCER: ICD-10-CM

## 2022-09-22 DIAGNOSIS — T45.1X5A CHEMOTHERAPY INDUCED DIARRHEA: ICD-10-CM

## 2022-09-22 DIAGNOSIS — C50.911 MALIGNANT NEOPLASM OF RIGHT BREAST IN FEMALE, ESTROGEN RECEPTOR POSITIVE, UNSPECIFIED SITE OF BREAST (HCC): ICD-10-CM

## 2022-09-22 DIAGNOSIS — Z51.11 ENCOUNTER FOR ANTINEOPLASTIC CHEMOTHERAPY: ICD-10-CM

## 2022-09-22 PROCEDURE — 74011250636 HC RX REV CODE- 250/636: Performed by: INTERNAL MEDICINE

## 2022-09-22 PROCEDURE — 96401 CHEMO ANTI-NEOPL SQ/IM: CPT

## 2022-09-22 PROCEDURE — 99212 OFFICE O/P EST SF 10 MIN: CPT | Performed by: NURSE PRACTITIONER

## 2022-09-22 RX ADMIN — PERTUZUMAB, TRASTUZUMAB, AND HYALURONIDASE-ZZXF 10 ML: 600; 600; 2000 INJECTION, SOLUTION SUBCUTANEOUS at 10:28

## 2022-09-22 NOTE — PROGRESS NOTES
[facility] at [address], [id number] [place] [address] Road, [place]: [phone]  F: [phone]    Reason for Visit:   Omid Joseph is a 56 y.o. female seen today in office for follow up if Right Breast Cancer    Treatment History:   Initially had abnormal mammo at [address] with calcifications on RIGHT. No mass reported/dense breast tissue  RIGHT Breast Biopsy 9/9/21: PATH -  IDC, grade 3 ER/FL strong+ Her2 3+  Genetic testing negative  Breast MRI 9/17/21: RIGHT BREAST:Background parenchymal enhancement: Severe. There is a biopsy clip in the lower, outer quadrant of the right breast, junction of the middle and deep thirds. There is linear, branching enhancement which extends anterior to the biopsy clip over approximately 3.6 cm. There are no suspicious internal mammary or axillary chain lymph nodes. LEFT BREAST: Background parenchymal enhancement: Severe. There is no suspicious masslike or nonmasslike enhancement within the left breast to indicate breast carcinoma. There is a small mass in the lower, outer quadrant with an adjacent biopsy clip. There is an additional mass without significant enhancement in the lateral, middle 3rd, just below the nipple line. There are no suspicious internal mammary or axillary chain lymph nodes  Neoadjuvant TCHP chemo x 6 cycles from 10/21/21 - 2/4/22  Breast MRI 1/27/22: Right Breast: In the right lateral breast at 8-9 o'clock posterior depth 10 cm from the nipple there is susceptibility artifact from the biopsy clip but no residual enhancement. There is no suspicious axillary or internal mammary chain lymphadenopathy. Left Breast: No suspicious enhancement. There is no suspicious axillary or internal mammary chain lymphadenopathy  Right Breast Lumpectomy and SNBx 3/15/22: PATH - No residual invasive carcinoma. No in situ carcinoma or atypia identified.  0/4 LN+  Maintenance Herceptin+Perjeta 3/18/22 - Current   XRT 5/23/22 - 6/21/22  Adjuvant hormonal therapy with Tamoxifen  - Current     STAGE:   Clinical 2 ER/NY+, HER2+    History of Present Illness:   Jazmin Jimenez is a 56 y.o. female seen today in office for follow up of right breast cancer ER+ Her2 + post biopsy only 21. She started neoadjuvant TCHP on 10/21/21 and she completed 6 cycles of TCHP on 22. She had a right lumpectomy on 3/15/22 and had pCR. She started radiation on 22 and finished on 22. She is here today for Cycle 16 (tenth dose) of maintenance HP. She is also on adjuvant hormonal therapy with Tamoxifen now. She reports that she feels well overall today. She is tolerating HP and Tamoxifen well overall with no side effects. Her appetite and energy levels are good. She denies fever, chills, mouth sores, cough, SOB, CP, nausea, diarrhea, constipation, and neuropathy. She denies pain today. CBC and CMP from 22 reviewed. She is ready for treatment today. She is here alone today. Past Medical History:   Diagnosis Date    Bell's palsy     Breast cancer ([facility] [address] 2021    ER/[place]+ Her2 +      Past Surgical History:   Procedure Laterality Date    HX BREAST LUMPECTOMY Right 3/15/2022    RIGHT BREAST MAGSEED GUIDED LUMPECTOMY performed by Taylor Anders MD at [address] [place] [address] Sw AMBULATORY OR    HX  SECTION      HX  SECTION      HX CHOLECYSTECTOMY      HX CHOLECYSTECTOMY      HX VASCULAR ACCESS      IR REMOVE TUNL CVAD W PORT/PUMP  3/1/2022      Social History     Tobacco Use    Smoking status: Never    Smokeless tobacco: Never   Substance Use Topics    Alcohol use: Yes     Comment: rarely      Family History   Problem Relation Age of Onset    Cancer Sister         leukemia     Current Outpatient Medications   Medication Sig    tamoxifen 20 mg/10 mL solution Take 20 mg by mouth daily. levonorgestreL (Mirena) 20 mcg/24 hours (6 yrs) 52 mg IUD 1 Device by IntraUTERine route once. No current facility-administered medications for this visit. Facility-Administered Medications Ordered in Other Visits   Medication Dose Route Frequency    pertuzumab 600 mg-trastuzumab 600 mg-hyaluronidase-zzxf 20,000 units/10 mL  10 mL SubCUTAneous ONCE      No Known Allergies     Review of Systems:  A complete review of systems was obtained, negative except as described above and as reported on ROS sheet scanned into system. Physical Exam:     Visit Vitals  BP (!) 108/54 (BP 1 Location: Left upper arm, BP Patient Position: Sitting)   Pulse 81   Temp 97.4 °F (36.3 °C) (Temporal)   Ht 5' (1.524 m)   Wt 206 lb 1.6 oz (93.5 kg)   SpO2 97%   BMI 40.25 kg/m²     ECOG PS: 0  General: No distress  Eyes: Anicteric sclerae  HENT: Atraumatic, wearing a mask  Neck: Supple  CV: Regular   Respiratory: CTAB, normal respiratory effort  MS: Normal gait and station. Digits without clubbing or cyanosis. Skin: No rashes, ecchymoses, or petechiae. Normal temperature, turgor, and texture  Psych: Alert, oriented, appropriate affect, normal judgment/insight  Breast: Prior Visit: Post XRT skin changes to right breast. Left breast not examined   Neuro: Non focal    Results:     Lab Results   Component Value Date/Time    WBC 6.0 09/01/2022 08:35 AM    HGB 12.0 09/01/2022 08:35 AM    HCT 36.6 09/01/2022 08:35 AM    PLATELET 042 98/36/8052 08:35 AM    MCV 80.4 09/01/2022 08:35 AM    ABS.  NEUTROPHILS 4.1 09/01/2022 08:35 AM    Hemoglobin (POC) 10.7 (L) 03/15/2022 06:50 AM     Lab Results   Component Value Date/Time    Sodium 139 09/01/2022 08:35 AM    Potassium 4.0 09/01/2022 08:35 AM    Chloride 109 (H) 09/01/2022 08:35 AM    CO2 26 09/01/2022 08:35 AM    Glucose 155 (H) 09/01/2022 08:35 AM    BUN 15 09/01/2022 08:35 AM    Creatinine 0.80 09/01/2022 08:35 AM    GFR est AA >60 09/01/2022 08:35 AM    GFR est non-AA >60 09/01/2022 08:35 AM    Calcium 9.1 09/01/2022 08:35 AM     Lab Results   Component Value Date/Time    Bilirubin, total 0.4 09/01/2022 08:35 AM    ALT (SGPT) 19 09/01/2022 08:35 AM    Alk. phosphatase 79 09/01/2022 08:35 AM    Protein, total 7.5 09/01/2022 08:35 AM    Albumin 3.4 (L) 09/01/2022 08:35 AM    Globulin 4.1 (H) 09/01/2022 08:35 AM     Path at outside facility - scanned into chart     Breast MRI 9/17/21  RIGHT BREAST:  Background parenchymal enhancement: Severe   There is a biopsy clip in the lower, outer quadrant of the right breast,  junction of the middle and deep thirds. There is linear, branching enhancement  which extends anterior to the biopsy clip over approximately 3.6 cm     There are no suspicious internal mammary or axillary chain lymph nodes. [place]o Salter LEFT BREAST:  Background parenchymal enhancement: Severe  There is no suspicious masslike or nonmasslike enhancement within the left  breast to indicate breast carcinoma. There is a small mass in the lower, outer  quadrant with an adjacent biopsy clip. There is an additional mass without  significant enhancement in the lateral, middle 3rd, just below the nipple line. There are no suspicious internal mammary or axillary chain lymph nodes. .  IMPRESSION  RIGHT BREAST:  1. There is a biopsy clip in the lower, outer quadrant of the right breast,  junction of the middle and deep thirds. There is linear, branching enhancement  which extends anteriorly from the biopsy clip over approximately 3.6 cm. BI-RADS Category 6 - Known biopsy-proven malignancy. Breast MRI 1/27/22  FINDINGS:  There is marked background parenchymal enhancement. The breast tissue is heterogeneously dense, which could obscure detection of  small masses (approximately 51-75% glandular). Right Breast:  In the right lateral breast at 8-9 o'clock posterior depth 10 cm from the nipple  there is susceptibility artifact from the biopsy clip but no residual  enhancement. There is no suspicious axillary or internal mammary chain lymphadenopathy. Left Beast:  No suspicious enhancement.      There is no suspicious axillary or internal mammary chain lymphadenopathy. IMPRESSION:  Right Breast:  1. BI-RADS Assessment Category 6: Known biopsy proven malignancy- Appropriate  action should be taken. Right breast biopsy clip with no residual suspicious  enhancement. Left Breast:  1. BI-RADS Assessment     3/15/22  FINAL PATHOLOGIC DIAGNOSIS   1. [place] lymph node, #1, right axilla, excision:   One lymph node identified, negative for metastatic carcinoma (0/1). See comment. 2. [place] lymph node, #2, right axilla, excision:   One lymph node identified, negative for metastatic carcinoma (0/1). See comment. 3. [place] lymph node, #3, right axilla, excision:   Two lymph nodes identified, negative for metastatic carcinoma (0/2). See comment. 4. Breast, right, lumpectomy:   No residual invasive carcinoma identified after presurgical therapy. Sclerosing adenosis with hyalinized fibrous stroma. No in situ carcinoma or atypia identified. 10/13/21    ECHO ADULT COMPLETE 10/13/2021 10/13/2021    Interpretation Summary  · LV: Estimated LVEF is 60 - 65%. Normal cavity size, wall thickness, systolic function (ejection fraction normal) and diastolic function. Normal left ventricular strain. Signed by: Emeli Cherry MD on 10/13/2021  3:52 PM    01/05/22    ECHO ADULT COMPLETE 01/05/2022 1/5/2022    Interpretation Summary    Left Ventricle: Left ventricle size is normal. Normal wall thickness. Normal wall motion. Normal left ventricular systolic function with a visually estimated EF of 55 - 60%. Global longitudinal strain is -18.0%. Normal diastolic function. Signed by: Terry Cordova MD on 1/5/2022 12:52 PM    02/23/22    ECHO LUIS W OR WO CONTRAST 03/04/2022 3/4/2022    Interpretation Summary    Left Ventricle: Left ventricle size is normal. Normal wall thickness. Normal wall motion. Normal left ventricular systolic function with a visually estimated EF of 55 - 60%.     Signed by: Terry Cordova MD on 3/4/2022  1:26 PM    05/31/22    ECHO ADULT COMPLETE 05/31/2022 5/31/2022    Interpretation Summary    Left Ventricle: Normal left ventricular systolic function with a visually estimated EF of 55 - 60%. Not well visualized. Left ventricle size is normal. Normal wall thickness. Normal wall motion. Normal diastolic function. Signed by: Heidi Hillman MD on 5/31/2022  2:27 PM    08/29/22    ECHO ADULT COMPLETE 08/29/2022 8/29/2022    Interpretation Summary    Left Ventricle: Normal left ventricular systolic function with a visually estimated EF of 55 - 60%. Left ventricle size is normal. Normal wall thickness. Normal wall motion. Right Ventricle: Right ventricle size is normal. Normal systolic function. Signed by: Gaetana Pallas, MD on 8/29/2022  2:54 PM    Records reviewed and summarized above. Pathology report(s) reviewed above. Radiology report(s) reviewed above. Assessment:/PLAN     1) Clinical Stage 2 (based on MRI) RIGHT Breast Cancer ER+ Her2+ post Breast Biopsy at [address] 9/9/21 and Right Lumpectomy on 3/15/22  Breast MRI 9/17/21 with 3.6 cm enhancement. No mass palpable on exam.   Patient was sent here for neoadjuvant chemo based on size of mass on MRI. Fllow up ECHO 1/5/22 and EF 55-60%. She started neoadjuvant TCHP on 10/21/21 and completed 6 cycles on 2/4/22. She had a Breast MRI on 1/27/22 that showed no residual disease. She had a right lumpectomy on 3/15/22 and had a pCR. Personally reviewed pathology. Discussed with patient. ECHO/LUIS from 3/2/22 reviewed. Follow up ECHO 5/31/22 good with EF 55-60%. She started radiation on 5/23/22 and finished on 6/21/22. She is here today for Cycle 16 (tenth cycle) of maintenance HP. She started Tamoxifen (liquid form as cannot swallow pills well). She is tolerating current regimen overall without significant toxicities. She is clinically stable and healthy overall - no new issues today. 3D bilateral dx mammogram ordered today for 12/22/1/23.   She will call to schedule Surgery follow up once mammo is scheduled. Labs (CBC and CMP from 9/122) reviewed today. Continue maintenance HP and Tamoxifen. Patient is ready for treatment today. Follow up in 3 weeks in [facility] for last HP. Follow up in 3 months in office. Patient agrees with plan. 2) Premenopausal  She has two kids and does not want more kids. Reviewed impaired fertility/early menopause due to chemo with patient. She has Mirena IUD in place. Continue routine GYN follow up. 3) Management of High Risk Medications - Chemotherapy  No toxicities noted. Pre chemo ECHO from 10/13/21 reviewed. Follow up ECHO from 1/5/22 reviewed. Follow up ECHO from 2/23/22 reviewed. Follow up [place] from 3/2/22 reviewed. Follow up ECHO from 5/31/22 reviewed. Follow up ECHO from 8/29/22 reviewed. Labs (CBC and CMP from 9/1/22) reviewed today. ECHOs every 6 months x 2 years post HP. Next due end of 2/23. 4) Psychosocial  She is vaccinated for COVID. She works as a , has a supportive fiance. SW/NN support as needed. She is here alone today. Call if questions. Follow up in 3 weeks in [facility] and 3 months in office. I appreciate the opportunity to participate in Ms. Susan Rob's care.     Signed By: Negin Aguirre NP

## 2022-09-22 NOTE — PROGRESS NOTES
Vicki Moreland is a 56 y.o. female  Chief Complaint   Patient presents with    Chemotherapy    Breast Cancer       1. Have you been to the ER, urgent care clinic since your last visit? Hospitalized since your last visit? No    2. Have you seen or consulted any other health care providers outside of the [address] [place] [address] since your last visit? Include any pap smears or colon screening.  No

## 2022-09-22 NOTE — PROGRESS NOTES
[facility] - Chemotherapy Progress Note    0830- Pt admit to [address] [place] [address] for Phesgo SQ in stable condition. Assessment completed. Patient has no new complaints. Labs drawn per order and sent. Line flushed, clamped, Curos Cap applied to end clave. Chemotherapy Flowsheet 9/22/2022   Cycle C16   Date 9/22/2022   Drug / Regimen Phesgo   Pre Meds -   Notes R thigh        Patient Vitals for the past 12 hrs:   Temp Pulse Resp BP   09/22/22 0832 97.4 °F (36.3 °C) 79 18 (!) 108/54            Medications:  Medications Administered       pertuzumab 600 mg-trastuzumab 600 mg-hyaluronidase-zzxf 20,000 units/10 mL       Admin Date  09/22/2022 Action  Given Dose  10 mL Route  SubCUTAneous Administered By  Fidel Villagran RN                         Pt tolerated treatment well. 1030- D/c home in no distress.  Pt aware of next [facility] appointment scheduled for:    Future Appointments   Date Time Provider Stacey Navarroi   10/13/2022  8:30 AM G1 [facility]   12/22/2022 10:30 AM Bairon Nose Alec Elizabeth  N [facility] [address] BS AMB

## 2022-10-07 RX ORDER — ACETAMINOPHEN 325 MG/1
650 TABLET ORAL AS NEEDED
Status: CANCELLED
Start: 2022-10-13

## 2022-10-07 RX ORDER — HEPARIN 100 UNIT/ML
300-500 SYRINGE INTRAVENOUS AS NEEDED
Status: CANCELLED
Start: 2022-10-13

## 2022-10-07 RX ORDER — ALBUTEROL SULFATE 0.83 MG/ML
2.5 SOLUTION RESPIRATORY (INHALATION) AS NEEDED
Status: CANCELLED
Start: 2022-10-13

## 2022-10-07 RX ORDER — DIPHENHYDRAMINE HYDROCHLORIDE 50 MG/ML
50 INJECTION, SOLUTION INTRAMUSCULAR; INTRAVENOUS
Status: CANCELLED | OUTPATIENT
Start: 2022-10-13

## 2022-10-07 RX ORDER — ONDANSETRON 2 MG/ML
8 INJECTION INTRAMUSCULAR; INTRAVENOUS AS NEEDED
Status: CANCELLED | OUTPATIENT
Start: 2022-10-13

## 2022-10-07 RX ORDER — DIPHENHYDRAMINE HYDROCHLORIDE 50 MG/ML
50 INJECTION, SOLUTION INTRAMUSCULAR; INTRAVENOUS AS NEEDED
Status: CANCELLED
Start: 2022-10-13

## 2022-10-07 RX ORDER — HYDROCORTISONE SODIUM SUCCINATE 100 MG/2ML
100 INJECTION, POWDER, FOR SOLUTION INTRAMUSCULAR; INTRAVENOUS AS NEEDED
Status: CANCELLED | OUTPATIENT
Start: 2022-10-13

## 2022-10-07 RX ORDER — ACETAMINOPHEN 325 MG/1
650 TABLET ORAL
Status: CANCELLED | OUTPATIENT
Start: 2022-10-13

## 2022-10-07 RX ORDER — SODIUM CHLORIDE 0.9 % (FLUSH) 0.9 %
10 SYRINGE (ML) INJECTION AS NEEDED
Status: CANCELLED | OUTPATIENT
Start: 2022-10-13

## 2022-10-07 RX ORDER — SODIUM CHLORIDE 9 MG/ML
25 INJECTION, SOLUTION INTRAVENOUS CONTINUOUS
Status: CANCELLED | OUTPATIENT
Start: 2022-10-13

## 2022-10-07 RX ORDER — DIPHENHYDRAMINE HYDROCHLORIDE 50 MG/ML
25 INJECTION, SOLUTION INTRAMUSCULAR; INTRAVENOUS AS NEEDED
Status: CANCELLED
Start: 2022-10-13

## 2022-10-07 RX ORDER — SODIUM CHLORIDE 9 MG/ML
10 INJECTION INTRAMUSCULAR; INTRAVENOUS; SUBCUTANEOUS AS NEEDED
Status: CANCELLED | OUTPATIENT
Start: 2022-10-13

## 2022-10-07 RX ORDER — EPINEPHRINE 1 MG/ML
0.3 INJECTION, SOLUTION, CONCENTRATE INTRAVENOUS AS NEEDED
Status: CANCELLED | OUTPATIENT
Start: 2022-10-13

## 2022-10-13 ENCOUNTER — HOSPITAL ENCOUNTER (OUTPATIENT)
Dept: INFUSION THERAPY | Age: 47
Discharge: HOME | End: 2022-10-13
Payer: MEDICAID

## 2022-10-13 VITALS
DIASTOLIC BLOOD PRESSURE: 72 MMHG | TEMPERATURE: 98 F | RESPIRATION RATE: 18 BRPM | HEART RATE: 72 BPM | SYSTOLIC BLOOD PRESSURE: 124 MMHG

## 2022-10-13 DIAGNOSIS — N95.9 PREMENOPAUSAL PATIENT: ICD-10-CM

## 2022-10-13 DIAGNOSIS — Z51.81 ENCOUNTER FOR MONITORING CARDIOTOXIC DRUG THERAPY: ICD-10-CM

## 2022-10-13 DIAGNOSIS — Z17.0 MALIGNANT NEOPLASM OF RIGHT BREAST IN FEMALE, ESTROGEN RECEPTOR POSITIVE, UNSPECIFIED SITE OF BREAST (HCC): ICD-10-CM

## 2022-10-13 DIAGNOSIS — Z79.899 ENCOUNTER FOR MONITORING CARDIOTOXIC DRUG THERAPY: ICD-10-CM

## 2022-10-13 DIAGNOSIS — C50.911 MALIGNANT NEOPLASM OF RIGHT BREAST IN FEMALE, ESTROGEN RECEPTOR POSITIVE, UNSPECIFIED SITE OF BREAST (HCC): ICD-10-CM

## 2022-10-13 DIAGNOSIS — N63.10 MASS OF RIGHT BREAST, UNSPECIFIED QUADRANT: ICD-10-CM

## 2022-10-13 DIAGNOSIS — Z95.828 PORT-A-CATH IN PLACE: ICD-10-CM

## 2022-10-13 DIAGNOSIS — T45.1X5A CHEMOTHERAPY INDUCED DIARRHEA: ICD-10-CM

## 2022-10-13 DIAGNOSIS — A02.1 SEPSIS DUE TO SALMONELLA SPECIES WITHOUT ACUTE ORGAN DYSFUNCTION (HCC): Primary | ICD-10-CM

## 2022-10-13 DIAGNOSIS — K52.1 CHEMOTHERAPY INDUCED DIARRHEA: ICD-10-CM

## 2022-10-13 DIAGNOSIS — Z09 CHEMOTHERAPY FOLLOW-UP EXAMINATION: ICD-10-CM

## 2022-10-13 DIAGNOSIS — Z51.11 ENCOUNTER FOR ANTINEOPLASTIC CHEMOTHERAPY: ICD-10-CM

## 2022-10-13 LAB
ALBUMIN SERPL-MCNC: 3.4 G/DL (ref 3.5–5)
ALBUMIN/GLOB SERPL: 0.9 {RATIO} (ref 1.1–2.2)
ALP SERPL-CCNC: 82 U/L (ref 45–117)
ALT SERPL-CCNC: 18 U/L (ref 12–78)
ANION GAP SERPL CALC-SCNC: 3 MMOL/L (ref 5–15)
AST SERPL-CCNC: 8 U/L (ref 15–37)
BASOPHILS # BLD: 0 K/UL (ref 0–0.1)
BASOPHILS NFR BLD: 1 % (ref 0–1)
BILIRUB SERPL-MCNC: 0.3 MG/DL (ref 0.2–1)
BUN SERPL-MCNC: 20 MG/DL (ref 6–20)
BUN/CREAT SERPL: 24 (ref 12–20)
CALCIUM SERPL-MCNC: 8.6 MG/DL (ref 8.5–10.1)
CHLORIDE SERPL-SCNC: 108 MMOL/L (ref 97–108)
CO2 SERPL-SCNC: 29 MMOL/L (ref 21–32)
CREAT SERPL-MCNC: 0.83 MG/DL (ref 0.55–1.02)
DIFFERENTIAL METHOD BLD: ABNORMAL
EOSINOPHIL # BLD: 0.1 K/UL (ref 0–0.4)
EOSINOPHIL NFR BLD: 2 % (ref 0–7)
ERYTHROCYTE [DISTWIDTH] IN BLOOD BY AUTOMATED COUNT: 14.7 % (ref 11.5–14.5)
GLOBULIN SER CALC-MCNC: 3.6 G/DL (ref 2–4)
GLUCOSE SERPL-MCNC: 182 MG/DL (ref 65–100)
HCT VFR BLD AUTO: 37.6 % (ref 35–47)
HGB BLD-MCNC: 11.7 G/DL (ref 11.5–16)
IMM GRANULOCYTES # BLD AUTO: 0 K/UL (ref 0–0.04)
IMM GRANULOCYTES NFR BLD AUTO: 0 % (ref 0–0.5)
LYMPHOCYTES # BLD: 1.4 K/UL (ref 0.8–3.5)
LYMPHOCYTES NFR BLD: 26 % (ref 12–49)
MCH RBC QN AUTO: 26.3 PG (ref 26–34)
MCHC RBC AUTO-ENTMCNC: 31.1 G/DL (ref 30–36.5)
MCV RBC AUTO: 84.5 FL (ref 80–99)
MONOCYTES # BLD: 0.3 K/UL (ref 0–1)
MONOCYTES NFR BLD: 5 % (ref 5–13)
NEUTS SEG # BLD: 3.5 K/UL (ref 1.8–8)
NEUTS SEG NFR BLD: 66 % (ref 32–75)
NRBC # BLD: 0 K/UL (ref 0–0.01)
NRBC BLD-RTO: 0 PER 100 WBC
PLATELET # BLD AUTO: 168 K/UL (ref 150–400)
PMV BLD AUTO: 10.8 FL (ref 8.9–12.9)
POTASSIUM SERPL-SCNC: 4 MMOL/L (ref 3.5–5.1)
PROT SERPL-MCNC: 7 G/DL (ref 6.4–8.2)
RBC # BLD AUTO: 4.45 M/UL (ref 3.8–5.2)
SODIUM SERPL-SCNC: 140 MMOL/L (ref 136–145)
WBC # BLD AUTO: 5.4 K/UL (ref 3.6–11)

## 2022-10-13 PROCEDURE — 85025 COMPLETE CBC W/AUTO DIFF WBC: CPT

## 2022-10-13 PROCEDURE — 96402 CHEMO HORMON ANTINEOPL SQ/IM: CPT

## 2022-10-13 PROCEDURE — 80053 COMPREHEN METABOLIC PANEL: CPT

## 2022-10-13 PROCEDURE — 74011250636 HC RX REV CODE- 250/636: Performed by: INTERNAL MEDICINE

## 2022-10-13 PROCEDURE — 36415 COLL VENOUS BLD VENIPUNCTURE: CPT

## 2022-10-13 RX ADMIN — PERTUZUMAB, TRASTUZUMAB, AND HYALURONIDASE-ZZXF 10 ML: 600; 600; 2000 INJECTION, SOLUTION SUBCUTANEOUS at 09:17

## 2022-10-13 NOTE — PROGRESS NOTES
[facility] Chemotherapy Progress Note    Date: 2022    Name: Emmy Smiley    MRN: [id number]         : 1975      0835 Pt admit to [facility] for Phesgo C17 ambulatory in stable condition. Assessment completed. No new concerns voiced. Labs drawn peripherally with no difficulty from RAC. Patient denies SOB, fever, cough, general not feeling well. Patient denies recent exposure to someone who has tested positive for COVID-19. Patient denies having contact with anyone who has a pending COVID test.    Ms. Rob's vitals were reviewed. Patient Vitals for the past 12 hrs:   Temp Pulse Resp BP   10/13/22 0835 98 °F (36.7 °C) 72 18 124/72     Lab results were obtained. See pending lab results    Medications given:  Phesgo SQ in left thigh    0925 Pt tolerated treatment well. D/c home ambulatory in no distress. Future Appointments   Date Time Provider Stacey Sanchez   10/13/2022  8:30 AM [facility]   2022 10:30 AM Jean-Claude Sandoval,  N [facility] BS AMB   1/3/2023  9:15 AM [facility] 5 [facility] RN  2022

## 2023-01-03 ENCOUNTER — HOSPITAL ENCOUNTER (OUTPATIENT)
Dept: MAMMOGRAPHY | Age: 48
Discharge: HOME | End: 2023-01-03
Attending: NURSE PRACTITIONER
Payer: MEDICAID

## 2023-01-03 DIAGNOSIS — C50.911 MALIGNANT NEOPLASM OF RIGHT BREAST IN FEMALE, ESTROGEN RECEPTOR POSITIVE, UNSPECIFIED SITE OF BREAST (HCC): ICD-10-CM

## 2023-01-03 DIAGNOSIS — Z85.3 PERSONAL HISTORY OF BREAST CANCER: ICD-10-CM

## 2023-01-03 DIAGNOSIS — Z17.0 MALIGNANT NEOPLASM OF RIGHT BREAST IN FEMALE, ESTROGEN RECEPTOR POSITIVE, UNSPECIFIED SITE OF BREAST (HCC): ICD-10-CM

## 2023-01-03 PROCEDURE — 77062 BREAST TOMOSYNTHESIS BI: CPT

## 2023-01-18 NOTE — PROGRESS NOTES
[facility] at [address] [address] [place], [id number] [place] [address] Road, [facility] [phone]  F: [phone]    Reason for Visit:   Barry Carranza is a 56 y.o. female seen today in office for follow up if Right Breast Cancer    Treatment History:   Initially had abnormal mammo at [facility] with calcifications on RIGHT. No mass reported/dense breast tissue  RIGHT Breast Biopsy 9/9/21: PATH -  IDC, grade 3 ER/RI strong+ Her2 3+  Genetic testing negative  Breast MRI 9/17/21: RIGHT BREAST:Background parenchymal enhancement: Severe. There is a biopsy clip in the lower, outer quadrant of the right breast, junction of the middle and deep thirds. There is linear, branching enhancement which extends anterior to the biopsy clip over approximately 3.6 cm. There are no suspicious internal mammary or axillary chain lymph nodes. LEFT BREAST: Background parenchymal enhancement: Severe. There is no suspicious masslike or nonmasslike enhancement within the left breast to indicate breast carcinoma. There is a small mass in the lower, outer quadrant with an adjacent biopsy clip. There is an additional mass without significant enhancement in the lateral, middle 3rd, just below the nipple line. There are no suspicious internal mammary or axillary chain lymph nodes  Neoadjuvant TCHP chemo x 6 cycles from 10/21/21 - 2/4/22  Breast MRI 1/27/22: Right Breast: In the right lateral breast at 8-9 o'clock posterior depth 10 cm from the nipple there is susceptibility artifact from the biopsy clip but no residual enhancement. There is no suspicious axillary or internal mammary chain lymphadenopathy. Left Breast: No suspicious enhancement. There is no suspicious axillary or internal mammary chain lymphadenopathy  Right Breast Lumpectomy and SNBx 3/15/22: PATH - No residual invasive carcinoma. No in situ carcinoma or atypia identified.  0/4 LN+  Maintenance Herceptin+Perjeta 3/18/22 - 10/13/22  XRT 5/23/22 - 6/21/22  Adjuvant hormonal therapy with Tamoxifen  - Current     STAGE:   Clinical 2 ER/NE+, HER2+    History of Present Illness:   Araceli Rudolhp is a 56 y.o. female seen today in office for follow up of right breast cancer ER+ Her2 + post biopsy 21. She started neoadjuvant TCHP on 10/21/21 and she completed 6 cycles of TCHP on 22. She had a right lumpectomy on 3/15/22 and had pCR. She started radiation on 22 and finished on 22. She finished maintenance HP on 10/13/22. She is here today for 3 month follow up. She remains on adjuvant hormonal therapy with Tamoxifen now. She reports that she feels well overall today. She is tolerating Tamoxifen well overall with some hot flashes. Her appetite and energy levels are good. She denies fever, chills, mouth sores, cough, SOB, CP, nausea, diarrhea, constipation, and neuropathy. She denies pain today. She is here alone today. Past Medical History:   Diagnosis Date    Bell's palsy     Breast cancer ([facility] 2021    ER/NE+ Her2 +      Past Surgical History:   Procedure Laterality Date    HX BREAST LUMPECTOMY Right 3/15/2022    RIGHT BREAST MAGSEED GUIDED LUMPECTOMY performed by Tommy Abernathy MD at [facility] OR    HX  SECTION      HX  SECTION      HX CHOLECYSTECTOMY      HX CHOLECYSTECTOMY      HX VASCULAR ACCESS      IR REMOVE TUNL CVAD W PORT/PUMP  3/1/2022      Social History     Tobacco Use    Smoking status: Never    Smokeless tobacco: Never   Substance Use Topics    Alcohol use: Yes     Comment: rarely      Family History   Problem Relation Age of Onset    Cancer Sister         leukemia     Current Outpatient Medications   Medication Sig    tamoxifen 20 mg/10 mL solution Take 20 mg by mouth daily. levonorgestreL (Mirena) 20 mcg/24 hours (6 yrs) 52 mg IUD 1 Device by IntraUTERine route once. No current facility-administered medications for this visit.       No Known Allergies     Review of Systems:  A complete review of systems was obtained, negative except as described above and as reported on ROS sheet scanned into system. Physical Exam:     Visit Vitals  BP 99/68 (BP 1 Location: Right arm, BP Patient Position: Sitting, BP Cuff Size: Adult)   Pulse 69   Temp 97.6 °F (36.4 °C) (Temporal)   Resp 18   Ht 5' (1.524 m)   Wt 210 lb 1.6 oz (95.3 kg)   SpO2 97%   BMI 41.03 kg/m²     ECOG PS: 0  General: No distress  Eyes: Anicteric sclerae  HENT: Atraumatic  Neck: Supple  CV: Regular   Respiratory: CTAB, normal respiratory effort  MS: Normal gait and station. Digits without clubbing or cyanosis. Skin: No rashes, ecchymoses, or petechiae. Normal temperature, turgor, and texture  Psych: Alert, oriented, appropriate affect, normal judgment/insight  Breast: Right breast with well healed lumpectomy incision, no palpable masses on left or right breast   Neuro: Non focal    Results:     Lab Results   Component Value Date/Time    WBC 5.4 10/13/2022 08:45 AM    HGB 11.7 10/13/2022 08:45 AM    HCT 37.6 10/13/2022 08:45 AM    PLATELET 312 58/10/5072 08:45 AM    MCV 84.5 10/13/2022 08:45 AM    ABS. NEUTROPHILS 3.5 10/13/2022 08:45 AM    Hemoglobin (POC) 10.7 (L) 03/15/2022 06:50 AM     Lab Results   Component Value Date/Time    Sodium 140 10/13/2022 08:45 AM    Potassium 4.0 10/13/2022 08:45 AM    Chloride 108 10/13/2022 08:45 AM    CO2 29 10/13/2022 08:45 AM    Glucose 182 (H) 10/13/2022 08:45 AM    BUN 20 10/13/2022 08:45 AM    Creatinine 0.83 10/13/2022 08:45 AM    GFR est AA >60 09/01/2022 08:35 AM    GFR est non-AA >60 09/01/2022 08:35 AM    Calcium 8.6 10/13/2022 08:45 AM     Lab Results   Component Value Date/Time    Bilirubin, total 0.3 10/13/2022 08:45 AM    ALT (SGPT) 18 10/13/2022 08:45 AM    Alk.  phosphatase 82 10/13/2022 08:45 AM    Protein, total 7.0 10/13/2022 08:45 AM    Albumin 3.4 (L) 10/13/2022 08:45 AM    Globulin 3.6 10/13/2022 08:45 AM     Path at outside facility - scanned into chart     Breast MRI 9/17/21  RIGHT BREAST:  Background parenchymal enhancement: Severe   There is a biopsy clip in the lower, outer quadrant of the right breast,  junction of the middle and deep thirds. There is linear, branching enhancement  which extends anterior to the biopsy clip over approximately 3.6 cm     There are no suspicious internal mammary or axillary chain lymph nodes. Pink Abel LEFT BREAST:  Background parenchymal enhancement: Severe  There is no suspicious masslike or nonmasslike enhancement within the left  breast to indicate breast carcinoma. There is a small mass in the lower, outer  quadrant with an adjacent biopsy clip. There is an additional mass without  significant enhancement in the lateral, middle 3rd, just below the nipple line. There are no suspicious internal mammary or axillary chain lymph nodes. .  IMPRESSION  RIGHT BREAST:  1. There is a biopsy clip in the lower, outer quadrant of the right breast,  junction of the middle and deep thirds. There is linear, branching enhancement  which extends anteriorly from the biopsy clip over approximately 3.6 cm. BI-RADS Category 6 - Known biopsy-proven malignancy. Breast MRI 1/27/22  FINDINGS:  There is marked background parenchymal enhancement. The breast tissue is heterogeneously dense, which could obscure detection of  small masses (approximately 51-75% glandular). Right Breast:  In the right lateral breast at 8-9 o'clock posterior depth 10 cm from the nipple  there is susceptibility artifact from the biopsy clip but no residual  enhancement. There is no suspicious axillary or internal mammary chain lymphadenopathy. Left Beast:  No suspicious enhancement. There is no suspicious axillary or internal mammary chain lymphadenopathy. IMPRESSION:  Right Breast:  1. BI-RADS Assessment Category 6: Known biopsy proven malignancy- Appropriate  action should be taken. Right breast biopsy clip with no residual suspicious  enhancement. Left Breast:  1.  BI-RADS Assessment 3/15/22  FINAL PATHOLOGIC DIAGNOSIS   1. [place] lymph node, #1, right axilla, excision:   One lymph node identified, negative for metastatic carcinoma (0/1). See comment. 2. [place] lymph node, #2, right axilla, excision:   One lymph node identified, negative for metastatic carcinoma (0/1). See comment. 3. [place] lymph node, #3, right axilla, excision:   Two lymph nodes identified, negative for metastatic carcinoma (0/2). See comment. 4. Breast, right, lumpectomy:   No residual invasive carcinoma identified after presurgical therapy. Sclerosing adenosis with hyalinized fibrous stroma. No in situ carcinoma or atypia identified. 10/13/21    ECHO ADULT COMPLETE 10/13/2021 10/13/2021    Interpretation Summary  · LV: Estimated LVEF is 60 - 65%. Normal cavity size, wall thickness, systolic function (ejection fraction normal) and diastolic function. Normal left ventricular strain. Signed by: Delbert Rogers MD on 10/13/2021  3:52 PM    01/05/22    ECHO ADULT COMPLETE 01/05/2022 1/5/2022    Interpretation Summary    Left Ventricle: Left ventricle size is normal. Normal wall thickness. Normal wall motion. Normal left ventricular systolic function with a visually estimated EF of 55 - 60%. Global longitudinal strain is -18.0%. Normal diastolic function. Signed by: Octavio Schmidt MD on 1/5/2022 12:52 PM    02/23/22    ECHO LUIS W OR WO CONTRAST 03/04/2022 3/4/2022    Interpretation Summary    Left Ventricle: Left ventricle size is normal. Normal wall thickness. Normal wall motion. Normal left ventricular systolic function with a visually estimated EF of 55 - 60%. Signed by: Octavio Schmidt MD on 3/4/2022  1:26 PM    05/31/22    ECHO ADULT COMPLETE 05/31/2022 5/31/2022    Interpretation Summary    Left Ventricle: Normal left ventricular systolic function with a visually estimated EF of 55 - 60%. Not well visualized. Left ventricle size is normal. Normal wall thickness.  Normal wall motion. Normal diastolic function. Signed by: Gee Golden MD on 5/31/2022  2:27 PM    08/29/22    ECHO ADULT COMPLETE 08/29/2022 8/29/2022    Interpretation Summary    Left Ventricle: Normal left ventricular systolic function with a visually estimated EF of 55 - 60%. Left ventricle size is normal. Normal wall thickness. Normal wall motion. Right Ventricle: Right ventricle size is normal. Normal systolic function. Signed by: Ella Easton MD on 8/29/2022  2:54 PM    [facility] Results (most recent):  Results from [place] encounter on 01/03/23    [facility] 3D DARRYL W MAMMO BI DX INCL CAD    Narrative  Bilateral diagnostic mammography with tomography    INDICATION: . Malignant neoplasm of unspecified site of right female breast.  Lumpectomy in the upper outer quadrant of the right breast in 2022. COMPARISON: Mammogram(s), most recent, 3/11/2022, 2021, 2020. Taina Pres[place]io COMPOSITION:  There are scattered fibroglandular densities. .  TECHNIQUE:  Standard 2D, CC and MLO views of the each breast were performed with digital  technique and subjected to computer-aided detection. Tomosynthesis of each  breast was performed in CC and MLO projections. Spot compression views were  obtained of the lumpectomy site. Taina Leaf FINDINGS:    No new visualized mass, suspicious microcalcification or architectural  distortion. Lumpectomy changes are present in the upper-outer quadrant of the  right breast. There is no new abnormality. .    Impression  1. Postlumpectomy changes in the right breast.  2. No mammographic evidence for malignancy in either breast.  3. The patient was given this result. 4. BI-RADS Category 2, benign  RECOMMENDATION:  Bilateral diagnostic Mammogram in 1 year. Records reviewed and summarized above. Pathology report(s) reviewed above. Radiology report(s) reviewed above.     Assessment:/PLAN     1) Clinical Stage 2 (based on MRI) RIGHT Breast Cancer ER+ Her2+ post Breast Biopsy at [facility] 9/9/21 and Right Lumpectomy on 3/15/22  Breast MRI 9/17/21 with 3.6 cm enhancement. No mass palpable on exam.   Patient was sent here for neoadjuvant chemo based on size of mass on MRI. Fllow up ECHO 1/5/22 and EF 55-60%. She started neoadjuvant TCHP on 10/21/21 and completed 6 cycles on 2/4/22. She had a Breast MRI on 1/27/22 that showed no residual disease. She had a right lumpectomy on 3/15/22 and had a pCR. Personally reviewed pathology. Discussed with patient. ECHO/LUIS from 3/2/22 reviewed. Follow up ECHO 5/31/22 good with EF 55-60%. She started radiation on 5/23/22 and finished on 6/21/22. She finished maintenance HP on 10/13/22. Patient seen today in office for 3 month follow up. She started Tamoxifen (liquid form as cannot swallow pills well). She is tolerating Tamoxifen well overall with some mild hot flashes. She is clinically stable and healthy overall - no new issues today. She had a 3D bilateral dx mammogram on 1/3/23 that was negative/good. ECHOs every 6 months x 2 years post HP. Next due end of 2/23 - ordered today. She has follow up with Breast Surgery on 1/30/23. Continue adjuvant hormonal therapy with Tamoxifen. Tamoxifen refilled today. Follow up in 3 months in office. Patient agrees with plan. 2) Premenopausal  She has two kids and does not want more kids. Reviewed impaired fertility/early menopause due to chemo with patient. She has Mirena IUD in place. Continue routine GYN follow up. 3) Psychosocial  She is vaccinated for COVID. She works as a , has a supportive fiance. SW/NN support as needed. She is here alone today. Call if questions. Follow up in 3 months in office. I have personally performed a face to face diagnostic evaluation on this patient. I have reviewed and agree with care plan today. My findings are as follows:  Doing well overall. Tolerating tamoxifen well.  No new issues today    General: alert, cooperative, no distress   Mental status: normal mood, behavior, speech, dress, motor activity, and thought processes, able to follow commands   HENT: NCAT   Neck: no visualized mass   Resp: no respiratory distress   Neuro: no gross deficits   Skin: no discoloration or lesions of concern on visible areas   Psychiatric: normal affect, consistent with stated mood, no evidence of hallucinations     Mammo reviewed and up to date  Labs and routine HM with PCP  Continue with adjuvant tamoxifen to 5 years then re eval  Continue ECHO surveillance    I appreciate the opportunity to participate in Ms. Susan Rob's care.     Signed By: Roberta Pruett DO

## 2023-01-19 ENCOUNTER — OFFICE VISIT (OUTPATIENT)
Dept: ONCOLOGY | Age: 48
End: 2023-01-19
Payer: MEDICAID

## 2023-01-19 VITALS
BODY MASS INDEX: 41.25 KG/M2 | HEART RATE: 69 BPM | TEMPERATURE: 97.6 F | OXYGEN SATURATION: 97 % | RESPIRATION RATE: 18 BRPM | WEIGHT: 210.1 LBS | DIASTOLIC BLOOD PRESSURE: 68 MMHG | SYSTOLIC BLOOD PRESSURE: 99 MMHG | HEIGHT: 60 IN

## 2023-01-19 DIAGNOSIS — Z79.810 USE OF TAMOXIFEN (NOLVADEX): ICD-10-CM

## 2023-01-19 DIAGNOSIS — Z51.81 ENCOUNTER FOR MONITORING CARDIOTOXIC DRUG THERAPY: ICD-10-CM

## 2023-01-19 DIAGNOSIS — T45.1X5A HOT FLASHES DUE TO TAMOXIFEN: ICD-10-CM

## 2023-01-19 DIAGNOSIS — Z17.0 MALIGNANT NEOPLASM OF RIGHT BREAST IN FEMALE, ESTROGEN RECEPTOR POSITIVE, UNSPECIFIED SITE OF BREAST (HCC): Primary | ICD-10-CM

## 2023-01-19 DIAGNOSIS — N95.9 PREMENOPAUSAL PATIENT: ICD-10-CM

## 2023-01-19 DIAGNOSIS — R23.2 HOT FLASHES DUE TO TAMOXIFEN: ICD-10-CM

## 2023-01-19 DIAGNOSIS — Z98.890 S/P LUMPECTOMY, RIGHT BREAST: ICD-10-CM

## 2023-01-19 DIAGNOSIS — Z79.899 ENCOUNTER FOR MONITORING CARDIOTOXIC DRUG THERAPY: ICD-10-CM

## 2023-01-19 DIAGNOSIS — C50.911 MALIGNANT NEOPLASM OF RIGHT BREAST IN FEMALE, ESTROGEN RECEPTOR POSITIVE, UNSPECIFIED SITE OF BREAST (HCC): Primary | ICD-10-CM

## 2023-01-19 NOTE — LETTER
1/19/2023    Patient: Barry Carranza   YOB: 1975   Date of Visit: 1/19/2023     Yaneli Dias MD  [address] [place] [id number]  Via In Basket    Dear aYneli Dias MD,      Thank you for referring Ms. Yandel Bautista to [address] [place] [address] for evaluation. My notes for this consultation are attached. If you have questions, please do not hesitate to call me. I look forward to following your patient along with you.       Sincerely,    Beatris Bryant, DO

## 2023-01-19 NOTE — PROGRESS NOTES
Verified Name and  of the patient. Chief Complaint   Patient presents with    Follow-up     3 month follow-up malignant neoplasm of right breast. Pt needs refill of Tamoxifen        Health maintenance will be addressed with Primary Care Provider at next visit. Vitals:    23 0907   BP: 99/68   Pulse: 69   Resp: 18   Temp: 97.6 °F (36.4 °C)   TempSrc: Temporal   SpO2: 97%   Weight: 210 lb 1.6 oz (95.3 kg)   Height: 5' (1.524 m)   PainSc:   0 - No pain       1. Have you been to the ER, urgent care clinic since your last visit? Hospitalized since your last visit? No    2. Have you seen or consulted any other health care providers outside of the [address] [place] [address] since your last visit? Include any pap smears or colon screening.  No

## 2023-01-30 ENCOUNTER — OFFICE VISIT (OUTPATIENT)
Dept: SURGERY | Age: 48
End: 2023-01-30
Payer: MEDICAID

## 2023-01-30 VITALS — WEIGHT: 210 LBS | HEIGHT: 60 IN | BODY MASS INDEX: 41.23 KG/M2

## 2023-01-30 DIAGNOSIS — C50.911 MALIGNANT NEOPLASM OF RIGHT BREAST IN FEMALE, ESTROGEN RECEPTOR POSITIVE, UNSPECIFIED SITE OF BREAST (HCC): Primary | ICD-10-CM

## 2023-01-30 DIAGNOSIS — Z17.0 MALIGNANT NEOPLASM OF RIGHT BREAST IN FEMALE, ESTROGEN RECEPTOR POSITIVE, UNSPECIFIED SITE OF BREAST (HCC): Primary | ICD-10-CM

## 2023-01-30 PROCEDURE — 99213 OFFICE O/P EST LOW 20 MIN: CPT | Performed by: SURGERY

## 2023-01-30 NOTE — PROGRESS NOTES
HISTORY OF PRESENT ILLNESS  Dianne Santos is a 56 y.o. female. HPI  ESTABLISHED patient here for annual follow up for RIGHT breast cancer. She is doing well. No breast complaints. Breast history -   Referring - Dr. Xu Ji - [address]  Initially had abnormal mammo at [address] with calcifications on RIGHT. No mass reported/dense breast tissue  RIGHT Breast Biopsy 9/9/21: PATH -  IDC, grade 3 ER/MT strong+ Her2 3+  Genetic testing negative  Neoadjuvant TCHP chemo 10/21/21 - started - Dr. Oriana Toney  Right Breast Lumpectomy and SNBx 3/15/22: PATH - No residual invasive carcinoma. No in situ carcinoma or atypia identified. 0/4 LN+  Maintenance Herceptin+Perjeta 3/18/22 - 10/13/22  XRT 5/23/22 - 6/21/22  Adjuvant hormonal therapy with Tamoxifen 7/22 - Current    [facility] Results (most recent):  Results from Hospital Encounter encounter on 01/03/23    [facility] 3D DARRYL W MAMMO BI DX INCL CAD    Narrative  Bilateral diagnostic mammography with tomography    INDICATION: . Malignant neoplasm of unspecified site of right female breast.  Lumpectomy in the upper outer quadrant of the right breast in 2022. COMPARISON: Mammogram(s), most recent, 3/11/2022, 2021, 2020. [facility] [place] COMPOSITION:  There are scattered fibroglandular densities. .  TECHNIQUE:  Standard 2D, CC and MLO views of the each breast were performed with digital  technique and subjected to computer-aided detection. Tomosynthesis of each  breast was performed in CC and MLO projections. Spot compression views were  obtained of the lumpectomy site. [place] Us[place] FINDINGS:    No new visualized mass, suspicious microcalcification or architectural  distortion. Lumpectomy changes are present in the upper-outer quadrant of the  right breast. There is no new abnormality. .    Impression  1. Postlumpectomy changes in the right breast.  2. No mammographic evidence for malignancy in either breast.  3. The patient was given this result.   4. BI-RADS Category 2, benign  RECOMMENDATION:  Bilateral diagnostic Mammogram in 1 year. Review of Systems   All other systems reviewed and are negative. Physical Exam  Vitals and nursing note reviewed. Chest:   Breasts:     Right: Skin change (from xrt) present. No swelling, bleeding, inverted nipple, mass, nipple discharge or tenderness. Left: No swelling, bleeding, inverted nipple, mass, nipple discharge, skin change or tenderness. Lymphadenopathy:      Upper Body:      Right upper body: No axillary adenopathy. Left upper body: No axillary adenopathy. ASSESSMENT and PLAN    ICD-10-CM ICD-9-CM    1. Malignant neoplasm of right breast in female, estrogen receptor positive, unspecified site of [facility]  C50.911 174.9     Z17.0 V86.0       - no evidence local recurrence  - rtc in 6 months with NP  20 minutes was spent with patient on counseling and coordination of care.

## 2023-02-21 ENCOUNTER — HOSPITAL ENCOUNTER (OUTPATIENT)
Dept: NON INVASIVE DIAGNOSTICS | Age: 48
Discharge: HOME | End: 2023-02-21
Attending: NURSE PRACTITIONER
Payer: MEDICAID

## 2023-02-21 VITALS
WEIGHT: 210.1 LBS | BODY MASS INDEX: 41.25 KG/M2 | SYSTOLIC BLOOD PRESSURE: 114 MMHG | HEIGHT: 60 IN | DIASTOLIC BLOOD PRESSURE: 69 MMHG

## 2023-02-21 DIAGNOSIS — Z17.0 MALIGNANT NEOPLASM OF RIGHT BREAST IN FEMALE, ESTROGEN RECEPTOR POSITIVE, UNSPECIFIED SITE OF BREAST (HCC): ICD-10-CM

## 2023-02-21 DIAGNOSIS — Z79.899 ENCOUNTER FOR MONITORING CARDIOTOXIC DRUG THERAPY: ICD-10-CM

## 2023-02-21 DIAGNOSIS — Z51.81 ENCOUNTER FOR MONITORING CARDIOTOXIC DRUG THERAPY: ICD-10-CM

## 2023-02-21 DIAGNOSIS — C50.911 MALIGNANT NEOPLASM OF RIGHT BREAST IN FEMALE, ESTROGEN RECEPTOR POSITIVE, UNSPECIFIED SITE OF BREAST (HCC): ICD-10-CM

## 2023-02-21 LAB
ECHO LV EJECTION FRACTION A2C: 54 %
ECHO LV EJECTION FRACTION A4C: 47 %
ECHO LV GLOBAL LONGITUDINAL STRAIN (GLS): -16.4 %

## 2023-02-21 PROCEDURE — 93308 TTE F-UP OR LMTD: CPT | Performed by: SPECIALIST

## 2023-02-21 PROCEDURE — 93356 MYOCRD STRAIN IMG SPCKL TRCK: CPT | Performed by: SPECIALIST

## 2023-02-21 PROCEDURE — 93308 TTE F-UP OR LMTD: CPT

## 2023-02-21 PROCEDURE — 93306 TTE W/DOPPLER COMPLETE: CPT

## 2023-02-21 NOTE — PROGRESS NOTES
Call to patient, [phone]. Left VM that RN was sending results via [facility].

## 2023-04-18 NOTE — PROGRESS NOTES
[facility] [place] [facility] [place] [address] [address], [address] [address], [facility]: [phone]  F: [phone]    Reason for Visit:   Trish Cerda is a 56 y.o. female seen today in office for follow up if Right Breast Cancer    Treatment History:   Initially had abnormal mammo at [facility] with calcifications on RIGHT. No mass reported/dense breast tissue  RIGHT Breast Biopsy 9/9/21: PATH -  IDC, grade 3 ER/NM strong+ Her2 3+  Genetic testing negative  Breast MRI 9/17/21: RIGHT BREAST:Background parenchymal enhancement: Severe. There is a biopsy clip in the lower, outer quadrant of the right breast, junction of the middle and deep thirds. There is linear, branching enhancement which extends anterior to the biopsy clip over approximately 3.6 cm. There are no suspicious internal mammary or axillary chain lymph nodes. LEFT BREAST: Background parenchymal enhancement: Severe. There is no suspicious masslike or nonmasslike enhancement within the left breast to indicate breast carcinoma. There is a small mass in the lower, outer quadrant with an adjacent biopsy clip. There is an additional mass without significant enhancement in the lateral, middle 3rd, just below the nipple line. There are no suspicious internal mammary or axillary chain lymph nodes  Neoadjuvant TCHP chemo x 6 cycles from 10/21/21 - 2/4/22  Breast MRI 1/27/22: Right Breast: In the right lateral breast at 8-9 o'clock posterior depth 10 cm from the nipple there is susceptibility artifact from the biopsy clip but no residual enhancement. There is no suspicious axillary or internal mammary chain lymphadenopathy. Left Breast: No suspicious enhancement. There is no suspicious axillary or internal mammary chain lymphadenopathy  Right Breast Lumpectomy and SNBx 3/15/22: PATH - No residual invasive carcinoma. No in situ carcinoma or atypia identified.  0/4 LN+  Maintenance Herceptin+Perjeta 3/18/22 - 10/13/22  XRT 5/23/22 - 6/21/22  Adjuvant hormonal therapy with Tamoxifen  - Current     STAGE:   Clinical 2 ER/ME+, HER2+    History of Present Illness:   Faustino Ashton is a 56 y.o. female seen today in office for follow up of right breast cancer ER+ Her2 + on adjuvant tamoxifen. Doing well. Tolerating tamoxifen ok. Has gained weight. Here with   No fevers/ chills/ chest pain/ SOB/ nausea/ vomiting/diarrhea/ pain/fatigue        Last visit:  She started neoadjuvant TCHP on 10/21/21 and she completed 6 cycles of TCHP on 22. She had a right lumpectomy on 3/15/22 and had pCR. She started radiation on 22 and finished on 22. She finished maintenance HP on 10/13/22. She is here today for 3 month follow up. She remains on adjuvant hormonal therapy with Tamoxifen now. She reports that she feels well overall today. She is tolerating Tamoxifen well overall with some hot flashes. Her appetite and energy levels are good. She denies fever, chills, mouth sores, cough, SOB, CP, nausea, diarrhea, constipation, and neuropathy. She denies pain today. She is here alone today. Past Medical History:   Diagnosis Date    Bell's palsy     Breast cancer ([facility] [address] 2021    ER/ME+ Her2 +      Past Surgical History:   Procedure Laterality Date    HX BREAST LUMPECTOMY Right 3/15/2022    RIGHT BREAST MAGSEED GUIDED LUMPECTOMY performed by Delores Bassett MD at [facility] AMBUL[facility] OR    HX  SECTION      HX  SECTION      HX CHOLECYSTECTOMY      HX CHOLECYSTECTOMY      HX VASCULAR ACCESS      IR REMOVE TUNL CVAD W PORT/PUMP  3/1/2022      Social History     Tobacco Use    Smoking status: Never    Smokeless tobacco: Never   Substance Use Topics    Alcohol use: Yes     Comment: rarely      Family History   Problem Relation Age of Onset    Cancer Sister         leukemia     Current Outpatient Medications   Medication Sig    tamoxifen 20 mg/10 mL solution Take 20 mg by mouth daily.     levonorgestreL (Mirena) 20 mcg/24 hours (6 yrs) 52 mg IUD 1 Device by IntraUTERine route once. No current facility-administered medications for this visit. No Known Allergies     Review of Systems:  A complete review of systems was obtained, negative except as described above and as reported on ROS sheet scanned into system. Physical Exam:     Visit Vitals  /73 (BP 1 Location: Left upper arm, BP Patient Position: Sitting)   Pulse 96   Temp 98.2 °F (36.8 °C)   Resp 18   Wt 219 lb (99.3 kg)   SpO2 97%   BMI 42.77 kg/m²       ECOG PS: 0  General: No distress  Eyes: Anicteric sclerae  HENT: Atraumatic  Neck: Supple  CV: Regular   Respiratory: CTAB, normal respiratory effort  MS: Normal gait and station. Digits without clubbing or cyanosis. Skin: No rashes, ecchymoses, or petechiae. Normal temperature, turgor, and texture  Psych: Alert, oriented, appropriate affect, normal judgment/insight  Breast: Right breast with scar, no palpable masses on left or right breast   Neuro: Non focal    Results:     Lab Results   Component Value Date/Time    WBC 5.4 10/13/2022 08:45 AM    HGB 11.7 10/13/2022 08:45 AM    HCT 37.6 10/13/2022 08:45 AM    PLATELET 096 66/90/4732 08:45 AM    MCV 84.5 10/13/2022 08:45 AM    ABS. NEUTROPHILS 3.5 10/13/2022 08:45 AM    Hemoglobin (POC) 10.7 (L) 03/15/2022 06:50 AM     Lab Results   Component Value Date/Time    Sodium 140 10/13/2022 08:45 AM    Potassium 4.0 10/13/2022 08:45 AM    Chloride 108 10/13/2022 08:45 AM    CO2 29 10/13/2022 08:45 AM    Glucose 182 (H) 10/13/2022 08:45 AM    BUN 20 10/13/2022 08:45 AM    Creatinine 0.83 10/13/2022 08:45 AM    GFR est AA >60 09/01/2022 08:35 AM    GFR est non-AA >60 09/01/2022 08:35 AM    Calcium 8.6 10/13/2022 08:45 AM     Lab Results   Component Value Date/Time    Bilirubin, total 0.3 10/13/2022 08:45 AM    ALT (SGPT) 18 10/13/2022 08:45 AM    Alk.  phosphatase 82 10/13/2022 08:45 AM    Protein, total 7.0 10/13/2022 08:45 AM    Albumin 3.4 (L) 10/13/2022 08:45 AM    Globulin 3.6 10/13/2022 08:45 AM     Path at outside facility - scanned into chart     Breast MRI 9/17/21  RIGHT BREAST:  Background parenchymal enhancement: Severe   There is a biopsy clip in the lower, outer quadrant of the right breast,  junction of the middle and deep thirds. There is linear, branching enhancement  which extends anterior to the biopsy clip over approximately 3.6 cm     There are no suspicious internal mammary or axillary chain lymph nodes. Justine Handym LEFT BREAST:  Background parenchymal enhancement: Severe  There is no suspicious masslike or nonmasslike enhancement within the left  breast to indicate breast carcinoma. There is a small mass in the lower, outer  quadrant with an adjacent biopsy clip. There is an additional mass without  significant enhancement in the lateral, middle 3rd, just below the nipple line. There are no suspicious internal mammary or axillary chain lymph nodes. .  IMPRESSION  RIGHT BREAST:  1. There is a biopsy clip in the lower, outer quadrant of the right breast,  junction of the middle and deep thirds. There is linear, branching enhancement  which extends anteriorly from the biopsy clip over approximately 3.6 cm. BI-RADS Category 6 - Known biopsy-proven malignancy. Breast MRI 1/27/22  FINDINGS:  There is marked background parenchymal enhancement. The breast tissue is heterogeneously dense, which could obscure detection of  small masses (approximately 51-75% glandular). Right Breast:  In the right lateral breast at 8-9 o'clock posterior depth 10 cm from the nipple  there is susceptibility artifact from the biopsy clip but no residual  enhancement. There is no suspicious axillary or internal mammary chain lymphadenopathy. Left Beast:  No suspicious enhancement. There is no suspicious axillary or internal mammary chain lymphadenopathy. IMPRESSION:  Right Breast:  1. BI-RADS Assessment Category 6: Known biopsy proven malignancy- Appropriate  action should be taken.  Right breast biopsy clip with no residual suspicious  enhancement. Left Breast:  1. BI-RADS Assessment     3/15/22  FINAL PATHOLOGIC DIAGNOSIS   1. [place] lymph node, #1, right axilla, excision:   One lymph node identified, negative for metastatic carcinoma (0/1). See comment. 2. [place] lymph node, #2, right axilla, excision:   One lymph node identified, negative for metastatic carcinoma (0/1). See comment. 3. [place] lymph node, #3, right axilla, excision:   Two lymph nodes identified, negative for metastatic carcinoma (0/2). See comment. 4. Breast, right, lumpectomy:   No residual invasive carcinoma identified after presurgical therapy. Sclerosing adenosis with hyalinized fibrous stroma. No in situ carcinoma or atypia identified. 10/13/21    ECHO ADULT COMPLETE 10/13/2021 10/13/2021    Interpretation Summary  · LV: Estimated LVEF is 60 - 65%. Normal cavity size, wall thickness, systolic function (ejection fraction normal) and diastolic function. Normal left ventricular strain. Signed by: Audi Chew MD on 10/13/2021  3:52 PM    01/05/22    ECHO ADULT COMPLETE 01/05/2022 1/5/2022    Interpretation Summary    Left Ventricle: Left ventricle size is normal. Normal wall thickness. Normal wall motion. Normal left ventricular systolic function with a visually estimated EF of 55 - 60%. Global longitudinal strain is -18.0%. Normal diastolic function. Signed by: Kathryn Ling MD on 1/5/2022 12:52 PM    02/23/22    ECHO LUIS W OR WO CONTRAST 03/04/2022 3/4/2022    Interpretation Summary    Left Ventricle: Left ventricle size is normal. Normal wall thickness. Normal wall motion. Normal left ventricular systolic function with a visually estimated EF of 55 - 60%.     Signed by: Kathryn Ling MD on 3/4/2022  1:26 PM    05/31/22    ECHO ADULT COMPLETE 05/31/2022 5/31/2022    Interpretation Summary    Left Ventricle: Normal left ventricular systolic function with a visually estimated EF of 55 - 60%. Not well visualized. Left ventricle size is normal. Normal wall thickness. Normal wall motion. Normal diastolic function. Signed by: Alphonse Roger MD on 5/31/2022  2:27 PM    08/29/22    ECHO ADULT COMPLETE 08/29/2022 8/29/2022    Interpretation Summary    Left Ventricle: Normal left ventricular systolic function with a visually estimated EF of 55 - 60%. Left ventricle size is normal. Normal wall thickness. Normal wall motion. Right Ventricle: Right ventricle size is normal. Normal systolic function. Signed by: Jose Juan Kingsley MD on 8/29/2022  2:54 PM    [facility] Results (most recent):  Results from [facility] encounter on 01/03/23    [facility] 3D DARRYL W MAMMO BI DX INCL CAD    Narrative  Bilateral diagnostic mammography with tomography    INDICATION: . Malignant neoplasm of unspecified site of right female breast.  Lumpectomy in the upper outer quadrant of the right breast in 2022. COMPARISON: Mammogram(s), most recent, 3/11/2022, 2021, 2020. Quillian Knack COMPOSITION:  There are scattered fibroglandular densities. .  TECHNIQUE:  Standard 2D, CC and MLO views of the each breast were performed with digital  technique and subjected to computer-aided detection. Tomosynthesis of each  breast was performed in CC and MLO projections. Spot compression views were  obtained of the lumpectomy site. Quillian Knack FINDINGS:    No new visualized mass, suspicious microcalcification or architectural  distortion. Lumpectomy changes are present in the upper-outer quadrant of the  right breast. There is no new abnormality. .    Impression  1. Postlumpectomy changes in the right breast.  2. No mammographic evidence for malignancy in either breast.  3. The patient was given this result. 4. BI-RADS Category 2, benign  RECOMMENDATION:  Bilateral diagnostic Mammogram in 1 year. Records reviewed and summarized above. Pathology report(s) reviewed above. Radiology report(s) reviewed above.     Assessment:/PLAN     1) Clinical Stage 2 (based on MRI) RIGHT Breast Cancer ER+ Her2+ post Breast Biopsy at [facility] 9/9/21 and Right Lumpectomy on 3/15/22  Breast MRI 9/17/21 with 3.6 cm enhancement. No mass palpable on exam.   Patient was sent here for neoadjuvant chemo based on size of mass on MRI. Fllow up ECHO 1/5/22 and EF 55-60%. She started neoadjuvant TCHP on 10/21/21 and completed 6 cycles on 2/4/22. She had a Breast MRI on 1/27/22 that showed no residual disease. She had a right lumpectomy on 3/15/22 and had a pCR. Personally reviewed pathology. Discussed with patient. ECHO/LUIS from 3/2/22 reviewed. Follow up ECHO 5/31/22 good with EF 55-60%. She started radiation on 5/23/22 and finished on 6/21/22. She finished maintenance HP on 10/13/22. Patient seen today in office for 3 month follow up. She started Tamoxifen (liquid form as cannot swallow pills well). She is tolerating Tamoxifen well overall with some mild hot flashes. She is clinically stable and healthy overall - no new issues today. She had a 3D bilateral dx mammogram on 1/3/23 that was negative/good. ECHOs every 6 months x 2 years post HP. Next due end of 8/23 - ordered today. Continue adjuvant hormonal therapy with Tamoxifen. Follow up in 6 months in office. 2) Premenopausal  She has two kids and does not want more kids. Reviewed impaired fertility/early menopause due to chemo with patient. She has Mirena IUD in place. Continue routine GYN follow up. 3) Psychosocial  She is vaccinated for COVID. She works as a , has a supportive fiance. SW/NN support as needed. Call if questions. Follow up in 6 months in office. I appreciate the opportunity to participate in Ms. Susan Rob's care.     Signed By: Fernando Vasquez DO

## 2023-04-19 ENCOUNTER — OFFICE VISIT (OUTPATIENT)
Dept: ONCOLOGY | Age: 48
End: 2023-04-19
Payer: MEDICAID

## 2023-04-19 VITALS
TEMPERATURE: 98.2 F | HEART RATE: 96 BPM | DIASTOLIC BLOOD PRESSURE: 73 MMHG | WEIGHT: 219 LBS | RESPIRATION RATE: 18 BRPM | SYSTOLIC BLOOD PRESSURE: 115 MMHG | OXYGEN SATURATION: 97 % | BODY MASS INDEX: 42.77 KG/M2

## 2023-04-19 DIAGNOSIS — Z98.890 S/P LUMPECTOMY, RIGHT BREAST: ICD-10-CM

## 2023-04-19 DIAGNOSIS — Z17.0 MALIGNANT NEOPLASM OF RIGHT BREAST IN FEMALE, ESTROGEN RECEPTOR POSITIVE, UNSPECIFIED SITE OF BREAST (HCC): Primary | ICD-10-CM

## 2023-04-19 DIAGNOSIS — C50.911 MALIGNANT NEOPLASM OF RIGHT BREAST IN FEMALE, ESTROGEN RECEPTOR POSITIVE, UNSPECIFIED SITE OF BREAST (HCC): Primary | ICD-10-CM

## 2023-04-19 DIAGNOSIS — Z79.810 USE OF TAMOXIFEN (NOLVADEX): ICD-10-CM

## 2023-04-19 PROCEDURE — 99213 OFFICE O/P EST LOW 20 MIN: CPT | Performed by: INTERNAL MEDICINE

## 2023-04-19 NOTE — PROGRESS NOTES
Trish Cerda is a 56 y.o. female    Chief Complaint   Patient presents with    Follow-up      Right Breast Cancer       1. Have you been to the ER, urgent care clinic since your last visit? Hospitalized since your last visit? No    2. Have you seen or consulted any other health care providers outside of the [address] [place] [address] since your last visit? Include any pap smears or colon screening.  No

## 2023-04-29 DIAGNOSIS — Z17.0 MALIGNANT NEOPLASM OF RIGHT BREAST IN FEMALE, ESTROGEN RECEPTOR POSITIVE, UNSPECIFIED SITE OF BREAST (HCC): Primary | ICD-10-CM

## 2023-04-29 DIAGNOSIS — Z79.810 USE OF TAMOXIFEN (NOLVADEX): ICD-10-CM

## 2023-04-29 DIAGNOSIS — Z98.890 S/P LUMPECTOMY, RIGHT BREAST: ICD-10-CM

## 2023-04-29 DIAGNOSIS — C50.911 MALIGNANT NEOPLASM OF RIGHT BREAST IN FEMALE, ESTROGEN RECEPTOR POSITIVE, UNSPECIFIED SITE OF BREAST (HCC): Primary | ICD-10-CM

## 2023-06-20 ENCOUNTER — OFFICE VISIT (OUTPATIENT)
Age: 48
End: 2023-06-20
Payer: MEDICAID

## 2023-06-20 VITALS
WEIGHT: 219.8 LBS | TEMPERATURE: 97.5 F | SYSTOLIC BLOOD PRESSURE: 120 MMHG | BODY MASS INDEX: 43.15 KG/M2 | OXYGEN SATURATION: 99 % | HEIGHT: 60 IN | DIASTOLIC BLOOD PRESSURE: 83 MMHG | RESPIRATION RATE: 18 BRPM | HEART RATE: 72 BPM

## 2023-06-20 DIAGNOSIS — Z12.11 SCREENING FOR COLON CANCER: ICD-10-CM

## 2023-06-20 DIAGNOSIS — Z00.00 WELL WOMAN EXAM (NO GYNECOLOGICAL EXAM): Primary | ICD-10-CM

## 2023-06-20 DIAGNOSIS — R53.83 OTHER FATIGUE: ICD-10-CM

## 2023-06-20 DIAGNOSIS — R73.09 ELEVATED HEMOGLOBIN A1C: ICD-10-CM

## 2023-06-20 PROCEDURE — 99396 PREV VISIT EST AGE 40-64: CPT | Performed by: INTERNAL MEDICINE

## 2023-06-20 PROCEDURE — 99213 OFFICE O/P EST LOW 20 MIN: CPT | Performed by: INTERNAL MEDICINE

## 2023-06-20 SDOH — ECONOMIC STABILITY: FOOD INSECURITY: WITHIN THE PAST 12 MONTHS, THE FOOD YOU BOUGHT JUST DIDN'T LAST AND YOU DIDN'T HAVE MONEY TO GET MORE.: NEVER TRUE

## 2023-06-20 SDOH — ECONOMIC STABILITY: FOOD INSECURITY: WITHIN THE PAST 12 MONTHS, YOU WORRIED THAT YOUR FOOD WOULD RUN OUT BEFORE YOU GOT MONEY TO BUY MORE.: NEVER TRUE

## 2023-06-20 SDOH — ECONOMIC STABILITY: INCOME INSECURITY: HOW HARD IS IT FOR YOU TO PAY FOR THE VERY BASICS LIKE FOOD, HOUSING, MEDICAL CARE, AND HEATING?: NOT VERY HARD

## 2023-06-20 SDOH — ECONOMIC STABILITY: HOUSING INSECURITY
IN THE LAST 12 MONTHS, WAS THERE A TIME WHEN YOU DID NOT HAVE A STEADY PLACE TO SLEEP OR SLEPT IN A SHELTER (INCLUDING NOW)?: NO

## 2023-06-20 NOTE — PATIENT INSTRUCTIONS
1.  Weight Loss Education/Recommendations    1. Eat three meals a day, with three snacks in between. Eat approximately every three hours. Never skip a meal.    2.  Eat protein with each meal.    Protein should be 25-30 grams per meal.  This is equal to your palm size. Examples of protein include fish, chicken, pork, lean meats. 3.  Limit carbohydrates. Eat 30 grams or less of carbohydrate per meal.  No carbohydrates before noon. Carbohydrates for snacks should be 15 grams or less. Carbohydrates that are good are vegetables and fruit. Limit amount of fruit intake due to natural sugars. Use carbohydrate count book to help limit carbohydrates. 4.  Drink 6-8 glasses of water a day. 5.  Incorporate exercise if possible. Start with 30 minutes a day for 3-4 days per week. Increase amount and number of days gradually but consistently over time. 6.  Weigh self weekly. Weigh on the same day at the same time each day. 2.  Nutrition should call you to schedule office or virtual appt. If problems, please call our clinic Nurses for assistance or alternative resources. 3.  The weight loss providers/groups we reviewed are listed below: For medical (non-surgical) weight management:    [facility] Weight Loss Referrals:  Call [phone] for appointments. Providers:  MD Bruno Jane MD Prudencio Corns, NP      Non-[facility] Medical Weight Loss Referral (doesn't take insurance):    [facility]:  [address], [address] [facility]  [phone] phone       4. The medications we mentioned (to clarify coverage if needed), are:  --Qsymia (phentermine + topiramate)  --Contrave (bupropion + naltrexone). Bupropion is also known as Wellbutrin. --Phentermine  --Rybelsus  --Semaglutide (injectable--as Ozempic or Wegovy)    If your insurance needs/can fax list of covered meds to us, they can fax to #[phone].   This is a direct fax to our nurse's

## 2023-06-20 NOTE — PROGRESS NOTES
RM 18    Chief Complaint   Patient presents with    Physical Therapy     Pt would like to discuss fluid intake to be checked    Weight Management       Vitals:    06/20/23 1206   BP: 120/83   Pulse: 72   Resp: 18   Temp: 97.5 °F (36.4 °C)   SpO2: 99%       No flowsheet data found. 1. \"Have you been to the ER, urgent care clinic since your last visit? Hospitalized since your last visit? \" no  2. \"Have you seen or consulted any other health care providers outside of the [address] [place] [address] since your last visit? \" no  3. For patients aged 39-70: Has the patient had a colonoscopy / FIT/ Cologuard? No  If the patient is female:    4. For patients aged 41-77: Has the patient had a mammogram within the past 2 years? Yes - no Care Gap present      5. For patients aged 21-65: Has the patient had a pap smear? Yes - Care Gap present. Most recent result on file  Health Maintenance Due   Topic Date Due    Colorectal Cancer Screen  Never done    COVID-19 Vaccine (4 - Booster for Moderna series) 05/12/2022    A1C test (Diabetic or Prediabetic)  06/21/2023    Lipids  06/21/2023       AVS  education, follow up, and recommendations provided and addressed with patient.   services used to advise patient No

## 2023-06-20 NOTE — PROGRESS NOTES
Peyton Meléndez (: 1975) is a 56 y.o. female, established patient, here for evaluation of the following chief complaint(s):  Chief Complaint   Patient presents with    Physical Therapy     Pt would like to discuss fluid intake to be checked    Weight Management       Assessment and Plan:      Diagnosis Orders   1. Well woman exam (no gynecological exam)  CBC with Auto Differential    Comprehensive Metabolic Panel    Lipid Panel    Hemoglobin A1C    TSH    T4, Free    T4, Free    TSH    Hemoglobin A1C    Lipid Panel    Comprehensive Metabolic Panel    CBC with Auto Differential      2. Elevated hemoglobin A1c  Hemoglobin A1C    Hemoglobin A1C      3. Other fatigue  TSH    T4, Free    Vitamin B12 & Folate    Vitamin D 25 Hydroxy    Vitamin D 25 Hydroxy    T4, Free    TSH    Vitamin B12 & Folate      4. Screening for colon cancer  XIME[facility] - Loli Chacon MD, Gastroenterology, [place] ([address] [place] [address])      5. BMI 40.0-44.9, adult [facility])            Diagnoses #1,4,5 for Preventive Care/Wellness visit. Due to significant separate problems unrelated to preventive care needs, also addressed following diagnoses/problems at visit as below (diagnoses #2-3 above). 1, 4:  Screenings reviewed at visit. 2:  Lab monitoring reviewed. 3:  Evaluation reviewed. 4.  Referral(s) and referral coordination reviewed with patient at visit. 5.  Mgt and clarification of medication coverage reviewed at visit. Return in about 3 months (around 2023) for weight/BMI follow-up.  lab results and schedule of future lab studies reviewed with patient  reviewed diet, exercise and weight control  reviewed medications and side effects in detail    For additional documentation of information and/or recommendations discussed this visit, please see notes in instructions. Plan and evaluation (above) reviewed with pt at visit  Patient voiced understanding of plan and provided with time to ask/review questions.   After Visit

## 2023-06-21 LAB
25(OH)D3 SERPL-MCNC: 14.7 NG/ML (ref 30–100)
ALBUMIN SERPL-MCNC: 4 G/DL (ref 3.5–5)
ALBUMIN/GLOB SERPL: 1.1 (ref 1.1–2.2)
ALP SERPL-CCNC: 73 U/L (ref 45–117)
ALT SERPL-CCNC: 21 U/L (ref 12–78)
ANION GAP SERPL CALC-SCNC: 3 MMOL/L (ref 5–15)
AST SERPL-CCNC: 13 U/L (ref 15–37)
BASOPHILS # BLD: 0.1 K/UL (ref 0–0.1)
BASOPHILS NFR BLD: 1 % (ref 0–1)
BILIRUB SERPL-MCNC: 0.4 MG/DL (ref 0.2–1)
BUN SERPL-MCNC: 15 MG/DL (ref 6–20)
BUN/CREAT SERPL: 20 (ref 12–20)
CALCIUM SERPL-MCNC: 9.5 MG/DL (ref 8.5–10.1)
CHLORIDE SERPL-SCNC: 107 MMOL/L (ref 97–108)
CHOLEST SERPL-MCNC: 170 MG/DL
CO2 SERPL-SCNC: 28 MMOL/L (ref 21–32)
CREAT SERPL-MCNC: 0.74 MG/DL (ref 0.55–1.02)
DIFFERENTIAL METHOD BLD: ABNORMAL
EOSINOPHIL # BLD: 0.1 K/UL (ref 0–0.4)
EOSINOPHIL NFR BLD: 1 % (ref 0–7)
ERYTHROCYTE [DISTWIDTH] IN BLOOD BY AUTOMATED COUNT: 14.3 % (ref 11.5–14.5)
EST. AVERAGE GLUCOSE BLD GHB EST-MCNC: 140 MG/DL
FOLATE SERPL-MCNC: 18.5 NG/ML (ref 5–21)
GLOBULIN SER CALC-MCNC: 3.6 G/DL (ref 2–4)
GLUCOSE SERPL-MCNC: 120 MG/DL (ref 65–100)
HBA1C MFR BLD: 6.5 % (ref 4–5.6)
HCT VFR BLD AUTO: 42.5 % (ref 35–47)
HDLC SERPL-MCNC: 46 MG/DL
HDLC SERPL: 3.7 (ref 0–5)
HGB BLD-MCNC: 13 G/DL (ref 11.5–16)
IMM GRANULOCYTES # BLD AUTO: 0 K/UL (ref 0–0.04)
IMM GRANULOCYTES NFR BLD AUTO: 0 % (ref 0–0.5)
LDLC SERPL CALC-MCNC: 94.2 MG/DL (ref 0–100)
LYMPHOCYTES # BLD: 2.1 K/UL (ref 0.8–3.5)
LYMPHOCYTES NFR BLD: 30 % (ref 12–49)
MCH RBC QN AUTO: 25.6 PG (ref 26–34)
MCHC RBC AUTO-ENTMCNC: 30.6 G/DL (ref 30–36.5)
MCV RBC AUTO: 83.8 FL (ref 80–99)
MONOCYTES # BLD: 0.3 K/UL (ref 0–1)
MONOCYTES NFR BLD: 5 % (ref 5–13)
NEUTS SEG # BLD: 4.6 K/UL (ref 1.8–8)
NEUTS SEG NFR BLD: 63 % (ref 32–75)
NRBC # BLD: 0 K/UL (ref 0–0.01)
NRBC BLD-RTO: 0 PER 100 WBC
PLATELET # BLD AUTO: 202 K/UL (ref 150–400)
PMV BLD AUTO: 11.5 FL (ref 8.9–12.9)
POTASSIUM SERPL-SCNC: 4.7 MMOL/L (ref 3.5–5.1)
PROT SERPL-MCNC: 7.6 G/DL (ref 6.4–8.2)
RBC # BLD AUTO: 5.07 M/UL (ref 3.8–5.2)
SODIUM SERPL-SCNC: 138 MMOL/L (ref 136–145)
T4 FREE SERPL-MCNC: 1 NG/DL (ref 0.8–1.5)
TRIGL SERPL-MCNC: 149 MG/DL
TSH SERPL DL<=0.05 MIU/L-ACNC: 1.42 UIU/ML (ref 0.36–3.74)
VIT B12 SERPL-MCNC: 455 PG/ML (ref 193–986)
VLDLC SERPL CALC-MCNC: 29.8 MG/DL
WBC # BLD AUTO: 7.2 K/UL (ref 3.6–11)

## 2023-07-24 DIAGNOSIS — Z17.0 MALIGNANT NEOPLASM OF RIGHT BREAST IN FEMALE, ESTROGEN RECEPTOR POSITIVE, UNSPECIFIED SITE OF BREAST (HCC): Primary | ICD-10-CM

## 2023-07-24 DIAGNOSIS — C50.911 MALIGNANT NEOPLASM OF RIGHT BREAST IN FEMALE, ESTROGEN RECEPTOR POSITIVE, UNSPECIFIED SITE OF BREAST (HCC): Primary | ICD-10-CM

## 2023-07-24 NOTE — TELEPHONE ENCOUNTER
Oral Hormone therapy     Rigoberto Golden is a  56 y. o.female  diagnosed with breast cancer . Ms. Prince Mcnair is being treated with tamoxifen.      Medication name: tamoxifen    Dose:  20 mg   Frequency: daily    Ordering provider: Brisa Vasques DO  Start date: 7/2022        Last OV 4/19/23  Next OV 10/16/23    Refill sent in for tamoxifen (Soltamox)        Brent Spencer, PHARMD, BCOP, [facility]    Program: Medical Group  CPA in place:  Yes  Recommendation Provided To: Patient/Caregiver: 1 via Telephone  Intervention Detail: Refill(s) Provided  Intervention Accepted By: Patient/Caregiver: 1    Time Spent (min): 10
clear

## 2023-07-25 ENCOUNTER — CLINICAL DOCUMENTATION (OUTPATIENT)
Age: 48
End: 2023-07-25

## 2023-07-25 NOTE — PROGRESS NOTES
07/25/2023    Soltamox 20mg/10 mL sol PA was approved with exp. Date 07/25/2024. Patient cost of care is $0 and medication will have to be ordered per pharmacy and will be ready for  07/26/2023. Called pt to notify and did not receive an answer. LVM.

## 2023-07-27 ENCOUNTER — OFFICE VISIT (OUTPATIENT)
Age: 48
End: 2023-07-27
Payer: MEDICAID

## 2023-07-27 VITALS — HEIGHT: 60 IN | BODY MASS INDEX: 42.41 KG/M2 | WEIGHT: 216 LBS

## 2023-07-27 DIAGNOSIS — Z92.3 S/P RADIATION THERAPY: ICD-10-CM

## 2023-07-27 DIAGNOSIS — C50.911 MALIGNANT NEOPLASM OF RIGHT BREAST IN FEMALE, ESTROGEN RECEPTOR POSITIVE, UNSPECIFIED SITE OF BREAST (HCC): Primary | ICD-10-CM

## 2023-07-27 DIAGNOSIS — Z85.3 HISTORY OF BREAST CANCER IN FEMALE: ICD-10-CM

## 2023-07-27 DIAGNOSIS — Z17.0 MALIGNANT NEOPLASM OF RIGHT BREAST IN FEMALE, ESTROGEN RECEPTOR POSITIVE, UNSPECIFIED SITE OF BREAST (HCC): Primary | ICD-10-CM

## 2023-07-27 DIAGNOSIS — Z98.890 S/P LUMPECTOMY OF BREAST: ICD-10-CM

## 2023-07-27 PROCEDURE — 99213 OFFICE O/P EST LOW 20 MIN: CPT | Performed by: NURSE PRACTITIONER

## 2023-07-27 NOTE — PROGRESS NOTES
history of breast feeding No, BCP No, Hormone therapy No                 Past Surgical History:   Procedure Laterality Date    BREAST LUMPECTOMY Right 3/15/2022    RIGHT BREAST MAGSEED GUIDED LUMPECTOMY performed by Kendall Gonzáles MD at [facility]      IR REMOVE TUNNELED VAD W PORT  3/1/2022    VASCULAR SURGERY             Mammogram Result (most recent):  [facility] DIGITAL DIAGNOSTIC BILATERAL 01/03/2023    Narrative  This is a summary report. The complete report is available in the patient's medical record. If you cannot access the medical record, please contact the sending organization for a detailed fax or copy. Bilateral diagnostic mammography with tomography    INDICATION: . Malignant neoplasm of unspecified site of right female breast.  Lumpectomy in the upper outer quadrant of the right breast in 2022. COMPARISON: Mammogram(s), most recent, 3/11/2022, 2021, 2020. Mary Anne Rodriguez COMPOSITION:  There are scattered fibroglandular densities. .  TECHNIQUE:  Standard 2D, CC and MLO views of the each breast were performed with digital  technique and subjected to computer-aided detection. Tomosynthesis of each  breast was performed in CC and MLO projections. Spot compression views were  obtained of the lumpectomy site. Mary Anne Rodriguez FINDINGS:    No new visualized mass, suspicious microcalcification or architectural  distortion. Lumpectomy changes are present in the upper-outer quadrant of the  right breast. There is no new abnormality. .    Impression  1. Postlumpectomy changes in the right breast.  2. No mammographic evidence for malignancy in either breast.  3. The patient was given this result. 4. BI-RADS Category 2, benign  RECOMMENDATION:  Bilateral diagnostic Mammogram in 1 year. Review of Systems      Physical Exam  Constitutional:       Appearance: Normal appearance.    Chest:   Breasts:     Right: No mass, nipple discharge,

## 2023-08-03 ENCOUNTER — HOSPITAL ENCOUNTER (OUTPATIENT)
Facility: HOSPITAL | Age: 48
Discharge: HOME | End: 2023-08-06

## 2023-09-12 ENCOUNTER — OFFICE VISIT (OUTPATIENT)
Age: 48
End: 2023-09-12
Payer: MEDICAID

## 2023-09-12 VITALS
TEMPERATURE: 98 F | OXYGEN SATURATION: 97 % | HEART RATE: 68 BPM | HEIGHT: 60 IN | WEIGHT: 220.6 LBS | BODY MASS INDEX: 43.31 KG/M2 | DIASTOLIC BLOOD PRESSURE: 81 MMHG | SYSTOLIC BLOOD PRESSURE: 126 MMHG

## 2023-09-12 DIAGNOSIS — E11.9 TYPE 2 DIABETES MELLITUS WITHOUT COMPLICATION, WITH LONG-TERM CURRENT USE OF INSULIN (HCC): Primary | ICD-10-CM

## 2023-09-12 DIAGNOSIS — Z79.4 TYPE 2 DIABETES MELLITUS WITHOUT COMPLICATION, WITH LONG-TERM CURRENT USE OF INSULIN (HCC): Primary | ICD-10-CM

## 2023-09-12 PROCEDURE — 3044F HG A1C LEVEL LT 7.0%: CPT | Performed by: INTERNAL MEDICINE

## 2023-09-12 PROCEDURE — 99213 OFFICE O/P EST LOW 20 MIN: CPT | Performed by: INTERNAL MEDICINE

## 2023-09-12 ASSESSMENT — PATIENT HEALTH QUESTIONNAIRE - PHQ9
SUM OF ALL RESPONSES TO PHQ9 QUESTIONS 1 & 2: 0
2. FEELING DOWN, DEPRESSED OR HOPELESS: 0
1. LITTLE INTEREST OR PLEASURE IN DOING THINGS: 0
SUM OF ALL RESPONSES TO PHQ QUESTIONS 1-9: 0

## 2023-09-12 NOTE — PROGRESS NOTES
Jenna Orona (: 1975) is a 53 y.o. female, established patient, here for evaluation of the following chief complaint(s):  Chief Complaint   Patient presents with    Follow-up       Assessment and Plan:      Diagnosis Orders   1. Type 2 diabetes mellitus without complication, with long-term current use of insulin ([facility])        2. BMI 40.0-44.9, adult ([facility])  [address] - Seferino Flores, RDN, MRM OP Nutrition, Nutrition Services, [place]          1:  Dx and mgt reviewed. Diet controlled at this time. 2:  Referral(s) and referral coordination reviewed with patient at visit. Return in about 6 months (around 3/12/2024) for nutrition referral follow-up, non-fasting labs (finger-stick A1c). [facility] lab results and schedule of future lab studies reviewed with patient  reviewed medications and side effects in detail    Plan and evaluation (above) reviewed with pt at visit  Patient voiced understanding of plan and provided with time to ask/review questions. After Visit Summary (AVS) provided to pt after visit with additional instructions as needed/reviewed. Future Appointments   Date Time Provider [address]   10/4/2023 11:00 AM Seferino Flores [facility]   10/16/2023  8:00 AM Ashley Berger DO [address] [facility] BS AMB   2024  8:30 AM MIYA Monzon - NP [facility] BS AMB   3/13/2024  8:00 AM Laron Robertson MD CP BS AMB   --Updated future visits after patient check-out. History of Present Illness:     Notes (nursing/rooming note copied below in italics):  As above    Here for weight/BMI follow-up. She continues right breast cancer follow-up with breast surgery and oncology. Last seen with breast surgery clinic 2023--6mo follow-up there. Wt Readings from Last 3 Encounters:   23 220 lb 9.6 oz (100.1 kg)   23 216 lb (98 kg)   23 219 lb 12.8 oz (99.7 kg)     LOV here with me for physical 2023.     Reviewed weight loss resources, possible referrals, possible meds

## 2023-10-04 ENCOUNTER — HOSPITAL ENCOUNTER (OUTPATIENT)
Facility: HOSPITAL | Age: 48
Setting detail: RECURRING SERIES
Discharge: HOME | End: 2023-10-07
Payer: MEDICAID

## 2023-10-04 PROCEDURE — 97802 MEDICAL NUTRITION INDIV IN: CPT | Performed by: DIETITIAN, REGISTERED

## 2023-10-04 NOTE — PROGRESS NOTES
times per day do you eat fruits and vegetables? Vegetables: salad 1-2 times per week. And likes other vegetables. Does not currently eat them daily. Fruit: none. Love them. Unsure of what can eat. How often do you eat fast food? 7 times a week  Typical meals ordered out/in: work each day ([employer] - pasta/ subs / fries / salads some / - 1 meal per day)  Sonic, Angel, [employer]. - at least 1 time per week     Picky daughter sometimes dictates where they eat. 9yo. Do you feel your life/schedule conflict with eating in a healthy pattern? yes  If yes, how so? Work limits ability to eat. If busy can't eat. Will eat more or less healthy items when finally has time. Drinks:  Sodas:1-2 x 16oz per day at work (6 days per week. - 1-2 weeks ago stopped drinking at home. .   Sweet tea: 0 per days  Lemonade/Fruit Punch/Dalton aid: 0 per days  Juice : 0 per days - cut out in past year or 2. Milk : 1 per 3 weeks to month  Slushies/milkshakes/other: 0 per days  Water: 2-3 x 8 oz per day  Coffee: sugar free creamer (1/4 cup) + 7 splendas. 1 coffee per day. How often do you consume alcohol? occasionally; 6 Glasses of wine coolers per month. Food Preferences  Foods you like: pasta, pizza, subs, burgers, salads  Foods you dislike: olives, mushrooms, anchovies. Likes most foods. Sometimes money limits desire to purchase healthier options. Felt like they were more expensive. Does your food intake have a lot of variety? Or tend to be the same? Same. Likes variety. Started making more brown rice. More tuna and eggs recently. Trying to avoid some other foods. Cooking Skills  Who is the main cook at home? pt  Who is the main  at home? boyfriend  Do you feel comfortable cooking? yes  Do you have access to. .. Refridgerator? yes   Microwave? yes   Stove top? yes   Oven? yes   Other:     Do you ever eat past feeling full? Yes. Often. Not daily. [address] Member.    Do you ever eat when you

## 2023-10-16 ENCOUNTER — OFFICE VISIT (OUTPATIENT)
Age: 48
End: 2023-10-16
Payer: MEDICAID

## 2023-10-16 VITALS
TEMPERATURE: 98.4 F | DIASTOLIC BLOOD PRESSURE: 76 MMHG | OXYGEN SATURATION: 97 % | BODY MASS INDEX: 42.18 KG/M2 | HEART RATE: 65 BPM | RESPIRATION RATE: 18 BRPM | SYSTOLIC BLOOD PRESSURE: 111 MMHG | WEIGHT: 216 LBS

## 2023-10-16 DIAGNOSIS — Z79.810 USE OF TAMOXIFEN (NOLVADEX): ICD-10-CM

## 2023-10-16 DIAGNOSIS — Z98.890 S/P LUMPECTOMY, RIGHT BREAST: ICD-10-CM

## 2023-10-16 DIAGNOSIS — C50.911 MALIGNANT NEOPLASM OF RIGHT BREAST IN FEMALE, ESTROGEN RECEPTOR POSITIVE, UNSPECIFIED SITE OF BREAST (HCC): Primary | ICD-10-CM

## 2023-10-16 DIAGNOSIS — Z17.0 MALIGNANT NEOPLASM OF RIGHT BREAST IN FEMALE, ESTROGEN RECEPTOR POSITIVE, UNSPECIFIED SITE OF BREAST (HCC): Primary | ICD-10-CM

## 2023-10-16 PROCEDURE — 99213 OFFICE O/P EST LOW 20 MIN: CPT | Performed by: INTERNAL MEDICINE

## 2023-10-16 NOTE — PROGRESS NOTES
Veronika Bell is a 53 y.o. female    Chief Complaint   Patient presents with    Follow-up     Right Breast Cancer       1. Have you been to the ER, urgent care clinic since your last visit? Hospitalized since your last visit? No    2. Have you seen or consulted any other health care providers outside of the [address] [place] [address] Avenue since your last visit? Include any pap smears or colon screening.  No
questions. Follow up in 6 months in office. I appreciate the opportunity to participate in Ms. Sherri Bone's care.     Signed By: Paulie Redman, DO

## 2023-11-06 ENCOUNTER — HOSPITAL ENCOUNTER (OUTPATIENT)
Facility: HOSPITAL | Age: 48
Setting detail: RECURRING SERIES
Discharge: HOME | End: 2023-11-09
Payer: MEDICAID

## 2023-11-06 PROCEDURE — 97803 MED NUTRITION INDIV SUBSEQ: CPT | Performed by: DIETITIAN, REGISTERED

## 2023-11-06 NOTE — PROGRESS NOTES
plate to eat at home to start putting less on. (Clean plate club member). Met. -start vitamin D today. Nutrition Prescription and  Intervention Reviewed carb sources, bananced plate, meal timing. Reviewed recommendations to include carbs and protein at meals/snacks. Reviewed meals vs snacks if unabl to find time for snacks. Discussed upcoming hoidays. Patient Education:  []  Review current plan with patient   []  Other:    Handouts/  Information Provided: []  Carbohydrates  []  Protein  []  Fiber  []  Serving Sizes  []  Fluids  []  General guidelines []  Diabetes  []  Cholesterol  []  Sodium  []  SBGM  []  Food Journals  []  Others:      Patient Goals -pack a protein for with snack for work. List provided  -3 days of food logs- bring to next session  -start wearing fitbit to track exercise/ steps at work. -continue using balanced plate as guide for meals/snacks.   -use smaller plate at home. PLAN  []  Continue on current plan []  Follow-up PRN   []  Discharge due to :    []  Next appt: 2 months.       Dietitian: Kaleb Willis MS, RD, CSSD    Date: 11/6/2023 Time: 10:01 AM

## 2023-11-14 ENCOUNTER — HOSPITAL ENCOUNTER (OUTPATIENT)
Facility: HOSPITAL | Age: 48
Discharge: HOME | End: 2023-11-16
Attending: INTERNAL MEDICINE
Payer: MEDICAID

## 2023-11-14 DIAGNOSIS — C50.911 MALIGNANT NEOPLASM OF RIGHT BREAST IN FEMALE, ESTROGEN RECEPTOR POSITIVE, UNSPECIFIED SITE OF BREAST (HCC): ICD-10-CM

## 2023-11-14 DIAGNOSIS — Z79.810 USE OF TAMOXIFEN (NOLVADEX): ICD-10-CM

## 2023-11-14 DIAGNOSIS — Z17.0 MALIGNANT NEOPLASM OF RIGHT BREAST IN FEMALE, ESTROGEN RECEPTOR POSITIVE, UNSPECIFIED SITE OF BREAST (HCC): ICD-10-CM

## 2023-11-14 DIAGNOSIS — Z98.890 S/P LUMPECTOMY, RIGHT BREAST: ICD-10-CM

## 2023-11-14 LAB
ECHO LA DIAMETER: 2.6 CM
ECHO LV FRACTIONAL SHORTENING: 18 % (ref 28–44)
ECHO LV INTERNAL DIMENSION DIASTOLIC: 3.4 CM (ref 3.9–5.3)
ECHO LV INTERNAL DIMENSION SYSTOLIC: 2.8 CM
ECHO LV IVSD: 0.8 CM (ref 0.6–0.9)
ECHO LV MASS 2D: 84.9 G (ref 67–162)
ECHO LV POSTERIOR WALL DIASTOLIC: 1 CM (ref 0.6–0.9)
ECHO LV RELATIVE WALL THICKNESS RATIO: 0.59
ECHO MV A VELOCITY: 0.82 M/S
ECHO MV E VELOCITY: 0.74 M/S
ECHO MV E/A RATIO: 0.9
ECHO RV TAPSE: 2.5 CM (ref 1.7–?)

## 2023-11-14 PROCEDURE — 93306 TTE W/DOPPLER COMPLETE: CPT

## 2023-11-14 PROCEDURE — 93306 TTE W/DOPPLER COMPLETE: CPT | Performed by: SPECIALIST

## 2023-11-15 DIAGNOSIS — R93.1 ABNORMAL ECHOCARDIOGRAM: ICD-10-CM

## 2023-11-15 DIAGNOSIS — Z79.899 ENCOUNTER FOR MONITORING CARDIOTOXIC DRUG THERAPY: Primary | ICD-10-CM

## 2023-11-15 DIAGNOSIS — Z51.81 ENCOUNTER FOR MONITORING CARDIOTOXIC DRUG THERAPY: Primary | ICD-10-CM

## 2023-11-21 ENCOUNTER — TELEPHONE (OUTPATIENT)
Age: 48
End: 2023-11-21

## 2023-11-21 NOTE — TELEPHONE ENCOUNTER
[id number]  Call to pt #[phone]. LVM, update on cardiology referral, no HIPAA disclosed. CB number given. FYI: see other call encounter 11/21/23 from Dr. Braulio Sotelo office. Sent pt letter to address on file, MELIZA order with BS scheduling info.

## 2023-11-21 NOTE — TELEPHONE ENCOUNTER
Needs a new patient appt with 1st available provider per Alley Boateng NP.  Dx: abn Echo, l/m on vm requesting a c/b to [facility] k[facility]le[facility]

## 2023-11-28 ENCOUNTER — TELEPHONE (OUTPATIENT)
Age: 48
End: 2023-11-28

## 2023-11-28 NOTE — TELEPHONE ENCOUNTER
[id number]  Call to pt, ID verified x2, inquired if pt has been contacted [facility], pt denied, supplied number for Dr. Van Hernandez office #[phone], advised to call and schedule consult appointment, discussed recent ECHO results not clear, if MUGA results abnormal needs to f/u with cardio. Supplied BS scheduling number for pt to call and schedule MUGA. Call office with any issues, understanding verbalized of all, pt appreciative of call.  Update to team.

## 2023-12-11 ENCOUNTER — HOSPITAL ENCOUNTER (OUTPATIENT)
Facility: HOSPITAL | Age: 48
Discharge: HOME | End: 2023-12-14
Payer: MEDICAID

## 2023-12-11 DIAGNOSIS — Z51.81 ENCOUNTER FOR MONITORING CARDIOTOXIC DRUG THERAPY: ICD-10-CM

## 2023-12-11 DIAGNOSIS — R93.1 ABNORMAL ECHOCARDIOGRAM: ICD-10-CM

## 2023-12-11 DIAGNOSIS — Z79.899 ENCOUNTER FOR MONITORING CARDIOTOXIC DRUG THERAPY: ICD-10-CM

## 2023-12-11 LAB — STRESS TARGET HR: 173 BPM

## 2023-12-11 PROCEDURE — A9560 TC99M LABELED RBC: HCPCS | Performed by: NURSE PRACTITIONER

## 2023-12-11 PROCEDURE — 78472 GATED HEART PLANAR SINGLE: CPT

## 2023-12-11 PROCEDURE — 3430000000 HC RX DIAGNOSTIC RADIOPHARMACEUTICAL: Performed by: NURSE PRACTITIONER

## 2023-12-11 RX ADMIN — TECHNETIUM TC 99M-LABELED RED BLOOD CELLS 19.8 MILLICURIE: KIT at 09:45

## 2024-01-30 ENCOUNTER — OFFICE VISIT (OUTPATIENT)
Age: 49
End: 2024-01-30
Payer: MEDICAID

## 2024-01-30 DIAGNOSIS — Z92.3 S/P RADIATION THERAPY: ICD-10-CM

## 2024-01-30 DIAGNOSIS — Z98.890 S/P LUMPECTOMY OF BREAST: ICD-10-CM

## 2024-01-30 DIAGNOSIS — Z85.3 HISTORY OF BREAST CANCER IN FEMALE: ICD-10-CM

## 2024-01-30 DIAGNOSIS — C50.911 MALIGNANT NEOPLASM OF RIGHT BREAST IN FEMALE, ESTROGEN RECEPTOR POSITIVE, UNSPECIFIED SITE OF BREAST (HCC): Primary | ICD-10-CM

## 2024-01-30 DIAGNOSIS — Z17.0 MALIGNANT NEOPLASM OF RIGHT BREAST IN FEMALE, ESTROGEN RECEPTOR POSITIVE, UNSPECIFIED SITE OF BREAST (HCC): Primary | ICD-10-CM

## 2024-01-30 PROCEDURE — 99213 OFFICE O/P EST LOW 20 MIN: CPT | Performed by: NURSE PRACTITIONER

## 2024-01-30 NOTE — PROGRESS NOTES
HISTORY OF PRESENT ILLNESS  Sherri Bone is a 48 y.o. female     HPI  Established patient presents for follow-up for RIGHT breast cancer. Denies breast mass, skin changes, nipple discharge and pain.                Breast history -   Referring - Dr. Sandra Yee - [facility]  Initially had abnormal mammo at [facility] with calcifications on RIGHT. No mass reported/dense breast tissue  RIGHT Breast Biopsy 9/9/21: PATH -  IDC, grade 3 ER/DE strong+ Her2 3+  Genetic testing negative  Breast MRI 9/17/21: RIGHT BREAST:Background parenchymal enhancement: Severe. There is a biopsy clip in the lower, outer quadrant of the right breast, junction of the middle and deep thirds. There is linear, branching enhancement which extends anterior to the biopsy clip over approximately 3.6 cm. There are no suspicious internal mammary or axillary chain lymph nodes. LEFT BREAST: Background parenchymal enhancement: Severe. There is no suspicious masslike or nonmasslike enhancement within the left breast to indicate breast carcinoma. There is a small mass in the lower, outer quadrant with an adjacent biopsy clip. There is an additional mass without significant enhancement in the lateral, middle 3rd, just below the nipple line.There are no suspicious internal mammary or axillary chain lymph nodes  Neoadjuvant TCHP chemo 10/21/21 - started - Dr. Askew  Right Breast Lumpectomy and SNBx 3/15/22 - Dr. White         PATH - No residual invasive carcinoma. No in situ carcinoma or atypia identified. 0/4 lymph nodes positive.    Maintenance Herceptin+Perjeta 3/18/22 - 10/13/22  XRT 5/23/22 - 6/21/22  Adjuvant hormonal therapy with Tamoxifen 7/2022 - started - Dr. Askew           Family history -   No family history of breast cancer  Genetic testing negative - 2021        OB History    No obstetric history on file.      Obstetric Comments   Menarche 14, LMP 39, # of children 2, age of 1st delivery 2013, Hysterectomy/oophorectomy No/No, Breast bx

## 2024-02-09 ENCOUNTER — HOSPITAL ENCOUNTER (OUTPATIENT)
Facility: HOSPITAL | Age: 49
End: 2024-02-09
Payer: MEDICAID

## 2024-02-09 VITALS — WEIGHT: 199 LBS | BODY MASS INDEX: 39.07 KG/M2 | HEIGHT: 60 IN

## 2024-02-09 DIAGNOSIS — Z85.3 HISTORY OF BREAST CANCER IN FEMALE: ICD-10-CM

## 2024-02-09 PROCEDURE — G0279 TOMOSYNTHESIS, MAMMO: HCPCS

## 2024-04-01 ENCOUNTER — TELEPHONE (OUTPATIENT)
Age: 49
End: 2024-04-01

## 2024-05-01 ENCOUNTER — TELEPHONE (OUTPATIENT)
Age: 49
End: 2024-05-01

## 2024-05-24 DIAGNOSIS — Z17.0 MALIGNANT NEOPLASM OF RIGHT BREAST IN FEMALE, ESTROGEN RECEPTOR POSITIVE, UNSPECIFIED SITE OF BREAST (HCC): ICD-10-CM

## 2024-05-24 DIAGNOSIS — C50.911 MALIGNANT NEOPLASM OF RIGHT BREAST IN FEMALE, ESTROGEN RECEPTOR POSITIVE, UNSPECIFIED SITE OF BREAST (HCC): ICD-10-CM

## 2024-05-24 RX ORDER — TAMOXIFEN CITRATE 20 MG/10ML
20 LIQUID ORAL DAILY
Qty: 300 ML | Refills: 0 | Status: SHIPPED | OUTPATIENT
Start: 2024-05-24

## 2024-05-24 NOTE — TELEPHONE ENCOUNTER
Oral Hormone therapy      Sherri Bone is a  48 y.o.female  diagnosed with breast cancer . Ms. Bone is being treated with tamoxifen.      Medication name: tamoxifen     Dose:  20 mg   Frequency: daily     Ordering provider: Elizabeth Mckeon DO  Start date: 7/2022        Last OV 10/16/23  Next OV  needs to be scheduled.      Refill sent in for tamoxifen (Soltamox) for 30 days until follow-up visit.         Sandra Baer, PHARMD, BCOP, BCPS      For Pharmacy Admin Tracking Only    Program: Medical Group  CPA in place:  Yes  Recommendation Provided To: Patient/Caregiver: 1 via Telephone  Intervention Detail: Refill(s) Provided  Intervention Accepted By: Patient/Caregiver: 1    Time Spent (min): 10

## 2024-05-28 ENCOUNTER — TELEPHONE (OUTPATIENT)
Age: 49
End: 2024-05-28

## 2024-05-28 NOTE — TELEPHONE ENCOUNTER
Called patient left  advising of scheduling for 6/5 at 10:45. Provided c/b # if that date and time doesn't work

## 2024-06-05 ENCOUNTER — OFFICE VISIT (OUTPATIENT)
Age: 49
End: 2024-06-05
Payer: MEDICAID

## 2024-06-05 VITALS
BODY MASS INDEX: 40.62 KG/M2 | WEIGHT: 208 LBS | TEMPERATURE: 98 F | HEART RATE: 84 BPM | RESPIRATION RATE: 16 BRPM | SYSTOLIC BLOOD PRESSURE: 111 MMHG | OXYGEN SATURATION: 95 % | DIASTOLIC BLOOD PRESSURE: 73 MMHG

## 2024-06-05 DIAGNOSIS — Z51.81 ENCOUNTER FOR MONITORING CARDIOTOXIC DRUG THERAPY: ICD-10-CM

## 2024-06-05 DIAGNOSIS — Z79.899 ENCOUNTER FOR MONITORING CARDIOTOXIC DRUG THERAPY: ICD-10-CM

## 2024-06-05 DIAGNOSIS — Z98.890 S/P LUMPECTOMY, RIGHT BREAST: ICD-10-CM

## 2024-06-05 DIAGNOSIS — Z85.3 PERSONAL HISTORY OF BREAST CANCER: ICD-10-CM

## 2024-06-05 DIAGNOSIS — Z17.0 MALIGNANT NEOPLASM OF RIGHT BREAST IN FEMALE, ESTROGEN RECEPTOR POSITIVE, UNSPECIFIED SITE OF BREAST (HCC): Primary | ICD-10-CM

## 2024-06-05 DIAGNOSIS — C50.911 MALIGNANT NEOPLASM OF RIGHT BREAST IN FEMALE, ESTROGEN RECEPTOR POSITIVE, UNSPECIFIED SITE OF BREAST (HCC): Primary | ICD-10-CM

## 2024-06-05 DIAGNOSIS — Z79.810 USE OF TAMOXIFEN (NOLVADEX): ICD-10-CM

## 2024-06-05 PROCEDURE — 99213 OFFICE O/P EST LOW 20 MIN: CPT | Performed by: NURSE PRACTITIONER

## 2024-06-05 RX ORDER — TAMOXIFEN CITRATE 20 MG/10ML
20 LIQUID ORAL DAILY
Qty: 300 ML | Refills: 11 | Status: SHIPPED | OUTPATIENT
Start: 2024-06-05

## 2024-06-05 NOTE — PROGRESS NOTES
Sherri Bone is a 48 y.o. female      Chief Complaint   Patient presents with    Follow-up     Breast Cancer       1. Have you been to the ER, urgent care clinic since your last visit?  Hospitalized since your last visit?No    2. Have you seen or consulted any other health care providers outside of the [facility] since your last visit?  Include any pap smears or colon screening. No      
   There are no suspicious internal mammary or axillary chain lymph nodes.   .   IMPRESSION   RIGHT BREAST:   1. There is a biopsy clip in the lower, outer quadrant of the right breast,   junction of the middle and deep thirds. There is linear, branching enhancement   which extends anteriorly from the biopsy clip over approximately 3.6 cm.    BI-RADS Category 6 - Known biopsy-proven malignancy.        Breast MRI 1/27/22   FINDINGS:   There is marked background parenchymal enhancement.   The breast tissue is heterogeneously dense, which could obscure detection of   small masses (approximately 51-75% glandular).        Right Breast:   In the right lateral breast at 8-9 o'clock posterior depth 10 cm from the nipple   there is susceptibility artifact from the biopsy clip but no residual   enhancement.       There is no suspicious axillary or internal mammary chain lymphadenopathy.       Left Beast:   No suspicious enhancement.       There is no suspicious axillary or internal mammary chain lymphadenopathy.       IMPRESSION:   Right Breast:   1. BI-RADS Assessment Category 6: Known biopsy proven malignancy- Appropriate   action should be taken. Right breast biopsy clip with no residual suspicious   enhancement.       Left Breast:   1. BI-RADS Assessment       3/15/22   FINAL PATHOLOGIC DIAGNOSIS    1. [place] lymph node, #1, right axilla, excision:    One lymph node identified, negative for metastatic carcinoma (0/1).    See comment.    2. [place] lymph node, #2, right axilla, excision:    One lymph node identified, negative for metastatic carcinoma (0/1).    See comment.    3. [place] lymph node, #3, right axilla, excision:    Two lymph nodes identified, negative for metastatic carcinoma (0/2).    See comment.    4. Breast, right, lumpectomy:    No residual invasive carcinoma identified after presurgical therapy.    Sclerosing adenosis with hyalinized fibrous stroma.    No in situ carcinoma or atypia identified.    
Sherri hector.    Signed By: Elizabeth Askew DO

## 2025-01-28 ENCOUNTER — TELEPHONE (OUTPATIENT)
Age: 50
End: 2025-01-28

## (undated) DEVICE — 3M™ TEGADERM™ TRANSPARENT FILM DRESSING FRAME STYLE, 1626W, 4 IN X 4-3/4 IN (10 CM X 12 CM), 50/CT 4CT/CASE: Brand: 3M™ TEGADERM™

## (undated) DEVICE — HANDLE LT SNAP ON ULT DURABLE LENS FOR TRUMPF ALC DISPOSABLE

## (undated) DEVICE — SYR 10ML LUER LOK 1/5ML GRAD --

## (undated) DEVICE — BASIN ST MAJOR-NO CAUTERY: Brand: MEDLINE INDUSTRIES, INC.

## (undated) DEVICE — SPONGE LAP 18X18IN STRL -- 5/PK

## (undated) DEVICE — SOL INJ SOD CL 0.9% 500ML BG --

## (undated) DEVICE — INSULATED BLADE ELECTRODE: Brand: EDGE

## (undated) DEVICE — CHEST PACK-SFMCASU: Brand: MEDLINE INDUSTRIES, INC.

## (undated) DEVICE — GARMENT,MEDLINE,DVT,INT,CALF,MED, GEN2: Brand: MEDLINE

## (undated) DEVICE — DECANTER BAG 9": Brand: MEDLINE INDUSTRIES, INC.

## (undated) DEVICE — DRAPE,CHEST,FENES,15X10,STERIL: Brand: MEDLINE

## (undated) DEVICE — COVER US PRB W15XL120CM W/ GEL RUBBERBAND TAPE STRP FLD GEN

## (undated) DEVICE — NEEDLE HYPO 25GA L1.5IN BVL ORIENTED ECLIPSE

## (undated) DEVICE — ROCKER SWITCH PENCIL BLADE ELECTRODE, HOLSTER: Brand: EDGE

## (undated) DEVICE — GLOVE SURG SZ 65 THK91MIL LTX FREE SYN POLYISOPRENE

## (undated) DEVICE — DRAPE,REIN 53X77,STERILE: Brand: MEDLINE

## (undated) DEVICE — INTENDED FOR TISSUE SEPARATION, AND OTHER PROCEDURES THAT REQUIRE A SHARP SURGICAL BLADE TO PUNCTURE OR CUT.: Brand: BARD-PARKER ® CARBON RIB-BACK BLADES

## (undated) DEVICE — GLOVE SURG SZ 6 L12IN FNGR THK79MIL GRN LTX FREE

## (undated) DEVICE — SOLUTION IRRIG 1000ML 0.9% SOD CHL USP POUR PLAS BTL

## (undated) DEVICE — SPONGE GZ W4XL4IN COT 12 PLY TYP VII WVN C FLD DSGN

## (undated) DEVICE — SUTURE MCRYL SZ 4-0 L27IN ABSRB UD L19MM PS-2 1/2 CIR PRIM Y426H

## (undated) DEVICE — HYPODERMIC SAFETY NEEDLE: Brand: MONOJECT

## (undated) DEVICE — DERMABOND SKIN ADH 0.7ML -- DERMABOND ADVANCED 12/BX

## (undated) DEVICE — SUT SLK 2-0SH 30IN BLK --

## (undated) DEVICE — PREP SKN CHLRAPRP APL 26ML STR --

## (undated) DEVICE — PENCIL SMK EVAC L10FT DIA95MM TBNG NONSTICK W ADPT TO 22MM

## (undated) DEVICE — C-ARM: Brand: UNBRANDED

## (undated) DEVICE — 1010 S-DRAPE TOWEL DRAPE 10/BX: Brand: STERI-DRAPE™

## (undated) DEVICE — COVER US PRB W12XL244CM FLD IORT STR TIP

## (undated) DEVICE — BRA COMPR MAMM SILKY 3XL BGE

## (undated) DEVICE — REM POLYHESIVE ADULT PATIENT RETURN ELECTRODE: Brand: VALLEYLAB

## (undated) DEVICE — CHEST PACK: Brand: MEDLINE INDUSTRIES, INC.